# Patient Record
Sex: MALE | Race: WHITE | NOT HISPANIC OR LATINO | Employment: OTHER | URBAN - METROPOLITAN AREA
[De-identification: names, ages, dates, MRNs, and addresses within clinical notes are randomized per-mention and may not be internally consistent; named-entity substitution may affect disease eponyms.]

---

## 2018-04-09 ENCOUNTER — TRANSCRIBE ORDERS (OUTPATIENT)
Dept: ADMINISTRATIVE | Facility: HOSPITAL | Age: 63
End: 2018-04-09

## 2018-04-09 DIAGNOSIS — J01.00 SUBACUTE MAXILLARY SINUSITIS: Primary | ICD-10-CM

## 2018-06-05 ENCOUNTER — HOSPITAL ENCOUNTER (INPATIENT)
Facility: HOSPITAL | Age: 63
LOS: 11 days | Discharge: HOME | DRG: 486 | End: 2018-06-16
Attending: EMERGENCY MEDICINE | Admitting: FAMILY MEDICINE
Payer: COMMERCIAL

## 2018-06-05 DIAGNOSIS — M70.51 INFRAPATELLAR BURSITIS OF RIGHT KNEE: ICD-10-CM

## 2018-06-05 DIAGNOSIS — A41.9 SEPSIS (HCC): ICD-10-CM

## 2018-06-05 DIAGNOSIS — L30.9 DERMATITIS: ICD-10-CM

## 2018-06-05 DIAGNOSIS — L03.115 CELLULITIS OF RIGHT LOWER EXTREMITY: Primary | ICD-10-CM

## 2018-06-05 DIAGNOSIS — M00.061 STAPHYLOCOCCAL ARTHRITIS OF RIGHT KNEE (HCC): ICD-10-CM

## 2018-06-05 DIAGNOSIS — F41.8 DEPRESSION WITH ANXIETY: ICD-10-CM

## 2018-06-05 DIAGNOSIS — M25.461 EFFUSION OF RIGHT KNEE: ICD-10-CM

## 2018-06-05 LAB
ALBUMIN SERPL BCP-MCNC: 3.5 G/DL (ref 3.5–5)
ALP SERPL-CCNC: 103 U/L (ref 46–116)
ALT SERPL W P-5'-P-CCNC: 74 U/L (ref 12–78)
ANION GAP SERPL CALCULATED.3IONS-SCNC: 9 MMOL/L (ref 4–13)
APTT PPP: 29 SECONDS (ref 24–36)
AST SERPL W P-5'-P-CCNC: 35 U/L (ref 5–45)
BASOPHILS # BLD AUTO: 0.03 THOUSANDS/ΜL (ref 0–0.1)
BASOPHILS NFR BLD AUTO: 0 % (ref 0–1)
BILIRUB SERPL-MCNC: 0.3 MG/DL (ref 0.2–1)
BUN SERPL-MCNC: 19 MG/DL (ref 5–25)
CALCIUM SERPL-MCNC: 9.4 MG/DL (ref 8.3–10.1)
CHLORIDE SERPL-SCNC: 97 MMOL/L (ref 100–108)
CO2 SERPL-SCNC: 28 MMOL/L (ref 21–32)
CREAT SERPL-MCNC: 0.77 MG/DL (ref 0.6–1.3)
EOSINOPHIL # BLD AUTO: 0.03 THOUSAND/ΜL (ref 0–0.61)
EOSINOPHIL NFR BLD AUTO: 0 % (ref 0–6)
ERYTHROCYTE [DISTWIDTH] IN BLOOD BY AUTOMATED COUNT: 13.3 % (ref 11.6–15.1)
GFR SERPL CREATININE-BSD FRML MDRD: 98 ML/MIN/1.73SQ M
GLUCOSE SERPL-MCNC: 95 MG/DL (ref 65–140)
HCT VFR BLD AUTO: 42.2 % (ref 36.5–49.3)
HGB BLD-MCNC: 13.5 G/DL (ref 12–17)
IMM GRANULOCYTES # BLD AUTO: 0.07 THOUSAND/UL (ref 0–0.2)
IMM GRANULOCYTES NFR BLD AUTO: 1 % (ref 0–2)
INR PPP: 0.92 (ref 0.86–1.16)
LACTATE SERPL-SCNC: 1.3 MMOL/L (ref 0.5–2)
LYMPHOCYTES # BLD AUTO: 2.17 THOUSANDS/ΜL (ref 0.6–4.47)
LYMPHOCYTES NFR BLD AUTO: 15 % (ref 14–44)
MCH RBC QN AUTO: 30.9 PG (ref 26.8–34.3)
MCHC RBC AUTO-ENTMCNC: 32 G/DL (ref 31.4–37.4)
MCV RBC AUTO: 97 FL (ref 82–98)
MONOCYTES # BLD AUTO: 1.17 THOUSAND/ΜL (ref 0.17–1.22)
MONOCYTES NFR BLD AUTO: 8 % (ref 4–12)
NEUTROPHILS # BLD AUTO: 10.75 THOUSANDS/ΜL (ref 1.85–7.62)
NEUTS SEG NFR BLD AUTO: 76 % (ref 43–75)
NRBC BLD AUTO-RTO: 0 /100 WBCS
PLATELET # BLD AUTO: 393 THOUSANDS/UL (ref 149–390)
PMV BLD AUTO: 8.6 FL (ref 8.9–12.7)
POTASSIUM SERPL-SCNC: 3.8 MMOL/L (ref 3.5–5.3)
PROT SERPL-MCNC: 8.2 G/DL (ref 6.4–8.2)
PROTHROMBIN TIME: 9.7 SECONDS (ref 9.4–11.7)
RBC # BLD AUTO: 4.37 MILLION/UL (ref 3.88–5.62)
SODIUM SERPL-SCNC: 134 MMOL/L (ref 136–145)
URATE SERPL-MCNC: 5 MG/DL (ref 4.2–8)
WBC # BLD AUTO: 14.22 THOUSAND/UL (ref 4.31–10.16)

## 2018-06-05 PROCEDURE — 84550 ASSAY OF BLOOD/URIC ACID: CPT | Performed by: NURSE PRACTITIONER

## 2018-06-05 PROCEDURE — 36415 COLL VENOUS BLD VENIPUNCTURE: CPT | Performed by: EMERGENCY MEDICINE

## 2018-06-05 PROCEDURE — 85610 PROTHROMBIN TIME: CPT | Performed by: EMERGENCY MEDICINE

## 2018-06-05 PROCEDURE — 85652 RBC SED RATE AUTOMATED: CPT | Performed by: NURSE PRACTITIONER

## 2018-06-05 PROCEDURE — 85025 COMPLETE CBC W/AUTO DIFF WBC: CPT | Performed by: EMERGENCY MEDICINE

## 2018-06-05 PROCEDURE — 99284 EMERGENCY DEPT VISIT MOD MDM: CPT

## 2018-06-05 PROCEDURE — 85730 THROMBOPLASTIN TIME PARTIAL: CPT | Performed by: EMERGENCY MEDICINE

## 2018-06-05 PROCEDURE — 99222 1ST HOSP IP/OBS MODERATE 55: CPT | Performed by: NURSE PRACTITIONER

## 2018-06-05 PROCEDURE — 87040 BLOOD CULTURE FOR BACTERIA: CPT | Performed by: EMERGENCY MEDICINE

## 2018-06-05 PROCEDURE — 83605 ASSAY OF LACTIC ACID: CPT | Performed by: NURSE PRACTITIONER

## 2018-06-05 PROCEDURE — 80053 COMPREHEN METABOLIC PANEL: CPT | Performed by: EMERGENCY MEDICINE

## 2018-06-05 RX ORDER — ZOLPIDEM TARTRATE 5 MG/1
10 TABLET ORAL
Status: DISCONTINUED | OUTPATIENT
Start: 2018-06-05 | End: 2018-06-11

## 2018-06-05 RX ORDER — SACCHAROMYCES BOULARDII 250 MG
250 CAPSULE ORAL 2 TIMES DAILY
Status: DISCONTINUED | OUTPATIENT
Start: 2018-06-06 | End: 2018-06-16 | Stop reason: HOSPADM

## 2018-06-05 RX ORDER — ACETAMINOPHEN 325 MG/1
650 TABLET ORAL ONCE
Status: DISCONTINUED | OUTPATIENT
Start: 2018-06-05 | End: 2018-06-05

## 2018-06-05 RX ORDER — SODIUM CHLORIDE 9 MG/ML
50 INJECTION, SOLUTION INTRAVENOUS CONTINUOUS
Status: DISCONTINUED | OUTPATIENT
Start: 2018-06-06 | End: 2018-06-08

## 2018-06-05 RX ORDER — ALPRAZOLAM 0.5 MG/1
0.5 TABLET ORAL 2 TIMES DAILY PRN
Status: DISCONTINUED | OUTPATIENT
Start: 2018-06-05 | End: 2018-06-16 | Stop reason: HOSPADM

## 2018-06-05 RX ORDER — HEPARIN SODIUM 5000 [USP'U]/ML
5000 INJECTION, SOLUTION INTRAVENOUS; SUBCUTANEOUS EVERY 8 HOURS SCHEDULED
Status: DISCONTINUED | OUTPATIENT
Start: 2018-06-05 | End: 2018-06-06

## 2018-06-05 RX ORDER — ZOLPIDEM TARTRATE 10 MG/1
10 TABLET ORAL
COMMUNITY

## 2018-06-05 RX ORDER — ONDANSETRON 2 MG/ML
4 INJECTION INTRAMUSCULAR; INTRAVENOUS EVERY 6 HOURS PRN
Status: DISCONTINUED | OUTPATIENT
Start: 2018-06-05 | End: 2018-06-16 | Stop reason: HOSPADM

## 2018-06-05 RX ORDER — IBUPROFEN 600 MG/1
600 TABLET ORAL ONCE
Status: COMPLETED | OUTPATIENT
Start: 2018-06-05 | End: 2018-06-05

## 2018-06-05 RX ORDER — MAGNESIUM HYDROXIDE/ALUMINUM HYDROXICE/SIMETHICONE 120; 1200; 1200 MG/30ML; MG/30ML; MG/30ML
30 SUSPENSION ORAL EVERY 6 HOURS PRN
Status: DISCONTINUED | OUTPATIENT
Start: 2018-06-05 | End: 2018-06-16 | Stop reason: HOSPADM

## 2018-06-05 RX ORDER — VENLAFAXINE 75 MG/1
75 TABLET ORAL 2 TIMES DAILY
COMMUNITY

## 2018-06-05 RX ORDER — PRAVASTATIN SODIUM 80 MG/1
80 TABLET ORAL
Status: DISCONTINUED | OUTPATIENT
Start: 2018-06-05 | End: 2018-06-16 | Stop reason: HOSPADM

## 2018-06-05 RX ORDER — OXYCODONE HYDROCHLORIDE AND ACETAMINOPHEN 5; 325 MG/1; MG/1
1 TABLET ORAL EVERY 6 HOURS PRN
Status: DISCONTINUED | OUTPATIENT
Start: 2018-06-05 | End: 2018-06-14

## 2018-06-05 RX ORDER — KETOROLAC TROMETHAMINE 30 MG/ML
30 INJECTION, SOLUTION INTRAMUSCULAR; INTRAVENOUS ONCE
Status: COMPLETED | OUTPATIENT
Start: 2018-06-05 | End: 2018-06-05

## 2018-06-05 RX ADMIN — HEPARIN SODIUM 5000 UNITS: 5000 INJECTION, SOLUTION INTRAVENOUS; SUBCUTANEOUS at 23:17

## 2018-06-05 RX ADMIN — IBUPROFEN 600 MG: 600 TABLET ORAL at 21:09

## 2018-06-05 RX ADMIN — CEFTRIAXONE 1000 MG: 1 INJECTION, SOLUTION INTRAVENOUS at 21:28

## 2018-06-05 RX ADMIN — SODIUM CHLORIDE 75 ML/HR: 0.9 INJECTION, SOLUTION INTRAVENOUS at 22:42

## 2018-06-05 RX ADMIN — VANCOMYCIN HYDROCHLORIDE 1250 MG: 10 INJECTION, POWDER, LYOPHILIZED, FOR SOLUTION INTRAVENOUS at 23:17

## 2018-06-05 RX ADMIN — PRAVASTATIN SODIUM 80 MG: 80 TABLET ORAL at 23:17

## 2018-06-05 RX ADMIN — KETOROLAC TROMETHAMINE 30 MG: 30 INJECTION, SOLUTION INTRAMUSCULAR at 21:27

## 2018-06-06 ENCOUNTER — APPOINTMENT (INPATIENT)
Dept: RADIOLOGY | Facility: HOSPITAL | Age: 63
DRG: 486 | End: 2018-06-06
Payer: COMMERCIAL

## 2018-06-06 PROBLEM — E87.1 HYPONATREMIA: Status: ACTIVE | Noted: 2018-06-06

## 2018-06-06 PROBLEM — D75.839 THROMBOCYTOSIS: Status: ACTIVE | Noted: 2018-06-06

## 2018-06-06 LAB
ANION GAP SERPL CALCULATED.3IONS-SCNC: 7 MMOL/L (ref 4–13)
APPEARANCE FLD: CLEAR
BASOPHILS # BLD AUTO: 0.03 THOUSANDS/ΜL (ref 0–0.1)
BASOPHILS NFR BLD AUTO: 0 % (ref 0–1)
BUN SERPL-MCNC: 16 MG/DL (ref 5–25)
CALCIUM SERPL-MCNC: 8.7 MG/DL (ref 8.3–10.1)
CHLORIDE SERPL-SCNC: 101 MMOL/L (ref 100–108)
CO2 SERPL-SCNC: 26 MMOL/L (ref 21–32)
COLOR FLD: ABNORMAL
CREAT SERPL-MCNC: 0.67 MG/DL (ref 0.6–1.3)
CRP SERPL QL: >90 MG/L
CRYSTALS SNV QL MICRO: NORMAL
EOSINOPHIL # BLD AUTO: 0.05 THOUSAND/ΜL (ref 0–0.61)
EOSINOPHIL NFR BLD AUTO: 0 % (ref 0–6)
ERYTHROCYTE [DISTWIDTH] IN BLOOD BY AUTOMATED COUNT: 13.4 % (ref 11.6–15.1)
ERYTHROCYTE [SEDIMENTATION RATE] IN BLOOD: 51 MM/HOUR (ref 2–10)
EST. AVERAGE GLUCOSE BLD GHB EST-MCNC: 154 MG/DL
GFR SERPL CREATININE-BSD FRML MDRD: 104 ML/MIN/1.73SQ M
GLUCOSE SERPL-MCNC: 96 MG/DL (ref 65–140)
HBA1C MFR BLD: 7 % (ref 4.2–6.3)
HCT VFR BLD AUTO: 36.5 % (ref 36.5–49.3)
HGB BLD-MCNC: 11.7 G/DL (ref 12–17)
IMM GRANULOCYTES # BLD AUTO: 0.04 THOUSAND/UL (ref 0–0.2)
IMM GRANULOCYTES NFR BLD AUTO: 0 % (ref 0–2)
LYMPHOCYTES # BLD AUTO: 2.21 THOUSANDS/ΜL (ref 0.6–4.47)
LYMPHOCYTES # SNV MANUAL: 12 %
LYMPHOCYTES NFR BLD AUTO: 19 % (ref 14–44)
MAGNESIUM SERPL-MCNC: 1.9 MG/DL (ref 1.6–2.6)
MCH RBC QN AUTO: 31 PG (ref 26.8–34.3)
MCHC RBC AUTO-ENTMCNC: 32.1 G/DL (ref 31.4–37.4)
MCV RBC AUTO: 97 FL (ref 82–98)
MONOCYTES # BLD AUTO: 0.94 THOUSAND/ΜL (ref 0.17–1.22)
MONOCYTES NFR BLD AUTO: 8 % (ref 4–12)
MONOCYTES NFR SNV MANUAL: 5 %
NEUTROPHILS # BLD AUTO: 8.42 THOUSANDS/ΜL (ref 1.85–7.62)
NEUTROPHILS NFR SNV MANUAL: 83 %
NEUTS SEG NFR BLD AUTO: 73 % (ref 43–75)
NRBC BLD AUTO-RTO: 0 /100 WBCS
PLATELET # BLD AUTO: 339 THOUSANDS/UL (ref 149–390)
PMV BLD AUTO: 9 FL (ref 8.9–12.7)
POTASSIUM SERPL-SCNC: 4.2 MMOL/L (ref 3.5–5.3)
RBC # BLD AUTO: 3.77 MILLION/UL (ref 3.88–5.62)
SITE: ABNORMAL
SODIUM SERPL-SCNC: 134 MMOL/L (ref 136–145)
TOTAL CELLS COUNTED SPEC: 100
WBC # BLD AUTO: 11.69 THOUSAND/UL (ref 4.31–10.16)
WBC # FLD MANUAL: ABNORMAL /UL (ref 0–200)

## 2018-06-06 PROCEDURE — 87186 SC STD MICRODIL/AGAR DIL: CPT | Performed by: PHYSICIAN ASSISTANT

## 2018-06-06 PROCEDURE — 87070 CULTURE OTHR SPECIMN AEROBIC: CPT | Performed by: PHYSICIAN ASSISTANT

## 2018-06-06 PROCEDURE — 80048 BASIC METABOLIC PNL TOTAL CA: CPT | Performed by: NURSE PRACTITIONER

## 2018-06-06 PROCEDURE — 87205 SMEAR GRAM STAIN: CPT | Performed by: SPECIALIST

## 2018-06-06 PROCEDURE — 20610 DRAIN/INJ JOINT/BURSA W/O US: CPT | Performed by: ORTHOPAEDIC SURGERY

## 2018-06-06 PROCEDURE — 99232 SBSQ HOSP IP/OBS MODERATE 35: CPT | Performed by: INTERNAL MEDICINE

## 2018-06-06 PROCEDURE — 0S9C3ZZ DRAINAGE OF RIGHT KNEE JOINT, PERCUTANEOUS APPROACH: ICD-10-PCS | Performed by: ORTHOPAEDIC SURGERY

## 2018-06-06 PROCEDURE — 89051 BODY FLUID CELL COUNT: CPT | Performed by: PHYSICIAN ASSISTANT

## 2018-06-06 PROCEDURE — 83735 ASSAY OF MAGNESIUM: CPT | Performed by: NURSE PRACTITIONER

## 2018-06-06 PROCEDURE — 73560 X-RAY EXAM OF KNEE 1 OR 2: CPT

## 2018-06-06 PROCEDURE — 87102 FUNGUS ISOLATION CULTURE: CPT | Performed by: SPECIALIST

## 2018-06-06 PROCEDURE — 83036 HEMOGLOBIN GLYCOSYLATED A1C: CPT | Performed by: NURSE PRACTITIONER

## 2018-06-06 PROCEDURE — 86618 LYME DISEASE ANTIBODY: CPT | Performed by: SPECIALIST

## 2018-06-06 PROCEDURE — 87476 LYME DIS DNA AMP PROBE: CPT | Performed by: SPECIALIST

## 2018-06-06 PROCEDURE — 87147 CULTURE TYPE IMMUNOLOGIC: CPT | Performed by: PHYSICIAN ASSISTANT

## 2018-06-06 PROCEDURE — 99253 IP/OBS CNSLTJ NEW/EST LOW 45: CPT | Performed by: ORTHOPAEDIC SURGERY

## 2018-06-06 PROCEDURE — 0M9N3ZZ DRAINAGE OF RIGHT KNEE BURSA AND LIGAMENT, PERCUTANEOUS APPROACH: ICD-10-PCS | Performed by: ORTHOPAEDIC SURGERY

## 2018-06-06 PROCEDURE — 85025 COMPLETE CBC W/AUTO DIFF WBC: CPT | Performed by: NURSE PRACTITIONER

## 2018-06-06 PROCEDURE — 89060 EXAM SYNOVIAL FLUID CRYSTALS: CPT | Performed by: PHYSICIAN ASSISTANT

## 2018-06-06 PROCEDURE — 87186 SC STD MICRODIL/AGAR DIL: CPT | Performed by: SPECIALIST

## 2018-06-06 PROCEDURE — 87205 SMEAR GRAM STAIN: CPT | Performed by: PHYSICIAN ASSISTANT

## 2018-06-06 PROCEDURE — 86140 C-REACTIVE PROTEIN: CPT | Performed by: NURSE PRACTITIONER

## 2018-06-06 PROCEDURE — 87070 CULTURE OTHR SPECIMN AEROBIC: CPT | Performed by: SPECIALIST

## 2018-06-06 PROCEDURE — 87147 CULTURE TYPE IMMUNOLOGIC: CPT | Performed by: SPECIALIST

## 2018-06-06 RX ORDER — HEPARIN SODIUM 5000 [USP'U]/ML
5000 INJECTION, SOLUTION INTRAVENOUS; SUBCUTANEOUS EVERY 8 HOURS SCHEDULED
Status: DISCONTINUED | OUTPATIENT
Start: 2018-06-06 | End: 2018-06-06

## 2018-06-06 RX ORDER — IBUPROFEN 400 MG/1
400 TABLET ORAL EVERY 8 HOURS SCHEDULED
Status: DISCONTINUED | OUTPATIENT
Start: 2018-06-06 | End: 2018-06-10

## 2018-06-06 RX ORDER — ACETAMINOPHEN 325 MG/1
650 TABLET ORAL EVERY 6 HOURS PRN
Status: DISCONTINUED | OUTPATIENT
Start: 2018-06-06 | End: 2018-06-10

## 2018-06-06 RX ORDER — HEPARIN SODIUM 5000 [USP'U]/ML
5000 INJECTION, SOLUTION INTRAVENOUS; SUBCUTANEOUS EVERY 12 HOURS SCHEDULED
Status: DISCONTINUED | OUTPATIENT
Start: 2018-06-06 | End: 2018-06-14

## 2018-06-06 RX ADMIN — CEFTRIAXONE 2000 MG: 2 INJECTION, SOLUTION INTRAVENOUS at 18:29

## 2018-06-06 RX ADMIN — Medication 250 MG: at 18:30

## 2018-06-06 RX ADMIN — VANCOMYCIN HYDROCHLORIDE 1250 MG: 10 INJECTION, POWDER, LYOPHILIZED, FOR SOLUTION INTRAVENOUS at 10:43

## 2018-06-06 RX ADMIN — Medication 250 MG: at 09:19

## 2018-06-06 RX ADMIN — OXYCODONE HYDROCHLORIDE AND ACETAMINOPHEN 1 TABLET: 5; 325 TABLET ORAL at 04:06

## 2018-06-06 RX ADMIN — SODIUM CHLORIDE 50 ML/HR: 0.9 INJECTION, SOLUTION INTRAVENOUS at 18:38

## 2018-06-06 RX ADMIN — HEPARIN SODIUM 5000 UNITS: 5000 INJECTION, SOLUTION INTRAVENOUS; SUBCUTANEOUS at 21:37

## 2018-06-06 RX ADMIN — HEPARIN SODIUM 5000 UNITS: 5000 INJECTION, SOLUTION INTRAVENOUS; SUBCUTANEOUS at 12:36

## 2018-06-06 RX ADMIN — ACETAMINOPHEN 650 MG: 325 TABLET ORAL at 18:30

## 2018-06-06 RX ADMIN — PRAVASTATIN SODIUM 80 MG: 80 TABLET ORAL at 18:31

## 2018-06-06 RX ADMIN — OXYCODONE HYDROCHLORIDE AND ACETAMINOPHEN 1 TABLET: 5; 325 TABLET ORAL at 21:41

## 2018-06-06 RX ADMIN — IBUPROFEN 400 MG: 400 TABLET ORAL at 21:37

## 2018-06-06 RX ADMIN — IBUPROFEN 400 MG: 400 TABLET ORAL at 14:42

## 2018-06-06 RX ADMIN — ACETAMINOPHEN 650 MG: 325 TABLET ORAL at 10:43

## 2018-06-06 RX ADMIN — ZOLPIDEM TARTRATE 10 MG: 5 TABLET ORAL at 22:35

## 2018-06-06 RX ADMIN — ENALAPRIL MALEATE 15 MG: 10 TABLET ORAL at 09:10

## 2018-06-06 RX ADMIN — ALPRAZOLAM 0.5 MG: 0.5 TABLET ORAL at 18:38

## 2018-06-06 NOTE — ASSESSMENT & PLAN NOTE
As evidenced by WBC 14 22, pulse rate 103, with suspected source of infection right lower extremity cellulitis, possible bursitis vs septic joint right knee  · Patient received IV Rocephin in ED, presently on Vancomycin IV q 12hr   · Consulted ID and orthopedics   · Gentle IV fluids in view of sepsis  · Follow-up cultures

## 2018-06-06 NOTE — CASE MANAGEMENT
Initial Clinical Review    Admission: Date/Time/Statement: 6/5/18 @ 2124     Orders Placed This Encounter   Procedures    Inpatient Admission (expected length of stay for this patient is greater than two midnights)     Standing Status:   Standing     Number of Occurrences:   1     Order Specific Question:   Admitting Physician     Answer:   Dora Landaverde [[id number]]     Order Specific Question:   Level of Care     Answer:   Med Surg [16]     Order Specific Question:   Estimated length of stay     Answer:   More than 2 Midnights     Order Specific Question:   Certification     Answer:   I certify that inpatient services are medically necessary for this patient for a duration of greater than two midnights  See H&P and MD Progress Notes for additional information about the patient's course of treatment  ED: Date/Time/Mode of Arrival:   ED Arrival Information     Expected Arrival Acuity Means of Arrival Escorted By Service Admission Type    - 6/5/2018 18:43 Urgent [facility] Urgent    Arrival Complaint    SWELLING OF THE RIGHT KNEE           Chief Complaint:   Chief Complaint   Patient presents with    Leg Swelling     Pt reports right let swollen, red, warm to touch since 5/27  PCP treating for gout  History of Illness: Roge Smart is a 66 y o  male with PMH of borderline diabetes, hypertension, hyperlipidemia who presents with right leg infection  He states he was treated for gout in right knee with colchicine by his PCP on 5/25 for 10 days  He had right knee aspiration and cortisone injection to right knee by his PCP on 5/28 in his office  He states redness has spreaded to right lower leg with edema for past 4-5 days  Right knee is extremely painful, very tender,felt like pins and needles in right knee, and pain radiates up to right hip and right buttock  He was prescribed Ultracet by PCP with minimal relief    He reports chills and cold sweats for past 4-5 days, denies fever   He denies chest pains, headaches, dizziness, SOB, nauseous, vomiting, diarrhea constipation  He denies urinary symptoms, denies URI symptoms  He denies history of MRSA  In ED, patient received Rocephin 1 g, Motrin, Toradol IV  Musculoskeletal: He exhibits edema and tenderness  He exhibits no deformity  Right knee swollen, reddened, increased warmth, tender to touch  Edema, erythema extends to right lower leg,2+ edema in right leg  Limited ROM to right knee due to pain  ED Vital Signs:   ED Triage Vitals [06/05/18 1850]   Temperature Pulse Respirations Blood Pressure SpO2   97 8 °F (36 6 °C) 103 20 147/80 99 %      Temp Source Heart Rate Source Patient Position - Orthostatic VS BP Location FiO2 (%)   Tympanic Monitor Sitting Right arm --      Pain Score       5        Wt Readings from Last 1 Encounters:   06/05/18 86 2 kg (190 lb 0 2 oz)       Vital Signs (abnormal): Abnormal Labs/Diagnostic Test Results: SEDRATE 51  WBC Thousand/uL 14 22*     PLATELETS Thousands/uL 393*   NEUTROS PCT % 76*     SODIUM mmol/L 134*     CHLORIDE mmol/L 97*   XRAY KNEE RIGHT   1   Mild degenerative changes with small suprapatellar joint effusion  If there is clinical concern for septic arthritis, consider follow-up arthrocentesis  2   Diffuse soft tissue swelling anterior to the patella, most compatible with prepatellar bursitis    3   More focal soft tissue swelling overlying the anterior tibial tubercle, suspicious for superimposed infrapatellar bursitis        ED Treatment:   Medication Administration from 06/05/2018 1843 to 06/05/2018 2229       Date/Time Order Dose Route Action Action by Comments     06/05/2018 2109 ibuprofen (MOTRIN) tablet 600 mg 600 mg Oral Given Karina Bhatia RN      06/05/2018 2127 ketorolac (TORADOL) injection 30 mg 30 mg Intravenous Given Karina Bhatia RN      06/05/2018 2128 cefTRIAXone (ROCEPHIN) IVPB (premix) 1,000 mg 1,000 mg Intravenous New Bag Karina Bhatia RN           Past Medical/Surgical History: Active Ambulatory Problems     Diagnosis Date Noted    No Active Ambulatory Problems     Resolved Ambulatory Problems     Diagnosis Date Noted    No Resolved Ambulatory Problems     Past Medical History:   Diagnosis Date    Borderline diabetes     Depression     Hyperlipidemia     Hypertension        Admitting Diagnosis: Effusion of right knee [M25 461]  Cellulitis of right lower extremity [L03 115]  Sepsis ([facility] [address] ) [A41 9]  Pain and swelling of right knee [M25 561, M25 461]    Age/Sex: 66 y o  male    Assessment/Plan:   Sepsis ([facility] [address] )   Assessment & Plan     · As evidenced by WBC 14 22, pulse rate 103, with suspected source of infection right lower extremity cellulitis, possible septic joint right knee  · Patient received IV Rocephin in ED  Will escalate to IV Vanco   · Will consult Orthopedic and ID in a deepa [place]leson · Gentle IV fluids in view of sepsis  · Will follow cultures       Cellulitis of right lower extremity   Assessment & Plan     · Suspect septic joint in right knee with extending infection to right lower leg  · Marked edema, erythema, increased warmth, tenderness to right knee  · Patient was treated with colchicine on 5/25/18 for 10 days and had knee aspiration and cortisone injection in PCP's office on 5/28 per patient  · Will order x-ray right knee  Venous Doppler to rule out DVT  ·  Start patient on IV Vanco for possible septic joint  Pain control with Percocet p r n     Warm compress  Elevation  Neurovascular check  · Check ESR, CRP, procalcitonin  Check uric acid  · Consult Orthopedic and ID in a deepa [place] Keep patient NPO after midnight until seen by Orthopedic       Hyponatremia   Assessment & Plan     Mild  Sodium 134  Gentle IV hydration with normal saline  Repeat lab in a          Depression   Assessment & Plan     Continue Effexor       Thrombocytosis (HCC)   Assessment & Plan     Mild  Platelet 792  Likely reactive to sepsis    Heparin subcu for DVT prophylaxis  Monitor       Hypertension   Assessment & Plan     Continue enalapril  Monitor       Borderline diabetes   Assessment & Plan     Glucose 95  Check A1c       Hyperlipidemia   Assessment & Plan     Continue statin         VTE Prophylaxis: Heparin  / sequential compression device   Code Status: Full code  POLST: POLST form is not discussed and not completed at this time    Anticipated Length of Stay:  Patient will be admitted on an Inpatient basis with an anticipated length of stay of  > 2 midnights  Justification for Hospital Stay:  Sepsis, RLE cellulitis, possible septic joint      Admission Orders:  GMF  ELEVATE AFFECT LIMB  CONSULT ORTHOPEDIC SURGERY  CONSULT ID  VAS LOWER LIMB VENOUS DUPLEX  BMP CBC DIFF   PROCALCTONIN  NEUROVASCULAR CHECKS  Scheduled Meds:   Current Facility-Administered Medications:  acetaminophen 650 mg Oral Q6H PRN SANCHO Clemons    ALPRAZolam 0 5 mg Oral BID PRN SANCHO Melvin    aluminum-magnesium hydroxide-simethicone 30 mL Oral Q6H PRN SANCHO Melvin    enalapril 15 mg Oral Daily SANCHO Melvin    heparin (porcine) 5,000 Units Subcutaneous Q12H [address] SANCHO Clemons    ibuprofen 400 mg Oral [facility] SANCHO Clemons    ondansetron 4 mg Intravenous Q6H PRN SANCHO Melvin    oxyCODONE-acetaminophen 1 tablet Oral Q6H PRN SANCHO Melvin    pravastatin 80 mg Oral Daily With SANCHO Macias    saccharomyces boulardii 250 mg Oral BID SANCHO Melvin    sodium chloride 50 mL/hr Intravenous Continuous SANCHO Clemons Last Rate: 75 mL/hr (06/05/18 2242)   vancomycin 15 mg/kg Intravenous Q12H SANCHO Melvin Last Rate: 1,250 mg (06/06/18 1043)   zolpidem 10 mg Oral HS PRN SANCHO Melvin      Continuous Infusions:   sodium chloride 50 mL/hr Last Rate: 75 mL/hr (06/05/18 2242)     PRN Meds:   acetaminophen    ALPRAZolam    aluminum-magnesium hydroxide-simethicone    ondansetron   oxyCODONE-acetaminophen    zolpidem

## 2018-06-06 NOTE — PLAN OF CARE
DISCHARGE PLANNING     Discharge to home or other facility with appropriate resources Progressing        INFECTION - ADULT     Absence or prevention of progression during hospitalization Progressing     Absence of fever/infection during neutropenic period Progressing        Knowledge Deficit     Patient/family/caregiver demonstrates understanding of disease process, treatment plan, medications, and discharge instructions Progressing        METABOLIC, FLUID AND ELECTROLYTES - ADULT     Glucose maintained within target range Progressing        MUSCULOSKELETAL - ADULT     Maintain or return mobility to safest level of function Progressing     Maintain proper alignment of affected body part Progressing        PAIN - ADULT     Verbalizes/displays adequate comfort level or baseline comfort level Progressing        Potential for Falls     Patient will remain free of falls Progressing        Prexisting or High Potential for Compromised Skin Integrity     Skin integrity is maintained or improved Progressing        SKIN/TISSUE INTEGRITY - ADULT     Skin integrity remains intact Progressing     Incision(s), wounds(s) or drain site(s) healing without S/S of infection Progressing

## 2018-06-06 NOTE — SEPSIS NOTE
Sepsis Note   Jose L Lesser 66 y o  male MRN: [id number]  Unit/Bed#: ED 09 Encounter: [id number]            Initial Sepsis Screening     Row Name 06/05/18 2201                Is the patient's history suggestive of a new or worsening infection? (!)  Yes (Proceed)  -CY        Suspected source of infection soft tissue  -CY        Are two or more of the following signs & symptoms of infection both present and new to the patient? (!)  Yes (Proceed)  -CY        Indicate SIRS criteria Tachycardia > 90 bpm;Leukocytosis (WBC > 09079 IJL)  -CY        If the answer is yes to both questions, suspicion of sepsis is present          If severe sepsis is present AND tissue hypoperfusion perists in the hour after fluid resuscitation or lactate > 4, the patient meets criteria for SEPTIC SHOCK          Are any of the following organ dysfunction criteria present within 6 hours of suspected infection and SIRS criteria that are NOT considered to be chronic conditions? No  -CY        Organ dysfunction          Date of presentation of severe sepsis          Time of presentation of severe sepsis          Tissue hypoperfusion persists in the hour after crystalloid fluid administration, evidenced, by either:          Was hypotension present within one hour of the conclusion of crystalloid fluid administration?           Date of presentation of septic shock          Time of presentation of septic shock            User Key  (r) = Recorded By, (t) = Taken By, (c) = Cosigned By    Initials Name Provider Type    [address], [address] Nenita [place] Nurse Practitioner

## 2018-06-06 NOTE — CASE MANAGEMENT
Continued Stay Review    Date: 6/6/18    Vital Signs: /77 (BP Location: Right arm)   Pulse 83   Temp 98 1 °F (36 7 °C) (Oral)   Resp 18   Ht 5' 9" (1 753 m)   Wt 86 2 kg (190 lb 0 2 oz)   SpO2 98%   BMI 28 06 kg/m²       GMF  SEDRATE  C REACTIVE  NEUROVASULAR CHECK  XR KNEE 1 OR 2 VIEW  MOTRIN  BMP CBC DIFF   PROCALCITONIN  Medications:   Scheduled Meds:   Current Facility-Administered Medications:  acetaminophen 650 mg Oral Q6H PRN SANCHO Allen    ALPRAZolam 0 5 mg Oral BID PRN Papo Alberto, SANCHO    aluminum-magnesium hydroxide-simethicone 30 mL Oral Q6H PRN Papo Alberto, SANCHO    enalapril 15 mg Oral Daily Cuijosé Alberto, SANCHO    heparin (porcine) 5,000 Units Subcutaneous Q12H [address] SANCHO Allen    ibuprofen 400 mg Oral On license of [facility] SANCHO Allen    ondansetron 4 mg Intravenous Q6H PRN Papo Alberto, SANCHO    oxyCODONE-acetaminophen 1 tablet Oral Q6H PRN Ppao Alberto, SANCHO    pravastatin 80 mg Oral Daily With SANCHO Macias    saccharomyces boulardii 250 mg Oral BID Cuijosé Alberto, SANCHO    sodium chloride 50 mL/hr Intravenous Continuous SANCHO Allen Last Rate: 75 mL/hr (06/05/18 2242)   vancomycin 15 mg/kg Intravenous Q12H SANCHO Melvin Last Rate: 1,250 mg (06/06/18 1043)   zolpidem 10 mg Oral HS PRN Papo Alberto, SANCHO      Continuous Infusions:   sodium chloride 50 mL/hr Last Rate: 75 mL/hr (06/05/18 2242)     PRN Meds:   acetaminophen    ALPRAZolam    aluminum-magnesium hydroxide-simethicone    ondansetron    oxyCODONE-acetaminophen    zolpidem    Abnormal Labs/Diagnostic Results:  WBC 11 69 ,HGBA1C 7 0 C REACTIVE >90 0  XRAY KNEE1  Mild degenerative changes with small suprapatellar joint effusion  If there is clinical concern for septic arthritis, consider follow-up arthrocentesis  2   Diffuse soft tissue swelling anterior to the patella, most compatible with prepatellar bursitis    3   More focal soft tissue swelling overlying the anterior tibial tubercle, suspicious for superimposed infrapatellar bursitis  Age/Sex: 66 y o  male     GENERAL SURGERY  Assessment:  1) Right knee Suprapatellar, prepatellar, infrapatellar bursitis - erythema, elevated leukocytosis, no tenderness with micro or macromotion, good AROM And PROM, not suspicious for septic arthritis, possible concurrent cellulitis vs bursitis vs gout, no history of gout, history of aspiration, no history of lyme or gonorrhea/chlamydia  2) Right knee pain - erythema present, could be due to gout; however, currently appears based on clinical history to have possible infectious etiology with bursitis, no improvement with colchicine  Plan:  1-2)   - Continue IV antibiotics  - IV fluids per SLIM  - RICE: rest, compression, elevation (no ice at this current moment because if gout arthropathy is underlying issue will necessarily alleviate pain)  - will continue to follow into tomorrow  - no acute orthopedic intervention  - can start NSAID if not contraindicated   - called outpatient PCP but their office was closed and was unable to obtain record of aspiration  - no requirement for repeat aspiration at this current moment expected with potential risk receiving the joint  - routine neurovascular exam with vitals  - repeat am labs with CBC, BMP, Mag, Phos  - will attempt to reach PCP tomorrow in order to obtain record of aspiration  - can r/o lyme or STI related reactive arthritis per SLIM with VRDL, STI panel, and PCR assay for lyme, will default to ID recommendations     ID CONSULT  This is a moved middle-age man with diabetes mellitus type 2, hypertension, hyperlipidemia, depression, presented with painful swelling and redness of the right leg particularly the knee area, not responsive to steroid injection to the right knee, given 8 days ago  He probably has septic bursitis/cellulitis, cannot rule out deeper ie , joint infection    Given his work history, previous Lyme disease, this could also be due to Lyme arthritis  Other microbial etiologies to be considered besides the usual staphylococci and streptococci, related to the steroid injection are Gram-negative bacilli, fungi, etc   Recommend aspiration of the bursas and submit for microbial diagnosis including Lyme disease PCR   Change antimicrobial treatment to ceftriaxone pending culture results  Discussed with the orthopedic surgeon  ATTENDING  Assessment & Plan     As evidenced by WBC 14 22, pulse rate 103, with suspected source of infection right lower extremity cellulitis, possible bursitis vs septic joint right knee  · Patient received IV Rocephin in ED, presently on Vancomycin IV q 12hr   · Consulted ID and orthopedics   · Gentle IV fluids in view of sepsis  · Follow-up cultures          Cellulitis of right lower extremity   Assessment & Plan     Suspect right knee bursitis vs septic joint in right knee with extending infection to right lower leg vs gout due to marked edema, erythema, increased warmth, tenderness with ROM to right knee  Patient was treated with colchicine on 5/25/18 for 10 days and had knee aspiration and cortisone injection in PCP's office on 5/28 per patient  · CRP > 90, SED rate 51, Uric Acid 5 0  · Right knee x-ray, small suprapatellar joint effusion, prepatellar bursitis, and more focal soft tissue swelling overlying the anterior tibial tubercle, suspicious for superimposed infrapatellar bursitis   If there is clinical concern for septic arthritis, consider follow-up arthrocentesis  · Venous Doppler to rule out DVT, pending   · Check Procalcitonin   · Continue Vancomycin IV q 12hrs for possible septic joint  · Pain control with scheduled Motrin and Percocet PRN  · Rest, elevation  · Neurovascular checks  · Consulted ID and orthopedic, pending consultation      Assessment & Plan     Mild  Sodium 134  Gentle IV hydration with normal saline      Repeat BMP in am          Thrombocytosis ([address] )   Assessment & Plan   Mild  Platelet 512 -> 233  Likely reactive to sepsis  Heparin subcu for DVT prophylaxis    Monitor CBC in am

## 2018-06-06 NOTE — H&P
H&P- Krishna Srinivasan 1955, 66 y o  male MRN: [id number]    Unit/Bed#: [address] [place] [address] Encounter: [id number]    Primary Care Provider: Jamie Cao MD   Date and time admitted to hospital: 6/5/2018  7:38 PM        * Sepsis [facility])   Assessment & Plan    · As evidenced by WBC 14 22, pulse rate 103, with suspected source of infection right lower extremity cellulitis, possible septic joint right knee  · Patient received IV Rocephin in ED  Will escalate to IV Vanco   · Will consult Orthopedic and ID in a [facility] · Gentle IV fluids in view of sepsis  · Will follow cultures  Cellulitis of right lower extremity   Assessment & Plan    · Suspect septic joint in right knee with extending infection to right lower leg  · Marked edema, erythema, increased warmth, tenderness to right knee  · Patient was treated with colchicine on 5/25/18 for 10 days and had knee aspiration and cortisone injection in PCP's office on 5/28 per patient  · Will order x-ray right knee  Venous Doppler to rule out DVT  ·  Start patient on IV Vanco for possible septic joint  Pain control with Percocet p r n     Warm compress  Elevation  Neurovascular check  · Check ESR, CRP, procalcitonin  Check uric acid  · Consult Orthopedic and ID in a [facility] Keep patient NPO after midnight until seen by Orthopedic  Hyponatremia   Assessment & Plan    Mild  Sodium 134  Gentle IV hydration with normal saline  Repeat lab in [facility] Depression   Assessment & Plan    Continue Effexor  Thrombocytosis (HCC)   Assessment & Plan    Mild  Platelet 431  Likely reactive to sepsis  Heparin subcu for DVT prophylaxis  Monitor  Hypertension   Assessment & Plan    Continue enalapril  Monitor  Borderline diabetes   Assessment & Plan    Glucose 95  Check A1c  Hyperlipidemia   Assessment & Plan    Continue statin              VTE Prophylaxis: Heparin  / sequential compression device   Code Status: Full code  POLST: POLST form is not discussed and not completed at this time  Anticipated Length of Stay:  Patient will be admitted on an Inpatient basis with an anticipated length of stay of  > 2 midnights  Justification for Hospital Stay:  Sepsis, RLE cellulitis, possible septic joint  Total Time for Visit, including Counseling / Coordination of Care: 45 minutes  Greater than 50% of this total time spent on direct patient counseling and coordination of care  Chief Complaint:   Right leg infection  History of Present Illness:    Reg Montoya is a 66 y o  male with PMH of borderline diabetes, hypertension, hyperlipidemia who presents with right leg infection  He states he was treated for gout in right knee with colchicine by his PCP on 5/25 for 10 days  He had right knee aspiration and cortisone injection to right knee by his PCP on 5/28 in his office  He states redness has spreaded to right lower leg with edema for past 4-5 days  Right knee is extremely painful, very tender,felt like pins and needles in right knee, and pain radiates up to right hip and right buttock  He was prescribed Ultracet by PCP with minimal relief  He reports chills and cold sweats for past 4-5 days, denies fever  He denies chest pains, headaches, dizziness, SOB, nauseous, vomiting, diarrhea constipation  He denies urinary symptoms, denies URI symptoms  He denies history of MRSA  In ED, patient received Rocephin 1 g, Motrin, Toradol IV  Review of Systems:    Review of Systems   Constitutional: Positive for chills and diaphoresis  Musculoskeletal: Positive for arthralgias and joint swelling  Skin: Positive for color change  All other systems reviewed and are negative        Past Medical and Surgical History:     Past Medical History:   Diagnosis Date    Borderline diabetes     Depression     Hyperlipidemia     Hypertension        Past Surgical History:   Procedure Laterality Date    LUMBAR EPIDURAL INJECTION      SHOULDER SURGERY Left     SPINE SURGERY         Meds/Allergies:    Prior to Admission medications    Medication Sig Start Date End Date Taking? Authorizing Provider   ALPRAZolam Jimkarlie Aldana) 0 5 mg tablet Take 0 5 mg by mouth 2 (two) times a day as needed     Yes Historical Provider, MD   ENALAPRIL MALEATE PO Take 5 mg by mouth 3 (three) times a day     Yes Historical Provider, MD   metFORMIN (GLUCOPHAGE) 1000 MG tablet Take 1,000 mg by mouth daily with breakfast   Yes Historical Provider, MD   rosuvastatin (CRESTOR) 10 MG tablet Take 10 mg by mouth daily  Yes Historical Provider, MD   venlafaxine [facility]) 75 mg tablet Take 75 mg by mouth 2 (two) times a day   Yes Historical Provider, MD   zolpidem (AMBIEN) 10 mg tablet Take 10 mg by mouth daily at bedtime as needed for sleep   Yes Historical Provider, MD   traMADol-acetaminophen (ULTRACET) 37 5-325 mg per tablet Take 1 tablet by mouth every 6 (six) hours as needed for moderate pain  Historical Provider, MD     I have reviewed home medications with patient personally  Allergies: Allergies   Allergen Reactions    Iodine Anaphylaxis    Shellfish-Derived Products        Social History:     Marital Status: Single   Occupation:  Retired  Patient Pre-hospital Living Situation:  Lives with family  Patient Pre-hospital Level of Mobility:  Independent  Patient Pre-hospital Diet Restrictions:  None  Substance Use History:   History   Alcohol Use No     History   Smoking Status    Current Some Day Smoker    Types: Cigars   Smokeless Tobacco    Never Used     History   Drug Use No       Family History:    History reviewed  No pertinent family history      Physical Exam:     Vitals:   Blood Pressure: 141/79 (06/05/18 2230)  Pulse: 87 (06/05/18 2230)  Temperature: 97 7 °F (36 5 °C) (06/05/18 2230)  Temp Source: Oral (06/05/18 2230)  Respirations: 18 (06/05/18 2230)  Height: 5' 9" (175 3 cm) (06/05/18 2230)  Weight - Scale: 86 2 kg (190 lb 0 2 oz) (06/05/18 2230)  SpO2: 98 % (06/05/18 2230)    Physical Exam   Constitutional: He is oriented to person, place, and time  He appears well-developed and well-nourished  HENT:   Head: Normocephalic and atraumatic  Neck: Normal range of motion  Neck supple  Cardiovascular: Normal rate and regular rhythm  Exam reveals no gallop and no friction rub  No murmur heard  Pulmonary/Chest: Effort normal and breath sounds normal  No respiratory distress  He has no wheezes  He has no rales  Abdominal: Soft  Bowel sounds are normal  He exhibits no distension  There is no tenderness  There is no rebound  Musculoskeletal: He exhibits edema and tenderness  He exhibits no deformity  Right knee swollen, reddened, increased warmth, tender to touch  Edema, erythema extends to right lower leg,2+ edema in right leg  Limited ROM to right knee due to pain  Neurological: He is alert and oriented to person, place, and time  Skin: There is erythema  Psychiatric:   Patient appears anxious  Nursing note and vitals reviewed  Additional Data:     Lab Results: I have personally reviewed pertinent reports  Results from last 7 days  Lab Units 06/05/18 2128   WBC Thousand/uL 14 22*   HEMOGLOBIN g/dL 13 5   HEMATOCRIT % 42 2   PLATELETS Thousands/uL 393*   NEUTROS PCT % 76*   LYMPHS PCT % 15   MONOS PCT % 8   EOS PCT % 0       Results from last 7 days  Lab Units 06/05/18  2128   SODIUM mmol/L 134*   POTASSIUM mmol/L 3 8   CHLORIDE mmol/L 97*   CO2 mmol/L 28   BUN mg/dL 19   CREATININE mg/dL 0 77   CALCIUM mg/dL 9 4   TOTAL PROTEIN g/dL 8 2   BILIRUBIN TOTAL mg/dL 0 30   ALK PHOS U/L 103   ALT U/L 74   AST U/L 35   GLUCOSE RANDOM mg/dL 95       Results from last 7 days  Lab Units 06/05/18  2128   INR  0 92       Imaging: I have personally reviewed pertinent reports  X-ray right knee pending    EKG, Pathology, and Other Studies Reviewed on Admission:   · EKG: n/a      Allscripts Records Reviewed: No  n/a     ** Please Note: Dragon 360 Dictation voice to text software may have been used in the creation of this document   **

## 2018-06-06 NOTE — ASSESSMENT & PLAN NOTE
· Suspect septic joint in right knee with extending infection to right lower leg  · Marked edema, erythema, increased warmth, tenderness to right knee  · Patient was treated with colchicine on 5/25/18 for 10 days and had knee aspiration and cortisone injection in PCP's office on 5/28 per patient  · Will order x-ray right knee  Venous Doppler to rule out DVT  ·  Start patient on IV Vanco for possible septic joint  Pain control with Percocet p r n     Warm compress  Elevation  Neurovascular check  · Check ESR, CRP, procalcitonin  Check uric acid  · [facility] Keep patient NPO after midnight until seen by Orthopedic

## 2018-06-06 NOTE — PLAN OF CARE
Problem: INFECTION - ADULT  Goal: Absence of fever/infection during neutropenic period  INTERVENTIONS:  - Monitor WBC      Outcome: Adequate for Discharge

## 2018-06-06 NOTE — ED NOTES
RT leg noted to have edema to knee,below knee and above knee  RT knee with noted area of redness,warm to touch  Patient was told by primary MD to come have leg seen in ER       Bulmaro Valladares RN  06/05/18 2005

## 2018-06-06 NOTE — SEPSIS NOTE
Sepsis Note   Robbie Arevalo 66 y o  male MRN: [id number]  Unit/Bed#: ED 09 Encounter: [id number]            Initial Sepsis Screening     Row Name 06/05/18 2201                Is the patient's history suggestive of a new or worsening infection? (!)  Yes (Proceed)  -CY        Suspected source of infection soft tissue  -CY        Are two or more of the following signs & symptoms of infection both present and new to the patient? (!)  Yes (Proceed)  -CY        Indicate SIRS criteria Tachycardia > 90 bpm;Leukocytosis (WBC > 24970 IJL)  -CY        If the answer is yes to both questions, suspicion of sepsis is present          If severe sepsis is present AND tissue hypoperfusion perists in the hour after fluid resuscitation or lactate > 4, the patient meets criteria for SEPTIC SHOCK          Are any of the following organ dysfunction criteria present within 6 hours of suspected infection and SIRS criteria that are NOT considered to be chronic conditions? No  -CY        Organ dysfunction          Date of presentation of severe sepsis          Time of presentation of severe sepsis          Tissue hypoperfusion persists in the hour after crystalloid fluid administration, evidenced, by either:          Was hypotension present within one hour of the conclusion of crystalloid fluid administration?           Date of presentation of septic shock          Time of presentation of septic shock            User Key  (r) = Recorded By, (t) = Taken By, (c) = Cosigned By    Initials Name Provider Type    [address], [address] Nenita [address] Nurse Practitioner

## 2018-06-06 NOTE — ED PROVIDER NOTES
History  Chief Complaint   Patient presents with    Leg Swelling     Pt reports right let swollen, red, warm to touch since 5/27  PCP treating for gout  24-year-old white male borderline diabetic presents with a for evaluation of red swollen right lower extremity  Patient was being treated for gout for the last 10 days and the symptoms have gotten progressively worse  No fever, no known trauma, patient complains of pain, increased tenderness, increased swelling, increased redness  History provided by:  Patient  Knee Pain   Location:  Knee  Time since incident:  1 week  Injury: no    Knee location:  R knee  Pain details:     Quality:  Aching and throbbing    Radiates to:  Does not radiate    Severity:  Moderate    Onset quality:  Gradual    Duration:  1 week    Timing:  Constant    Progression:  Worsening  Chronicity:  Recurrent  Prior injury to area:  No  Relieved by:  Nothing  Worsened by:  Bearing weight, extension and flexion  Ineffective treatments:  NSAIDs  Associated symptoms: no fever        Prior to Admission Medications   Prescriptions Last Dose Informant Patient Reported? Taking? ALPRAZolam (XANAX) 0 5 mg tablet 6/4/2018 at Unknown time  Yes Yes   Sig: Take 0 5 mg by mouth 2 (two) times a day as needed     ENALAPRIL MALEATE PO 6/5/2018 at 0800  Yes Yes   Sig: Take 5 mg by mouth 3 (three) times a day     metFORMIN (GLUCOPHAGE) 1000 MG tablet 6/5/2018 at 0800  Yes Yes   Sig: Take 1,000 mg by mouth daily with breakfast   rosuvastatin (CRESTOR) 10 MG tablet 6/4/2018 at 2100  Yes Yes   Sig: Take 10 mg by mouth daily  traMADol-acetaminophen (ULTRACET) 37 5-325 mg per tablet   Yes No   Sig: Take 1 tablet by mouth every 6 (six) hours as needed for moderate pain     venlafaxine (EFFEXOR) 75 mg tablet 6/4/2018  Yes Yes   Sig: Take 75 mg by mouth 2 (two) times a day   zolpidem (AMBIEN) 10 mg tablet   Yes Yes   Sig: Take 10 mg by mouth daily at bedtime as needed for sleep Facility-Administered Medications: None       Past Medical History:   Diagnosis Date    Borderline diabetes     Depression     Hyperlipidemia     Hypertension        Past Surgical History:   Procedure Laterality Date    LUMBAR EPIDURAL INJECTION      SHOULDER SURGERY Left     SPINE SURGERY      WOUND DEBRIDEMENT Right 6/8/2018    Procedure: RIGHT KNEE ARTHROSCOPY, IRRIGATION AND DEBRIDEMENT; RIGHT KNEE IRRIGATION AND EXTENSIVE DEBRIDEMENT OF INFECTED BURSA; Surgeon: Loraine Palmer MD;  Location: [address] [place] [address];  Service: Orthopedics    WOUND DEBRIDEMENT Right 6/11/2018    Procedure: open DEBRIDEMENT patellar bursa, arthroscopic right knee joint irrigation and debridement;  Surgeon: Loraine Palmer MD;  Location: [address] [place] [address];  Service: Orthopedics       History reviewed  No pertinent family history  I have reviewed and agree with the history as documented  Social History   Substance Use Topics    Smoking status: Current Some Day Smoker     Types: Cigars    Smokeless tobacco: Never Used    Alcohol use No        Review of Systems   Constitutional: Negative  Negative for chills and fever  HENT: Negative  Respiratory: Negative  Cardiovascular: Negative  Gastrointestinal: Negative  Genitourinary: Negative  Musculoskeletal: Negative  Skin: Positive for color change and rash  Neurological: Negative  Hematological: Negative  Psychiatric/Behavioral: Negative  All other systems reviewed and are negative  Physical Exam  Physical Exam   Constitutional: He is oriented to person, place, and time  He appears well-developed and well-nourished  HENT:   Head: Normocephalic and atraumatic  Right Ear: External ear normal    Left Ear: External ear normal    Nose: Nose normal    Mouth/Throat: Oropharynx is clear and moist    Eyes: Conjunctivae and EOM are normal    Neck: Normal range of motion  Neck supple     Cardiovascular: Normal rate, regular rhythm, normal heart sounds and intact distal pulses  Pulmonary/Chest: Effort normal and breath sounds normal    Abdominal: Soft  Bowel sounds are normal    Musculoskeletal: He exhibits edema and tenderness  Right knee: He exhibits swelling, effusion and erythema  Right lower extremity cellulitis, with knee effusion and edema   Neurological: He is alert and oriented to person, place, and time  Skin: Skin is warm  Capillary refill takes less than 2 seconds  There is erythema  Psychiatric: He has a normal mood and affect  His behavior is normal  Judgment and thought content normal    Nursing note and vitals reviewed        Vital Signs  ED Triage Vitals [06/05/18 1850]   Temperature Pulse Respirations Blood Pressure SpO2   97 8 °F (36 6 °C) 103 20 147/80 99 %      Temp Source Heart Rate Source Patient Position - Orthostatic VS BP Location FiO2 (%)   Tympanic Monitor Sitting Right arm --      Pain Score       5           Vitals:    06/14/18 2045 06/14/18 2055 06/14/18 2116 06/14/18 2138   BP: 157/91 (!) 179/93 165/88 127/91   Pulse: 97 97 98 105   Patient Position - Orthostatic VS:    Lying       Visual Acuity      ED Medications  Medications   ALPRAZolam (XANAX) tablet 0 5 mg (0 5 mg Oral Given 6/14/18 2241)   enalapril (VASOTEC) tablet 15 mg ( Oral MAR Unhold 6/14/18 2145)   pravastatin (PRAVACHOL) tablet 80 mg ( Oral MAR Unhold 6/14/18 2145)   ondansetron (ZOFRAN) injection 4 mg ( Intravenous MAR Unhold 6/14/18 2145)   aluminum-magnesium hydroxide-simethicone (MYLANTA) 200-200-20 mg/5 mL oral suspension 30 mL ( Oral MAR Unhold 6/14/18 2145)   saccharomyces boulardii (FLORASTOR) capsule 250 mg ( Oral MAR Unhold 6/14/18 2145)   venlafaxine (EFFEXOR) tablet 75 mg ( Oral MAR Unhold 6/14/18 2145)   insulin lispro (HumaLOG) 100 units/mL subcutaneous injection 1-5 Units ( Subcutaneous MAR Unhold 6/14/18 2145)   insulin lispro (HumaLOG) 100 units/mL subcutaneous injection 1-5 Units (1 Units Subcutaneous Not Given 6/14/18 2231)   polyethylene glycol (MIRALAX) packet 17 g ( Oral MAR Unhold 6/14/18 2145)   morphine injection 2 mg (2 mg Intravenous Given 6/14/18 2225)   famotidine (PEPCID) tablet 20 mg ( Oral MAR Unhold 6/14/18 2145)   ibuprofen (MOTRIN) tablet 400 mg ( Oral MAR Unhold 6/14/18 2145)   acetaminophen (TYLENOL) tablet 650 mg (650 mg Oral Given 6/14/18 2225)   sodium chloride 0 9 % infusion ( Intravenous New Bag 6/14/18 1830)   zolpidem (AMBIEN) tablet 10 mg (10 mg Oral Given 6/14/18 2241)   ALPRAZolam (XANAX) tablet 0 5 mg ( Oral MAR Unhold 6/14/18 2145)   ceFAZolin (ANCEF) IVPB (premix) 2,000 mg (2,000 mg Intravenous New Bag 6/15/18 0225)   clobetasol (TEMOVATE) 0 05 % cream ( Topical MAR Unhold 6/14/18 2145)   diphenhydrAMINE (BENADRYL) tablet 25 mg (25 mg Oral Given 6/14/18 2241)   diphenhydrAMINE-zinc acetate (BENADRYL) 2-0 1 % cream ( Topical MAR Unhold 6/14/18 2145)   oxyCODONE-acetaminophen (PERCOCET) 5-325 mg per tablet 1 tablet (1 tablet Oral Not Given 6/15/18 0328)   calamine-zinc oxide lotion ( Topical MAR Unhold 6/14/18 2145)   nystatin (MYCOSTATIN) powder 1 application ( Topical MAR Unhold 6/14/18 2145)   heparin (porcine) subcutaneous injection 5,000 Units (not administered)   docusate sodium (COLACE) capsule 100 mg (100 mg Oral Not Given 6/14/18 2241)   ibuprofen (MOTRIN) tablet 600 mg (600 mg Oral Given 6/5/18 2109)   ketorolac (TORADOL) injection 30 mg (30 mg Intravenous Given 6/5/18 2127)   cefTRIAXone (ROCEPHIN) IVPB (premix) 1,000 mg (0 mg Intravenous Stopped 6/6/18 0912)   potassium chloride (K-DUR,KLOR-CON) CR tablet 40 mEq (40 mEq Oral Given 6/8/18 0946)   sodium chloride 0 9 % infusion (75 mL/hr Intravenous New Bag 6/8/18 1529)   ceFAZolin (ANCEF) IVPB (premix) 1,000 mg (0 mg Intravenous Stopped 6/9/18 2223)   morphine injection 2 mg (2 mg Intravenous Given 6/9/18 0408)   magnesium sulfate 2 g/50 mL IVPB (premix) 2 g (0 g Intravenous Stopped 6/9/18 2223)   LORazepam (ATIVAN) 2 mg/mL injection 1 mg (1 mg Intravenous Given 6/11/18 2134)   ALPRAZolam Jimmey Maze) tablet 0 5 mg (0 5 mg Oral Given 6/12/18 0113)   diphenhydrAMINE (BENADRYL) tablet 25 mg (25 mg Oral Given 6/14/18 0428)   HYDROmorphone (DILAUDID) injection 0 5 mg (0 5 mg Intravenous Given 6/15/18 0224)       Diagnostic Studies  Results Reviewed     Procedure Component Value Units Date/Time    Blood culture #2 [[id number]] Collected:  06/05/18 2128    Lab Status:  Final result Specimen:  Blood from Arm, Right Updated:  06/11/18 1101     Blood Culture No Growth After 5 Days  Blood culture #1 [[id number]] Collected:  06/05/18 2128    Lab Status:  Final result Specimen:  Blood from Arm, Left Updated:  06/11/18 1101     Blood Culture No Growth After 5 Days  Sedimentation rate, automated [[id number]]  (Abnormal) Collected:  06/05/18 2128    Lab Status:  Final result Specimen:  Blood from Arm, Left Updated:  06/06/18 0239     Sed Rate 51 (H) mm/hour     Lactic acid, plasma [[id number]]  (Normal) Collected:  06/05/18 2218    Lab Status:  Final result Specimen:  Blood from Arm, Right Updated:  06/05/18 2249     LACTIC ACID 1 3 mmol/L     Narrative:         Result may be elevated if tourniquet was used during collection      Uric acid [[id number]]  (Normal) Collected:  06/05/18 2219    Lab Status:  Final result Specimen:  Blood from Arm, Right Updated:  06/05/18 2243     Uric Acid 5 0 mg/dL     Comprehensive metabolic panel [[id number]]  (Abnormal) Collected:  06/05/18 2128    Lab Status:  Final result Specimen:  Blood from Arm, Left Updated:  06/05/18 2156     Sodium 134 (L) mmol/L      Potassium 3 8 mmol/L      Chloride 97 (L) mmol/L      CO2 28 mmol/L      Anion Gap 9 mmol/L      BUN 19 mg/dL      Creatinine 0 77 mg/dL      Glucose 95 mg/dL      Calcium 9 4 mg/dL      AST 35 U/L      ALT 74 U/L      Alkaline Phosphatase 103 U/L      Total Protein 8 2 g/dL      Albumin 3 5 g/dL      Total Bilirubin 0 30 mg/dL      eGFR 98 ml/min/1 73sq m     Narrative:         National Kidney Disease Education Program recommendations are as follows:  GFR calculation is accurate only with a steady state creatinine  Chronic Kidney disease less than 60 ml/min/1 73 sq  meters  Kidney failure less than 15 ml/min/1 73 sq  meters  Protime-INR [[id number]]  (Normal) Collected:  06/05/18 2128    Lab Status:  Final result Specimen:  Blood from Arm, Left Updated:  06/05/18 2154     Protime 9 7 seconds      INR 0 92    APTT [[id number]]  (Normal) Collected:  06/05/18 2128    Lab Status:  Final result Specimen:  Blood from Arm, Left Updated:  06/05/18 2154     PTT 29 seconds     CBC and differential [[id number]]  (Abnormal) Collected:  06/05/18 2128    Lab Status:  Final result Specimen:  Blood from Arm, Left Updated:  06/05/18 2138     WBC 14 22 (H) Thousand/uL      RBC 4 37 Million/uL      Hemoglobin 13 5 g/dL      Hematocrit 42 2 %      MCV 97 fL      MCH 30 9 pg      MCHC 32 0 g/dL      RDW 13 3 %      MPV 8 6 (L) fL      Platelets 116 (H) Thousands/uL      nRBC 0 /100 WBCs      Neutrophils Relative 76 (H) %      Immat GRANS % 1 %      Lymphocytes Relative 15 %      Monocytes Relative 8 %      Eosinophils Relative 0 %      Basophils Relative 0 %      Neutrophils Absolute 10 75 (H) Thousands/µL      Immature Grans Absolute 0 07 Thousand/uL      Lymphocytes Absolute 2 17 Thousands/µL      Monocytes Absolute 1 17 Thousand/µL      Eosinophils Absolute 0 03 Thousand/µL      Basophils Absolute 0 03 Thousands/µL                  XR spine lumbar 2 or 3 views injury   Final Result by Von Estevez DO (06/11 1114)   Degenerative and postoperative changes  Workstation performed: [id number]         VAS lower limb venous duplex study, unilateral/limited   Final Result by Cyndee Paige MD (06/07 3654)      XR knee 1 or 2 vw right   Final Result by Von Estevez DO (06/06 1015)   1  Mild degenerative changes with small suprapatellar joint effusion    If there is clinical concern for septic arthritis, consider follow-up arthrocentesis  2   Diffuse soft tissue swelling anterior to the patella, most compatible with prepatellar bursitis  3   More focal soft tissue swelling overlying the anterior tibial tubercle, suspicious for superimposed infrapatellar bursitis  The study was marked in [facility] for immediate notification  Workstation performed: [id number]                    Procedures  Procedures       Phone Contacts  ED Phone Contact    ED Course                         Initial Sepsis Screening     [address] Name 06/05/18 2201                Is the patient's history suggestive of a new or worsening infection? (!)  Yes (Proceed)  -CY        Suspected source of infection soft tissue  -CY        Are two or more of the following signs & symptoms of infection both present and new to the patient? (!)  Yes (Proceed)  -CY        Indicate SIRS criteria Tachycardia > 90 bpm;Leukocytosis (WBC > 08855 IJL)  -CY        If the answer is yes to both questions, suspicion of sepsis is present          If severe sepsis is present AND tissue hypoperfusion perists in the hour after fluid resuscitation or lactate > 4, the patient meets criteria for SEPTIC SHOCK          Are any of the following organ dysfunction criteria present within 6 hours of suspected infection and SIRS criteria that are NOT considered to be chronic conditions? No  -CY        Organ dysfunction          Date of presentation of severe sepsis          Time of presentation of severe sepsis          Tissue hypoperfusion persists in the hour after crystalloid fluid administration, evidenced, by either:          Was hypotension present within one hour of the conclusion of crystalloid fluid administration?           Date of presentation of septic shock          Time of presentation of septic shock            User Key  (r) = Recorded By, (t) = Taken By, (c) = Cosigned By    Initials Name Provider Type    Heladio Young, [facility] Nurse Practitioner                  [facility]  [address] Time    Disposition  Final diagnoses:   Cellulitis of right lower extremity   Effusion of right knee     Time reflects when diagnosis was documented in both MDM as applicable and the Disposition within this note     Time User Action Codes Description Comment    6/5/2018  9:19 PM Cyntha Campi Add [A90 976] Cellulitis of right lower extremity     6/5/2018  9:19 PM Cyntha Campi Add [M25 461] Effusion of right knee     6/5/2018 10:12 PM Alexandria Deep Modify [X87 174] Cellulitis of right lower extremity     6/5/2018 10:13 PM Geoffrey Deep Modify [L03 115] Cellulitis of right lower extremity     6/5/2018 10:13 PM Geoffrey Deep Add [A41 9] Sepsis ([address] )     6/5/2018 10:13 PM Geoffrey Deep Modify [H02 975] Cellulitis of right lower extremity     6/5/2018 10:13 PM Alexandria Deep Modify [A41 9] Sepsis ([address] )     6/5/2018 10:13 PM Alexandria Deep Modify [G10 967] Cellulitis of right lower extremity     6/5/2018 10:14 PM Alexandria Deep Modify [A91 705] Cellulitis of right lower extremity     6/8/2018  9:59 AM Fede Spikes Add [M70 51] Infrapatellar bursitis of right knee     6/8/2018  5:52 PM Cassia Henderson Modify [C97 187] Cellulitis of right lower extremity     6/8/2018  5:52 PM Cassia Henderson Modify [M70 51] Infrapatellar bursitis of right knee     6/9/2018  2:12 PM Arliss Sours Modify [J28 537] Cellulitis of right lower extremity     6/9/2018  2:12 PM Arliss Sours Modify [M70 51] Infrapatellar bursitis of right knee     6/11/2018  9:28 PM Alexandria Deep Add [F41 8] Depression with anxiety     6/13/2018  1:31 PM Vick Welch Add [M00 061] Staphylococcal arthritis of right knee ([address] )     6/13/2018  1:31 PM Vick Welch Modify [M00 061] Staphylococcal arthritis of right knee [facility])       ED Disposition     ED Disposition Condition Comment    Admit  Case was discussed with NP and the patient's admission status was agreed to be Admission Status: inpatient status to the service of Dr Mariaelena Guerrero   Follow-up Information    None         Current Discharge Medication List      CONTINUE these medications which have NOT CHANGED    Details   ALPRAZolam (XANAX) 0 5 mg tablet Take 0 5 mg by mouth 2 (two) times a day as needed        ENALAPRIL MALEATE PO Take 5 mg by mouth 3 (three) times a day        metFORMIN (GLUCOPHAGE) 1000 MG tablet Take 1,000 mg by mouth daily with breakfast      rosuvastatin (CRESTOR) 10 MG tablet Take 10 mg by mouth daily  venlafaxine (EFFEXOR) 75 mg tablet Take 75 mg by mouth 2 (two) times a day      zolpidem (AMBIEN) 10 mg tablet Take 10 mg by mouth daily at bedtime as needed for sleep      traMADol-acetaminophen (ULTRACET) 37 5-325 mg per tablet Take 1 tablet by mouth every 6 (six) hours as needed for moderate pain  No discharge procedures on file      ED Provider  Electronically Signed by           Adair Proctor MD  06/05/18 0877 MD Puma  06/15/18 2246

## 2018-06-06 NOTE — SOCIAL WORK
SW met with pt to assess needs and discuss plans  Discussed goals of making sure pt's needs are met upon discharge, pt's preferences are taken into account, pt understands her health condition, medications and symptoms to watch for after returning home and pt is aware of any follow up appointments recommended by hospital physician  Pt lives alone  Independent, driving  No need for DME or HHC in the past   Pt's PCP is Dr Carie Hu MD   Preferred pharmacy is [facility]  Per pt he has prescription coverage and no difficulty getting his medication as prescribed  Pt's plan is to return home when discharged  No discharge needs expressed or anticipated by pt at this time  Offered support in future if needed

## 2018-06-06 NOTE — ASSESSMENT & PLAN NOTE
Mild   Platelet 353 -> 913  Likely reactive to sepsis  Heparin subcu for DVT prophylaxis    Monitor CBC in am

## 2018-06-06 NOTE — CONSULTS
Consultation - Orthopedics   Brenda Srinivasan 66 y o  male MRN: [id number]  Unit/Bed#: 2 [place] [address] Encounter: [id number]      Assessment/Plan     Assessment:  1) Right knee Suprapatellar, prepatellar, infrapatellar bursitis - erythema, elevated leukocytosis, no tenderness with micro or macromotion, good AROM And PROM, not suspicious for septic arthritis, possible concurrent cellulitis vs bursitis vs gout, no history of gout, history of aspiration, no history of lyme or gonorrhea/chlamydia  2) Right knee pain - erythema present, could be due to gout; however, currently appears based on clinical history to have possible infectious etiology with bursitis, no improvement with colchicine    Plan:  1-2)   - Continue IV antibiotics  - IV fluids per SLIM  - RICE: rest, compression, elevation (no ice at this current moment because if gout arthropathy is underlying issue will necessarily alleviate pain)  - will continue to follow into tomorrow  - no acute orthopedic intervention  - can start NSAID if not contraindicated   - called outpatient PCP but their office was closed and was unable to obtain record of aspiration  - no requirement for repeat aspiration at this current moment expected with potential risk receiving the joint  - routine neurovascular exam with vitals  - repeat am labs with CBC, BMP, Mag, Phos  - will attempt to reach PCP tomorrow in order to obtain record of aspiration  - can r/o lyme or STI related reactive arthritis per SLIM with VRDL, STI panel, and PCR assay for lyme, will default to ID recommendations    History of Present Illness   Physician Requesting Consult: Abby Cote MD  Reason for Consult / Principal Problem: right knee pain  HPI: Leonidas Loyola is a 66y o  year old male with a past medical history pertinent for borderline diabetes, hypertension, hyperlipidemia presenting to the orthopedic surgery service with right knee pain   Patient reports that in the 3rd week of May he began to start to have right sided knee pain  Patient reports and initial onset of very sharp and very severe pain that was exacerbated with even just light touch and range of motion of the knee  Patient reports that it was erythematous on the top of the kneecap and was swollen just on top of the kneecap  Patient reports that on 5/28/18 he visited his family doctor at which point his doctor was suspicious of in gout arthropathy  Patient had aspiration and injection of steroid at his family doctor  Patient was then put on colchicine for 10 day course  Patient reports afterwards he had felt better initially but his pain still persisted  Patient reports that the pain, over the last week, has become more of a moderate to severe dull/aching around the knee circumferentially but primarily around the prepatellar comma suprapatellar, infrapatellar regions  Patient reports that his active and passive range of motion are limited better it improved compared to his initial visit with family doctor  Patient reports that his erythema is become more diffuse extending throughout the entire knee  Patient reports some chills but denies fevers  Patient denies any surgical history on his knee  Patient denies any sexually transmitted diseases such as chlamydia gonorrhea  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 and has had tick bites but never any sort of presentation of Lyme disease  Patient currently has the ability to flex and extend his knee from approximately 15-90 degrees without much tenderness  Patient since hospitalization and administration of antibiotics as reported that he has felt some improvement in his right knee  Patient denies any history of trauma but 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  Patient denies any history of prior exacerbations of gout      Inpatient consult to Orthopedic Surgery  Consult performed by: Bhavana Carrion T  Consult ordered by: Araseli Hathaway          Review of Systems   Constitutional: Positive for chills  Negative for fatigue and fever  Respiratory: Negative for chest tightness, shortness of breath and wheezing  Cardiovascular: Negative for chest pain and palpitations  Gastrointestinal: Negative for abdominal distention, abdominal pain, constipation, diarrhea and vomiting  Genitourinary: Negative for difficulty urinating, dysuria and flank pain  Musculoskeletal: Positive for arthralgias, gait problem, joint swelling and myalgias  Skin: Positive for color change  Neurological: Negative for weakness and numbness         Historical Information   Past Medical History:   Diagnosis Date    Borderline diabetes     Depression     Hyperlipidemia     Hypertension      Past Surgical History:   Procedure Laterality Date    LUMBAR EPIDURAL INJECTION      SHOULDER SURGERY Left     SPINE SURGERY       Social History   History   Alcohol Use No     History   Drug Use No     History   Smoking Status    Current Some Day Smoker    Types: Cigars   Smokeless Tobacco    Never Used     Family History: non-contributory    Meds/Allergies   all current active meds have been reviewed and current meds:   Current Facility-Administered Medications   Medication Dose Route Frequency    acetaminophen (TYLENOL) tablet 650 mg  650 mg Oral Q6H PRN    ALPRAZolam (XANAX) tablet 0 5 mg  0 5 mg Oral BID PRN    aluminum-magnesium hydroxide-simethicone (MYLANTA) 200-200-20 mg/5 mL oral suspension 30 mL  30 mL Oral Q6H PRN    enalapril (VASOTEC) tablet 15 mg  15 mg Oral Daily    heparin (porcine) subcutaneous injection 5,000 Units  5,000 Units Subcutaneous Q12H KERLINE    ibuprofen (MOTRIN) tablet 400 mg  400 mg Oral Q8H [address]    ondansetron (ZOFRAN) injection 4 mg  4 mg Intravenous Q6H PRN    oxyCODONE-acetaminophen (PERCOCET) 5-325 mg per tablet 1 tablet  1 tablet Oral Q6H PRN    pravastatin (PRAVACHOL) tablet 80 mg  80 mg Oral Daily With Dinner    saccharomyces boulardii (FLORASTOR) capsule 250 mg  250 mg Oral BID    sodium chloride 0 9 % infusion  50 mL/hr Intravenous Continuous    vancomycin (VANCOCIN) 1,250 mg in sodium chloride 0 9 % 250 mL IVPB  15 mg/kg Intravenous Q12H    zolpidem (AMBIEN) tablet 10 mg  10 mg Oral HS PRN     Allergies   Allergen Reactions    Iodine Anaphylaxis    Shellfish-Derived Products        Objective   Vitals: Blood pressure 144/77, pulse 83, temperature 98 1 °F (36 7 °C), temperature source Oral, resp  rate 18, height 5' 9" (1 753 m), weight 86 2 kg (190 lb 0 2 oz), SpO2 98 %  ,Body mass index is 28 06 kg/m²  No intake or output data in the 24 hours ending 06/06/18 1255  No intake/output data recorded  Invasive Devices     Peripheral Intravenous Line            Peripheral IV 06/05/18 Left Antecubital less than 1 day                Physical Exam   Constitutional: He is oriented to person, place, and time  Vital signs are normal  He appears well-developed and well-nourished  He is cooperative  He does not have a sickly appearance  He does not appear ill  No distress  HENT:   Head: Normocephalic and atraumatic  Right Ear: External ear normal  Decreased hearing is noted  Left Ear: External ear normal  Decreased hearing is noted  Nose: Nose normal    Cardiovascular: Normal rate  Pulses:       Radial pulses are 2+ on the right side, and 2+ on the left side  Dorsalis pedis pulses are 2+ on the right side, and 2+ on the left side  Pulmonary/Chest: Breath sounds normal  No accessory muscle usage  No respiratory distress  He has no decreased breath sounds  Musculoskeletal:        Right knee: He exhibits effusion  Left knee: He exhibits no effusion  Neurological: He is alert and oriented to person, place, and time  He has normal strength  No sensory deficit  GCS eye subscore is 4  GCS verbal subscore is 5  GCS motor subscore is 6     Skin:          Right Ankle Exam   Right ankle exam is normal   Swelling: none    Tenderness   The patient is experiencing no tenderness  Range of Motion   The patient has normal right ankle ROM  Muscle Strength   The patient has normal right ankle strength  Other   Erythema: absent  Sensation: normal  Pulse: present     Comments:  Significant 3+ edema up the calf and over the tibial area      Left Ankle Exam   Left ankle exam is normal   Swelling: none    Tenderness   The patient is experiencing no tenderness  Range of Motion   The patient has normal left ankle ROM  Muscle Strength   The patient has normal left ankle strength  Other   Erythema: absent  Sensation: normal  Pulse: present      Right Knee Exam     Tenderness   The patient is experiencing tenderness in the lateral joint line, medial joint line, patella and patellar tendon  Range of Motion   Extension:  15 abnormal   Flexion:  90 abnormal     Tests   Lachman:  Anterior - negative    Posterior - negative  Varus: negative  Valgus: negative    Other   Erythema: present  Sensation: normal  Swelling: moderate  Other tests: effusion present    Comments:  Prepatellar, suprapatellar, and infrapatellar bursitis      Left Knee Exam     Tenderness   The patient is experiencing no tenderness  Range of Motion   The patient has normal left knee ROM  Other   Erythema: absent  Sensation: normal  Swelling: none  Effusion: no effusion present      Right Hip Exam   Right hip exam is normal      Tenderness   The patient is experiencing no tenderness  Range of Motion   The patient has normal right hip ROM  Muscle Strength   The patient has normal right hip strength  Left Hip Exam   Left hip exam is normal     Tenderness   The patient is experiencing no tenderness  Range of Motion   The patient has normal left hip ROM  Muscle Strength   The patient has normal left hip strength  Lab Results:   I have personally reviewed pertinent lab results    CBC: Lab Results   Component Value Date    WBC 11 69 (H) 06/06/2018    HGB 11 7 (L) 06/06/2018    HCT 36 5 06/06/2018    MCV 97 06/06/2018     06/06/2018    MCH 31 0 06/06/2018    MCHC 32 1 06/06/2018    RDW 13 4 06/06/2018    MPV 9 0 06/06/2018    NRBC 0 06/06/2018     CMP:   Lab Results   Component Value Date     (L) 06/06/2018     06/06/2018    CO2 26 06/06/2018    ANIONGAP 7 06/06/2018    BUN 16 06/06/2018    CREATININE 0 67 06/06/2018    GLUCOSE 96 06/06/2018    CALCIUM 8 7 06/06/2018    AST 35 06/05/2018    ALT 74 06/05/2018    ALKPHOS 103 06/05/2018    PROT 8 2 06/05/2018    BILITOT 0 30 06/05/2018    EGFR 104 06/06/2018     Imaging Studies: I have personally reviewed pertinent reports  EKG, Pathology, and Other Studies: I have personally reviewed pertinent reports  VTE Prophylaxis: Heparin    Code Status: Level 1 - Full Code    Counseling / Coordination of Care  Total floor / unit time spent today 45 minutes  Greater than 50% of total time was spent with the patient and / or family counseling and / or coordination of care  A description of the counseling / coordination of care:  Developing plan, reviewing with attending, reviewing imaging, reviewing labs, physical exam, patient history, patient education, coordinating with im

## 2018-06-06 NOTE — PROGRESS NOTES
Progress Note - Kwaku Enter 1955, 66 y o  male MRN: [id number]    Unit/Bed#: 2 [place] [address] Encounter: [id number]    Primary Care Provider: Sunshine Parry MD   Date and time admitted to hospital: 6/5/2018  7:38 PM        * Sepsis [facility])   Assessment & Plan    As evidenced by WBC 14 22, pulse rate 103, with suspected source of infection right lower extremity cellulitis, possible bursitis vs septic joint right knee  · Patient received IV Rocephin in ED, presently on Vancomycin IV q 12hr   · Consulted ID and orthopedics   · Gentle IV fluids in view of sepsis  · Follow-up cultures        Cellulitis of right lower extremity   Assessment & Plan    Suspect right knee bursitis vs septic joint in right knee with extending infection to right lower leg vs gout due to marked edema, erythema, increased warmth, tenderness with ROM to right knee  Patient was treated with colchicine on 5/25/18 for 10 days and had knee aspiration and cortisone injection in PCP's office on 5/28 per patient  · CRP > 90, SED rate 51, Uric Acid 5 0  · Right knee x-ray, small suprapatellar joint effusion, prepatellar bursitis, and more focal soft tissue swelling overlying the anterior tibial tubercle, suspicious for superimposed infrapatellar bursitis   If there is clinical concern for septic arthritis, consider follow-up arthrocentesis  · Venous Doppler to rule out DVT, pending   · Check Procalcitonin   · Continue Vancomycin IV q 12hrs for possible septic joint  · Pain control with scheduled Motrin and Percocet PRN  · Rest, elevation  · Neurovascular checks  · Consulted ID and orthopedic, pending consultation         Depression   Assessment & Plan    Continue Effexor  Hyponatremia   Assessment & Plan    Mild  Sodium 134  Gentle IV hydration with normal saline  Repeat BMP in am        Thrombocytosis (HCC)   Assessment & Plan    Mild  Platelet 559 -> 764  Likely reactive to sepsis  Heparin subcu for DVT prophylaxis    Monitor CBC in am         Hypertension   Assessment & Plan    Continue Enalapril  Monitor  Borderline diabetes   Assessment & Plan    Check A1c 7 0  Enforce TLCs  Outpatient F/U        Hyperlipidemia   Assessment & Plan    Continue statin  VTE Pharmacologic Prophylaxis:   Pharmacologic: Heparin  Mechanical VTE Prophylaxis in Place: Yes    Patient Centered Rounds: I have performed bedside rounds with nursing staff today  Discussions with Specialists or Other Care Team Provider: Nursing, CM, orthopedics     Education and Discussions with Family / Patient: I have answered all questions to the best of my ability  Family at bedside  Time Spent for Care: 20 minutes  More than 50% of total time spent on counseling and coordination of care as described above  Current Length of Stay: 1 day(s)    Current Patient Status: Inpatient   Certification Statement: The patient will continue to require additional inpatient hospital stay due to sepsis due to cellulitis, r/o septic arthritis     Discharge Plan: Patient is not medically stable for discharge, likely in 48-72 hours pending progress  Code Status: Level 1 - Full Code      Subjective:   Patient resting in bed  Reports right knee erythema, edema, pain with ROM  Pain control with oral analgesics  No other complaints, denies HA, lightheadedness, dizziness, chest pain, SOB, abdominal pain, N/V/D  Objective:     Vitals:   Temp (24hrs), Av 9 °F (36 6 °C), Min:97 6 °F (36 4 °C), Max:98 1 °F (36 7 °C)    HR:  [] 97  Resp:  [18-20] 18  BP: (129-154)/(61-85) 129/61  SpO2:  [97 %-99 %] 97 %  Body mass index is 28 06 kg/m²  Input and Output Summary (last 24 hours):     No intake or output data in the 24 hours ending 18 5624    Physical Exam:     Physical Exam   Constitutional: He is oriented to person, place, and time  He appears well-developed  No distress  HENT:   Head: Normocephalic  Neck: Normal range of motion     Cardiovascular: Normal rate and regular rhythm  Pulmonary/Chest: Effort normal and breath sounds normal  No respiratory distress  He has no wheezes  He has no rhonchi  He has no rales  Abdominal: Soft  Bowel sounds are normal  He exhibits no distension  There is no tenderness  Musculoskeletal: He exhibits edema (+3 RLE with significant swelling to inferior and superior aspect of right knee ) and tenderness (right knee with ROM)  Neurological: He is alert and oriented to person, place, and time  Skin: Skin is warm and dry  He is not diaphoretic  There is erythema  Psychiatric: He has a normal mood and affect  His behavior is normal    Nursing note and vitals reviewed  Additional Data:     Labs:      Results from last 7 days  Lab Units 06/06/18  0711   WBC Thousand/uL 11 69*   HEMOGLOBIN g/dL 11 7*   HEMATOCRIT % 36 5   PLATELETS Thousands/uL 339   NEUTROS PCT % 73   LYMPHS PCT % 19   MONOS PCT % 8   EOS PCT % 0       Results from last 7 days  Lab Units 06/06/18  0711 06/05/18  2128   SODIUM mmol/L 134* 134*   POTASSIUM mmol/L 4 2 3 8   CHLORIDE mmol/L 101 97*   CO2 mmol/L 26 28   BUN mg/dL 16 19   CREATININE mg/dL 0 67 0 77   CALCIUM mg/dL 8 7 9 4   TOTAL PROTEIN g/dL  --  8 2   BILIRUBIN TOTAL mg/dL  --  0 30   ALK PHOS U/L  --  103   ALT U/L  --  74   AST U/L  --  35   GLUCOSE RANDOM mg/dL 96 95       Results from last 7 days  Lab Units 06/05/18  2128   INR  0 92       * I Have Reviewed All Lab Data Listed Above  * Additional Pertinent Lab Tests Reviewed:  All Labs Within Last 24 Hours Reviewed    Imaging:    Imaging Reports Reviewed Today Include: Right Knee Xray  Imaging Personally Reviewed by Myself Includes:  None     Recent Cultures (last 7 days):           Last 24 Hours Medication List:     Current Facility-Administered Medications:  acetaminophen 650 mg Oral Q6H PRN SANCHO Hilario    ALPRAZolam 0 5 mg Oral BID PRN SANCHO Melvin    aluminum-magnesium hydroxide-simethicone 30 mL Oral Q6H PRN SANCHO Melvin    enalapril 15 mg Oral Daily SANCHO Melvin    heparin (porcine) 5,000 Units Subcutaneous Q12H [address] SANCHO Root    ibuprofen 400 mg Oral [facility] SANCHO Root    ondansetron 4 mg Intravenous Q6H PRN SANCHO Helton    oxyCODONE-acetaminophen 1 tablet Oral Q6H PRN SANCHO Melvin    pravastatin 80 mg Oral Daily With SANCHO Macias    saccharomyces boulardii 250 mg Oral BID SANCHO Melvin    sodium chloride 50 mL/hr Intravenous Continuous SANCHO Root Last Rate: 50 mL/hr (06/06/18 1240)   vancomycin 15 mg/kg Intravenous Q12H SANCHO Melvin Last Rate: 1,250 mg (06/06/18 1043)   zolpidem 10 mg Oral HS PRN SANCHO Helton         Today, Patient Was Seen By: SANCHO Root    ** Please Note: Dictation voice to text software may have been used in the creation of this document   **

## 2018-06-06 NOTE — PLAN OF CARE
Problem: INFECTION - ADULT  Goal: Absence of fever/infection during neutropenic period  INTERVENTIONS:  - Monitor WBC      Outcome: Completed Date Met: 06/06/18

## 2018-06-06 NOTE — ASSESSMENT & PLAN NOTE
Suspect right knee bursitis vs septic joint in right knee with extending infection to right lower leg vs gout due to marked edema, erythema, increased warmth, tenderness with ROM to right knee  Patient was treated with colchicine on 5/25/18 for 10 days and had knee aspiration and cortisone injection in PCP's office on 5/28 per patient  · CRP > 90, SED rate 51, Uric Acid 5 0  · Right knee x-ray, small suprapatellar joint effusion, prepatellar bursitis, and more focal soft tissue swelling overlying the anterior tibial tubercle, suspicious for superimposed infrapatellar bursitis   If there is clinical concern for septic arthritis, consider follow-up arthrocentesis    · Venous Doppler to rule out DVT, pending   · Check Procalcitonin   · Continue Vancomycin IV q 12hrs for possible septic joint  · Pain control with scheduled Motrin and Percocet PRN  · Rest, elevation  · Neurovascular checks  · Consulted ID and orthopedic, pending consultation

## 2018-06-06 NOTE — ASSESSMENT & PLAN NOTE
· As evidenced by WBC 14 22, pulse rate 103, with suspected source of infection right lower extremity cellulitis, possible septic joint right knee  · Patient received IV Rocephin in ED  Will escalate to IV Vanco   · Will consult Orthopedic and ID in a deepa Ocampo · Gentle IV fluids in view of sepsis  · Will follow cultures

## 2018-06-06 NOTE — CONSULTS
Consultation - Infectious Disease   Galen Srinivasan 66 y o  male MRN: [id number]  Unit/Bed#: [address] [place] [address] Encounter: [id number]        History of Present Illness   Physician Requesting Consult: Lashanda Diane MD  Reason for Consult / Principal Problem:  Sepsis/cellulitis of lower extremities    HPI: Jeaneth Plunkett is a 66y o  year old male who has diabetes mellitus type 2, hypertension, hyperlipidemia, depression, was in usual state of his health till about 8 or 10 days ago when he had an abrupt onset of right knee swelling and pain  He sought medical attention with his primary physician, Isma Martinez, on May 29, 2018  The Dr  suspected acute gout, and according to the patient, he gave him an injection of steroid in the right knee  Over the course of the week, his pain and swelling did not improve, in fact the redness and swelling worsened, spread to involve the leg  His primary physician then advised him to seek medical attention at the hospital   He was hospitalized yesterday  In the ER he was given a dose of ceftriaxone 1 g and since then he has been on vancomycin  The Infectious Diseases consult is requested for further intervention  Patient does not recall having any fever or chills  He had some malaise related to the pain  He has not had any sore throat, chest pain, cough or phlegm  He has not had any abdominal pain, nausea, vomiting, diarrhea or dysuria  He has not had any recent travel, any sick contacts  No recent antimicrobial use  He is retired , works outdoors still  His works  requires excavation, working in [employer]  He has had tick bites  About 2 or 3 years ago he was diagnosed to have Lyme disease  He denies any substance abuse, and injection drug use or cigarette smoking or alcohol abuse  Allergies to iodine and shellfish noted        ,Inpatient consult to Infectious Diseases  Consult performed by: Darlene Avila ordered by: Jennifer Rios of [employer]   Constitutional: Positive for activity change, appetite change and fatigue  HENT: Positive for hearing loss  Eyes: Negative  Respiratory: Negative  Cardiovascular: Negative  Gastrointestinal: Negative  Endocrine: Negative  Genitourinary: Negative  Musculoskeletal: Positive for joint swelling  Skin: Positive for color change  Allergic/Immunologic: Positive for food allergies  Neurological: Negative  Hematological: Negative  Psychiatric/Behavioral: Negative  Historical Information   Past Medical History:   Diagnosis Date    Borderline diabetes     Depression     Hyperlipidemia     Hypertension      Past Surgical History:   Procedure Laterality Date    LUMBAR EPIDURAL INJECTION      SHOULDER SURGERY Left     SPINE SURGERY       Social History   History   Alcohol Use No     History   Drug Use No     History   Smoking Status    Current Some Day Smoker    Types: Cigars   Smokeless Tobacco    Never Used     Family History: History reviewed  No pertinent family history      Meds/Allergies     Current Facility-Administered Medications:     acetaminophen (TYLENOL) tablet 650 mg, 650 mg, Oral, Q6H PRN, SANCHO Chau, 650 mg at 06/06/18 1043    ALPRAZolam (XANAX) tablet 0 5 mg, 0 5 mg, Oral, BID PRN, SANCHO Melvin    aluminum-magnesium hydroxide-simethicone (MYLANTA) 200-200-20 mg/5 mL oral suspension 30 mL, 30 mL, Oral, Q6H PRN, SANCHO Melvin    enalapril (VASOTEC) tablet 15 mg, 15 mg, Oral, Daily, SANCHO Melvin, 15 mg at 06/06/18 0910    heparin (porcine) subcutaneous injection 5,000 Units, 5,000 Units, Subcutaneous, Q12H [facility], 5,000 Units at 06/06/18 1236 **AND** [CANCELED] Platelet count, , , Once, SANCHO Melvin    ibuprofen (MOTRIN) tablet 400 mg, 400 mg, Oral, Q8H [facility], SANCHO Chau, 400 mg at 06/06/18 1442    ondansetron (ZOFRAN) injection 4 mg, 4 mg, Intravenous, Q6H PRN, SANCHO Mcclure   oxyCODONE-acetaminophen (PERCOCET) 5-325 mg per tablet 1 tablet, 1 tablet, Oral, Q6H PRN, SANCHO Melvin, 1 tablet at 06/06/18 0406    pravastatin (PRAVACHOL) tablet 80 mg, 80 mg, Oral, Daily With Dinner, Papo Alberto, SANCHO, 80 mg at 06/05/18 2317    saccharomyces boulardii (FLORASTOR) capsule 250 mg, 250 mg, Oral, BID, KORIN MelvinNP, 250 mg at 06/06/18 0919    sodium chloride 0 9 % infusion, 50 mL/hr, Intravenous, Continuous, KORIN UpNP, Last Rate: 50 mL/hr at 06/06/18 1240, 50 mL/hr at 06/06/18 1240    vancomycin (VANCOCIN) 1,250 mg in sodium chloride 0 9 % 250 mL IVPB, 15 mg/kg, Intravenous, Q12H, SANCHO Melvin, Last Rate: 166 7 mL/hr at 06/06/18 1043, 1,250 mg at 06/06/18 1043    zolpidem (AMBIEN) tablet 10 mg, 10 mg, Oral, HS PRN, Papo Alberto, KORINNP  Allergies   Allergen Reactions    Iodine Anaphylaxis    Shellfish-Derived Products      all current active meds have been reviewed    PTA meds:    Prescriptions Prior to Admission   Medication Sig Dispense Refill Last Dose    ALPRAZolam (XANAX) 0 5 mg tablet Take 0 5 mg by mouth 2 (two) times a day as needed     6/4/2018 at Unknown time    ENALAPRIL MALEATE PO Take 5 mg by mouth 3 (three) times a day     6/5/2018 at 0800    metFORMIN (GLUCOPHAGE) 1000 MG tablet Take 1,000 mg by mouth daily with breakfast   6/5/2018 at 0800    rosuvastatin (CRESTOR) 10 MG tablet Take 10 mg by mouth daily  6/4/2018 at 2100    [facility]) 75 mg tablet Take 75 mg by mouth 2 (two) times a day   6/4/2018    zolpidem (AMBIEN) 10 mg tablet Take 10 mg by mouth daily at bedtime as needed for sleep       traMADol-acetaminophen (ULTRACET) 37 5-325 mg per tablet Take 1 tablet by mouth every 6 (six) hours as needed for moderate pain         and discontinued meds:   Medications Discontinued During This Encounter   Medication Reason    acetaminophen (TYLENOL) tablet 650 mg     heparin (porcine) subcutaneous injection 5,000 Units     heparin (porcine) subcutaneous injection 5,000 Units        PHYSICAL EXAM:  Vitals:    06/05/18 2230 06/06/18 0322 06/06/18 0701 06/06/18 1420   BP: 141/79 154/85 144/77 129/61   BP Location: Right arm Right arm Right arm Right arm   Pulse: 87 96 83 97   Resp: 18 18 18 18   Temp: 97 7 °F (36 5 °C) 97 6 °F (36 4 °C) 98 1 °F (36 7 °C) 98 1 °F (36 7 °C)   TempSrc: Oral Tympanic Oral Oral   SpO2: 98% 98% 98% 97%   Weight: 86 2 kg (190 lb 0 2 oz)      Height: 5' 9" (1 753 m)        Body mass index is 28 06 kg/m²  Weight (last 2 days)     Date/Time   Weight    06/05/18 2230  86 2 (190 01)    06/05/18 1850  86 2 (190)            Physical Exam  This is a middle-age man who is alert and comfortable  He has been afebrile  Vital signs are stable  HEENT exam:  Normocephalic head, no scleral icterus or conjunctivitis  No nasal or oropharyngeal mucosal lesions  Neck:  Is supple with no jugular venous distention, lymphadenopathy, thyromegaly or focal tenderness  Chest:  Heart sounds are regular with no murmurs  The lungs are clear to auscultation  Abdomen:  Is soft, nontender without any organomegaly  Bowel sounds are active  Lower extremities:  Right leg: There is a puncture sierra on the knee; along with intense erythema, swelling, involving the knee, and distally the leg below knee  There is a fluctuant swelling of the prepatellar bursa, along with tenderness as well as over the anterior tibial tubercle   No evidence of regional lymphadenitis or lymphangitis  The calf is supple  The upper extremities reveal no acute arthritis of phlebitis  No intake or output data in the 24 hours ending 06/06/18 1648    Invasive Devices:   Peripheral IV 06/05/18 Left Antecubital (Active)   Site Assessment Clean;Dry; Intact 6/5/2018 10:45 PM   Dressing Type Transparent 6/5/2018 10:45 PM   Line Status Blood return noted; Flushed; Infusing 6/5/2018 10:45 PM   Dressing Status Clean;Dry; Intact 6/5/2018 10:45 PM   Dressing Change Due 06/09/18 6/5/2018 10:45 PM   Reason Not Rotated Not due 6/5/2018 10:45 PM         Lab Results:   Recent Results (from the past 96 hour(s))   CBC and differential    Collection Time: 06/05/18  9:28 PM   Result Value Ref Range    WBC 14 22 (H) 4 31 - 10 16 Thousand/uL    RBC 4 37 3 88 - 5 62 Million/uL    Hemoglobin 13 5 12 0 - 17 0 g/dL    Hematocrit 42 2 36 5 - 49 3 %    MCV 97 82 - 98 fL    MCH 30 9 26 8 - 34 3 pg    MCHC 32 0 31 4 - 37 4 g/dL    RDW 13 3 11 6 - 15 1 %    MPV 8 6 (L) 8 9 - 12 7 fL    Platelets 968 (H) 231 - 390 Thousands/uL    nRBC 0 /100 WBCs    Neutrophils Relative 76 (H) 43 - 75 %    Immat GRANS % 1 0 - 2 %    Lymphocytes Relative 15 14 - 44 %    Monocytes Relative 8 4 - 12 %    Eosinophils Relative 0 0 - 6 %    Basophils Relative 0 0 - 1 %    Neutrophils Absolute 10 75 (H) 1 85 - 7 62 Thousands/µL    Immature Grans Absolute 0 07 0 00 - 0 20 Thousand/uL    Lymphocytes Absolute 2 17 0 60 - 4 47 Thousands/µL    Monocytes Absolute 1 17 0 17 - 1 22 Thousand/µL    Eosinophils Absolute 0 03 0 00 - 0 61 Thousand/µL    Basophils Absolute 0 03 0 00 - 0 10 Thousands/µL   Protime-INR    Collection Time: 06/05/18  9:28 PM   Result Value Ref Range    Protime 9 7 9 4 - 11 7 seconds    INR 0 92 0 86 - 1 16   APTT    Collection Time: 06/05/18  9:28 PM   Result Value Ref Range    PTT 29 24 - 36 seconds   Comprehensive metabolic panel    Collection Time: 06/05/18  9:28 PM   Result Value Ref Range    Sodium 134 (L) 136 - 145 mmol/L    Potassium 3 8 3 5 - 5 3 mmol/L    Chloride 97 (L) 100 - 108 mmol/L    CO2 28 21 - 32 mmol/L    Anion Gap 9 4 - 13 mmol/L    BUN 19 5 - 25 mg/dL    Creatinine 0 77 0 60 - 1 30 mg/dL    Glucose 95 65 - 140 mg/dL    Calcium 9 4 8 3 - 10 1 mg/dL    AST 35 5 - 45 U/L    ALT 74 12 - 78 U/L    Alkaline Phosphatase 103 46 - 116 U/L    Total Protein 8 2 6 4 - 8 2 g/dL    Albumin 3 5 3 5 - 5 0 g/dL    Total Bilirubin 0 30 0 20 - 1 00 mg/dL    eGFR 98 ml/min/1 73sq m   Sedimentation rate, automated    Collection Time: 06/05/18  9:28 PM   Result Value Ref Range    Sed Rate 51 (H) 2 - 10 mm/hour   Lactic acid, plasma    Collection Time: 06/05/18 10:18 PM   Result Value Ref Range    LACTIC ACID 1 3 0 5 - 2 0 mmol/L   Uric acid    Collection Time: 06/05/18 10:19 PM   Result Value Ref Range    Uric Acid 5 0 4 2 - 8 0 mg/dL   C-reactive protein    Collection Time: 06/06/18  7:11 AM   Result Value Ref Range    CRP >90 0 (H) <3 0 mg/L   Basic metabolic panel    Collection Time: 06/06/18  7:11 AM   Result Value Ref Range    Sodium 134 (L) 136 - 145 mmol/L    Potassium 4 2 3 5 - 5 3 mmol/L    Chloride 101 100 - 108 mmol/L    CO2 26 21 - 32 mmol/L    Anion Gap 7 4 - 13 mmol/L    BUN 16 5 - 25 mg/dL    Creatinine 0 67 0 60 - 1 30 mg/dL    Glucose 96 65 - 140 mg/dL    Calcium 8 7 8 3 - 10 1 mg/dL    eGFR 104 ml/min/1 73sq m   Magnesium    Collection Time: 06/06/18  7:11 AM   Result Value Ref Range    Magnesium 1 9 1 6 - 2 6 mg/dL   Hemoglobin A1C    Collection Time: 06/06/18  7:11 AM   Result Value Ref Range    Hemoglobin A1C 7 0 (H) 4 2 - 6 3 %     mg/dl   CBC and differential    Collection Time: 06/06/18  7:11 AM   Result Value Ref Range    WBC 11 69 (H) 4 31 - 10 16 Thousand/uL    RBC 3 77 (L) 3 88 - 5 62 Million/uL    Hemoglobin 11 7 (L) 12 0 - 17 0 g/dL    Hematocrit 36 5 36 5 - 49 3 %    MCV 97 82 - 98 fL    MCH 31 0 26 8 - 34 3 pg    MCHC 32 1 31 4 - 37 4 g/dL    RDW 13 4 11 6 - 15 1 %    MPV 9 0 8 9 - 12 7 fL    Platelets 442 062 - 364 Thousands/uL    nRBC 0 /100 WBCs    Neutrophils Relative 73 43 - 75 %    Immat GRANS % 0 0 - 2 %    Lymphocytes Relative 19 14 - 44 %    Monocytes Relative 8 4 - 12 %    Eosinophils Relative 0 0 - 6 %    Basophils Relative 0 0 - 1 %    Neutrophils Absolute 8 42 (H) 1 85 - 7 62 Thousands/µL    Immature Grans Absolute 0 04 0 00 - 0 20 Thousand/uL    Lymphocytes Absolute 2 21 0 60 - 4 47 Thousands/µL    Monocytes Absolute 0 94 0 17 - 1 22 Thousand/µL    Eosinophils Absolute 0 05 0 00 - 0 61 Thousand/µL    Basophils Absolute 0 03 0 00 - 0 10 Thousands/µL     Cultures            HEPATITIS: No results found for: HAV, HEPAIGM, HEPBIGM, HEPBCAB, HBEAG, HEPCAB    PPD: No results found for: PPD    Urine Tox Screen: No results found for: PCP, THC, TCA    I have personally reviewed pertinent labs  Imaging Studies:  Xr Knee 1 Or 2 Vw Right    Result Date: 6/6/2018  Narrative: RIGHT KNEE INDICATION:   infection,suspect spetic joint     Redness and swelling  COMPARISON:  Plain radiographs May 15, 2016 VIEWS:  XR KNEE 1 OR 2 VW RIGHT Images: 2 FINDINGS: There is no acute fracture or dislocation  Small suprapatellar joint effusion  Mild medial joint space narrowing  Mild narrowing of the patellofemoral space  Mild sharpening of the tibial spines  No lytic or blastic lesions are seen  Diffuse soft tissue swelling, extending from above the patella, inferiorly to the anterior tibial tubercle  More focal soft tissue swelling overlying the anterior tibial tubercle  No radiopaque foreign bodies are seen  Impression: 1  Mild degenerative changes with small suprapatellar joint effusion  If there is clinical concern for septic arthritis, consider follow-up arthrocentesis  2   Diffuse soft tissue swelling anterior to the patella, most compatible with prepatellar bursitis  3   More focal soft tissue swelling overlying the anterior tibial tubercle, suspicious for superimposed infrapatellar bursitis  The study was marked in [facility] for immediate notification  Workstation performed: [id number]       EKG, Pathology, and Other Studies: I have personally reviewed pertinent reports          Principal Problem:    Sepsis ([address] )  Active Problems:    Hyperlipidemia    Borderline diabetes    Hypertension    Cellulitis of right lower extremity    Thrombocytosis (HCC)    Hyponatremia    Depression      Assessment/Plan   This is a moved middle-age man with diabetes mellitus type 2, hypertension, hyperlipidemia, depression, presented with painful swelling and redness of the right leg particularly the knee area, not responsive to steroid injection to the right knee, given 8 days ago  He probably has septic bursitis/cellulitis, cannot rule out deeper ie , joint infection  Given his work history, previous Lyme disease, this could also be due to Lyme arthritis  Other microbial etiologies to be considered besides the usual staphylococci and streptococci, related to the steroid injection are Gram-negative bacilli, fungi, etc   Recommend aspiration of the bursas and submit for microbial diagnosis including Lyme disease PCR   Change antimicrobial treatment to ceftriaxone pending culture results  Discussed with the orthopedic surgeon  Discussed with the hospitalist   Thank you for the consultation  I will follow up

## 2018-06-07 ENCOUNTER — APPOINTMENT (INPATIENT)
Dept: RADIOLOGY | Facility: HOSPITAL | Age: 63
DRG: 486 | End: 2018-06-07
Payer: COMMERCIAL

## 2018-06-07 LAB
ANION GAP SERPL CALCULATED.3IONS-SCNC: 11 MMOL/L (ref 4–13)
BASOPHILS # BLD AUTO: 0.05 THOUSANDS/ΜL (ref 0–0.1)
BASOPHILS NFR BLD AUTO: 0 % (ref 0–1)
BUN SERPL-MCNC: 10 MG/DL (ref 5–25)
CALCIUM SERPL-MCNC: 9.4 MG/DL (ref 8.3–10.1)
CHLORIDE SERPL-SCNC: 100 MMOL/L (ref 100–108)
CO2 SERPL-SCNC: 21 MMOL/L (ref 21–32)
CREAT SERPL-MCNC: 0.7 MG/DL (ref 0.6–1.3)
EOSINOPHIL # BLD AUTO: 0.06 THOUSAND/ΜL (ref 0–0.61)
EOSINOPHIL NFR BLD AUTO: 1 % (ref 0–6)
ERYTHROCYTE [DISTWIDTH] IN BLOOD BY AUTOMATED COUNT: 13.3 % (ref 11.6–15.1)
GFR SERPL CREATININE-BSD FRML MDRD: 102 ML/MIN/1.73SQ M
GLUCOSE SERPL-MCNC: 105 MG/DL (ref 65–140)
GLUCOSE SERPL-MCNC: 107 MG/DL (ref 65–140)
GLUCOSE SERPL-MCNC: 137 MG/DL (ref 65–140)
GLUCOSE SERPL-MCNC: 82 MG/DL (ref 65–140)
GLUCOSE SERPL-MCNC: 89 MG/DL (ref 65–140)
HCT VFR BLD AUTO: 44.6 % (ref 36.5–49.3)
HGB BLD-MCNC: 13.6 G/DL (ref 12–17)
IMM GRANULOCYTES # BLD AUTO: 0.08 THOUSAND/UL (ref 0–0.2)
IMM GRANULOCYTES NFR BLD AUTO: 1 % (ref 0–2)
LYMPHOCYTES # BLD AUTO: 2.3 THOUSANDS/ΜL (ref 0.6–4.47)
LYMPHOCYTES NFR BLD AUTO: 18 % (ref 14–44)
MCH RBC QN AUTO: 31.3 PG (ref 26.8–34.3)
MCHC RBC AUTO-ENTMCNC: 30.5 G/DL (ref 31.4–37.4)
MCV RBC AUTO: 103 FL (ref 82–98)
MONOCYTES # BLD AUTO: 1.26 THOUSAND/ΜL (ref 0.17–1.22)
MONOCYTES NFR BLD AUTO: 10 % (ref 4–12)
NEUTROPHILS # BLD AUTO: 9.42 THOUSANDS/ΜL (ref 1.85–7.62)
NEUTS SEG NFR BLD AUTO: 70 % (ref 43–75)
NRBC BLD AUTO-RTO: 0 /100 WBCS
PLATELET # BLD AUTO: 339 THOUSANDS/UL (ref 149–390)
PMV BLD AUTO: 8.7 FL (ref 8.9–12.7)
POTASSIUM SERPL-SCNC: 4.7 MMOL/L (ref 3.5–5.3)
PROCALCITONIN SERPL-MCNC: 0.06 NG/ML
RBC # BLD AUTO: 4.34 MILLION/UL (ref 3.88–5.62)
SODIUM SERPL-SCNC: 132 MMOL/L (ref 136–145)
WBC # BLD AUTO: 13.17 THOUSAND/UL (ref 4.31–10.16)

## 2018-06-07 PROCEDURE — 82948 REAGENT STRIP/BLOOD GLUCOSE: CPT

## 2018-06-07 PROCEDURE — 85025 COMPLETE CBC W/AUTO DIFF WBC: CPT | Performed by: NURSE PRACTITIONER

## 2018-06-07 PROCEDURE — 80048 BASIC METABOLIC PNL TOTAL CA: CPT | Performed by: NURSE PRACTITIONER

## 2018-06-07 PROCEDURE — 84145 PROCALCITONIN (PCT): CPT | Performed by: NURSE PRACTITIONER

## 2018-06-07 PROCEDURE — 99232 SBSQ HOSP IP/OBS MODERATE 35: CPT | Performed by: INTERNAL MEDICINE

## 2018-06-07 PROCEDURE — 93971 EXTREMITY STUDY: CPT

## 2018-06-07 PROCEDURE — 93970 EXTREMITY STUDY: CPT | Performed by: SURGERY

## 2018-06-07 PROCEDURE — 99232 SBSQ HOSP IP/OBS MODERATE 35: CPT | Performed by: ORTHOPAEDIC SURGERY

## 2018-06-07 RX ORDER — DOCUSATE SODIUM 100 MG/1
100 CAPSULE, LIQUID FILLED ORAL 2 TIMES DAILY
Status: DISCONTINUED | OUTPATIENT
Start: 2018-06-07 | End: 2018-06-13

## 2018-06-07 RX ORDER — VENLAFAXINE 37.5 MG/1
75 TABLET ORAL 2 TIMES DAILY WITH MEALS
Status: DISCONTINUED | OUTPATIENT
Start: 2018-06-07 | End: 2018-06-16 | Stop reason: HOSPADM

## 2018-06-07 RX ADMIN — ALPRAZOLAM 0.5 MG: 0.5 TABLET ORAL at 21:27

## 2018-06-07 RX ADMIN — Medication 250 MG: at 17:19

## 2018-06-07 RX ADMIN — ZOLPIDEM TARTRATE 10 MG: 5 TABLET ORAL at 23:10

## 2018-06-07 RX ADMIN — ENALAPRIL MALEATE 15 MG: 10 TABLET ORAL at 09:21

## 2018-06-07 RX ADMIN — IBUPROFEN 400 MG: 400 TABLET ORAL at 21:26

## 2018-06-07 RX ADMIN — CEFTRIAXONE 2000 MG: 2 INJECTION, SOLUTION INTRAVENOUS at 17:19

## 2018-06-07 RX ADMIN — IBUPROFEN 400 MG: 400 TABLET ORAL at 06:19

## 2018-06-07 RX ADMIN — OXYCODONE HYDROCHLORIDE AND ACETAMINOPHEN 1 TABLET: 5; 325 TABLET ORAL at 06:34

## 2018-06-07 RX ADMIN — HEPARIN SODIUM 5000 UNITS: 5000 INJECTION, SOLUTION INTRAVENOUS; SUBCUTANEOUS at 21:26

## 2018-06-07 RX ADMIN — PRAVASTATIN SODIUM 80 MG: 80 TABLET ORAL at 17:19

## 2018-06-07 RX ADMIN — OXYCODONE HYDROCHLORIDE AND ACETAMINOPHEN 1 TABLET: 5; 325 TABLET ORAL at 14:09

## 2018-06-07 RX ADMIN — ACETAMINOPHEN 650 MG: 325 TABLET ORAL at 00:41

## 2018-06-07 RX ADMIN — DOCUSATE SODIUM 100 MG: 100 CAPSULE, LIQUID FILLED ORAL at 21:26

## 2018-06-07 RX ADMIN — VENLAFAXINE 75 MG: 37.5 TABLET ORAL at 17:20

## 2018-06-07 RX ADMIN — Medication 250 MG: at 09:22

## 2018-06-07 RX ADMIN — VENLAFAXINE 75 MG: 37.5 TABLET ORAL at 09:23

## 2018-06-07 RX ADMIN — HEPARIN SODIUM 5000 UNITS: 5000 INJECTION, SOLUTION INTRAVENOUS; SUBCUTANEOUS at 09:21

## 2018-06-07 RX ADMIN — IBUPROFEN 400 MG: 400 TABLET ORAL at 14:07

## 2018-06-07 RX ADMIN — SODIUM CHLORIDE 50 ML/HR: 0.9 INJECTION, SOLUTION INTRAVENOUS at 17:20

## 2018-06-07 NOTE — PROGRESS NOTES
Progress Note - Orthopedics   Marita Boxer Woronowicz 66 y o  male MRN: [id number]  Unit/Bed#: [address] [place] [address] Encounter: [id number]    Assessment:  1) Right knee Suprapatellar, prepatellar, infrapatellar bursitis - erythema reduced, swelling reduced, marked improvement, leukocytosis increased secondary to aspiration, minimal tenderness with micro or macromotion, good AROM And PROM, not suspicious for septic arthritis, possible concurrent cellulitis vs bursitis, not as likely to be gout being that there were no crystals seen, history of aspiration, no history of lyme or gonorrhea/chlamydia, pertinent history of being in the woods/forest a lot  2) Right knee pain - erythema present, could be due to gout; however, currently appears based on clinical history to have possible infectious etiology with bursitis, no improvement with colchicine    Plan:  1-2)   - Continue IV antibiotics per SLIM and ID  - IV fluids per SLIM  - RICE: rest, ice, compression, elevation   - will continue to follow as fluid cultures pending  - no acute orthopedic intervention  - can start NSAID if not contraindicated   - no need to reach out PCP at this point   - routine neurovascular exam with vitals   - repeat am labs with CBC, BMP, Mag, Phos   - Dr Anatoliy Linda performed aspiration of bursa and synovial fluid, will continue to review for cultures and white count   - WBAT  - no acute surgical intervention  - regular diet is fine        Weight bearing: WBAT    VTE Pharmacologic Prophylaxis: Heparin  VTE Mechanical Prophylaxis: sequential compression device    Subjective:  Patient was seen examined at bedside  Patient denies any acute events overnight  Patient reports that his pain is much improved  Patient denies any sort of increase in swelling or increase in erythema  Patient reports that his knee is much better  Patient denies any changes in his neurovascular status    Patient denies any numbness, tingling, lack of motor movement, lack of sensation in his distal lower right extremity  Vitals: Blood pressure 138/76, pulse 90, temperature 99 6 °F (37 6 °C), temperature source Oral, resp  rate 18, height 5' 9" (1 753 m), weight 86 2 kg (190 lb 0 2 oz), SpO2 96 %  ,Body mass index is 28 06 kg/m²  Intake/Output Summary (Last 24 hours) at 06/07/18 1206  Last data filed at 06/07/18 0150   Gross per 24 hour   Intake             1500 ml   Output             1450 ml   Net               50 ml       Invasive Devices     Peripheral Intravenous Line            Peripheral IV 06/05/18 Left Antecubital 1 day    Long-Dwell Peripheral IV (Midline) 98/85/22 Left Basilic less than 1 day                Physical Exam: /76 (BP Location: Right arm)   Pulse 90   Temp 99 6 °F (37 6 °C) (Oral)   Resp 18   Ht 5' 9" (1 753 m)   Wt 86 2 kg (190 lb 0 2 oz)   SpO2 96%   BMI 28 06 kg/m²   General appearance: alert and oriented, in no acute distress  Head: Normocephalic, without obvious abnormality, atraumatic  Skin: erythema and swelling present on right knee  Ortho Exam:   right Lower Extremity: Thigh and calf compartments are soft and nontender to palpation  + L3-S1 SILT  2+ DF/PF  Right knee is tender to palpation minimally around prepatellar , infrapatellar, suprapatellar bursa as well as medial/lateral joint line  Right lower extremity edema has significantly reduced to +1  Right knee is warm and erythematous but erythema has reduced compared to yesterday  Active and passive range of motion are much improved  Flexion of knee is up to 105° as extension is to about 10°  Right ankle and foot have full and complete active and passive range of motion with dorsiflexion, plantar flexion, eversion, inversion, EHL  Patient has moderate joint effusion with positive ballottement  capillary refill less than 2 seconds  Sensation and strength are intact    Lab, Imaging and other studies:   I have personally reviewed pertinent lab results    CBC:   Lab Results   Component Value Date    WBC 13 17 (H) 06/07/2018    HGB 13 6 06/07/2018    HCT 44 6 06/07/2018     (H) 06/07/2018     06/07/2018    MCH 31 3 06/07/2018    MCHC 30 5 (L) 06/07/2018    RDW 13 3 06/07/2018    MPV 8 7 (L) 06/07/2018    NRBC 0 06/07/2018     CMP:   Lab Results   Component Value Date     (L) 06/07/2018     06/07/2018    CO2 21 06/07/2018    ANIONGAP 11 06/07/2018    BUN 10 06/07/2018    CREATININE 0 70 06/07/2018    GLUCOSE 105 06/07/2018    CALCIUM 9 4 06/07/2018    EGFR 102 06/07/2018

## 2018-06-07 NOTE — PROGRESS NOTES
Progress Note - Infectious Disease   Keven Srinivasan 66 y o  male MRN: [id number]  Unit/Bed#: [address] [place] [address] Encounter: [id number]      Subjective/Objective   Subjective:   Events since last seen were noted  Discussed with the orthopedic surgeon  Patient feels better, reports that he can bend his right knee more easily without pain  He denies any chills        Meds/Allergies     Current Facility-Administered Medications:     acetaminophen (TYLENOL) tablet 650 mg, 650 mg, Oral, Q6H PRN, MichaelSANCHO Myrick, 650 mg at 06/07/18 0041    ALPRAZolam [place] [facility]) tablet 0 5 mg, 0 5 mg, Oral, BID PRN, SANCHO Melvin, 0 5 mg at 06/06/18 1838    aluminum-magnesium hydroxide-simethicone (MYLANTA) 200-200-20 mg/5 mL oral suspension 30 mL, 30 mL, Oral, Q6H PRN, SANCHO Melvin    cefTRIAXone (ROCEPHIN) IVPB (premix) 2,000 mg, 2,000 mg, Intravenous, Q24H, Yoanna Brar MD, Last Rate: 100 mL/hr at 06/06/18 1829, 2,000 mg at 06/06/18 1829    docusate sodium (COLACE) capsule 100 mg, 100 mg, Oral, BID, SANCHO Gamez    enalapril (VASOTEC) tablet 15 mg, 15 mg, Oral, Daily, SANCHO Melvin, 15 mg at 06/07/18 7805    heparin (porcine) subcutaneous injection 5,000 Units, 5,000 Units, Subcutaneous, Q12H [facility], 5,000 Units at 06/07/18 0921 **AND** [CANCELED] Platelet count, , , Once, SANCHO Melvin    ibuprofen (MOTRIN) tablet 400 mg, 400 mg, Oral, Q8H [facility], Michael Rob, CRNP, 400 mg at 06/07/18 2530    insulin lispro (HumaLOG) 100 units/mL subcutaneous injection 1-5 Units, 1-5 Units, Subcutaneous, TID AC **AND** Fingerstick Glucose (POCT), , , TID AC, SANCHO Melvin    insulin lispro (HumaLOG) 100 units/mL subcutaneous injection 1-5 Units, 1-5 Units, Subcutaneous, HS, SANCHO Melvin    ondansetron (ZOFRAN) injection 4 mg, 4 mg, Intravenous, Q6H PRN, SANCHO Melvin    oxyCODONE-acetaminophen (PERCOCET) 5-325 mg per tablet 1 tablet, 1 tablet, Oral, Q6H PRN, SANCHO Melvin, 1 tablet at 18 0634    pravastatin (PRAVACHOL) tablet 80 mg, 80 mg, Oral, Daily With Dinner, Papo Tobinrik, CRNP, 80 mg at 18 1831    saccharomyces boulardii (FLORASTOR) capsule 250 mg, 250 mg, Oral, BID, Cuiyin Yurik, CRNP, 250 mg at 18 3526    sodium chloride 0 9 % infusion, 50 mL/hr, Intravenous, Continuous, SANCHO Knowles, Last Rate: 50 mL/hr at 18 183, 50 mL/hr at 18 183    venlafaxine [facility]) tablet 75 mg, 75 mg, Oral, BID With Meals, Cuiyin Yurik, CRNP, 75 mg at 18 6581    zolpidem (AMBIEN) tablet 10 mg, 10 mg, Oral, HS PRN, Cuiyin Yurik, CRNP, 10 mg at 18 2235  Allergies   Allergen Reactions    Iodine Anaphylaxis    Shellfish-Derived Products      all current active meds have been reviewed    discontinued meds:   Medications Discontinued During This Encounter   Medication Reason    acetaminophen (TYLENOL) tablet 650 mg     heparin (porcine) subcutaneous injection 5,000 Units     heparin (porcine) subcutaneous injection 5,000 Units     vancomycin (VANCOCIN) 1,250 mg in sodium chloride 0 9 % 250 mL IVPB        Physical Exam: Physical Exam    HR:  [85-97] 90  Resp:  [18] 18  BP: (121-149)/(61-76) 138/76  SpO2:  [96 %-99 %] 96 %  Body mass index is 28 06 kg/m²  Weight (last 2 days)     Date/Time   Weight    18  86 2 (190 01)    18 1850  86 2 (190)            Temp (24hrs), Av 7 °F (37 1 °C), Min:98 1 °F (36 7 °C), Max:99 6 °F (37 6 °C)  Current: Temperature: 99 6 °F (37 6 °C)AGE@    Intake/Output Summary (Last 24 hours) at 18 1403  Last data filed at 18 0150   Gross per 24 hour   Intake             1260 ml   Output             1100 ml   Net              160 ml     His maximum temperature 99 6 degree F he still has tachycardia, other vital signs are stable  The lungs are clear and heart sounds revealed no murmurs  Abdomen is soft and nontender    The right lower extremity:  There is persistent erythema, fluctuance swelling, over the right knee, and also there is erythema and swelling of part of the leg below knee  The calf is supple  There is no lymphangitis or regional lymphadenitis  No other joints show acute inflammation        Invasive Devices     Peripheral Intravenous Line            Long-Dwell Peripheral IV (Midline) 20/50/23 Left Basilic less than 1 day                            Lab, Imaging and other studies:    Recent Results (from the past 96 hour(s))   CBC and differential    Collection Time: 06/05/18  9:28 PM   Result Value Ref Range    WBC 14 22 (H) 4 31 - 10 16 Thousand/uL    RBC 4 37 3 88 - 5 62 Million/uL    Hemoglobin 13 5 12 0 - 17 0 g/dL    Hematocrit 42 2 36 5 - 49 3 %    MCV 97 82 - 98 fL    MCH 30 9 26 8 - 34 3 pg    MCHC 32 0 31 4 - 37 4 g/dL    RDW 13 3 11 6 - 15 1 %    MPV 8 6 (L) 8 9 - 12 7 fL    Platelets 518 (H) 169 - 390 Thousands/uL    nRBC 0 /100 WBCs    Neutrophils Relative 76 (H) 43 - 75 %    Immat GRANS % 1 0 - 2 %    Lymphocytes Relative 15 14 - 44 %    Monocytes Relative 8 4 - 12 %    Eosinophils Relative 0 0 - 6 %    Basophils Relative 0 0 - 1 %    Neutrophils Absolute 10 75 (H) 1 85 - 7 62 Thousands/µL    Immature Grans Absolute 0 07 0 00 - 0 20 Thousand/uL    Lymphocytes Absolute 2 17 0 60 - 4 47 Thousands/µL    Monocytes Absolute 1 17 0 17 - 1 22 Thousand/µL    Eosinophils Absolute 0 03 0 00 - 0 61 Thousand/µL    Basophils Absolute 0 03 0 00 - 0 10 Thousands/µL   Protime-INR    Collection Time: 06/05/18  9:28 PM   Result Value Ref Range    Protime 9 7 9 4 - 11 7 seconds    INR 0 92 0 86 - 1 16   APTT    Collection Time: 06/05/18  9:28 PM   Result Value Ref Range    PTT 29 24 - 36 seconds   Comprehensive metabolic panel    Collection Time: 06/05/18  9:28 PM   Result Value Ref Range    Sodium 134 (L) 136 - 145 mmol/L    Potassium 3 8 3 5 - 5 3 mmol/L    Chloride 97 (L) 100 - 108 mmol/L    CO2 28 21 - 32 mmol/L    Anion Gap 9 4 - 13 mmol/L    BUN 19 5 - 25 mg/dL    Creatinine 0 77 0 60 - 1 30 mg/dL    Glucose 95 65 - 140 mg/dL    Calcium 9 4 8 3 - 10 1 mg/dL    AST 35 5 - 45 U/L    ALT 74 12 - 78 U/L    Alkaline Phosphatase 103 46 - 116 U/L    Total Protein 8 2 6 4 - 8 2 g/dL    Albumin 3 5 3 5 - 5 0 g/dL    Total Bilirubin 0 30 0 20 - 1 00 mg/dL    eGFR 98 ml/min/1 73sq m   Blood culture #1    Collection Time: 06/05/18  9:28 PM   Result Value Ref Range    Blood Culture No Growth at 24 hrs  Blood culture #2    Collection Time: 06/05/18  9:28 PM   Result Value Ref Range    Blood Culture No Growth at 24 hrs      Sedimentation rate, automated    Collection Time: 06/05/18  9:28 PM   Result Value Ref Range    Sed Rate 51 (H) 2 - 10 mm/hour   Lactic acid, plasma    Collection Time: 06/05/18 10:18 PM   Result Value Ref Range    LACTIC ACID 1 3 0 5 - 2 0 mmol/L   Uric acid    Collection Time: 06/05/18 10:19 PM   Result Value Ref Range    Uric Acid 5 0 4 2 - 8 0 mg/dL   C-reactive protein    Collection Time: 06/06/18  7:11 AM   Result Value Ref Range    CRP >90 0 (H) <3 0 mg/L   Basic metabolic panel    Collection Time: 06/06/18  7:11 AM   Result Value Ref Range    Sodium 134 (L) 136 - 145 mmol/L    Potassium 4 2 3 5 - 5 3 mmol/L    Chloride 101 100 - 108 mmol/L    CO2 26 21 - 32 mmol/L    Anion Gap 7 4 - 13 mmol/L    BUN 16 5 - 25 mg/dL    Creatinine 0 67 0 60 - 1 30 mg/dL    Glucose 96 65 - 140 mg/dL    Calcium 8 7 8 3 - 10 1 mg/dL    eGFR 104 ml/min/1 73sq m   Magnesium    Collection Time: 06/06/18  7:11 AM   Result Value Ref Range    Magnesium 1 9 1 6 - 2 6 mg/dL   Hemoglobin A1C    Collection Time: 06/06/18  7:11 AM   Result Value Ref Range    Hemoglobin A1C 7 0 (H) 4 2 - 6 3 %     mg/dl   CBC and differential    Collection Time: 06/06/18  7:11 AM   Result Value Ref Range    WBC 11 69 (H) 4 31 - 10 16 Thousand/uL    RBC 3 77 (L) 3 88 - 5 62 Million/uL    Hemoglobin 11 7 (L) 12 0 - 17 0 g/dL    Hematocrit 36 5 36 5 - 49 3 %    MCV 97 82 - 98 fL    MCH 31 0 26 8 - 34 3 pg    MCHC 32 1 31 4 - 37 4 g/dL    RDW 13 4 11 6 - 15 1 %    MPV 9 0 8 9 - 12 7 fL    Platelets 999 994 - 784 Thousands/uL    nRBC 0 /100 WBCs    Neutrophils Relative 73 43 - 75 %    Immat GRANS % 0 0 - 2 %    Lymphocytes Relative 19 14 - 44 %    Monocytes Relative 8 4 - 12 %    Eosinophils Relative 0 0 - 6 %    Basophils Relative 0 0 - 1 %    Neutrophils Absolute 8 42 (H) 1 85 - 7 62 Thousands/µL    Immature Grans Absolute 0 04 0 00 - 0 20 Thousand/uL    Lymphocytes Absolute 2 21 0 60 - 4 47 Thousands/µL    Monocytes Absolute 0 94 0 17 - 1 22 Thousand/µL    Eosinophils Absolute 0 05 0 00 - 0 61 Thousand/µL    Basophils Absolute 0 03 0 00 - 0 10 Thousands/µL   Synovial fluid white cell count w/ diff    Collection Time: 06/06/18  7:23 PM   Result Value Ref Range    Site synovial fluid right knee     Color, Fluid Light Yellow Clear, Colorless,Yellow    Clarity, Fluid Clear Clear    WBC, Fluid 17,450 (H) 0 - 200 /ul   Synovial fluid, crystal    Collection Time: 06/06/18  7:23 PM   Result Value Ref Range    Crystals, Synovial Fluid No Crystals Seen No Crystals Seen   Body fluid culture and Gram stain    Collection Time: 06/06/18  7:23 PM   Result Value Ref Range    Body Fluid Culture, Sterile Culture results to follow       Gram Stain Result 2+ Polys     Gram Stain Result No bacteria seen    Synovial Fluid Diff    Collection Time: 06/06/18  7:23 PM   Result Value Ref Range    Total Counted 100     Neutrophil % Synovial 83 %    Lymph % Synovial 12 %    Monocyte % Synovial 5 %   Body fluid culture and Gram stain    Collection Time: 06/06/18  7:24 PM   Result Value Ref Range    Body Fluid Culture, Sterile 4+ Growth of Staphylococcus aureus (A)     Gram Stain Result 4+ Polys     Gram Stain Result       4+ Gram positive cocci in pairs, chains and clusters   Basic metabolic panel    Collection Time: 06/07/18  6:30 AM   Result Value Ref Range    Sodium 132 (L) 136 - 145 mmol/L    Potassium 4 7 3 5 - 5 3 mmol/L    Chloride 100 100 - 108 mmol/L    CO2 21 21 - 32 mmol/L    Anion Gap 11 4 - 13 mmol/L    BUN 10 5 - 25 mg/dL    Creatinine 0 70 0 60 - 1 30 mg/dL    Glucose 105 65 - 140 mg/dL    Calcium 9 4 8 3 - 10 1 mg/dL    eGFR 102 ml/min/1 73sq m   CBC and differential    Collection Time: 06/07/18  6:30 AM   Result Value Ref Range    WBC 13 17 (H) 4 31 - 10 16 Thousand/uL    RBC 4 34 3 88 - 5 62 Million/uL    Hemoglobin 13 6 12 0 - 17 0 g/dL    Hematocrit 44 6 36 5 - 49 3 %     (H) 82 - 98 fL    MCH 31 3 26 8 - 34 3 pg    MCHC 30 5 (L) 31 4 - 37 4 g/dL    RDW 13 3 11 6 - 15 1 %    MPV 8 7 (L) 8 9 - 12 7 fL    Platelets 443 678 - 241 Thousands/uL    nRBC 0 /100 WBCs    Neutrophils Relative 70 43 - 75 %    Immat GRANS % 1 0 - 2 %    Lymphocytes Relative 18 14 - 44 %    Monocytes Relative 10 4 - 12 %    Eosinophils Relative 1 0 - 6 %    Basophils Relative 0 0 - 1 %    Neutrophils Absolute 9 42 (H) 1 85 - 7 62 Thousands/µL    Immature Grans Absolute 0 08 0 00 - 0 20 Thousand/uL    Lymphocytes Absolute 2 30 0 60 - 4 47 Thousands/µL    Monocytes Absolute 1 26 (H) 0 17 - 1 22 Thousand/µL    Eosinophils Absolute 0 06 0 00 - 0 61 Thousand/µL    Basophils Absolute 0 05 0 00 - 0 10 Thousands/µL   Procalcitonin    Collection Time: 06/07/18  6:30 AM   Result Value Ref Range    Procalcitonin 0 06 <=0 25 ng/ml   Fingerstick Glucose (POCT)    Collection Time: 06/07/18  7:25 AM   Result Value Ref Range    POC Glucose 107 65 - 140 mg/dl   Fingerstick Glucose (POCT)    Collection Time: 06/07/18 11:35 AM   Result Value Ref Range    POC Glucose 89 65 - 140 mg/dl       Results from last 7 days  Lab Units 06/06/18  0711   MAGNESIUM mg/dL 1 9           Results from last 7 days  Lab Units 06/05/18  2128   INR  0 92   PTT seconds 29     No results found for: TROPONINT  ABG:No results found for: PHART, QXD1QMT, PO2ART, YZH3XKO, G8TKMJPB, BEART, SOURCE        Cultures    Results from last 7 days  Lab Units 06/06/18 1924 06/06/18 1923 06/05/18 2128   BLOOD CULTURE   --   --  No Growth at 24 hrs  No Growth at 24 hrs  GRAM STAIN RESULT  4+ Polys  4+ Gram positive cocci in pairs, chains and clusters 2+ Polys  No bacteria seen  --    BODY FLUID CULTURE, STERILE  4+ Growth of Staphylococcus aureus* Culture results to follow  --       Xr Knee 1 Or 2 Vw Right    Result Date: 6/6/2018  Narrative: RIGHT KNEE INDICATION:   infection,suspect spetic joint     Redness and swelling  COMPARISON:  Plain radiographs May 15, 2016 VIEWS:  XR KNEE 1 OR 2 VW RIGHT Images: 2 FINDINGS: There is no acute fracture or dislocation  Small suprapatellar joint effusion  Mild medial joint space narrowing  Mild narrowing of the patellofemoral space  Mild sharpening of the tibial spines  No lytic or blastic lesions are seen  Diffuse soft tissue swelling, extending from above the patella, inferiorly to the anterior tibial tubercle  More focal soft tissue swelling overlying the anterior tibial tubercle  No radiopaque foreign bodies are seen  Impression: 1  Mild degenerative changes with small suprapatellar joint effusion  If there is clinical concern for septic arthritis, consider follow-up arthrocentesis  2   Diffuse soft tissue swelling anterior to the patella, most compatible with prepatellar bursitis  3   More focal soft tissue swelling overlying the anterior tibial tubercle, suspicious for superimposed infrapatellar bursitis  The study was marked in [facility] for immediate notification  Workstation performed: [id number]     Vas Lower Limb Venous Duplex Study, Unilateral/limited    Result Date: 6/7/2018  Narrative:  [facility] REPORT CLINICAL: Indications: Patient presents with right lower extremity edema  Risk Factors: The patient has no history of DVT  The patient current BMI is 28 06, Weight is 190 lb and Height is 69 in  CONCLUSION: RIGHT LOWER LIMB No evidence of acute or chronic deep vein thrombosis   No evidence of superficial thrombophlebitis noted   Doppler evaluation shows a normal response to augmentation maneuvers  Popliteal, posterior tibial and anterior tibial arterial Doppler waveforms are triphasic  LEFT LOWER LIMB LIMITED Evaluation shows no evidence of thrombus in the common femoral vein  Doppler evaluation shows a normal response to augmentation maneuvers  I have personally reviewed pertinent reports  Principal Problem:    Sepsis ([address] )  Active Problems:    Hyperlipidemia    Borderline diabetes    Hypertension    Cellulitis of right lower extremity    Thrombocytosis (HCC)    Hyponatremia    Depression      Assessment/Plan: This is a middle-age man with acute right prepatellar bursitis and arthritis, suspected septic bursitis, microbial etiology  Staphylococcus aureus  He does not have the usual risk for methicillin-resistant Staphylococcus aureus  It is unclear whether he has septic arthritis of the knee with the same organism  He has had a lot of outdoor exposure to ticks, in the differential, Lyme disease was also considered  No crystals were seen and other smears, Lyme disease PCR and cultures are pending  Continue current treatment with ceftriaxone pending sensitivity results and results of the cultures done on the synovial fluid  He may need surgical incision and drainage of the bursa and washout of the knee if both areas are infected with Staphylococcus aureus  Patient was counseled

## 2018-06-07 NOTE — CASE MANAGEMENT
Continued Stay Review    Date: 6/7/18    Vital Signs: /76 (BP Location: Right arm)   Pulse 90   Temp 99 6 °F (37 6 °C) (Oral)   Resp 18   Ht 5' 9" (1 753 m)   Wt 86 2 kg (190 lb 0 2 oz)   SpO2 96%   BMI 28 06 kg/m²       GMF  BMP MG CBC DIFF  Medications:   Scheduled Meds:   Current Facility-Administered Medications:  acetaminophen 650 mg Oral Q6H PRN Kathe SANCHO Burrell    ALPRAZolam 0 5 mg Oral BID PRN SANCHO Melvin    aluminum-magnesium hydroxide-simethicone 30 mL Oral Q6H PRN SANCHO Melvin    cefTRIAXone 2,000 mg Intravenous Q24H Yoanna Brar MD Last Rate: 2,000 mg (06/06/18 1829)   enalapril 15 mg Oral Daily SANCHO Melvin    heparin (porcine) 5,000 Units Subcutaneous Q12H [facility] Kathe First, SANCHO    ibuprofen 400 mg Oral [facility] Kathe First, CRREJI    insulin lispro 1-5 Units Subcutaneous TID AC Papo Alberto, SANCHO    insulin lispro 1-5 Units Subcutaneous HS SANCHO Melvin    ondansetron 4 mg Intravenous Q6H PRN SANCHO Melvin    oxyCODONE-acetaminophen 1 tablet Oral Q6H PRN SANCHO Melvin    pravastatin 80 mg Oral Daily With Burris & SANCHO Perez    saccharomyces boulardii 250 mg Oral BID SANCHO Melvin    sodium chloride 50 mL/hr Intravenous Continuous Kathe First, CRNP Last Rate: 50 mL/hr (06/06/18 1838)   venlafaxine 75 mg Oral BID With Meals SANCHO Melvin    zolpidem 10 mg Oral HS PRN SANCHO Melvin      Continuous Infusions:   sodium chloride 50 mL/hr Last Rate: 50 mL/hr (06/06/18 1838)     PRN Meds:   acetaminophen    ALPRAZolam    aluminum-magnesium hydroxide-simethicone    ondansetron    oxyCODONE-acetaminophen    zolpidem    Abnormal Labs/Diagnostic Results:  WBC 13 17   SYNOVIAL FLUID WBC 17,450    Age/Sex: 66 y o  male     ATTENDING  * Sepsis ([facility] [address] )  CRP > 90, SED rate 51, Uric Acid 5 0, Procalcitonin 0 06 (checked after initiation of antimicrobial therapy)   · ID and orthopedics following, appreciate recommendations  · S/p aspiration x2 of right knee joint on 6/6/18   · Per ID, OK to discontinue IV Vancomycin   · Started Rocephin 2gm IVPB daily, day #2   · Discontinue IVF   · Follow-up blood cultures, aspiration fluid for gram stain and culture and Lyme, and Lyme PCR   · Monitor for worsening signs of infection  · Repeat CBC in am   · Probiotic while on ABX  Cellulitis of right lower extremity  · Venous Doppler to rule out DVT, pending   · Discontinued IV Vancomycin and started Rocephin 2gm IVPB daily per ID recs  · Pain control with scheduled Motrin and Percocet PRN  · Rest, elevation  · Neurovascular checks  · Per ortho, right knee aspirated in 2 areas  F/U cultures   May need surgical irrigation debridement if no significant improvement with ABX    SURGEON  Assessment:  1) Right knee Suprapatellar, prepatellar, infrapatellar bursitis - erythema reduced, swelling reduced, marked improvement, leukocytosis increased secondary to aspiration, minimal tenderness with micro or macromotion, good AROM And PROM, not suspicious for septic arthritis, possible concurrent cellulitis vs bursitis, not as likely to be gout being that there were no crystals seen, history of aspiration, no history of lyme or gonorrhea/chlamydia, pertinent history of being in the woods/forest a lot  2) Right knee pain - erythema present, could be due to gout; however, currently appears based on clinical history to have possible infectious etiology with bursitis, no improvement with colchicine  Plan:  1-2)   - Continue IV antibiotics per SLIM and ID  - IV fluids per SLIM  - RICE: rest, ice, compression, elevation   - will continue to follow as fluid cultures pending  - no acute orthopedic intervention  - can start NSAID if not contraindicated   - no need to reach out PCP at this point   - routine neurovascular exam with vitals   - repeat am labs with CBC, BMP, Mag, Phos   - Dr Shamika Smith performed aspiration of bursa and synovial fluid, will continue to review for cultures and white count   - WBAT  - no acute surgical intervention  - regular diet is fine      ID  Assessment/Plan: This is a middle-age man with acute right prepatellar bursitis and arthritis, suspected septic bursitis, microbial etiology  Staphylococcus aureus  He does not have the usual risk for methicillin-resistant Staphylococcus aureus  It is unclear whether he has septic arthritis of the knee with the same organism  He has had a lot of outdoor exposure to ticks, in the differential, Lyme disease was also considered  No crystals were seen and other smears, Lyme disease PCR and cultures are pending  Continue current treatment with ceftriaxone pending sensitivity results and results of the cultures done on the synovial fluid  He may need surgical incision and drainage of the bursa and washout of the knee if both areas are infected with Staphylococcus aureus  Patient was counseled

## 2018-06-07 NOTE — PROGRESS NOTES
Progress Note - Yasmine Cunningham 1955, 66 y o  male MRN: [id number]    Unit/Bed#: 2 [place] [address] Encounter: [id number]    Primary Care Provider: Augustin Calderon MD   Date and time admitted to hospital: 6/5/2018  7:38 PM        * Sepsis [facility])   Assessment & Plan    As evidenced by WBC 14 22, pulse rate 103, with suspected source of infection right lower extremity cellulitis with a possible prepatellar bursitis with significant effusion vs septic arthritis vs gout vs Lyme  Presently, afebrile however white count slightly increased from 11 69 ->13 1   CRP > 90, SED rate 51, Uric Acid 5 0, Procalcitonin 0 06 (checked after initiation of antimicrobial therapy)   · ID and orthopedics following, appreciate recommendations  · S/p aspiration x2 of right knee joint on 6/6/18   · Per ID, OK to discontinue IV Vancomycin   · Started Rocephin 2gm IVPB daily, day #2   · Discontinue IVF   · Follow-up blood cultures, aspiration fluid for gram stain and culture and Lyme, and Lyme PCR   · Monitor for worsening signs of infection  · Repeat CBC in am   · Probiotic while on ABX        Cellulitis of right lower extremity   Assessment & Plan    With a possible prepatellar bursitis with significant effusion vs septic arthritis vs gout vs Lyme  Patient presenting with marked edema, erythema, increased warmth, tenderness with ROM to right knee  Patient was treated with colchicine on 5/25/18 for 10 days and had knee aspiration and cortisone injection in PCP's office on 5/28 per patient  · Right knee x-ray, small suprapatellar joint effusion, prepatellar bursitis, and more focal soft tissue swelling overlying the anterior tibial tubercle, suspicious for superimposed infrapatellar bursitis   If there is clinical concern for septic arthritis, consider follow-up arthrocentesis    · Venous Doppler to rule out DVT, pending   · Discontinued IV Vancomycin and started Rocephin 2gm IVPB daily per ID recs  · Pain control with scheduled Motrin and Percocet PRN  · Rest, elevation  · Neurovascular checks  · Per ortho, right knee aspirated in 2 areas  F/U cultures  May need surgical irrigation debridement if no significant improvement with ABX        Depression   Assessment & Plan    Continue Effexor        Hyponatremia   Assessment & Plan    Mild  Sodium 134 -> 132  · OK to discontinue IV hydration   · Repeat BMP in am        Thrombocytosis (HCC)   Assessment & Plan    Mild  Platelet 597 -> 781 -> 339  Likely reactive to sepsis  · Continue Heparin subcu for DVT prophylaxis  · Monitor CBC in am         Hypertension   Assessment & Plan    Continue Enalapril  Pain control  Monitor  Borderline diabetes   Assessment & Plan    Check A1c, 7 0  Morning glucose, 105  · Enforce TLCs  · Outpatient F/U        Hyperlipidemia   Assessment & Plan    Continue statin            VTE Pharmacologic Prophylaxis:   Pharmacologic: Heparin  Mechanical VTE Prophylaxis in Place: Yes    Patient Centered Rounds: I have performed bedside rounds with nursing staff today  Discussions with Specialists or Other Care Team Provider: Nursing, CM, orthopedic and ID notes appreciated     Education and Discussions with Family / Patient: I have answered all questions to the best of my ability  Time Spent for Care: 20 minutes  More than 50% of total time spent on counseling and coordination of care as described above  Current Length of Stay: 2 day(s)    Current Patient Status: Inpatient   Certification Statement: The patient will continue to require additional inpatient hospital stay due to sepsis 2/2 right knee cellulitis with pending workup and possible need for washout debridement in OR     Discharge Plan: Patient is not medically stable for discharge today, likely in 48-72 hours pending progress  Code Status: Level 1 - Full Code      Subjective:   Patient observed resting in bed, appears to be in good spirits  States less pain and swelling in his right knee   States improvement with ROM after orthopedic team aspirated his knee  States he still has significant swelling and pain just below his knee and feels like an aspiration to this area would relieve his symptoms  Denies HA, dizziness, lightheadedness, chest pain, SOB, abd pain, N/V/D  Had a BM this morning  Appetite fair  Requesting a stool softener  Objective:     Vitals:   Temp (24hrs), Av 7 °F (37 1 °C), Min:98 1 °F (36 7 °C), Max:99 6 °F (37 6 °C)    HR:  [85-97] 90  Resp:  [18] 18  BP: (121-149)/(61-76) 138/76  SpO2:  [96 %-99 %] 96 %  Body mass index is 28 06 kg/m²  Input and Output Summary (last 24 hours): Intake/Output Summary (Last 24 hours) at 18 1056  Last data filed at 18 0150   Gross per 24 hour   Intake             1500 ml   Output             1450 ml   Net               50 ml       Physical Exam:     Physical Exam   Constitutional: He is oriented to person, place, and time  He appears well-developed and well-nourished  No distress  HENT:   Head: Normocephalic  Neck: Normal range of motion  Cardiovascular: Normal rate, regular rhythm and intact distal pulses  Pulmonary/Chest: Effort normal and breath sounds normal  No respiratory distress  He has no wheezes  He has no rhonchi  He has no rales  Abdominal: Soft  Bowel sounds are normal  He exhibits no distension  There is no tenderness  Musculoskeletal: He exhibits edema (surrounding right knee and extending to right ankle, +2 ) and tenderness (right knee)  Neurological: He is alert and oriented to person, place, and time  Skin: Skin is warm and dry  He is not diaphoretic  There is erythema (right knee and right lower extremity )  Psychiatric: He has a normal mood and affect  His behavior is normal  Judgment and thought content normal    Nursing note and vitals reviewed        Additional Data:     Labs:      Results from last 7 days  Lab Units 18  0630   WBC Thousand/uL 13 17*   HEMOGLOBIN g/dL 13 6 HEMATOCRIT % 44 6   PLATELETS Thousands/uL 339   NEUTROS PCT % 70   LYMPHS PCT % 18   MONOS PCT % 10   EOS PCT % 1       Results from last 7 days  Lab Units 06/07/18  0630  06/05/18 2128   SODIUM mmol/L 132*  < > 134*   POTASSIUM mmol/L 4 7  < > 3 8   CHLORIDE mmol/L 100  < > 97*   CO2 mmol/L 21  < > 28   BUN mg/dL 10  < > 19   CREATININE mg/dL 0 70  < > 0 77   CALCIUM mg/dL 9 4  < > 9 4   TOTAL PROTEIN g/dL  --   --  8 2   BILIRUBIN TOTAL mg/dL  --   --  0 30   ALK PHOS U/L  --   --  103   ALT U/L  --   --  74   AST U/L  --   --  35   GLUCOSE RANDOM mg/dL 105  < > 95   < > = values in this interval not displayed  Results from last 7 days  Lab Units 06/05/18 2128   INR  0 92       * I Have Reviewed All Lab Data Listed Above  * Additional Pertinent Lab Tests Reviewed:  All Labs Within Last 24 Hours Reviewed    Imaging:    Imaging Reports Reviewed Today Include: Xray   Imaging Personally Reviewed by Myself Includes: None    Recent Cultures (last 7 days):           Last 24 Hours Medication List:     Current Facility-Administered Medications:  acetaminophen 650 mg Oral Q6H PRN Uche Purple, KORINNP    ALPRAZolam 0 5 mg Oral BID PRN SANCHO Mevlin    aluminum-magnesium hydroxide-simethicone 30 mL Oral Q6H PRN SANCHO Melvin    cefTRIAXone 2,000 mg Intravenous Q24H Yoanna Brar MD Last Rate: 2,000 mg (06/06/18 1829)   enalapril 15 mg Oral Daily SANCHO Melvin    heparin (porcine) 5,000 Units Subcutaneous Q12H [address] Uche Purple, SANCHO    ibuprofen 400 mg Oral Q8H [address] Uche Purple, CRNP    insulin lispro 1-5 Units Subcutaneous TID AC SANCHO Melvin    insulin lispro 1-5 Units Subcutaneous HS SANCHO Melvin    ondansetron 4 mg Intravenous Q6H PRN SANCHO Melvin    oxyCODONE-acetaminophen 1 tablet Oral Q6H PRN SANCHO Melvin    pravastatin 80 mg Oral Daily With SANCHO Macias    saccharomyces boulardii 250 mg Oral BID SANCHO Melvin    sodium chloride 50 mL/hr Intravenous Continuous SANCHO Clemons Last Rate: 50 mL/hr (06/06/18 1838)   venlafaxine 75 mg Oral BID With Meals SANCHO Melvin    zolpidem 10 mg Oral HS PRN SANCHO Avila         Today, Patient Was Seen By: SANCHO Clemons    ** Please Note: Dictation voice to text software may have been used in the creation of this document   **

## 2018-06-07 NOTE — ASSESSMENT & PLAN NOTE
With a possible prepatellar bursitis with significant effusion vs septic arthritis vs gout vs Lyme  Patient presenting with marked edema, erythema, increased warmth, tenderness with ROM to right knee  Patient was treated with colchicine on 5/25/18 for 10 days and had knee aspiration and cortisone injection in PCP's office on 5/28 per patient  · Right knee x-ray, small suprapatellar joint effusion, prepatellar bursitis, and more focal soft tissue swelling overlying the anterior tibial tubercle, suspicious for superimposed infrapatellar bursitis   If there is clinical concern for septic arthritis, consider follow-up arthrocentesis  · Venous Doppler to rule out DVT, pending   · Discontinued IV Vancomycin and started Rocephin 2gm IVPB daily per ID recs  · Pain control with scheduled Motrin and Percocet PRN  · Rest, elevation  · Neurovascular checks  · Per ortho, right knee aspirated in 2 areas  F/U cultures   May need surgical irrigation debridement if no significant improvement with ABX

## 2018-06-07 NOTE — ASSESSMENT & PLAN NOTE
Mild   Platelet 247 -> 834 -> 339  Likely reactive to sepsis  · Continue Heparin subcu for DVT prophylaxis    · Monitor CBC in am

## 2018-06-07 NOTE — PLAN OF CARE
DISCHARGE PLANNING     Discharge to home or other facility with appropriate resources Progressing        DISCHARGE PLANNING - CARE MANAGEMENT     Discharge to post-acute care or home with appropriate resources Progressing        INFECTION - ADULT     Absence or prevention of progression during hospitalization Progressing        Knowledge Deficit     Patient/family/caregiver demonstrates understanding of disease process, treatment plan, medications, and discharge instructions Progressing        METABOLIC, FLUID AND ELECTROLYTES - ADULT     Glucose maintained within target range Progressing        MUSCULOSKELETAL - ADULT     Maintain or return mobility to safest level of function Progressing     Maintain proper alignment of affected body part Progressing        PAIN - ADULT     Verbalizes/displays adequate comfort level or baseline comfort level Progressing        Potential for Falls     Patient will remain free of falls Progressing        Prexisting or High Potential for Compromised Skin Integrity     Skin integrity is maintained or improved Progressing        SKIN/TISSUE INTEGRITY - ADULT     Skin integrity remains intact Progressing     Incision(s), wounds(s) or drain site(s) healing without S/S of infection Progressing

## 2018-06-07 NOTE — ASSESSMENT & PLAN NOTE
As evidenced by WBC 14 22, pulse rate 103, with suspected source of infection right lower extremity cellulitis with a possible prepatellar bursitis with significant effusion vs septic arthritis vs gout vs Lyme  Presently, afebrile however white count slightly increased from 11 69 ->13 1   CRP > 90, SED rate 51, Uric Acid 5 0, Procalcitonin 0 06 (checked after initiation of antimicrobial therapy)   · ID and orthopedics following, appreciate recommendations  · S/p aspiration x2 of right knee joint on 6/6/18   · Per ID, OK to discontinue IV Vancomycin   · Started Rocephin 2gm IVPB daily, day #2   · Discontinue IVF   · Follow-up blood cultures, aspiration fluid for gram stain and culture and Lyme, and Lyme PCR   · Monitor for worsening signs of infection  · Repeat CBC in am   · Probiotic while on ABX

## 2018-06-08 ENCOUNTER — ANESTHESIA EVENT (INPATIENT)
Dept: PERIOP | Facility: HOSPITAL | Age: 63
DRG: 486 | End: 2018-06-08
Payer: COMMERCIAL

## 2018-06-08 ENCOUNTER — ANESTHESIA (INPATIENT)
Dept: PERIOP | Facility: HOSPITAL | Age: 63
DRG: 486 | End: 2018-06-08
Payer: COMMERCIAL

## 2018-06-08 PROBLEM — M70.51 INFRAPATELLAR BURSITIS OF RIGHT KNEE: Status: ACTIVE | Noted: 2018-06-05

## 2018-06-08 LAB
ANION GAP SERPL CALCULATED.3IONS-SCNC: 9 MMOL/L (ref 4–13)
B BURGDOR IGG SER IA-ACNC: 0.67
B BURGDOR IGM SER IA-ACNC: 0.28
BACTERIA SPEC BFLD CULT: ABNORMAL
BASOPHILS # BLD AUTO: 0.03 THOUSANDS/ΜL (ref 0–0.1)
BASOPHILS NFR BLD AUTO: 0 % (ref 0–1)
BUN SERPL-MCNC: 9 MG/DL (ref 5–25)
CALCIUM SERPL-MCNC: 8.6 MG/DL (ref 8.3–10.1)
CHLORIDE SERPL-SCNC: 99 MMOL/L (ref 100–108)
CO2 SERPL-SCNC: 25 MMOL/L (ref 21–32)
CREAT SERPL-MCNC: 0.66 MG/DL (ref 0.6–1.3)
EOSINOPHIL # BLD AUTO: 0.06 THOUSAND/ΜL (ref 0–0.61)
EOSINOPHIL NFR BLD AUTO: 1 % (ref 0–6)
ERYTHROCYTE [DISTWIDTH] IN BLOOD BY AUTOMATED COUNT: 13 % (ref 11.6–15.1)
GFR SERPL CREATININE-BSD FRML MDRD: 104 ML/MIN/1.73SQ M
GLUCOSE SERPL-MCNC: 101 MG/DL (ref 65–140)
GLUCOSE SERPL-MCNC: 143 MG/DL (ref 65–140)
GLUCOSE SERPL-MCNC: 72 MG/DL (ref 65–140)
GLUCOSE SERPL-MCNC: 92 MG/DL (ref 65–140)
GLUCOSE SERPL-MCNC: 99 MG/DL (ref 65–140)
GRAM STN SPEC: ABNORMAL
GRAM STN SPEC: ABNORMAL
HCT VFR BLD AUTO: 35 % (ref 36.5–49.3)
HGB BLD-MCNC: 11.2 G/DL (ref 12–17)
IMM GRANULOCYTES # BLD AUTO: 0.05 THOUSAND/UL (ref 0–0.2)
IMM GRANULOCYTES NFR BLD AUTO: 1 % (ref 0–2)
LYMPHOCYTES # BLD AUTO: 2.02 THOUSANDS/ΜL (ref 0.6–4.47)
LYMPHOCYTES NFR BLD AUTO: 20 % (ref 14–44)
MAGNESIUM SERPL-MCNC: 1.7 MG/DL (ref 1.6–2.6)
MCH RBC QN AUTO: 30.6 PG (ref 26.8–34.3)
MCHC RBC AUTO-ENTMCNC: 32 G/DL (ref 31.4–37.4)
MCV RBC AUTO: 96 FL (ref 82–98)
MONOCYTES # BLD AUTO: 1.06 THOUSAND/ΜL (ref 0.17–1.22)
MONOCYTES NFR BLD AUTO: 10 % (ref 4–12)
NEUTROPHILS # BLD AUTO: 6.97 THOUSANDS/ΜL (ref 1.85–7.62)
NEUTS SEG NFR BLD AUTO: 68 % (ref 43–75)
NRBC BLD AUTO-RTO: 0 /100 WBCS
PLATELET # BLD AUTO: 307 THOUSANDS/UL (ref 149–390)
PMV BLD AUTO: 8.8 FL (ref 8.9–12.7)
POTASSIUM SERPL-SCNC: 3.6 MMOL/L (ref 3.5–5.3)
RBC # BLD AUTO: 3.66 MILLION/UL (ref 3.88–5.62)
SODIUM SERPL-SCNC: 133 MMOL/L (ref 136–145)
WBC # BLD AUTO: 10.19 THOUSAND/UL (ref 4.31–10.16)

## 2018-06-08 PROCEDURE — 87147 CULTURE TYPE IMMUNOLOGIC: CPT | Performed by: ORTHOPAEDIC SURGERY

## 2018-06-08 PROCEDURE — 87070 CULTURE OTHR SPECIMN AEROBIC: CPT | Performed by: ORTHOPAEDIC SURGERY

## 2018-06-08 PROCEDURE — 87205 SMEAR GRAM STAIN: CPT | Performed by: ORTHOPAEDIC SURGERY

## 2018-06-08 PROCEDURE — 99232 SBSQ HOSP IP/OBS MODERATE 35: CPT | Performed by: NURSE PRACTITIONER

## 2018-06-08 PROCEDURE — 0MDN0ZZ EXTRACTION OF RIGHT KNEE BURSA AND LIGAMENT, OPEN APPROACH: ICD-10-PCS | Performed by: ORTHOPAEDIC SURGERY

## 2018-06-08 PROCEDURE — 99232 SBSQ HOSP IP/OBS MODERATE 35: CPT | Performed by: ORTHOPAEDIC SURGERY

## 2018-06-08 PROCEDURE — 29871 ARTHRS KNEE SURG FOR INFCTJ: CPT | Performed by: ORTHOPAEDIC SURGERY

## 2018-06-08 PROCEDURE — 88305 TISSUE EXAM BY PATHOLOGIST: CPT | Performed by: PATHOLOGY

## 2018-06-08 PROCEDURE — 83735 ASSAY OF MAGNESIUM: CPT | Performed by: NURSE PRACTITIONER

## 2018-06-08 PROCEDURE — 85025 COMPLETE CBC W/AUTO DIFF WBC: CPT | Performed by: NURSE PRACTITIONER

## 2018-06-08 PROCEDURE — 0S9C4ZZ DRAINAGE OF RIGHT KNEE JOINT, PERCUTANEOUS ENDOSCOPIC APPROACH: ICD-10-PCS | Performed by: ORTHOPAEDIC SURGERY

## 2018-06-08 PROCEDURE — 80048 BASIC METABOLIC PNL TOTAL CA: CPT | Performed by: NURSE PRACTITIONER

## 2018-06-08 PROCEDURE — 88311 DECALCIFY TISSUE: CPT | Performed by: PATHOLOGY

## 2018-06-08 PROCEDURE — 87075 CULTR BACTERIA EXCEPT BLOOD: CPT | Performed by: ORTHOPAEDIC SURGERY

## 2018-06-08 PROCEDURE — 87186 SC STD MICRODIL/AGAR DIL: CPT | Performed by: ORTHOPAEDIC SURGERY

## 2018-06-08 PROCEDURE — 82948 REAGENT STRIP/BLOOD GLUCOSE: CPT

## 2018-06-08 PROCEDURE — 27301 DRAIN THIGH/KNEE LESION: CPT | Performed by: ORTHOPAEDIC SURGERY

## 2018-06-08 PROCEDURE — 87176 TISSUE HOMOGENIZATION CULTR: CPT | Performed by: ORTHOPAEDIC SURGERY

## 2018-06-08 RX ORDER — POLYETHYLENE GLYCOL 3350 17 G/17G
17 POWDER, FOR SOLUTION ORAL DAILY PRN
Status: DISCONTINUED | OUTPATIENT
Start: 2018-06-08 | End: 2018-06-15

## 2018-06-08 RX ORDER — CEFAZOLIN SODIUM 1 G/3ML
INJECTION, POWDER, FOR SOLUTION INTRAMUSCULAR; INTRAVENOUS AS NEEDED
Status: DISCONTINUED | OUTPATIENT
Start: 2018-06-08 | End: 2018-06-08 | Stop reason: SURG

## 2018-06-08 RX ORDER — POTASSIUM CHLORIDE 20 MEQ/1
40 TABLET, EXTENDED RELEASE ORAL ONCE
Status: COMPLETED | OUTPATIENT
Start: 2018-06-08 | End: 2018-06-08

## 2018-06-08 RX ORDER — FENTANYL CITRATE/PF 50 MCG/ML
50 SYRINGE (ML) INJECTION
Status: DISCONTINUED | OUTPATIENT
Start: 2018-06-08 | End: 2018-06-08 | Stop reason: HOSPADM

## 2018-06-08 RX ORDER — FENTANYL CITRATE 50 UG/ML
INJECTION, SOLUTION INTRAMUSCULAR; INTRAVENOUS AS NEEDED
Status: DISCONTINUED | OUTPATIENT
Start: 2018-06-08 | End: 2018-06-08 | Stop reason: SURG

## 2018-06-08 RX ORDER — ONDANSETRON 2 MG/ML
INJECTION INTRAMUSCULAR; INTRAVENOUS AS NEEDED
Status: DISCONTINUED | OUTPATIENT
Start: 2018-06-08 | End: 2018-06-08 | Stop reason: SURG

## 2018-06-08 RX ORDER — OXYCODONE HYDROCHLORIDE AND ACETAMINOPHEN 5; 325 MG/1; MG/1
1 TABLET ORAL EVERY 4 HOURS PRN
Status: DISCONTINUED | OUTPATIENT
Start: 2018-06-08 | End: 2018-06-08 | Stop reason: HOSPADM

## 2018-06-08 RX ORDER — SODIUM CHLORIDE 9 MG/ML
75 INJECTION, SOLUTION INTRAVENOUS CONTINUOUS
Status: CANCELLED | OUTPATIENT
Start: 2018-06-08

## 2018-06-08 RX ORDER — PROMETHAZINE HYDROCHLORIDE 25 MG/ML
12.5 INJECTION, SOLUTION INTRAMUSCULAR; INTRAVENOUS ONCE AS NEEDED
Status: DISCONTINUED | OUTPATIENT
Start: 2018-06-08 | End: 2018-06-08 | Stop reason: HOSPADM

## 2018-06-08 RX ORDER — MAGNESIUM HYDROXIDE 1200 MG/15ML
LIQUID ORAL AS NEEDED
Status: DISCONTINUED | OUTPATIENT
Start: 2018-06-08 | End: 2018-06-08 | Stop reason: HOSPADM

## 2018-06-08 RX ORDER — LIDOCAINE HYDROCHLORIDE 10 MG/ML
INJECTION, SOLUTION INFILTRATION; PERINEURAL AS NEEDED
Status: DISCONTINUED | OUTPATIENT
Start: 2018-06-08 | End: 2018-06-08 | Stop reason: SURG

## 2018-06-08 RX ORDER — PROPOFOL 10 MG/ML
INJECTION, EMULSION INTRAVENOUS AS NEEDED
Status: DISCONTINUED | OUTPATIENT
Start: 2018-06-08 | End: 2018-06-08 | Stop reason: SURG

## 2018-06-08 RX ORDER — SODIUM CHLORIDE 9 MG/ML
75 INJECTION, SOLUTION INTRAVENOUS ONCE
Status: COMPLETED | OUTPATIENT
Start: 2018-06-08 | End: 2018-06-08

## 2018-06-08 RX ORDER — MEPERIDINE HYDROCHLORIDE 25 MG/ML
12.5 INJECTION INTRAMUSCULAR; INTRAVENOUS; SUBCUTANEOUS
Status: DISCONTINUED | OUTPATIENT
Start: 2018-06-08 | End: 2018-06-08 | Stop reason: HOSPADM

## 2018-06-08 RX ORDER — HYDROMORPHONE HCL 110MG/55ML
0.5 PATIENT CONTROLLED ANALGESIA SYRINGE INTRAVENOUS
Status: DISCONTINUED | OUTPATIENT
Start: 2018-06-08 | End: 2018-06-08 | Stop reason: HOSPADM

## 2018-06-08 RX ORDER — ONDANSETRON 2 MG/ML
4 INJECTION INTRAMUSCULAR; INTRAVENOUS ONCE AS NEEDED
Status: DISCONTINUED | OUTPATIENT
Start: 2018-06-08 | End: 2018-06-08 | Stop reason: HOSPADM

## 2018-06-08 RX ORDER — MIDAZOLAM HYDROCHLORIDE 1 MG/ML
INJECTION INTRAMUSCULAR; INTRAVENOUS AS NEEDED
Status: DISCONTINUED | OUTPATIENT
Start: 2018-06-08 | End: 2018-06-08 | Stop reason: SURG

## 2018-06-08 RX ADMIN — OXYCODONE HYDROCHLORIDE AND ACETAMINOPHEN 1 TABLET: 5; 325 TABLET ORAL at 19:32

## 2018-06-08 RX ADMIN — VENLAFAXINE 75 MG: 37.5 TABLET ORAL at 09:47

## 2018-06-08 RX ADMIN — FENTANYL CITRATE 50 MCG: 50 INJECTION, SOLUTION INTRAMUSCULAR; INTRAVENOUS at 17:49

## 2018-06-08 RX ADMIN — FENTANYL CITRATE 50 MCG: 50 INJECTION, SOLUTION INTRAMUSCULAR; INTRAVENOUS at 18:02

## 2018-06-08 RX ADMIN — IBUPROFEN 400 MG: 400 TABLET ORAL at 06:18

## 2018-06-08 RX ADMIN — DOCUSATE SODIUM 100 MG: 100 CAPSULE, LIQUID FILLED ORAL at 09:43

## 2018-06-08 RX ADMIN — ENALAPRIL MALEATE 15 MG: 10 TABLET ORAL at 09:43

## 2018-06-08 RX ADMIN — FENTANYL CITRATE 50 MCG: 50 INJECTION, SOLUTION INTRAMUSCULAR; INTRAVENOUS at 19:08

## 2018-06-08 RX ADMIN — DOCUSATE SODIUM 100 MG: 100 CAPSULE, LIQUID FILLED ORAL at 21:30

## 2018-06-08 RX ADMIN — LIDOCAINE HYDROCHLORIDE 50 MG: 10 INJECTION, SOLUTION INFILTRATION; PERINEURAL at 17:26

## 2018-06-08 RX ADMIN — FENTANYL CITRATE 50 MCG: 50 INJECTION, SOLUTION INTRAMUSCULAR; INTRAVENOUS at 19:24

## 2018-06-08 RX ADMIN — SODIUM CHLORIDE 75 ML/HR: 0.9 INJECTION, SOLUTION INTRAVENOUS at 15:29

## 2018-06-08 RX ADMIN — OXYCODONE HYDROCHLORIDE AND ACETAMINOPHEN 1 TABLET: 5; 325 TABLET ORAL at 04:49

## 2018-06-08 RX ADMIN — FENTANYL CITRATE 50 MCG: 50 INJECTION, SOLUTION INTRAMUSCULAR; INTRAVENOUS at 17:26

## 2018-06-08 RX ADMIN — VENLAFAXINE 75 MG: 37.5 TABLET ORAL at 21:30

## 2018-06-08 RX ADMIN — PROPOFOL 200 MG: 10 INJECTION, EMULSION INTRAVENOUS at 17:26

## 2018-06-08 RX ADMIN — ONDANSETRON 4 MG: 2 INJECTION INTRAMUSCULAR; INTRAVENOUS at 17:37

## 2018-06-08 RX ADMIN — IBUPROFEN 400 MG: 400 TABLET ORAL at 14:29

## 2018-06-08 RX ADMIN — FENTANYL CITRATE 50 MCG: 50 INJECTION, SOLUTION INTRAMUSCULAR; INTRAVENOUS at 18:18

## 2018-06-08 RX ADMIN — HYDROMORPHONE HYDROCHLORIDE 0.5 MG: 2 INJECTION, SOLUTION INTRAMUSCULAR; INTRAVENOUS; SUBCUTANEOUS at 19:43

## 2018-06-08 RX ADMIN — MIDAZOLAM HYDROCHLORIDE 2 MG: 1 INJECTION, SOLUTION INTRAMUSCULAR; INTRAVENOUS at 17:21

## 2018-06-08 RX ADMIN — CEFAZOLIN 2000 MG: 1 INJECTION, POWDER, FOR SOLUTION INTRAVENOUS at 17:31

## 2018-06-08 RX ADMIN — HEPARIN SODIUM 5000 UNITS: 5000 INJECTION, SOLUTION INTRAVENOUS; SUBCUTANEOUS at 21:31

## 2018-06-08 RX ADMIN — Medication 250 MG: at 09:47

## 2018-06-08 RX ADMIN — POTASSIUM CHLORIDE 40 MEQ: 1500 TABLET, EXTENDED RELEASE ORAL at 09:46

## 2018-06-08 RX ADMIN — ALPRAZOLAM 0.5 MG: 0.5 TABLET ORAL at 14:47

## 2018-06-08 RX ADMIN — FENTANYL CITRATE 50 MCG: 50 INJECTION, SOLUTION INTRAMUSCULAR; INTRAVENOUS at 18:06

## 2018-06-08 RX ADMIN — Medication 400 MG: at 09:47

## 2018-06-08 RX ADMIN — PRAVASTATIN SODIUM 80 MG: 80 TABLET ORAL at 21:31

## 2018-06-08 NOTE — PROGRESS NOTES
Dr Katharine Desouza office # [phone] Fax # [phone]    Release of information signed form faxed to Dr Georges Palacios office on 6/7718  I followed up with a phone call at 2pm  said she was waiting for Dr Katharine Desouza he was in with a patient  As of 6/8/18 we still did not receive office notes or lab results from office visit on 5/28/18  I called office again today  told me the office notes were misplaced and as soon as they find them they will fax them to us

## 2018-06-08 NOTE — ASSESSMENT & PLAN NOTE
With prepatellar bursitis and septic arthritis   Right knee x-ray, small suprapatellar joint effusion, prepatellar bursitis, and more focal soft tissue swelling overlying the anterior tibial tubercle, suspicious for superimposed infrapatellar bursitis   If there is clinical concern for septic arthritis, consider follow-up arthrocentesis    Venous Doppler to rule out DVT, negative

## 2018-06-08 NOTE — H&P (VIEW-ONLY)
Consultation - Orthopedics   Nena Srinivasan 66 y o  male MRN: [id number]  Unit/Bed#: 2 [place] [address] Encounter: [id number]      Assessment/Plan     Assessment:  1) Right knee Suprapatellar, prepatellar, infrapatellar bursitis - erythema, elevated leukocytosis, no tenderness with micro or macromotion, good AROM And PROM, not suspicious for septic arthritis, possible concurrent cellulitis vs bursitis vs gout, no history of gout, history of aspiration, no history of lyme or gonorrhea/chlamydia  2) Right knee pain - erythema present, could be due to gout; however, currently appears based on clinical history to have possible infectious etiology with bursitis, no improvement with colchicine    Plan:  1-2)   - Continue IV antibiotics  - IV fluids per SLIM  - RICE: rest, compression, elevation (no ice at this current moment because if gout arthropathy is underlying issue will necessarily alleviate pain)  - will continue to follow into tomorrow  - no acute orthopedic intervention  - can start NSAID if not contraindicated   - called outpatient PCP but their office was closed and was unable to obtain record of aspiration  - no requirement for repeat aspiration at this current moment expected with potential risk receiving the joint  - routine neurovascular exam with vitals  - repeat am labs with CBC, BMP, Mag, Phos  - will attempt to reach PCP tomorrow in order to obtain record of aspiration  - can r/o lyme or STI related reactive arthritis per SLIM with VRDL, STI panel, and PCR assay for lyme, will default to ID recommendations    History of Present Illness   Physician Requesting Consult: John Beck MD  Reason for Consult / Principal Problem: right knee pain  HPI: Cyndee Copeland is a 66y o  year old male with a past medical history pertinent for borderline diabetes, hypertension, hyperlipidemia presenting to the orthopedic surgery service with right knee pain   Patient reports that in the 3rd week of May he began to start to have right sided knee pain  Patient reports and initial onset of very sharp and very severe pain that was exacerbated with even just light touch and range of motion of the knee  Patient reports that it was erythematous on the top of the kneecap and was swollen just on top of the kneecap  Patient reports that on 5/28/18 he visited his family doctor at which point his doctor was suspicious of in gout arthropathy  Patient had aspiration and injection of steroid at his family doctor  Patient was then put on colchicine for 10 day course  Patient reports afterwards he had felt better initially but his pain still persisted  Patient reports that the pain, over the last week, has become more of a moderate to severe dull/aching around the knee circumferentially but primarily around the prepatellar comma suprapatellar, infrapatellar regions  Patient reports that his active and passive range of motion are limited better it improved compared to his initial visit with family doctor  Patient reports that his erythema is become more diffuse extending throughout the entire knee  Patient reports some chills but denies fevers  Patient denies any surgical history on his knee  Patient denies any sexually transmitted diseases such as chlamydia gonorrhea  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 and has had tick bites but never any sort of presentation of Lyme disease  Patient currently has the ability to flex and extend his knee from approximately 15-90 degrees without much tenderness  Patient since hospitalization and administration of antibiotics as reported that he has felt some improvement in his right knee  Patient denies any history of trauma but 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  Patient denies any history of prior exacerbations of gout      Inpatient consult to Orthopedic Surgery  Consult performed by: Veronica Prather T  Consult ordered by: Inna Covarrubias          Review of Systems   Constitutional: Positive for chills  Negative for fatigue and fever  Respiratory: Negative for chest tightness, shortness of breath and wheezing  Cardiovascular: Negative for chest pain and palpitations  Gastrointestinal: Negative for abdominal distention, abdominal pain, constipation, diarrhea and vomiting  Genitourinary: Negative for difficulty urinating, dysuria and flank pain  Musculoskeletal: Positive for arthralgias, gait problem, joint swelling and myalgias  Skin: Positive for color change  Neurological: Negative for weakness and numbness         Historical Information   Past Medical History:   Diagnosis Date    Borderline diabetes     Depression     Hyperlipidemia     Hypertension      Past Surgical History:   Procedure Laterality Date    LUMBAR EPIDURAL INJECTION      SHOULDER SURGERY Left     SPINE SURGERY       Social History   History   Alcohol Use No     History   Drug Use No     History   Smoking Status    Current Some Day Smoker    Types: Cigars   Smokeless Tobacco    Never Used     Family History: non-contributory    Meds/Allergies   all current active meds have been reviewed and current meds:   Current Facility-Administered Medications   Medication Dose Route Frequency    acetaminophen (TYLENOL) tablet 650 mg  650 mg Oral Q6H PRN    ALPRAZolam (XANAX) tablet 0 5 mg  0 5 mg Oral BID PRN    aluminum-magnesium hydroxide-simethicone (MYLANTA) 200-200-20 mg/5 mL oral suspension 30 mL  30 mL Oral Q6H PRN    enalapril (VASOTEC) tablet 15 mg  15 mg Oral Daily    heparin (porcine) subcutaneous injection 5,000 Units  5,000 Units Subcutaneous Q12H [facility]    ibuprofen (MOTRIN) tablet 400 mg  400 mg Oral Q8H [facility]    ondansetron (ZOFRAN) injection 4 mg  4 mg Intravenous Q6H PRN    oxyCODONE-acetaminophen (PERCOCET) 5-325 mg per tablet 1 tablet  1 tablet Oral Q6H PRN    pravastatin (PRAVACHOL) tablet 80 mg  80 mg Oral Daily With Dinner    saccharomyces boulardii (FLORASTOR) capsule 250 mg  250 mg Oral BID    sodium chloride 0 9 % infusion  50 mL/hr Intravenous Continuous    vancomycin (VANCOCIN) 1,250 mg in sodium chloride 0 9 % 250 mL IVPB  15 mg/kg Intravenous Q12H    zolpidem (AMBIEN) tablet 10 mg  10 mg Oral HS PRN     Allergies   Allergen Reactions    Iodine Anaphylaxis    Shellfish-Derived Products        Objective   Vitals: Blood pressure 144/77, pulse 83, temperature 98 1 °F (36 7 °C), temperature source Oral, resp  rate 18, height 5' 9" (1 753 m), weight 86 2 kg (190 lb 0 2 oz), SpO2 98 %  ,Body mass index is 28 06 kg/m²  No intake or output data in the 24 hours ending 06/06/18 1255  No intake/output data recorded  Invasive Devices     Peripheral Intravenous Line            Peripheral IV 06/05/18 Left Antecubital less than 1 day                Physical Exam   Constitutional: He is oriented to person, place, and time  Vital signs are normal  He appears well-developed and well-nourished  He is cooperative  He does not have a sickly appearance  He does not appear ill  No distress  HENT:   Head: Normocephalic and atraumatic  Right Ear: External ear normal  Decreased hearing is noted  Left Ear: External ear normal  Decreased hearing is noted  Nose: Nose normal    Cardiovascular: Normal rate  Pulses:       Radial pulses are 2+ on the right side, and 2+ on the left side  Dorsalis pedis pulses are 2+ on the right side, and 2+ on the left side  Pulmonary/Chest: Breath sounds normal  No accessory muscle usage  No respiratory distress  He has no decreased breath sounds  Musculoskeletal:        Right knee: He exhibits effusion  Left knee: He exhibits no effusion  Neurological: He is alert and oriented to person, place, and time  He has normal strength  No sensory deficit  GCS eye subscore is 4  GCS verbal subscore is 5  GCS motor subscore is 6     Skin:          Right Ankle Exam   Right ankle exam is normal   Swelling: none    Tenderness   The patient is experiencing no tenderness  Range of Motion   The patient has normal right ankle ROM  Muscle Strength   The patient has normal right ankle strength  Other   Erythema: absent  Sensation: normal  Pulse: present     Comments:  Significant 3+ edema up the calf and over the tibial area      Left Ankle Exam   Left ankle exam is normal   Swelling: none    Tenderness   The patient is experiencing no tenderness  Range of Motion   The patient has normal left ankle ROM  Muscle Strength   The patient has normal left ankle strength  Other   Erythema: absent  Sensation: normal  Pulse: present      Right Knee Exam     Tenderness   The patient is experiencing tenderness in the lateral joint line, medial joint line, patella and patellar tendon  Range of Motion   Extension:  15 abnormal   Flexion:  90 abnormal     Tests   Lachman:  Anterior - negative    Posterior - negative  Varus: negative  Valgus: negative    Other   Erythema: present  Sensation: normal  Swelling: moderate  Other tests: effusion present    Comments:  Prepatellar, suprapatellar, and infrapatellar bursitis      Left Knee Exam     Tenderness   The patient is experiencing no tenderness  Range of Motion   The patient has normal left knee ROM  Other   Erythema: absent  Sensation: normal  Swelling: none  Effusion: no effusion present      Right Hip Exam   Right hip exam is normal      Tenderness   The patient is experiencing no tenderness  Range of Motion   The patient has normal right hip ROM  Muscle Strength   The patient has normal right hip strength  Left Hip Exam   Left hip exam is normal     Tenderness   The patient is experiencing no tenderness  Range of Motion   The patient has normal left hip ROM  Muscle Strength   The patient has normal left hip strength  Lab Results:   I have personally reviewed pertinent lab results    CBC: Lab Results   Component Value Date    WBC 11 69 (H) 06/06/2018    HGB 11 7 (L) 06/06/2018    HCT 36 5 06/06/2018    MCV 97 06/06/2018     06/06/2018    MCH 31 0 06/06/2018    MCHC 32 1 06/06/2018    RDW 13 4 06/06/2018    MPV 9 0 06/06/2018    NRBC 0 06/06/2018     CMP:   Lab Results   Component Value Date     (L) 06/06/2018     06/06/2018    CO2 26 06/06/2018    ANIONGAP 7 06/06/2018    BUN 16 06/06/2018    CREATININE 0 67 06/06/2018    GLUCOSE 96 06/06/2018    CALCIUM 8 7 06/06/2018    AST 35 06/05/2018    ALT 74 06/05/2018    ALKPHOS 103 06/05/2018    PROT 8 2 06/05/2018    BILITOT 0 30 06/05/2018    EGFR 104 06/06/2018     Imaging Studies: I have personally reviewed pertinent reports  EKG, Pathology, and Other Studies: I have personally reviewed pertinent reports  VTE Prophylaxis: Heparin    Code Status: Level 1 - Full Code    Counseling / Coordination of Care  Total floor / unit time spent today 45 minutes  Greater than 50% of total time was spent with the patient and / or family counseling and / or coordination of care  A description of the counseling / coordination of care:  Developing plan, reviewing with attending, reviewing imaging, reviewing labs, physical exam, patient history, patient education, coordinating with im

## 2018-06-08 NOTE — PROGRESS NOTES
Progress Note - Emmanuel Guerin 1955, 66 y o  male MRN: [id number]    Unit/Bed#: [address] [address] Encounter: [id number]    Primary Care Provider: Noemi Guerra MD   Date and time admitted to hospital: 6/5/2018  7:38 PM        * Sepsis [facility])   Assessment & Plan    As evidenced by WBC 14 22, , with right lower extremity cellulitis with prepatellar bursitis with possible septic bursitis vs Lyme   Presently, afebrile  WBC improved today, 11 69 ->13 1 -> 11 2  CRP > 90, SED rate 51, Uric Acid 5 0, Procalcitonin 0 06 (checked after initiation of antimicrobial therapy)   · ID and orthopedics following, appreciate recommendations  · S/p aspiration x2 of right knee joint on 6/6/18   · Per ID, on Rocephin 2gm IVPB daily, day #3  · Preliminary right knee aspirate culture growing Staph Aureus   · Follow-up final blood cultures, aspiration fluid for gram stain and culture and Lyme, and Lyme PCR   · Monitor for worsening signs of infection  · Repeat CBC in am   · Probiotic while on ABX        Infrapatellar bursitis of right knee   Assessment & Plan    With prepatellar bursitis with possible septic bursitis vs Lyme   Patient was treated with colchicine on 5/25/18 for 10 days and had knee aspiration and cortisone injection in PCP's office on 5/28 per patient  · Right knee x-ray, small suprapatellar joint effusion, prepatellar bursitis, and more focal soft tissue swelling overlying the anterior tibial tubercle, suspicious for superimposed infrapatellar bursitis   If there is clinical concern for septic arthritis, consider follow-up arthrocentesis  · Venous Doppler to rule out DVT, negative   · On Rocephin 2gm IVPB daily per ID recs, day #3  · Preliminary right knee aspirate growing Staph Aureus   · Pain control with scheduled Motrin and Percocet PRN  · Rest, elevation  · Neurovascular checks  · Per ortho, right knee aspirated in 2 areas  F/U cultures   May need surgical irrigation debridement if no significant improvement with ABX        Cellulitis of right lower extremity   Assessment & Plan    With prepatellar bursitis with possible septic bursitis vs Lyme   · Right knee x-ray, small suprapatellar joint effusion, prepatellar bursitis, and more focal soft tissue swelling overlying the anterior tibial tubercle, suspicious for superimposed infrapatellar bursitis   If there is clinical concern for septic arthritis, consider follow-up arthrocentesis  · Venous Doppler to rule out DVT, negative   · On Rocephin 2gm IVPB daily per ID recs, day #3  · Preliminary right knee aspirate growing Staph Aureus   · Pain control with scheduled Motrin and Percocet PRN  · Rest, elevation  · Neurovascular checks        Hyponatremia   Assessment & Plan    Mild  Sodium 134 -> 132 -> 133  · Repeat BMP in am        Thrombocytosis (HCC)   Assessment & Plan    Mild  Platelet 518 -> 361  Likely reactive to sepsis  · Continue Heparin subcu for DVT prophylaxis  Borderline diabetes   Assessment & Plan    Check A1c, 7 0  Morning glucose, 105 -> 99  · Enforce TLCs  · Outpatient F/U        Depression   Assessment & Plan    Continue Effexor        Hypertension   Assessment & Plan    Continue Enalapril  Pain control  Monitor  Hyperlipidemia   Assessment & Plan    Continue statin            VTE Pharmacologic Prophylaxis:   Pharmacologic: Heparin  Mechanical VTE Prophylaxis in Place: Yes    Patient Centered Rounds: I have performed bedside rounds with nursing staff today  Discussions with Specialists or Other Care Team Provider: Nursing, CM, orthopedic and ID notes appreciated     Education and Discussions with Family / Patient: I have answered all questions to the best of my ability  Time Spent for Care: 20 minutes  More than 50% of total time spent on counseling and coordination of care as described above      Current Length of Stay: 3 day(s)    Current Patient Status: Inpatient   Certification Statement: The patient will continue to require additional inpatient hospital stay due to sepsis 2/2 right knee cellulitis with pending workup and possible need for washout debridement in OR     Discharge Plan: Patient is not medically stable for discharge today, likely in 48 hours pending progress  Code Status: Level 1 - Full Code      Subjective:   Observed patient resting OOB in chair with feet elevated  Overall, patient continues to have improvement in right knee swelling, redness, pain, and ROM  Reports a large, fluid-filled blister at the base of his knee  He would like this aspirated  He had a normal BM today  No other complaints  Objective:     Vitals:   Temp (24hrs), Av 3 °F (36 8 °C), Min:97 7 °F (36 5 °C), Max:98 9 °F (37 2 °C)    HR:  [] 88  Resp:  [18] 18  BP: (121-155)/(70-82) 134/82  SpO2:  [95 %-100 %] 95 %  Body mass index is 28 06 kg/m²  Input and Output Summary (last 24 hours): Intake/Output Summary (Last 24 hours) at 18 1133  Last data filed at 18 0120   Gross per 24 hour   Intake                0 ml   Output             1150 ml   Net            -1150 ml       Physical Exam:     Physical Exam   Constitutional: He is oriented to person, place, and time  He appears well-developed  No distress  HENT:   Head: Normocephalic  Neck: Normal range of motion  Cardiovascular: Normal rate, regular rhythm and intact distal pulses  Pulmonary/Chest: Effort normal and breath sounds normal  No respiratory distress  He has no wheezes  He has no rhonchi  He has no rales  Abdominal: Soft  Bowel sounds are normal  He exhibits no distension  There is no tenderness  Musculoskeletal: He exhibits edema (+1 RLE with increased edema around right knee now with a bullae at the prepatellar location ) and tenderness (right knee)  Right knee: He exhibits decreased range of motion, swelling, effusion and erythema  Tenderness found  Neurological: He is alert and oriented to person, place, and time  Skin: Skin is warm and dry  He is not diaphoretic  There is erythema (Right leg, > around R knee)  Psychiatric: He has a normal mood and affect  His behavior is normal  Judgment and thought content normal    Nursing note and vitals reviewed  Additional Data:     Labs:      Results from last 7 days  Lab Units 06/08/18 0623   WBC Thousand/uL 10 19*   HEMOGLOBIN g/dL 11 2*   HEMATOCRIT % 35 0*   PLATELETS Thousands/uL 307   NEUTROS PCT % 68   LYMPHS PCT % 20   MONOS PCT % 10   EOS PCT % 1       Results from last 7 days  Lab Units 06/08/18 0623 06/05/18 2128   SODIUM mmol/L 133*  < > 134*   POTASSIUM mmol/L 3 6  < > 3 8   CHLORIDE mmol/L 99*  < > 97*   CO2 mmol/L 25  < > 28   BUN mg/dL 9  < > 19   CREATININE mg/dL 0 66  < > 0 77   CALCIUM mg/dL 8 6  < > 9 4   TOTAL PROTEIN g/dL  --   --  8 2   BILIRUBIN TOTAL mg/dL  --   --  0 30   ALK PHOS U/L  --   --  103   ALT U/L  --   --  74   AST U/L  --   --  35   GLUCOSE RANDOM mg/dL 99  < > 95   < > = values in this interval not displayed  Results from last 7 days  Lab Units 06/05/18 2128   INR  0 92       * I Have Reviewed All Lab Data Listed Above  * Additional Pertinent Lab Tests Reviewed: All Labs Within Last 24 Hours Reviewed    Imaging:    Imaging Reports Reviewed Today Include: Xray   Imaging Personally Reviewed by Myself Includes: None    Recent Cultures (last 7 days):       Results from last 7 days  Lab Units 06/06/18 1924 06/06/18 1923 06/05/18 2128   BLOOD CULTURE   --   --  No Growth at 48 hrs  No Growth at 48 hrs     GRAM STAIN RESULT  4+ Polys  4+ Gram positive cocci in pairs, chains and clusters 2+ Polys  No bacteria seen  --    BODY FLUID CULTURE, STERILE  4+ Growth of Staphylococcus aureus* Few Colonies of Staphylococcus aureus*  --        Last 24 Hours Medication List:     Current Facility-Administered Medications:  acetaminophen 650 mg Oral Q6H PRN SANCHO Mitcehll    ALPRAZolam 0 5 mg Oral BID PRN SANCHO Chavez aluminum-magnesium hydroxide-simethicone 30 mL Oral Q6H PRN SANCHO Melvin    cefTRIAXone 2,000 mg Intravenous Q24H Yoanna Brar MD Last Rate: Stopped (06/07/18 1834)   docusate sodium 100 mg Oral BID SANCHO Garcia    enalapril 15 mg Oral Daily SANCHO Melvin    heparin (porcine) 5,000 Units Subcutaneous Q12H [address] SANCHO Garcia    ibuprofen 400 mg Oral [facility] SANCHO Garcia    insulin lispro 1-5 Units Subcutaneous TID AC SANCHO Melvin    insulin lispro 1-5 Units Subcutaneous HS SANCHO Melvin    magnesium oxide 400 mg Oral BID SANCHO Garcia    ondansetron 4 mg Intravenous Q6H PRN SANCHO Melvin    oxyCODONE-acetaminophen 1 tablet Oral Q6H PRN SANCHO Melvin    polyethylene glycol 17 g Oral Daily PRN SANCHO Garcia    pravastatin 80 mg Oral Daily With Dinner SANCHO Melvin    saccharomyces boulardii 250 mg Oral BID SANCHO Melvin    venlafaxine 75 mg Oral BID With Meals SANCHO Melvin    zolpidem 10 mg Oral HS PRN SANCHO Eisenberg         Today, Patient Was Seen By: SANCHO Garcia    ** Please Note: Dictation voice to text software may have been used in the creation of this document   **

## 2018-06-08 NOTE — CASE MANAGEMENT
Continued Stay Review    Date: 6/8/18    Vital Signs: /82 (BP Location: Right arm)   Pulse 88   Temp 97 7 °F (36 5 °C) (Oral)   Resp 18   Ht 5' 9" (1 753 m)   Wt 86 2 kg (190 lb 0 2 oz)   SpO2 95%   BMI 28 06 kg/m²       GMF  BMP MG CBC   Medications:   Scheduled Meds:   Current Facility-Administered Medications:  acetaminophen 650 mg Oral Q6H PRN SANCHO Gaspar    ALPRAZolam 0 5 mg Oral BID PRN SANCHO Melvin    aluminum-magnesium hydroxide-simethicone 30 mL Oral Q6H PRN SANCHO Melvin    cefTRIAXone 2,000 mg Intravenous Q24H Yoanna Brar MD Last Rate: Stopped (06/07/18 1834)   docusate sodium 100 mg Oral BID SANCHO Gaspar    enalapril 15 mg Oral Daily SANCHO Melvin    heparin (porcine) 5,000 Units Subcutaneous Q12H [facility] SANCHO Gaspar    ibuprofen 400 mg Oral [facility] SANCHO Gaspar    insulin lispro 1-5 Units Subcutaneous TID AC SANCHO Melvin    insulin lispro 1-5 Units Subcutaneous HS SANCHO Melvin    magnesium oxide 400 mg Oral BID Betzaida Zavaleta, SANCHO    ondansetron 4 mg Intravenous Q6H PRN SANCHO Melvin    oxyCODONE-acetaminophen 1 tablet Oral Q6H PRN SANCHO Melvin    polyethylene glycol 17 g Oral Daily PRN SANCHO Gaspar    potassium chloride 40 mEq Oral Once SANCHO Gaspar    pravastatin 80 mg Oral Daily With [place] & [place], SANCHO    saccharomyces boulardii 250 mg Oral BID SANCHO Melvin    sodium chloride 50 mL/hr Intravenous Continuous SANCHO Gaspar Last Rate: 50 mL/hr (06/07/18 1720)   venlafaxine 75 mg Oral BID With Meals SANCHO Melvin    zolpidem 10 mg Oral HS PRN SANCHO Melvin      Continuous Infusions:   sodium chloride 50 mL/hr Last Rate: 50 mL/hr (06/07/18 1720)     PRN Meds:   acetaminophen    ALPRAZolam    aluminum-magnesium hydroxide-simethicone    ondansetron    oxyCODONE-acetaminophen    polyethylene glycol    zolpidem    Abnormal Labs/Diagnostic Results:  CL 99 WBC 10 19 H/H 11 2/35    Age/Sex: 66 y o  male     ATTENDING  Sepsis ([facility] [address] )  WBC improved today, 11 69 ->13 1 -> 11 2  CRP > 90, SED rate 51, Uric Acid 5 0, Procalcitonin 0 06 (checked after initiation of antimicrobial therapy)   · ID and orthopedics following, appreciate recommendations  · S/p aspiration x2 of right knee joint on 6/6/18   · Per ID, on Rocephin 2gm IVPB daily, day #3  · Preliminary right knee aspirate culture growing Staph Aureus   · Follow-up final blood cultures, aspiration fluid for gram stain and culture and Lyme, and Lyme PCR   · Monitor for worsening signs of infection  · Repeat CBC in am   · Probiotic while on ABX  Infrapatellar bursitis of right knee  · Per ortho, right knee aspirated in 2 areas  F/U cultures  May need surgical irrigation debridement if no significant improvement with ABX  Certification Statement: The patient will continue to require additional inpatient hospital stay due to sepsis 2/2 right knee cellulitis with pending workup and possible need for washout debridement in OR      PER ID  Assessment/Plan: This is a middle-age man with above list of comorbidities who presented with signs of sepsis and is noted to have septic arthritis of the right knee along with prepatellar/infrapatellar bursitis  The organism most likely is methicillin sensitive Staphylococcus aureus  In addition he has had multiple tick bites and outdoor exposure  Underlying Lyme disease is also suspected  The concur with plan for surgical debridement/arthroscopic washout    Continue ceftriaxone pending Lyme disease serologically test   Discussed with the orthopedic surgeon as well as the hospitalist     SURGICAL NOTE     Assessment:  1) Right knee Suprapatellar, prepatellar, infrapatellar bursitis - erythema similar compared to yesterday, swelling reduced, marked improvement with AROM and PROM, leukocytosis reducing, minimal tenderness with micro or macromotion, good AROM And PROM, not suspicious for septic arthritis, likely just infectious bursitis, infrapatellar has developed to having a hematoma/blister like presentation with some purulence underneath the epidermis  2) Right knee pain - likely just secondary to bursitis     Plan:  1-2)   - continue IV antibiotics per slim and Infectious Disease  - IV fluids per slim  - continue RICE  - reviewed cultures  - will plan for incision and drainage as well as washout of infrapatellar and prepatellar bursas  - continue p r n  analgesic medications per slim  - consent to be obtained by Dr Lema Dad  - case request placed  - NPO  - repeat labs with a m  lab work  - weight-bearing as tolerated

## 2018-06-08 NOTE — PROGRESS NOTES
Progress Note - Orthopedics   Shari Soulier Woronowicz 66 y o  male MRN: [id number]  Unit/Bed#: [address] [place] [address] Encounter: [id number]    Assessment:  1) Right knee Suprapatellar, prepatellar, infrapatellar bursitis - erythema similar compared to yesterday, swelling reduced, marked improvement with AROM and PROM, leukocytosis reducing, minimal tenderness with micro or macromotion, good AROM And PROM, not suspicious for septic arthritis, likely just infectious bursitis, infrapatellar has developed to having a hematoma/blister like presentation with some purulence underneath the epidermis  2) Right knee pain - likely just secondary to bursitis    Plan:  1-2)   - continue IV antibiotics per slim and Infectious Disease  - IV fluids per slim  - continue RICE  - reviewed cultures  - will plan for incision and drainage as well as washout of infrapatellar and prepatellar bursas  - continue p r n  analgesic medications per slim  - consent to be obtained by Dr Wilian Luis  - case request placed  - NPO  - repeat labs with a m  lab work  - weight-bearing as tolerated    Weight bearing: WBAT    VTE Pharmacologic Prophylaxis: Heparin  VTE Mechanical Prophylaxis: sequential compression device    Subjective:  Patient was seen examined at bedside  Patient denies any acute events overnight  Patient reports that his knee feels much better than he feels like his swelling and his redness is minimally improved  Patient reports that his pain is significantly improved  Patient reports that he still neurovascularly intact  Patient denies any numbness, tingling, lack of motor movement, lack of sensation in his distal lower right extremity  Patient reports that he has better active and passive range of motion  Patient does report that beneath his knee cap he has noticed the development of this red blister like thing underneath the skin  Patient was wondering if it would help with another aspiration    Patient was educated on the fact that this could be the development of an abscess/purulence within the bursa will not necessarily heal without incision and drainage  Patient was educated that Dr Cortney Cheek will be able to evaluate and determine whether not he will do an incision and drainage this afternoon  Patient was educated and aspirations not the appropriate management of this particular disease process from a therapeutic standpoint  Vitals: Blood pressure 142/75, pulse 90, temperature 97 9 °F (36 6 °C), temperature source Oral, resp  rate 18, height 5' 9" (1 753 m), weight 86 2 kg (190 lb 0 2 oz), SpO2 97 %  ,Body mass index is 28 06 kg/m²  Intake/Output Summary (Last 24 hours) at 06/08/18 1435  Last data filed at 06/08/18 0120   Gross per 24 hour   Intake                0 ml   Output             1150 ml   Net            -1150 ml       Invasive Devices     Peripheral Intravenous Line            Long-Dwell Peripheral IV (Midline) 40/82/91 Left Basilic 1 day                Physical Exam: /75 (BP Location: Right arm)   Pulse 90   Temp 97 9 °F (36 6 °C) (Oral)   Resp 18   Ht 5' 9" (1 753 m)   Wt 86 2 kg (190 lb 0 2 oz)   SpO2 97%   BMI 28 06 kg/m²   General appearance: alert and oriented, in no acute distress  Head: Normocephalic, without obvious abnormality, atraumatic  Skin: erythema minimally improved, swelling minimally improved, swelling/blood/purulence developement over infrapatellar bursa, Swelling minimally improved,  Ortho Exam:  Thigh and calf compartments are soft and nontender to palpation  + L3-S1 SILT  2+ DF/PF  Right knee is tender to palpation minimally around prepatellar , infrapatellar, suprapatellar bursa as well as medial/lateral joint line  Right lower extremity edema has significantly reduced 0  Right knee is warm and erythematous but erythema has reduced compared to yesterday  Active and passive range of motion are much improved  Flexion of knee is up to 105° as extension is to about 5°    Right ankle and foot have full and complete active and passive range of motion with dorsiflexion, plantar flexion, eversion, inversion, EHL  Patient has moderate joint effusion  capillary refill less than 2 seconds  Sensation and strength are intact  development of blood and purulence over the infrapatellar bursa consistent with possible consolidation of infection in infrapatellar bursa  Lab, Imaging and other studies:   I have personally reviewed pertinent lab results    CBC:   Lab Results   Component Value Date    WBC 10 19 (H) 06/08/2018    HGB 11 2 (L) 06/08/2018    HCT 35 0 (L) 06/08/2018    MCV 96 06/08/2018     06/08/2018    MCH 30 6 06/08/2018    MCHC 32 0 06/08/2018    RDW 13 0 06/08/2018    MPV 8 8 (L) 06/08/2018    NRBC 0 06/08/2018     CMP:   Lab Results   Component Value Date     (L) 06/08/2018    CL 99 (L) 06/08/2018    CO2 25 06/08/2018    ANIONGAP 9 06/08/2018    BUN 9 06/08/2018    CREATININE 0 66 06/08/2018    GLUCOSE 99 06/08/2018    CALCIUM 8 6 06/08/2018    EGFR 104 06/08/2018

## 2018-06-08 NOTE — ASSESSMENT & PLAN NOTE
Possible SIADH from uncontrolled pain  Less likely due to Effexor as patient's Na has worsened  Serum osmo low, however, urine osmo wnl  No improvement on IVF  TSH and urine acid wnl    With fluid restriction, Na normalized  Continue fluid restriction, 1500 mL/day

## 2018-06-08 NOTE — OP NOTE
OPERATIVE REPORT  PATIENT NAME: Ifeoma Miller    :  1955  MRN: [id number]  Pt Location: [facility] OR ROOM 02    SURGERY DATE: 2018    Surgeon(s) and Role:     * Alton Renee MD - Primary    Preop Diagnosis:  Cellulitis of right lower extremity [L03 115]  Infrapatellar bursitis of right knee [M70 51] and septic arthritis right knee    Post-Op Diagnosis Codes:     * Cellulitis of right lower extremity [I86 852]     * Infrapatellar bursitis of right knee [M70 51] and septic arthritis right knee and infected prepatellar bursitis right knee    Procedure:  Right knee arthroscopy with irrigation and debridement of the joint and right knee irrigation and extensive debridement of the prepatellar bursa with placement of VAC sponge    Specimen(s):  Multiple specimens taken with 1 from the joint itself for culture as well as 1 from the suprapatellar bursa and 1 from the infrapatellar bursa as well as tissue culture and tissue pathology of the bursa itself    Estimated Blood Loss:   50 cc  Drains:  Negative Pressure Wound Therapy (V A C ) Knee Right (Active)   Number of days: 0       Anesthesia Type:   General    Operative Indications:  Cellulitis of right lower extremity [C08 592]  Infrapatellar bursitis of right knee [M70 51]  Ed is a 74-year-old male who has been suffering with right knee pain  He was found to have what appears to be a prepatellar bursitis that was infected and I indeed did aspirate purulence off of that prepatellar bursa 2 days ago  I also aspirated fluid from the right knee 2 days ago  The right knee had 17,000 white cells and the culture did not come back positive until today  This was staph aureus with just a few colonies  The prepatellar bursa however was significantly positive for Staph aureus with many colonies being 4+  He did not respond to IV antibiotics over the course of the past 2 days to my liking and today in fact was having worsening of his symptoms   Thus, we thought it appropriate to bring him to the operating room for formal irrigation debridement of both the prepatellar bursa and the knee  He understood the risks and benefits of that procedure wished to go ahead  The risks are inclusive of but not limited to persistent infection, stiffness, failure of the operation, blood clots, medical problems perioperatively, and need for further surgery  Operative Findings:  Right knee with what appeared to be infected joint fluid that was irrigated thoroughly utilizing the arthroscope with 3 L of irrigation  It was however not nearly as purulent with infection as the prepatellar bursa which was infected in the infrapatellar region as well as prepatellar and suprapatellar region  We did start with multiple smaller incisions about the right knee in the areas of fluctuance however we did make a large anterior incision as we demonstrated copious amounts of purulence and what appeared to be necrotic bursa tissue  This required extensive debridement  We did get an excellent visualization of this with excellent debridement sharply with scissors  We placed a VAC sponge and we do plan to come back on Monday for further irrigation and debridement  Complications:   None    Procedure and Technique:  Ed was taken to the operating room and placed supine on the OR table  He was given preoperative IV antibiotics  The cultures had demonstrated methicillin sensitive Staph aureus that was sensitive to Ancef and thus Ancef was given preoperatively  He was due for his Rocephin anyway  We did prep and drape the right lower extremity in usual sterile fashion after general anesthesia was induced  We took a surgical time-out  We began with the arthroscopic portion of the procedure  We placed an arthroscopic portal superolaterally away from any areas of erythema  This was with an 11 blade  We began diagnostic arthroscopy and demonstrated a cloudy type of fluid from the knee and did culture that    We then evacuated the fluid in the knee and began our placement of arthroscopic fluid and did suction that out as well  We did irrigate with more than 3 L  we did go through skin along the superomedial aspect of the knee however as we went through skin with our 11 blade we immediately came through with a copious amount of purulent and thus did not into the joint from there  It was clear that this was an area of bursal infection  We did open that slightly further with a skin blade and did demonstrate quite a bit of purulence  We then made a small incision over the prepatellar bursa in the infrapatellar aspect where it had been leaking pus  This did express some significant pus as well  We did also make a small incision along the lateral aspect as we were attempting to express all purulence  It became evident that we needed a large anterior incision as I did visualize necrotic bursal tissue and extensive purulence  Thus, we did make an anterior incision with a 15 blade that encompassed the infrapatellar incision  We did extensive debridement sharply with scissors the necrotic bursa  We expressed likely well over 500 cc of purulence  We then irrigated with the pulse lavage quite thoroughly  We felt that we had a very good and clean and dry field at the end of the procedure  We then took a medium-sized VAC sponge and cut that down the size  We did place is in standard fashion after we had closed the arthroscopic portal as well as the 2 other bursal incisions with 4-0 nylon suture  We had good suction  The knee was obviously significantly decompressed as we had alleviated a very large amount of purulence  We placed him into a knee immobilizer after the dressings had been applied  He woke from anesthesia without difficulty and transferred to recovery room stable condition   He will continue to be on IV antibiotics and we will plan is taken back to the operating room on Monday for additional irrigation debridement and likely VAC sponge change  This does appear to be a significant infection that requires close monitoring and aggressive treatment     I was present for the entire procedure    Patient Disposition:  PACU     SIGNATURE: Luis Haines MD  DATE: June 8, 2018  TIME: 7:03 PM

## 2018-06-08 NOTE — ASSESSMENT & PLAN NOTE
Sepsis as evidenced by leukocytosis, tachycardia, resolved  Right knee aspirate and tissue cultures growing MSSA  Orthopedics and ID consult following  S/p right knee surgical irrigation debridement of bursa and infected joint on 6/8/18  S/p repeat right I&D of infrapatellar bursa and irrigation/washout of right knee on 6/11/18  S/p right knee arthrotomy, I&D, irrigation debridement with complex wound closure on 6/14/18  Patient declined STR placement  He insists to go home  Continue on IV abx, switch back to ceftriaxone 2g iv daily to facilitate outpatient therapy in the infusion center  Plan PICC at [place] [id number] on 6/15/18  CM/SW notified  Anticipate d/c home w/ home service tomorrow

## 2018-06-08 NOTE — ANESTHESIA PREPROCEDURE EVALUATION
Review of Systems/Medical History  Patient summary reviewed  Chart reviewed  No history of anesthetic complications     Cardiovascular  Hyperlipidemia, Hypertension ,    Pulmonary  Smoker cigar smoker  ,        GI/Hepatic            Endo/Other  Diabetes Diet controlled,      GYN       Hematology   Musculoskeletal  Back pain (s/p LESI) , lumbar pain,   Comment: Bursitis right knee Arthritis     Neurology   Psychology   Depression ,              Physical Exam    Airway    Mallampati score: II  TM Distance: >3 FB  Neck ROM: full     Dental   lower dentures,     Cardiovascular  Rhythm: regular, Rate: normal,     Pulmonary  Breath sounds clear to auscultation,     Other Findings  Partial lower denture      Anesthesia Plan  ASA Score- 2     Anesthesia Type- general with ASA Monitors  Additional Monitors:   Airway Plan: LMA  Plan Factors-    Induction- intravenous  Postoperative Plan- Plan for postoperative opioid use  Planned trial extubation    Informed Consent- Anesthetic plan and risks discussed with patient

## 2018-06-08 NOTE — PROGRESS NOTES
Progress Note - Infectious Disease   Radha Srinivasan 66 y o  male MRN: [id number]  Unit/Bed#: 2 [place] [address] Encounter: [id number]      Subjective/Objective   Subjective: The event since last seen were noted   Patient continues to have discomfort in his right knee, but no chills or high fever  No gastrointestinal or urinary tract symptoms        Meds/Allergies     Current Facility-Administered Medications:     acetaminophen (TYLENOL) tablet 650 mg, 650 mg, Oral, Q6H PRN, SANCHO Gaspar, 650 mg at 06/07/18 0041    ALPRAZolam Buddie Vannesa) tablet 0 5 mg, 0 5 mg, Oral, BID PRN, SANCHO Melvin, 0 5 mg at 06/07/18 2127    aluminum-magnesium hydroxide-simethicone (MYLANTA) 200-200-20 mg/5 mL oral suspension 30 mL, 30 mL, Oral, Q6H PRN, SANCHO Melvin    cefTRIAXone (ROCEPHIN) IVPB (premix) 2,000 mg, 2,000 mg, Intravenous, Q24H, Yoanna Brar MD, Stopped at 06/07/18 1834    docusate sodium (COLACE) capsule 100 mg, 100 mg, Oral, BID, SANCHO Gamez, 100 mg at 06/08/18 0943    enalapril (VASOTEC) tablet 15 mg, 15 mg, Oral, Daily, SANCHO Melvin, 15 mg at 06/08/18 0943    heparin (porcine) subcutaneous injection 5,000 Units, 5,000 Units, Subcutaneous, Q12H [facility], 5,000 Units at 06/07/18 2126 **AND** [CANCELED] Platelet count, , , Once, SANCHO Melvin    ibuprofen (MOTRIN) tablet 400 mg, 400 mg, Oral, Q8H [facility], SANCHO Gaspar, 400 mg at 06/08/18 0618    insulin lispro (HumaLOG) 100 units/mL subcutaneous injection 1-5 Units, 1-5 Units, Subcutaneous, TID AC **AND** Fingerstick Glucose (POCT), , , TID AC, SANCHO Melvin    insulin lispro (HumaLOG) 100 units/mL subcutaneous injection 1-5 Units, 1-5 Units, Subcutaneous, HS, SANCHO Melvin    magnesium oxide (MAG-OX) tablet 400 mg, 400 mg, Oral, BID, SANCHO Gamez, 400 mg at 06/08/18 0947    ondansetron (ZOFRAN) injection 4 mg, 4 mg, Intravenous, Q6H PRN, SANCHO Kumar    oxyCODONE-acetaminophen (PERCOCET) 5-325 mg per tablet 1 tablet, 1 tablet, Oral, Q6H PRN, Cuiyin Yurik, CRNP, 1 tablet at 18 0449    polyethylene glycol (MIRALAX) packet 17 g, 17 g, Oral, Daily PRN, Vista Lemme, CRNP    pravastatin (PRAVACHOL) tablet 80 mg, 80 mg, Oral, Daily With Dinner, Cuiyin Yurik, CRNP, 80 mg at 18 1719    saccharomyces boulardii (FLORASTOR) capsule 250 mg, 250 mg, Oral, BID, Cuiyin Yurik, CRNP, 250 mg at 18 0947    venlafaxine (EFFEXOR) tablet 75 mg, 75 mg, Oral, BID With Meals, Cuiyin Yurik, CRNP, 75 mg at 18 0947    zolpidem (AMBIEN) tablet 10 mg, 10 mg, Oral, HS PRN, Cuiyin Yurik, CRNP, 10 mg at 18 2310  Allergies   Allergen Reactions    Iodine Anaphylaxis    Shellfish-Derived Products      all current active meds have been reviewed    discontinued meds:   Medications Discontinued During This Encounter   Medication Reason    acetaminophen (TYLENOL) tablet 650 mg     heparin (porcine) subcutaneous injection 5,000 Units     heparin (porcine) subcutaneous injection 5,000 Units     vancomycin (VANCOCIN) 1,250 mg in sodium chloride 0 9 % 250 mL IVPB     sodium chloride 0 9 % infusion        Physical Exam: Physical Exam    HR:  [] 88  Resp:  [18] 18  BP: (121-155)/(70-82) 134/82  SpO2:  [95 %-100 %] 95 %  Body mass index is 28 06 kg/m²  Weight (last 2 days)     None        Temp (24hrs), Av 3 °F (36 8 °C), Min:97 7 °F (36 5 °C), Max:98 9 °F (37 2 °C)  Current: Temperature: 97 7 °F (36 5 °C)AGE@    Intake/Output Summary (Last 24 hours) at 18 1218  Last data filed at 18 0120   Gross per 24 hour   Intake                0 ml   Output             1150 ml   Net            -1150 ml    He has been afebrile and vital signs have stabilized  He is alert and not uncomfortable  The lungs are clear  Heart sounds are regular without murmur  Abdomen is obese and soft and nontender    The right leg, the knee and adjacent proximal and distal leg revealed persistent erythema, moderate swelling and today presence of a large bulla on the prepatellar area  This is draining sanguinous fluid  The calf is supple  There is no lymphangitis or regional lymphadenitis  The midline catheter exit site is clean          Invasive Devices     Peripheral Intravenous Line            Long-Dwell Peripheral IV (Midline) 05/27/75 Left Basilic 1 day                            Lab, Imaging and other studies:    Recent Results (from the past 96 hour(s))   CBC and differential    Collection Time: 06/05/18  9:28 PM   Result Value Ref Range    WBC 14 22 (H) 4 31 - 10 16 Thousand/uL    RBC 4 37 3 88 - 5 62 Million/uL    Hemoglobin 13 5 12 0 - 17 0 g/dL    Hematocrit 42 2 36 5 - 49 3 %    MCV 97 82 - 98 fL    MCH 30 9 26 8 - 34 3 pg    MCHC 32 0 31 4 - 37 4 g/dL    RDW 13 3 11 6 - 15 1 %    MPV 8 6 (L) 8 9 - 12 7 fL    Platelets 070 (H) 708 - 390 Thousands/uL    nRBC 0 /100 WBCs    Neutrophils Relative 76 (H) 43 - 75 %    Immat GRANS % 1 0 - 2 %    Lymphocytes Relative 15 14 - 44 %    Monocytes Relative 8 4 - 12 %    Eosinophils Relative 0 0 - 6 %    Basophils Relative 0 0 - 1 %    Neutrophils Absolute 10 75 (H) 1 85 - 7 62 Thousands/µL    Immature Grans Absolute 0 07 0 00 - 0 20 Thousand/uL    Lymphocytes Absolute 2 17 0 60 - 4 47 Thousands/µL    Monocytes Absolute 1 17 0 17 - 1 22 Thousand/µL    Eosinophils Absolute 0 03 0 00 - 0 61 Thousand/µL    Basophils Absolute 0 03 0 00 - 0 10 Thousands/µL   Protime-INR    Collection Time: 06/05/18  9:28 PM   Result Value Ref Range    Protime 9 7 9 4 - 11 7 seconds    INR 0 92 0 86 - 1 16   APTT    Collection Time: 06/05/18  9:28 PM   Result Value Ref Range    PTT 29 24 - 36 seconds   Comprehensive metabolic panel    Collection Time: 06/05/18  9:28 PM   Result Value Ref Range    Sodium 134 (L) 136 - 145 mmol/L    Potassium 3 8 3 5 - 5 3 mmol/L    Chloride 97 (L) 100 - 108 mmol/L    CO2 28 21 - 32 mmol/L    Anion Gap 9 4 - 13 mmol/L    BUN 19 5 - 25 mg/dL    Creatinine 0 77 0 60 - 1 30 mg/dL    Glucose 95 65 - 140 mg/dL    Calcium 9 4 8 3 - 10 1 mg/dL    AST 35 5 - 45 U/L    ALT 74 12 - 78 U/L    Alkaline Phosphatase 103 46 - 116 U/L    Total Protein 8 2 6 4 - 8 2 g/dL    Albumin 3 5 3 5 - 5 0 g/dL    Total Bilirubin 0 30 0 20 - 1 00 mg/dL    eGFR 98 ml/min/1 73sq m   Blood culture #1    Collection Time: 06/05/18  9:28 PM   Result Value Ref Range    Blood Culture No Growth at 48 hrs  Blood culture #2    Collection Time: 06/05/18  9:28 PM   Result Value Ref Range    Blood Culture No Growth at 48 hrs      Sedimentation rate, automated    Collection Time: 06/05/18  9:28 PM   Result Value Ref Range    Sed Rate 51 (H) 2 - 10 mm/hour   Lactic acid, plasma    Collection Time: 06/05/18 10:18 PM   Result Value Ref Range    LACTIC ACID 1 3 0 5 - 2 0 mmol/L   Uric acid    Collection Time: 06/05/18 10:19 PM   Result Value Ref Range    Uric Acid 5 0 4 2 - 8 0 mg/dL   C-reactive protein    Collection Time: 06/06/18  7:11 AM   Result Value Ref Range    CRP >90 0 (H) <3 0 mg/L   Basic metabolic panel    Collection Time: 06/06/18  7:11 AM   Result Value Ref Range    Sodium 134 (L) 136 - 145 mmol/L    Potassium 4 2 3 5 - 5 3 mmol/L    Chloride 101 100 - 108 mmol/L    CO2 26 21 - 32 mmol/L    Anion Gap 7 4 - 13 mmol/L    BUN 16 5 - 25 mg/dL    Creatinine 0 67 0 60 - 1 30 mg/dL    Glucose 96 65 - 140 mg/dL    Calcium 8 7 8 3 - 10 1 mg/dL    eGFR 104 ml/min/1 73sq m   Magnesium    Collection Time: 06/06/18  7:11 AM   Result Value Ref Range    Magnesium 1 9 1 6 - 2 6 mg/dL   Hemoglobin A1C    Collection Time: 06/06/18  7:11 AM   Result Value Ref Range    Hemoglobin A1C 7 0 (H) 4 2 - 6 3 %     mg/dl   CBC and differential    Collection Time: 06/06/18  7:11 AM   Result Value Ref Range    WBC 11 69 (H) 4 31 - 10 16 Thousand/uL    RBC 3 77 (L) 3 88 - 5 62 Million/uL    Hemoglobin 11 7 (L) 12 0 - 17 0 g/dL    Hematocrit 36 5 36 5 - 49 3 %    MCV 97 82 - 98 fL    MCH 31 0 26 8 - 34 3 pg    MCHC 32 1 31 4 - 37 4 g/dL    RDW 13 4 11 6 - 15 1 %    MPV 9 0 8 9 - 12 7 fL    Platelets 287 988 - 078 Thousands/uL    nRBC 0 /100 WBCs    Neutrophils Relative 73 43 - 75 %    Immat GRANS % 0 0 - 2 %    Lymphocytes Relative 19 14 - 44 %    Monocytes Relative 8 4 - 12 %    Eosinophils Relative 0 0 - 6 %    Basophils Relative 0 0 - 1 %    Neutrophils Absolute 8 42 (H) 1 85 - 7 62 Thousands/µL    Immature Grans Absolute 0 04 0 00 - 0 20 Thousand/uL    Lymphocytes Absolute 2 21 0 60 - 4 47 Thousands/µL    Monocytes Absolute 0 94 0 17 - 1 22 Thousand/µL    Eosinophils Absolute 0 05 0 00 - 0 61 Thousand/µL    Basophils Absolute 0 03 0 00 - 0 10 Thousands/µL   Synovial fluid white cell count w/ diff    Collection Time: 06/06/18  7:23 PM   Result Value Ref Range    Site synovial fluid right knee     Color, Fluid Light Yellow Clear, Colorless,Yellow    Clarity, Fluid Clear Clear    WBC, Fluid 17,450 (H) 0 - 200 /ul   Synovial fluid, crystal    Collection Time: 06/06/18  7:23 PM   Result Value Ref Range    Crystals, Synovial Fluid No Crystals Seen No Crystals Seen   Body fluid culture and Gram stain    Collection Time: 06/06/18  7:23 PM   Result Value Ref Range    Body Fluid Culture, Sterile Few Colonies of Staphylococcus aureus (A)     Gram Stain Result 2+ Polys     Gram Stain Result No bacteria seen    Synovial Fluid Diff    Collection Time: 06/06/18  7:23 PM   Result Value Ref Range    Total Counted 100     Neutrophil % Synovial 83 %    Lymph % Synovial 12 %    Monocyte % Synovial 5 %   Body fluid culture and Gram stain    Collection Time: 06/06/18  7:24 PM   Result Value Ref Range    Body Fluid Culture, Sterile 4+ Growth of Staphylococcus aureus (A)     Gram Stain Result 4+ Polys     Gram Stain Result       4+ Gram positive cocci in pairs, chains and clusters       Susceptibility    Staphylococcus aureus - TRIPP     Ampicillin ($$) >8 00 Resistant ug/ml     Cefazolin ($) <=4 00 Susceptible ug/ml     Clindamycin ($) <=0 25 Susceptible ug/ml     Erythromycin ($$$$) <=0 25 Susceptible ug/ml     Gentamicin ($$) <=1 Susceptible ug/ml     Oxacillin <=0 25 Susceptible ug/ml     Tetracycline <=2 Susceptible ug/ml     Trimethoprim + Sulfamethoxazole ($$$) <=0 5/9 5 Susceptible ug/ml     Vancomycin ($) 1 00 Susceptible ug/ml   Basic metabolic panel    Collection Time: 06/07/18  6:30 AM   Result Value Ref Range    Sodium 132 (L) 136 - 145 mmol/L    Potassium 4 7 3 5 - 5 3 mmol/L    Chloride 100 100 - 108 mmol/L    CO2 21 21 - 32 mmol/L    Anion Gap 11 4 - 13 mmol/L    BUN 10 5 - 25 mg/dL    Creatinine 0 70 0 60 - 1 30 mg/dL    Glucose 105 65 - 140 mg/dL    Calcium 9 4 8 3 - 10 1 mg/dL    eGFR 102 ml/min/1 73sq m   CBC and differential    Collection Time: 06/07/18  6:30 AM   Result Value Ref Range    WBC 13 17 (H) 4 31 - 10 16 Thousand/uL    RBC 4 34 3 88 - 5 62 Million/uL    Hemoglobin 13 6 12 0 - 17 0 g/dL    Hematocrit 44 6 36 5 - 49 3 %     (H) 82 - 98 fL    MCH 31 3 26 8 - 34 3 pg    MCHC 30 5 (L) 31 4 - 37 4 g/dL    RDW 13 3 11 6 - 15 1 %    MPV 8 7 (L) 8 9 - 12 7 fL    Platelets 046 798 - 512 Thousands/uL    nRBC 0 /100 WBCs    Neutrophils Relative 70 43 - 75 %    Immat GRANS % 1 0 - 2 %    Lymphocytes Relative 18 14 - 44 %    Monocytes Relative 10 4 - 12 %    Eosinophils Relative 1 0 - 6 %    Basophils Relative 0 0 - 1 %    Neutrophils Absolute 9 42 (H) 1 85 - 7 62 Thousands/µL    Immature Grans Absolute 0 08 0 00 - 0 20 Thousand/uL    Lymphocytes Absolute 2 30 0 60 - 4 47 Thousands/µL    Monocytes Absolute 1 26 (H) 0 17 - 1 22 Thousand/µL    Eosinophils Absolute 0 06 0 00 - 0 61 Thousand/µL    Basophils Absolute 0 05 0 00 - 0 10 Thousands/µL   Procalcitonin    Collection Time: 06/07/18  6:30 AM   Result Value Ref Range    Procalcitonin 0 06 <=0 25 ng/ml   Fingerstick Glucose (POCT)    Collection Time: 06/07/18  7:25 AM   Result Value Ref Range    POC Glucose 107 65 - 140 mg/dl   Fingerstick Glucose (POCT)    Collection Time: 06/07/18 11:35 AM   Result Value Ref Range    POC Glucose 89 65 - 140 mg/dl   Fingerstick Glucose (POCT)    Collection Time: 06/07/18  4:35 PM   Result Value Ref Range    POC Glucose 137 65 - 140 mg/dl   Fingerstick Glucose (POCT)    Collection Time: 06/07/18  9:31 PM   Result Value Ref Range    POC Glucose 82 65 - 140 mg/dl   Basic metabolic panel    Collection Time: 06/08/18  6:23 AM   Result Value Ref Range    Sodium 133 (L) 136 - 145 mmol/L    Potassium 3 6 3 5 - 5 3 mmol/L    Chloride 99 (L) 100 - 108 mmol/L    CO2 25 21 - 32 mmol/L    Anion Gap 9 4 - 13 mmol/L    BUN 9 5 - 25 mg/dL    Creatinine 0 66 0 60 - 1 30 mg/dL    Glucose 99 65 - 140 mg/dL    Calcium 8 6 8 3 - 10 1 mg/dL    eGFR 104 ml/min/1 73sq m   Magnesium    Collection Time: 06/08/18  6:23 AM   Result Value Ref Range    Magnesium 1 7 1 6 - 2 6 mg/dL   CBC and differential    Collection Time: 06/08/18  6:23 AM   Result Value Ref Range    WBC 10 19 (H) 4 31 - 10 16 Thousand/uL    RBC 3 66 (L) 3 88 - 5 62 Million/uL    Hemoglobin 11 2 (L) 12 0 - 17 0 g/dL    Hematocrit 35 0 (L) 36 5 - 49 3 %    MCV 96 82 - 98 fL    MCH 30 6 26 8 - 34 3 pg    MCHC 32 0 31 4 - 37 4 g/dL    RDW 13 0 11 6 - 15 1 %    MPV 8 8 (L) 8 9 - 12 7 fL    Platelets 332 868 - 498 Thousands/uL    nRBC 0 /100 WBCs    Neutrophils Relative 68 43 - 75 %    Immat GRANS % 1 0 - 2 %    Lymphocytes Relative 20 14 - 44 %    Monocytes Relative 10 4 - 12 %    Eosinophils Relative 1 0 - 6 %    Basophils Relative 0 0 - 1 %    Neutrophils Absolute 6 97 1 85 - 7 62 Thousands/µL    Immature Grans Absolute 0 05 0 00 - 0 20 Thousand/uL    Lymphocytes Absolute 2 02 0 60 - 4 47 Thousands/µL    Monocytes Absolute 1 06 0 17 - 1 22 Thousand/µL    Eosinophils Absolute 0 06 0 00 - 0 61 Thousand/µL    Basophils Absolute 0 03 0 00 - 0 10 Thousands/µL   Fingerstick Glucose (POCT)    Collection Time: 06/08/18  7:12 AM   Result Value Ref Range    POC Glucose 101 65 - 140 mg/dl   Fingerstick Glucose (POCT) Collection Time: 06/08/18 11:32 AM   Result Value Ref Range    POC Glucose 92 65 - 140 mg/dl       Results from last 7 days  Lab Units 06/08/18  0623 06/06/18  0711   MAGNESIUM mg/dL 1 7 1 9           Results from last 7 days  Lab Units 06/05/18 2128   INR  0 92   PTT seconds 29     No results found for: TROPONINT  ABG:No results found for: PHART, CDY6CZE, PO2ART, HVG5AQS, X7EXLILQ, BEART, SOURCE        Cultures    Results from last 7 days  Lab Units 06/06/18  1924 06/06/18  1923 06/05/18  2128   BLOOD CULTURE   --   --  No Growth at 48 hrs  No Growth at 48 hrs  GRAM STAIN RESULT  4+ Polys  4+ Gram positive cocci in pairs, chains and clusters 2+ Polys  No bacteria seen  --    BODY FLUID CULTURE, STERILE  4+ Growth of Staphylococcus aureus* Few Colonies of Staphylococcus aureus*  --       Xr Knee 1 Or 2 Vw Right    Result Date: 6/6/2018  Narrative: RIGHT KNEE INDICATION:   infection,suspect spetic joint     Redness and swelling  COMPARISON:  Plain radiographs May 15, 2016 VIEWS:  XR KNEE 1 OR 2 VW RIGHT Images: 2 FINDINGS: There is no acute fracture or dislocation  Small suprapatellar joint effusion  Mild medial joint space narrowing  Mild narrowing of the patellofemoral space  Mild sharpening of the tibial spines  No lytic or blastic lesions are seen  Diffuse soft tissue swelling, extending from above the patella, inferiorly to the anterior tibial tubercle  More focal soft tissue swelling overlying the anterior tibial tubercle  No radiopaque foreign bodies are seen  Impression: 1  Mild degenerative changes with small suprapatellar joint effusion  If there is clinical concern for septic arthritis, consider follow-up arthrocentesis  2   Diffuse soft tissue swelling anterior to the patella, most compatible with prepatellar bursitis  3   More focal soft tissue swelling overlying the anterior tibial tubercle, suspicious for superimposed infrapatellar bursitis   The study was marked in [facility] for immediate notification  Workstation performed: [id number]     Vas Lower Limb Venous Duplex Study, Unilateral/limited    Result Date: 6/7/2018  Narrative:  [facility] REPORT CLINICAL: Indications: Patient presents with right lower extremity edema  Risk Factors: The patient has no history of DVT  The patient current BMI is 28 06, Weight is 190 lb and Height is 69 in  CONCLUSION: Impression: RIGHT LOWER LIMB No evidence of acute or chronic deep vein thrombosis   No evidence of superficial thrombophlebitis noted  Doppler evaluation shows a normal response to augmentation maneuvers  Popliteal, posterior tibial and anterior tibial arterial Doppler waveforms are triphasic  LEFT LOWER LIMB LIMITED Evaluation shows no evidence of thrombus in the common femoral vein  Doppler evaluation shows a normal response to augmentation maneuvers  SIGNATURE: Electronically Signed by: Rocael Graham MD, RPVI on 2018-06-07 03:44:21 PM    I have personally reviewed pertinent reports  Principal Problem:    Sepsis ([address] )  Active Problems:    Hyperlipidemia    Borderline diabetes    Hypertension    Cellulitis of right lower extremity    Thrombocytosis (HCC)    Hyponatremia    Depression    Infrapatellar bursitis of right knee      Assessment/Plan: This is a middle-age man with above list of comorbidities who presented with signs of sepsis and is noted to have septic arthritis of the right knee along with prepatellar/infrapatellar bursitis  The organism most likely is methicillin sensitive Staphylococcus aureus  In addition he has had multiple tick bites and outdoor exposure  Underlying Lyme disease is also suspected  The concur with plan for surgical debridement/arthroscopic washout    Continue ceftriaxone pending Lyme disease serologically test   Discussed with the orthopedic surgeon as well as the hospitalist

## 2018-06-09 PROBLEM — M25.461 EFFUSION OF RIGHT KNEE: Status: ACTIVE | Noted: 2018-06-05

## 2018-06-09 LAB
ANION GAP SERPL CALCULATED.3IONS-SCNC: 8 MMOL/L (ref 4–13)
BACTERIA SPEC BFLD CULT: ABNORMAL
BUN SERPL-MCNC: 8 MG/DL (ref 5–25)
CALCIUM SERPL-MCNC: 8.4 MG/DL (ref 8.3–10.1)
CHLORIDE SERPL-SCNC: 95 MMOL/L (ref 100–108)
CO2 SERPL-SCNC: 27 MMOL/L (ref 21–32)
CREAT SERPL-MCNC: 0.71 MG/DL (ref 0.6–1.3)
ERYTHROCYTE [DISTWIDTH] IN BLOOD BY AUTOMATED COUNT: 13 % (ref 11.6–15.1)
GFR SERPL CREATININE-BSD FRML MDRD: 101 ML/MIN/1.73SQ M
GLUCOSE SERPL-MCNC: 100 MG/DL (ref 65–140)
GLUCOSE SERPL-MCNC: 110 MG/DL (ref 65–140)
GLUCOSE SERPL-MCNC: 130 MG/DL (ref 65–140)
GLUCOSE SERPL-MCNC: 144 MG/DL (ref 65–140)
GLUCOSE SERPL-MCNC: 155 MG/DL (ref 65–140)
GRAM STN SPEC: ABNORMAL
GRAM STN SPEC: ABNORMAL
HCT VFR BLD AUTO: 33.5 % (ref 36.5–49.3)
HGB BLD-MCNC: 10.9 G/DL (ref 12–17)
MAGNESIUM SERPL-MCNC: 1.7 MG/DL (ref 1.6–2.6)
MCH RBC QN AUTO: 31.1 PG (ref 26.8–34.3)
MCHC RBC AUTO-ENTMCNC: 32.5 G/DL (ref 31.4–37.4)
MCV RBC AUTO: 95 FL (ref 82–98)
OSMOLALITY UR/SERPL-RTO: 274 MMOL/KG (ref 282–298)
OSMOLALITY UR: 465 MMOL/KG
PLATELET # BLD AUTO: 332 THOUSANDS/UL (ref 149–390)
PMV BLD AUTO: 8.8 FL (ref 8.9–12.7)
POTASSIUM SERPL-SCNC: 4 MMOL/L (ref 3.5–5.3)
RBC # BLD AUTO: 3.51 MILLION/UL (ref 3.88–5.62)
SODIUM 24H UR-SCNC: 29 MOL/L
SODIUM SERPL-SCNC: 130 MMOL/L (ref 136–145)
WBC # BLD AUTO: 9.53 THOUSAND/UL (ref 4.31–10.16)

## 2018-06-09 PROCEDURE — 80048 BASIC METABOLIC PNL TOTAL CA: CPT | Performed by: NURSE PRACTITIONER

## 2018-06-09 PROCEDURE — 99232 SBSQ HOSP IP/OBS MODERATE 35: CPT | Performed by: NURSE PRACTITIONER

## 2018-06-09 PROCEDURE — 83735 ASSAY OF MAGNESIUM: CPT | Performed by: NURSE PRACTITIONER

## 2018-06-09 PROCEDURE — 83930 ASSAY OF BLOOD OSMOLALITY: CPT | Performed by: NURSE PRACTITIONER

## 2018-06-09 PROCEDURE — 83935 ASSAY OF URINE OSMOLALITY: CPT | Performed by: NURSE PRACTITIONER

## 2018-06-09 PROCEDURE — 84300 ASSAY OF URINE SODIUM: CPT | Performed by: NURSE PRACTITIONER

## 2018-06-09 PROCEDURE — 99024 POSTOP FOLLOW-UP VISIT: CPT | Performed by: ORTHOPAEDIC SURGERY

## 2018-06-09 PROCEDURE — 85027 COMPLETE CBC AUTOMATED: CPT | Performed by: NURSE PRACTITIONER

## 2018-06-09 PROCEDURE — 82948 REAGENT STRIP/BLOOD GLUCOSE: CPT

## 2018-06-09 RX ORDER — MORPHINE SULFATE 2 MG/ML
2 INJECTION, SOLUTION INTRAMUSCULAR; INTRAVENOUS ONCE AS NEEDED
Status: COMPLETED | OUTPATIENT
Start: 2018-06-09 | End: 2018-06-09

## 2018-06-09 RX ORDER — MAGNESIUM SULFATE HEPTAHYDRATE 40 MG/ML
2 INJECTION, SOLUTION INTRAVENOUS ONCE
Status: COMPLETED | OUTPATIENT
Start: 2018-06-09 | End: 2018-06-09

## 2018-06-09 RX ORDER — MORPHINE SULFATE 2 MG/ML
2 INJECTION, SOLUTION INTRAMUSCULAR; INTRAVENOUS EVERY 4 HOURS PRN
Status: DISCONTINUED | OUTPATIENT
Start: 2018-06-09 | End: 2018-06-15

## 2018-06-09 RX ORDER — SODIUM CHLORIDE 9 MG/ML
50 INJECTION, SOLUTION INTRAVENOUS CONTINUOUS
Status: DISCONTINUED | OUTPATIENT
Start: 2018-06-09 | End: 2018-06-09

## 2018-06-09 RX ADMIN — VENLAFAXINE 75 MG: 37.5 TABLET ORAL at 17:36

## 2018-06-09 RX ADMIN — CEFAZOLIN SODIUM 1000 MG: 1 SOLUTION INTRAVENOUS at 09:37

## 2018-06-09 RX ADMIN — IBUPROFEN 400 MG: 400 TABLET ORAL at 22:15

## 2018-06-09 RX ADMIN — Medication 250 MG: at 09:40

## 2018-06-09 RX ADMIN — ZOLPIDEM TARTRATE 10 MG: 5 TABLET ORAL at 22:22

## 2018-06-09 RX ADMIN — MORPHINE SULFATE 2 MG: 2 INJECTION, SOLUTION INTRAMUSCULAR; INTRAVENOUS at 04:08

## 2018-06-09 RX ADMIN — HEPARIN SODIUM 5000 UNITS: 5000 INJECTION, SOLUTION INTRAVENOUS; SUBCUTANEOUS at 22:15

## 2018-06-09 RX ADMIN — HEPARIN SODIUM 5000 UNITS: 5000 INJECTION, SOLUTION INTRAVENOUS; SUBCUTANEOUS at 09:40

## 2018-06-09 RX ADMIN — OXYCODONE HYDROCHLORIDE AND ACETAMINOPHEN 1 TABLET: 5; 325 TABLET ORAL at 18:16

## 2018-06-09 RX ADMIN — Medication 250 MG: at 17:36

## 2018-06-09 RX ADMIN — PRAVASTATIN SODIUM 80 MG: 80 TABLET ORAL at 17:36

## 2018-06-09 RX ADMIN — CEFAZOLIN SODIUM 1000 MG: 1 SOLUTION INTRAVENOUS at 00:24

## 2018-06-09 RX ADMIN — OXYCODONE HYDROCHLORIDE AND ACETAMINOPHEN 1 TABLET: 5; 325 TABLET ORAL at 10:28

## 2018-06-09 RX ADMIN — MAGNESIUM SULFATE HEPTAHYDRATE 2 G: 40 INJECTION, SOLUTION INTRAVENOUS at 10:28

## 2018-06-09 RX ADMIN — SODIUM CHLORIDE 50 ML/HR: 0.9 INJECTION, SOLUTION INTRAVENOUS at 15:15

## 2018-06-09 RX ADMIN — DOCUSATE SODIUM 100 MG: 100 CAPSULE, LIQUID FILLED ORAL at 09:41

## 2018-06-09 RX ADMIN — OXYCODONE HYDROCHLORIDE AND ACETAMINOPHEN 1 TABLET: 5; 325 TABLET ORAL at 00:24

## 2018-06-09 RX ADMIN — ZOLPIDEM TARTRATE 10 MG: 5 TABLET ORAL at 00:19

## 2018-06-09 RX ADMIN — INSULIN LISPRO 1 UNITS: 100 INJECTION, SOLUTION INTRAVENOUS; SUBCUTANEOUS at 09:38

## 2018-06-09 RX ADMIN — CEFTRIAXONE 2000 MG: 2 INJECTION, SOLUTION INTRAVENOUS at 16:22

## 2018-06-09 RX ADMIN — IBUPROFEN 400 MG: 400 TABLET ORAL at 06:15

## 2018-06-09 RX ADMIN — IBUPROFEN 400 MG: 400 TABLET ORAL at 13:46

## 2018-06-09 RX ADMIN — VENLAFAXINE 75 MG: 37.5 TABLET ORAL at 09:40

## 2018-06-09 NOTE — PROGRESS NOTES
Progress Note - Infectious Disease   Dion Srinivasan 66 y o  male MRN: [id number]  Unit/Bed#: [address] [place] [address] Encounter: [id number]      Subjective/Objective   Subjective: The events since last seen were noted  The operative note seen  Right knee arthroscopy with irrigation debridement of the joint, extensive debridement of prepatellar bursa and placement of wound VAC  Today, feels a lot better  When queried about the extent of his knee pain he reported that today his knee pain is " 2-1/2    No other symptoms    He is enjoying the attention at the hospital     Meds/Allergies     Current Facility-Administered Medications:     acetaminophen (TYLENOL) tablet 650 mg, 650 mg, Oral, Q6H PRN, SANCHO Guerrero, 650 mg at 06/07/18 0041    ALPRAZolam Noris Jethro) tablet 0 5 mg, 0 5 mg, Oral, BID PRN, SANCHO Melvin, 0 5 mg at 06/08/18 1447    aluminum-magnesium hydroxide-simethicone (MYLANTA) 200-200-20 mg/5 mL oral suspension 30 mL, 30 mL, Oral, Q6H PRN, SANCHO Melvin    cefTRIAXone (ROCEPHIN) IVPB (premix) 2,000 mg, 2,000 mg, Intravenous, Q24H, SANCHO Gamez, Last Rate: 100 mL/hr at 06/09/18 1622, 2,000 mg at 06/09/18 1622    docusate sodium (COLACE) capsule 100 mg, 100 mg, Oral, BID, SANCHO Gamez, 100 mg at 06/09/18 0941    enalapril (VASOTEC) tablet 15 mg, 15 mg, Oral, Daily, SANCHO Melvin, 15 mg at 06/08/18 0943    heparin (porcine) subcutaneous injection 5,000 Units, 5,000 Units, Subcutaneous, Q12H [address], 5,000 Units at 06/09/18 0940 **AND** [CANCELED] Platelet count, , , Once, SANCHO Melvin    ibuprofen (MOTRIN) tablet 400 mg, 400 mg, Oral, Q8H [address], SANCHO Guerrero, 400 mg at 06/09/18 1346    insulin lispro (HumaLOG) 100 units/mL subcutaneous injection 1-5 Units, 1-5 Units, Subcutaneous, TID AC, 1 Units at 06/09/18 0938 **AND** Fingerstick Glucose (POCT), , , TID AC, SANCHO Melvin    insulin lispro (HumaLOG) 100 units/mL subcutaneous injection 1-5 Units, 1-5 Units, Subcutaneous, HS, Papo Alberto, CRNP    morphine injection 2 mg, 2 mg, Intravenous, Q4H PRN, SANCHO Ott    ondansetron [facility]) injection 4 mg, 4 mg, Intravenous, Q6H PRN, Papo Tobinrik, CRNP    oxyCODONE-acetaminophen (PERCOCET) 5-325 mg per tablet 1 tablet, 1 tablet, Oral, Q6H PRN, Meliijosé Tobinrik, CRNP, 1 tablet at 06/09/18 1028    polyethylene glycol (MIRALAX) packet 17 g, 17 g, Oral, Daily PRN, Edd Aaron, KORINNP    pravastatin (PRAVACHOL) tablet 80 mg, 80 mg, Oral, Daily With Dinner, Papo Alberto, CRNP, 80 mg at 06/08/18 2131    saccharomyces boulardii (FLORASTOR) capsule 250 mg, 250 mg, Oral, BID, Cuijosé Tobinrik, CRNP, 250 mg at 06/09/18 0940    sodium chloride 0 9 % infusion, 50 mL/hr, Intravenous, Continuous, SANCHO Gamez, Last Rate: 50 mL/hr at 06/09/18 1515, 50 mL/hr at 06/09/18 1515    venlafaxine (EFFEXOR) tablet 75 mg, 75 mg, Oral, BID With Meals, Meliijosé Tobinrik, CRNP, 75 mg at 06/09/18 0940    zolpidem (AMBIEN) tablet 10 mg, 10 mg, Oral, HS PRN, Meliijosé Tobinrik, CRNP, 10 mg at 06/09/18 0019  Allergies   Allergen Reactions    Iodine Anaphylaxis    Shellfish-Derived Products      all current active meds have been reviewed    discontinued meds:   Medications Discontinued During This Encounter   Medication Reason    acetaminophen (TYLENOL) tablet 650 mg     heparin (porcine) subcutaneous injection 5,000 Units     heparin (porcine) subcutaneous injection 5,000 Units     vancomycin (VANCOCIN) 1,250 mg in sodium chloride 0 9 % 250 mL IVPB     sodium chloride 0 9 % infusion     sodium chloride 0 9 % irrigation solution Patient Discharge    fentaNYL (SUBLIMAZE) injection 50 mcg Patient Transfer    HYDROmorphone (DILAUDID) injection 0 5 mg Patient Transfer    ondansetron (ZOFRAN) injection 4 mg Patient Transfer    promethazine (PHENERGAN) injection 12 5 mg Patient Transfer    meperidine (DEMEROL) injection 12 5 mg Patient Transfer    oxyCODONE-acetaminophen (PERCOCET) 5-325 mg per tablet 1 tablet Patient Transfer    cefTRIAXone (ROCEPHIN) IVPB (premix) 2,000 mg     magnesium oxide (MAG-OX) tablet 400 mg        Physical Exam: Physical Exam    HR:  [] 102  Resp:  [16-20] 20  BP: (109-188)/() 124/67  SpO2:  [95 %-97 %] 97 %  Body mass index is 28 06 kg/m²  Weight (last 2 days)     None        Temp (24hrs), Av 9 °F (36 6 °C), Min:97 °F (36 1 °C), Max:98 5 °F (36 9 °C)  Current: Temperature: 98 5 °F (36 9 °C)AGE@    Intake/Output Summary (Last 24 hours) at 18 1724  Last data filed at 18 1604   Gross per 24 hour   Intake              200 ml   Output              680 ml   Net             -480 ml     He is alert and comfortable, cheerful  Vital signs are stable except for tachycardia  The lungs are clear  Heart sounds are regular  Abdomen is soft and nontender  Right lower extremity is held in a brace  The wound VAC drainage is sanguinous  The foot is warm and pink  The left arm IV site is clean        Invasive Devices     Peripheral Intravenous Line            Long-Dwell Peripheral IV (Midline)  Left Basilic 2 days          Drain            Negative Pressure Wound Therapy (V A C ) Knee Right less than 1 day                            Lab, Imaging and other studies:    Recent Results (from the past 96 hour(s))   CBC and differential    Collection Time: 18  9:28 PM   Result Value Ref Range    WBC 14 22 (H) 4 31 - 10 16 Thousand/uL    RBC 4 37 3 88 - 5 62 Million/uL    Hemoglobin 13 5 12 0 - 17 0 g/dL    Hematocrit 42 2 36 5 - 49 3 %    MCV 97 82 - 98 fL    MCH 30 9 26 8 - 34 3 pg    MCHC 32 0 31 4 - 37 4 g/dL    RDW 13 3 11 6 - 15 1 %    MPV 8 6 (L) 8 9 - 12 7 fL    Platelets 197 (H) 395 - 390 Thousands/uL    nRBC 0 /100 WBCs    Neutrophils Relative 76 (H) 43 - 75 %    Immat GRANS % 1 0 - 2 %    Lymphocytes Relative 15 14 - 44 %    Monocytes Relative 8 4 - 12 %    Eosinophils Relative 0 0 - 6 % Basophils Relative 0 0 - 1 %    Neutrophils Absolute 10 75 (H) 1 85 - 7 62 Thousands/µL    Immature Grans Absolute 0 07 0 00 - 0 20 Thousand/uL    Lymphocytes Absolute 2 17 0 60 - 4 47 Thousands/µL    Monocytes Absolute 1 17 0 17 - 1 22 Thousand/µL    Eosinophils Absolute 0 03 0 00 - 0 61 Thousand/µL    Basophils Absolute 0 03 0 00 - 0 10 Thousands/µL   Protime-INR    Collection Time: 06/05/18  9:28 PM   Result Value Ref Range    Protime 9 7 9 4 - 11 7 seconds    INR 0 92 0 86 - 1 16   APTT    Collection Time: 06/05/18  9:28 PM   Result Value Ref Range    PTT 29 24 - 36 seconds   Comprehensive metabolic panel    Collection Time: 06/05/18  9:28 PM   Result Value Ref Range    Sodium 134 (L) 136 - 145 mmol/L    Potassium 3 8 3 5 - 5 3 mmol/L    Chloride 97 (L) 100 - 108 mmol/L    CO2 28 21 - 32 mmol/L    Anion Gap 9 4 - 13 mmol/L    BUN 19 5 - 25 mg/dL    Creatinine 0 77 0 60 - 1 30 mg/dL    Glucose 95 65 - 140 mg/dL    Calcium 9 4 8 3 - 10 1 mg/dL    AST 35 5 - 45 U/L    ALT 74 12 - 78 U/L    Alkaline Phosphatase 103 46 - 116 U/L    Total Protein 8 2 6 4 - 8 2 g/dL    Albumin 3 5 3 5 - 5 0 g/dL    Total Bilirubin 0 30 0 20 - 1 00 mg/dL    eGFR 98 ml/min/1 73sq m   Blood culture #1    Collection Time: 06/05/18  9:28 PM   Result Value Ref Range    Blood Culture No Growth at 72 hrs  Blood culture #2    Collection Time: 06/05/18  9:28 PM   Result Value Ref Range    Blood Culture No Growth at 72 hrs      Sedimentation rate, automated    Collection Time: 06/05/18  9:28 PM   Result Value Ref Range    Sed Rate 51 (H) 2 - 10 mm/hour   Lactic acid, plasma    Collection Time: 06/05/18 10:18 PM   Result Value Ref Range    LACTIC ACID 1 3 0 5 - 2 0 mmol/L   Uric acid    Collection Time: 06/05/18 10:19 PM   Result Value Ref Range    Uric Acid 5 0 4 2 - 8 0 mg/dL   C-reactive protein    Collection Time: 06/06/18  7:11 AM   Result Value Ref Range    CRP >90 0 (H) <3 0 mg/L   Basic metabolic panel    Collection Time: 06/06/18  7:11 AM   Result Value Ref Range    Sodium 134 (L) 136 - 145 mmol/L    Potassium 4 2 3 5 - 5 3 mmol/L    Chloride 101 100 - 108 mmol/L    CO2 26 21 - 32 mmol/L    Anion Gap 7 4 - 13 mmol/L    BUN 16 5 - 25 mg/dL    Creatinine 0 67 0 60 - 1 30 mg/dL    Glucose 96 65 - 140 mg/dL    Calcium 8 7 8 3 - 10 1 mg/dL    eGFR 104 ml/min/1 73sq m   Magnesium    Collection Time: 06/06/18  7:11 AM   Result Value Ref Range    Magnesium 1 9 1 6 - 2 6 mg/dL   Hemoglobin A1C    Collection Time: 06/06/18  7:11 AM   Result Value Ref Range    Hemoglobin A1C 7 0 (H) 4 2 - 6 3 %     mg/dl   CBC and differential    Collection Time: 06/06/18  7:11 AM   Result Value Ref Range    WBC 11 69 (H) 4 31 - 10 16 Thousand/uL    RBC 3 77 (L) 3 88 - 5 62 Million/uL    Hemoglobin 11 7 (L) 12 0 - 17 0 g/dL    Hematocrit 36 5 36 5 - 49 3 %    MCV 97 82 - 98 fL    MCH 31 0 26 8 - 34 3 pg    MCHC 32 1 31 4 - 37 4 g/dL    RDW 13 4 11 6 - 15 1 %    MPV 9 0 8 9 - 12 7 fL    Platelets 837 457 - 436 Thousands/uL    nRBC 0 /100 WBCs    Neutrophils Relative 73 43 - 75 %    Immat GRANS % 0 0 - 2 %    Lymphocytes Relative 19 14 - 44 %    Monocytes Relative 8 4 - 12 %    Eosinophils Relative 0 0 - 6 %    Basophils Relative 0 0 - 1 %    Neutrophils Absolute 8 42 (H) 1 85 - 7 62 Thousands/µL    Immature Grans Absolute 0 04 0 00 - 0 20 Thousand/uL    Lymphocytes Absolute 2 21 0 60 - 4 47 Thousands/µL    Monocytes Absolute 0 94 0 17 - 1 22 Thousand/µL    Eosinophils Absolute 0 05 0 00 - 0 61 Thousand/µL    Basophils Absolute 0 03 0 00 - 0 10 Thousands/µL   Synovial fluid white cell count w/ diff    Collection Time: 06/06/18  7:23 PM   Result Value Ref Range    Site synovial fluid right knee     Color, Fluid Light Yellow Clear, Colorless,Yellow    Clarity, Fluid Clear Clear    WBC, Fluid 17,450 (H) 0 - 200 /ul   Synovial fluid, crystal    Collection Time: 06/06/18  7:23 PM   Result Value Ref Range    Crystals, Synovial Fluid No Crystals Seen No Crystals Seen   Body fluid culture and Gram stain    Collection Time: 06/06/18  7:23 PM   Result Value Ref Range    Body Fluid Culture, Sterile Few Colonies of Staphylococcus aureus (A)     Gram Stain Result 2+ Polys     Gram Stain Result No bacteria seen        Susceptibility    Staphylococcus aureus - TRIPP     Ampicillin ($$) >8 00 Resistant ug/ml     Cefazolin ($) <=4 00 Susceptible ug/ml     Clindamycin ($) <=0 25 Susceptible ug/ml     Erythromycin ($$$$) <=0 25 Susceptible ug/ml     Gentamicin ($$) <=1 Susceptible ug/ml     Oxacillin <=0 25 Susceptible ug/ml     Tetracycline <=2 Susceptible ug/ml     Trimethoprim + Sulfamethoxazole ($$$) <=0 5/9 5 Susceptible ug/ml     Vancomycin ($) 1 00 Susceptible ug/ml   Synovial Fluid Diff    Collection Time: 06/06/18  7:23 PM   Result Value Ref Range    Total Counted 100     Neutrophil % Synovial 83 %    Lymph % Synovial 12 %    Monocyte % Synovial 5 %   Body fluid culture and Gram stain    Collection Time: 06/06/18  7:24 PM   Result Value Ref Range    Body Fluid Culture, Sterile 4+ Growth of Staphylococcus aureus (A)     Gram Stain Result 4+ Polys     Gram Stain Result       4+ Gram positive cocci in pairs, chains and clusters       Susceptibility    Staphylococcus aureus - TRIPP     Ampicillin ($$) >8 00 Resistant ug/ml     Cefazolin ($) <=4 00 Susceptible ug/ml     Clindamycin ($) <=0 25 Susceptible ug/ml     Erythromycin ($$$$) <=0 25 Susceptible ug/ml     Gentamicin ($$) <=1 Susceptible ug/ml     Oxacillin <=0 25 Susceptible ug/ml     Tetracycline <=2 Susceptible ug/ml     Trimethoprim + Sulfamethoxazole ($$$) <=0 5/9 5 Susceptible ug/ml     Vancomycin ($) 1 00 Susceptible ug/ml   Lyme Antibody Profile with reflex to WB    Collection Time: 06/06/18  8:25 PM   Result Value Ref Range    LYME AB IGG 0 67 0 00 - 0 79    LYME AB IGM 0 28 0 00 - 3 01   Basic metabolic panel    Collection Time: 06/07/18  6:30 AM   Result Value Ref Range    Sodium 132 (L) 136 - 145 mmol/L    Potassium 4 7 3 5 - 5 3 mmol/L    Chloride 100 100 - 108 mmol/L    CO2 21 21 - 32 mmol/L    Anion Gap 11 4 - 13 mmol/L    BUN 10 5 - 25 mg/dL    Creatinine 0 70 0 60 - 1 30 mg/dL    Glucose 105 65 - 140 mg/dL    Calcium 9 4 8 3 - 10 1 mg/dL    eGFR 102 ml/min/1 73sq m   CBC and differential    Collection Time: 06/07/18  6:30 AM   Result Value Ref Range    WBC 13 17 (H) 4 31 - 10 16 Thousand/uL    RBC 4 34 3 88 - 5 62 Million/uL    Hemoglobin 13 6 12 0 - 17 0 g/dL    Hematocrit 44 6 36 5 - 49 3 %     (H) 82 - 98 fL    MCH 31 3 26 8 - 34 3 pg    MCHC 30 5 (L) 31 4 - 37 4 g/dL    RDW 13 3 11 6 - 15 1 %    MPV 8 7 (L) 8 9 - 12 7 fL    Platelets 669 085 - 980 Thousands/uL    nRBC 0 /100 WBCs    Neutrophils Relative 70 43 - 75 %    Immat GRANS % 1 0 - 2 %    Lymphocytes Relative 18 14 - 44 %    Monocytes Relative 10 4 - 12 %    Eosinophils Relative 1 0 - 6 %    Basophils Relative 0 0 - 1 %    Neutrophils Absolute 9 42 (H) 1 85 - 7 62 Thousands/µL    Immature Grans Absolute 0 08 0 00 - 0 20 Thousand/uL    Lymphocytes Absolute 2 30 0 60 - 4 47 Thousands/µL    Monocytes Absolute 1 26 (H) 0 17 - 1 22 Thousand/µL    Eosinophils Absolute 0 06 0 00 - 0 61 Thousand/µL    Basophils Absolute 0 05 0 00 - 0 10 Thousands/µL   Procalcitonin    Collection Time: 06/07/18  6:30 AM   Result Value Ref Range    Procalcitonin 0 06 <=0 25 ng/ml   Fingerstick Glucose (POCT)    Collection Time: 06/07/18  7:25 AM   Result Value Ref Range    POC Glucose 107 65 - 140 mg/dl   Fingerstick Glucose (POCT)    Collection Time: 06/07/18 11:35 AM   Result Value Ref Range    POC Glucose 89 65 - 140 mg/dl   Fingerstick Glucose (POCT)    Collection Time: 06/07/18  4:35 PM   Result Value Ref Range    POC Glucose 137 65 - 140 mg/dl   Fingerstick Glucose (POCT)    Collection Time: 06/07/18  9:31 PM   Result Value Ref Range    POC Glucose 82 65 - 140 mg/dl   Basic metabolic panel    Collection Time: 06/08/18  6:23 AM   Result Value Ref Range    Sodium 133 (L) 136 - 145 mmol/L Potassium 3 6 3 5 - 5 3 mmol/L    Chloride 99 (L) 100 - 108 mmol/L    CO2 25 21 - 32 mmol/L    Anion Gap 9 4 - 13 mmol/L    BUN 9 5 - 25 mg/dL    Creatinine 0 66 0 60 - 1 30 mg/dL    Glucose 99 65 - 140 mg/dL    Calcium 8 6 8 3 - 10 1 mg/dL    eGFR 104 ml/min/1 73sq m   Magnesium    Collection Time: 06/08/18  6:23 AM   Result Value Ref Range    Magnesium 1 7 1 6 - 2 6 mg/dL   CBC and differential    Collection Time: 06/08/18  6:23 AM   Result Value Ref Range    WBC 10 19 (H) 4 31 - 10 16 Thousand/uL    RBC 3 66 (L) 3 88 - 5 62 Million/uL    Hemoglobin 11 2 (L) 12 0 - 17 0 g/dL    Hematocrit 35 0 (L) 36 5 - 49 3 %    MCV 96 82 - 98 fL    MCH 30 6 26 8 - 34 3 pg    MCHC 32 0 31 4 - 37 4 g/dL    RDW 13 0 11 6 - 15 1 %    MPV 8 8 (L) 8 9 - 12 7 fL    Platelets 363 378 - 844 Thousands/uL    nRBC 0 /100 WBCs    Neutrophils Relative 68 43 - 75 %    Immat GRANS % 1 0 - 2 %    Lymphocytes Relative 20 14 - 44 %    Monocytes Relative 10 4 - 12 %    Eosinophils Relative 1 0 - 6 %    Basophils Relative 0 0 - 1 %    Neutrophils Absolute 6 97 1 85 - 7 62 Thousands/µL    Immature Grans Absolute 0 05 0 00 - 0 20 Thousand/uL    Lymphocytes Absolute 2 02 0 60 - 4 47 Thousands/µL    Monocytes Absolute 1 06 0 17 - 1 22 Thousand/µL    Eosinophils Absolute 0 06 0 00 - 0 61 Thousand/µL    Basophils Absolute 0 03 0 00 - 0 10 Thousands/µL   Fingerstick Glucose (POCT)    Collection Time: 06/08/18  7:12 AM   Result Value Ref Range    POC Glucose 101 65 - 140 mg/dl   Fingerstick Glucose (POCT)    Collection Time: 06/08/18 11:32 AM   Result Value Ref Range    POC Glucose 92 65 - 140 mg/dl   Fingerstick Glucose (POCT)    Collection Time: 06/08/18  4:26 PM   Result Value Ref Range    POC Glucose 72 65 - 140 mg/dl   Fingerstick Glucose (POCT)    Collection Time: 06/08/18  8:39 PM   Result Value Ref Range    POC Glucose 143 (H) 65 - 140 mg/dl   Basic metabolic panel    Collection Time: 06/09/18  6:17 AM   Result Value Ref Range    Sodium 130 (L) 136 - 145 mmol/L    Potassium 4 0 3 5 - 5 3 mmol/L    Chloride 95 (L) 100 - 108 mmol/L    CO2 27 21 - 32 mmol/L    Anion Gap 8 4 - 13 mmol/L    BUN 8 5 - 25 mg/dL    Creatinine 0 71 0 60 - 1 30 mg/dL    Glucose 144 (H) 65 - 140 mg/dL    Calcium 8 4 8 3 - 10 1 mg/dL    eGFR 101 ml/min/1 73sq m   Magnesium    Collection Time: 06/09/18  6:17 AM   Result Value Ref Range    Magnesium 1 7 1 6 - 2 6 mg/dL   CBC    Collection Time: 06/09/18  6:17 AM   Result Value Ref Range    WBC 9 53 4 31 - 10 16 Thousand/uL    RBC 3 51 (L) 3 88 - 5 62 Million/uL    Hemoglobin 10 9 (L) 12 0 - 17 0 g/dL    Hematocrit 33 5 (L) 36 5 - 49 3 %    MCV 95 82 - 98 fL    MCH 31 1 26 8 - 34 3 pg    MCHC 32 5 31 4 - 37 4 g/dL    RDW 13 0 11 6 - 15 1 %    Platelets 835 651 - 223 Thousands/uL    MPV 8 8 (L) 8 9 - 12 7 fL   Fingerstick Glucose (POCT)    Collection Time: 06/09/18  8:10 AM   Result Value Ref Range    POC Glucose 155 (H) 65 - 140 mg/dl   Fingerstick Glucose (POCT)    Collection Time: 06/09/18 11:06 AM   Result Value Ref Range    POC Glucose 110 65 - 140 mg/dl   Osmolality    Collection Time: 06/09/18 11:57 AM   Result Value Ref Range    Osmolality Serum 274 (L) 282 - 298 mmol/KG   Fingerstick Glucose (POCT)    Collection Time: 06/09/18  4:22 PM   Result Value Ref Range    POC Glucose 130 65 - 140 mg/dl       Results from last 7 days  Lab Units 06/09/18  0617 06/08/18  0623 06/06/18  0711   MAGNESIUM mg/dL 1 7 1 7 1 9           Results from last 7 days  Lab Units 06/05/18 2128   INR  0 92   PTT seconds 29     No results found for: TROPONINT  ABG:No results found for: PHART, DYX6VME, PO2ART, ZQX2PIX, H0PWONOD, BEART, SOURCE        Cultures    Results from last 7 days  Lab Units 06/06/18 1924 06/06/18 1923 06/05/18 2128   BLOOD CULTURE   --   --  No Growth at 72 hrs  No Growth at 72 hrs     GRAM STAIN RESULT  4+ Polys  4+ Gram positive cocci in pairs, chains and clusters 2+ Polys  No bacteria seen  --    BODY FLUID CULTURE, STERILE  4+ Growth of Staphylococcus aureus* Few Colonies of Staphylococcus aureus*  --       Xr Knee 1 Or 2 Vw Right    Result Date: 6/6/2018  Narrative: RIGHT KNEE INDICATION:   infection,suspect spetic joint     Redness and swelling  COMPARISON:  Plain radiographs May 15, 2016 VIEWS:  XR KNEE 1 OR 2 VW RIGHT Images: 2 FINDINGS: There is no acute fracture or dislocation  Small suprapatellar joint effusion  Mild medial joint space narrowing  Mild narrowing of the patellofemoral space  Mild sharpening of the tibial spines  No lytic or blastic lesions are seen  Diffuse soft tissue swelling, extending from above the patella, inferiorly to the anterior tibial tubercle  More focal soft tissue swelling overlying the anterior tibial tubercle  No radiopaque foreign bodies are seen  Impression: 1  Mild degenerative changes with small suprapatellar joint effusion  If there is clinical concern for septic arthritis, consider follow-up arthrocentesis  2   Diffuse soft tissue swelling anterior to the patella, most compatible with prepatellar bursitis  3   More focal soft tissue swelling overlying the anterior tibial tubercle, suspicious for superimposed infrapatellar bursitis  The study was marked in [facility] for immediate notification  Workstation performed: [id number]     Vas Lower Limb Venous Duplex Study, Unilateral/limited    Result Date: 6/7/2018  Narrative:  [facility] REPORT CLINICAL: Indications: Patient presents with right lower extremity edema  Risk Factors: The patient has no history of DVT  The patient current BMI is 28 06, Weight is 190 lb and Height is 69 in  CONCLUSION: Impression: RIGHT LOWER LIMB No evidence of acute or chronic deep vein thrombosis   No evidence of superficial thrombophlebitis noted  Doppler evaluation shows a normal response to augmentation maneuvers  Popliteal, posterior tibial and anterior tibial arterial Doppler waveforms are triphasic    LEFT LOWER LIMB LIMITED Evaluation shows no evidence of thrombus in the common femoral vein  Doppler evaluation shows a normal response to augmentation maneuvers  SIGNATURE: Electronically Signed by: Arnol Gonzalez MD, RPVI on 2018-06-07 03:44:21 PM    I have personally reviewed pertinent reports  Principal Problem:    Sepsis ([address] )  Active Problems:    Hyperlipidemia    Borderline diabetes    Hypertension    Cellulitis of right lower extremity    Thrombocytosis (HCC)    Hyponatremia    Depression    Infrapatellar bursitis of right knee    Effusion of right knee      Assessment/Plan: This is a middle-age man with above list of comorbidities who had presented with signs of sepsis, found to have prepatellar/infrapatellar bursitis and septic arthritis of the right knee along with cellulitis of the right leg  Methicillin sensitive Staphylococcus aureus is the microbial etiology  He has had arthroscopic surgery for debridement and irrigation  He is postoperative day 1  His courses satisfactory  Continue current treatment  Ceftriaxone treatment would be conducive to daily intravenous infusion in the [facility] infusion room  The Lyme disease serology occult tests are reported negative

## 2018-06-09 NOTE — PROGRESS NOTES
Progress Note - Reg Montoya 1955, 66 y o  male MRN: [id number]    Unit/Bed#: [address] [address] Encounter: [id number]    Primary Care Provider: Millie Knight MD   Date and time admitted to hospital: 6/5/2018  7:38 PM        * Sepsis [facility])   Assessment & Plan    As evidenced by WBC 14 22, , due to right knee bursitis and septic arthritis with right lower extremity cellulitis   Rule out Lyme disease  Presently, afebrile  WBC improved today, 11 69 ->13 1 -> 10 19 -> 9 53  CRP > 90, SED rate 51, Uric Acid 5 0, Procalcitonin 0 06 (checked after initiation of antimicrobial therapy)   · ID and orthopedics following, appreciate recommendations  · S/p aspiration x2 of right knee joint on 6/6/18   · POD #1 S/p right knee irrigation debridement of infected bursa and irrigation and debridement of infected joint on 6/8/18  · Per ID, continue on Rocephin 2gm IVPB daily until line has been ruled out, day #4  · Right knee aspirate culture growing Staph Aureus   · Follow-up final blood cultures, aspiration fluid for gram stain and culture and Lyme, and Lyme PCR   · Monitor for worsening signs of infection  · Repeat CBC in am   · Probiotic while on ABX        Infrapatellar bursitis of right knee   Assessment & Plan    With prepatellar bursitis and septic arthritis   · Right knee x-ray, small suprapatellar joint effusion, prepatellar bursitis, and more focal soft tissue swelling overlying the anterior tibial tubercle, suspicious for superimposed infrapatellar bursitis   If there is clinical concern for septic arthritis, consider follow-up arthrocentesis    · Venous Doppler to rule out DVT, negative   · POD day #1 S/p right knee irrigation debridement of infected bursa as well as irrigation and debridement of infected joint  · Wound VAC intact and functioning well  · Plan for repeat irrigation and debridement of the right knee on Monday with VAC change  · Consider performing closure on Monday if wound looks good per Ortho  · Continue Rocephin 2gm IVPB daily per ID recs, day #4  · Preliminary right knee aspirate growing Staph Aureus   · Pain control   · Rest, elevation  · Neurovascular checks  · PT in light of right knee wound VAC and leg brace  · Consider need for short-term rehab on discharge        Cellulitis of right lower extremity   Assessment & Plan    With prepatellar bursitis and septic arthritis   · Right knee x-ray, small suprapatellar joint effusion, prepatellar bursitis, and more focal soft tissue swelling overlying the anterior tibial tubercle, suspicious for superimposed infrapatellar bursitis   If there is clinical concern for septic arthritis, consider follow-up arthrocentesis  · Venous Doppler to rule out DVT, negative   · On Rocephin 2gm IVPB daily per ID recs, day #4  · Right knee aspirate growing Staph Aureus   · Pain control with scheduled Motrin, Percocet and Morphine PRN  · Rest, elevation  · Neurovascular checks        Hyponatremia   Assessment & Plan    Mild  Sodium 134 -> 132 -> 133 -> 130, possible medication side effect from Effexor  · Check urine sodium and osmolality as well as serum osmolality  · Continue IV fluids, normal saline solution   · Repeat BMP in am        Thrombocytosis (HCC)   Assessment & Plan    Mild  Resolved  Likely reactive to sepsis  · Continue Heparin subcu for DVT prophylaxis  Depression   Assessment & Plan    Continue Effexor        Hypertension   Assessment & Plan    Continue Enalapril  Pain control  Monitor  Borderline diabetes   Assessment & Plan    Check A1c, 7 0  · Enforce TLCs  · Outpatient F/U        Hyperlipidemia   Assessment & Plan    Continue statin            VTE Pharmacologic Prophylaxis:   Pharmacologic: Heparin  Mechanical VTE Prophylaxis in Place: Yes    Patient Centered Rounds: I have performed bedside rounds with nursing staff today      Discussions with Specialists or Other Care Team Provider: Nursing, CM, orthopedic and ID notes appreciated     Education and Discussions with Family / Patient: I have answered all questions to the best of my ability  Time Spent for Care: 20 minutes  More than 50% of total time spent on counseling and coordination of care as described above  Current Length of Stay: 4 day(s)    Current Patient Status: Inpatient   Certification Statement: The patient will continue to require additional inpatient hospital stay due to sepsis 2/2 right knee cellulitis with bursitis and septic arthritis requiring OR debridement and washout with another washout scheduled for Monday     Discharge Plan: Patient is not medically stable for discharge today, likely in 72 hours pending progress  Code Status: Level 1 - Full Code      Subjective:   Observed patient resting in bed  Right lower extremity leg brace intact  Right knee wound VAC intact and functioning well  Patient is tolerating wound VAC and leg brace without pain or discomfort  He has sensation in his feet  He would like to stand and pivot on his left leg in order to get out of bed  He is looking forward to starting physical therapy  His pain is currently controlled  His appetite is improving  He is without other complaints  Objective:     Vitals:   Temp (24hrs), Av 9 °F (36 6 °C), Min:97 °F (36 1 °C), Max:98 5 °F (36 9 °C)    HR:  [] 102  Resp:  [16-20] 20  BP: (109-188)/() 124/67  SpO2:  [95 %-97 %] 97 %  Body mass index is 28 06 kg/m²  Input and Output Summary (last 24 hours): Intake/Output Summary (Last 24 hours) at 18 1459  Last data filed at 18 1100   Gross per 24 hour   Intake              200 ml   Output              480 ml   Net             -280 ml       Physical Exam:     Physical Exam   Constitutional: He is oriented to person, place, and time  He appears well-developed  No distress  HENT:   Head: Normocephalic  Neck: Normal range of motion     Cardiovascular: Normal rate, regular rhythm and intact distal pulses  Pulmonary/Chest: Effort normal and breath sounds normal  No respiratory distress  He has no wheezes  He has no rhonchi  He has no rales  Abdominal: Soft  Bowel sounds are normal  He exhibits no distension  There is no tenderness  Musculoskeletal: Normal range of motion  He exhibits edema (+1 RLE) and tenderness (Right knee)  Right knee with improvement in erythema and swelling  Wound VAC intact and functioning well at 125 mmHg continuous suction with no seal leak  Low to moderate amount of bloody drainage in wound VAC chamber  RLE leg brace in place  Neurological: He is alert and oriented to person, place, and time  Skin: Skin is warm and dry  He is not diaphoretic  There is erythema (RLE)  Improvement in RLE erythema      Psychiatric: He has a normal mood and affect  His behavior is normal  Thought content normal    Nursing note and vitals reviewed  Additional Data:     Labs:      Results from last 7 days  Lab Units 06/09/18 0617 06/08/18  0623   WBC Thousand/uL 9 53 10 19*   HEMOGLOBIN g/dL 10 9* 11 2*   HEMATOCRIT % 33 5* 35 0*   PLATELETS Thousands/uL 332 307   NEUTROS PCT %  --  68   LYMPHS PCT %  --  20   MONOS PCT %  --  10   EOS PCT %  --  1       Results from last 7 days  Lab Units 06/09/18 0617 06/05/18 2128   SODIUM mmol/L 130*  < > 134*   POTASSIUM mmol/L 4 0  < > 3 8   CHLORIDE mmol/L 95*  < > 97*   CO2 mmol/L 27  < > 28   BUN mg/dL 8  < > 19   CREATININE mg/dL 0 71  < > 0 77   CALCIUM mg/dL 8 4  < > 9 4   TOTAL PROTEIN g/dL  --   --  8 2   BILIRUBIN TOTAL mg/dL  --   --  0 30   ALK PHOS U/L  --   --  103   ALT U/L  --   --  74   AST U/L  --   --  35   GLUCOSE RANDOM mg/dL 144*  < > 95   < > = values in this interval not displayed  Results from last 7 days  Lab Units 06/05/18 2128   INR  0 92       * I Have Reviewed All Lab Data Listed Above  * Additional Pertinent Lab Tests Reviewed:  All Labs Within Last 24 Hours Reviewed    Imaging:    Imaging Reports Reviewed Today Include: Right knee xray  Imaging Personally Reviewed by Myself Includes: None    Recent Cultures (last 7 days):       Results from last 7 days  Lab Units 06/06/18 1924 06/06/18 1923 06/05/18 2128   BLOOD CULTURE   --   --  No Growth at 72 hrs  No Growth at 72 hrs  GRAM STAIN RESULT  4+ Polys  4+ Gram positive cocci in pairs, chains and clusters 2+ Polys  No bacteria seen  --    BODY FLUID CULTURE, STERILE  4+ Growth of Staphylococcus aureus* Few Colonies of Staphylococcus aureus*  --        Last 24 Hours Medication List:     Current Facility-Administered Medications:  acetaminophen 650 mg Oral Q6H PRN SANCHO Dudley   ALPRAZolam 0 5 mg Oral BID PRN SANCHO Melvin   aluminum-magnesium hydroxide-simethicone 30 mL Oral Q6H PRN Meliijosé Alberto, SANCHO   cefTRIAXone 2,000 mg Intravenous Q24H Cher Costello, SANCHO   docusate sodium 100 mg Oral BID Cher Costello, SANCHO   enalapril 15 mg Oral Daily CuiSANCHO Hickman   heparin (porcine) 5,000 Units Subcutaneous Q12H [address] SANCHO Dudley   ibuprofen 400 mg Oral [facility] SANCHO Dudley   insulin lispro 1-5 Units Subcutaneous TID AC Papo Alberto, SANCHO   insulin lispro 1-5 Units Subcutaneous HS Cuijosé Alberto, SANCHO   morphine injection 2 mg Intravenous Q4H PRN SANCHO Dudley   ondansetron 4 mg Intravenous Q6H PRN Papo Alberto, SANCHO   oxyCODONE-acetaminophen 1 tablet Oral Q6H PRN Cuijosé Tobinrijossie, SANCHO   polyethylene glycol 17 g Oral Daily PRN SANCHO Dudley   pravastatin 80 mg Oral Daily With Dinner Cuijosé Alberto, SANCHO   saccharomyces boulardii 250 mg Oral BID Cuiyimyke Tobinrijossie, SANCHO   venlafaxine 75 mg Oral BID With Meals Cuiyimyke Yurijossie, SANCHO   zolpidem 10 mg Oral HS PRN SANCHO Hong        Today, Patient Was Seen By: SANCHO Dudley    ** Please Note: Dictation voice to text software may have been used in the creation of this document   **

## 2018-06-10 PROBLEM — M00.9 PYOGENIC ARTHRITIS OF RIGHT KNEE JOINT (HCC): Status: ACTIVE | Noted: 2018-06-10

## 2018-06-10 PROBLEM — D75.839 THROMBOCYTOSIS: Status: RESOLVED | Noted: 2018-06-06 | Resolved: 2018-06-10

## 2018-06-10 PROBLEM — M00.061 STAPHYLOCOCCAL ARTHRITIS OF RIGHT KNEE (HCC): Status: ACTIVE | Noted: 2018-06-10

## 2018-06-10 LAB
ANION GAP SERPL CALCULATED.3IONS-SCNC: 8 MMOL/L (ref 4–13)
B BURGDOR DNA SPEC QL NAA+PROBE: NEGATIVE
BUN SERPL-MCNC: 10 MG/DL (ref 5–25)
CALCIUM SERPL-MCNC: 8.9 MG/DL (ref 8.3–10.1)
CHLORIDE SERPL-SCNC: 97 MMOL/L (ref 100–108)
CO2 SERPL-SCNC: 27 MMOL/L (ref 21–32)
CREAT SERPL-MCNC: 0.85 MG/DL (ref 0.6–1.3)
ERYTHROCYTE [DISTWIDTH] IN BLOOD BY AUTOMATED COUNT: 13 % (ref 11.6–15.1)
GFR SERPL CREATININE-BSD FRML MDRD: 94 ML/MIN/1.73SQ M
GLUCOSE SERPL-MCNC: 107 MG/DL (ref 65–140)
GLUCOSE SERPL-MCNC: 109 MG/DL (ref 65–140)
GLUCOSE SERPL-MCNC: 119 MG/DL (ref 65–140)
GLUCOSE SERPL-MCNC: 138 MG/DL (ref 65–140)
GLUCOSE SERPL-MCNC: 198 MG/DL (ref 65–140)
HCT VFR BLD AUTO: 34.5 % (ref 36.5–49.3)
HGB BLD-MCNC: 11.3 G/DL (ref 12–17)
MCH RBC QN AUTO: 31.1 PG (ref 26.8–34.3)
MCHC RBC AUTO-ENTMCNC: 32.8 G/DL (ref 31.4–37.4)
MCV RBC AUTO: 95 FL (ref 82–98)
PLATELET # BLD AUTO: 344 THOUSANDS/UL (ref 149–390)
PMV BLD AUTO: 8.8 FL (ref 8.9–12.7)
POTASSIUM SERPL-SCNC: 3.7 MMOL/L (ref 3.5–5.3)
RBC # BLD AUTO: 3.63 MILLION/UL (ref 3.88–5.62)
SODIUM SERPL-SCNC: 132 MMOL/L (ref 136–145)
TSH SERPL DL<=0.05 MIU/L-ACNC: 1.08 UIU/ML (ref 0.36–3.74)
URATE SERPL-MCNC: 4.2 MG/DL (ref 4.2–8)
WBC # BLD AUTO: 12.44 THOUSAND/UL (ref 4.31–10.16)

## 2018-06-10 PROCEDURE — 82948 REAGENT STRIP/BLOOD GLUCOSE: CPT

## 2018-06-10 PROCEDURE — 84550 ASSAY OF BLOOD/URIC ACID: CPT | Performed by: NURSE PRACTITIONER

## 2018-06-10 PROCEDURE — 97116 GAIT TRAINING THERAPY: CPT

## 2018-06-10 PROCEDURE — G8979 MOBILITY GOAL STATUS: HCPCS

## 2018-06-10 PROCEDURE — 85027 COMPLETE CBC AUTOMATED: CPT | Performed by: NURSE PRACTITIONER

## 2018-06-10 PROCEDURE — G8978 MOBILITY CURRENT STATUS: HCPCS

## 2018-06-10 PROCEDURE — 97162 PT EVAL MOD COMPLEX 30 MIN: CPT

## 2018-06-10 PROCEDURE — 99232 SBSQ HOSP IP/OBS MODERATE 35: CPT | Performed by: NURSE PRACTITIONER

## 2018-06-10 PROCEDURE — 80048 BASIC METABOLIC PNL TOTAL CA: CPT | Performed by: NURSE PRACTITIONER

## 2018-06-10 PROCEDURE — 99024 POSTOP FOLLOW-UP VISIT: CPT | Performed by: PHYSICIAN ASSISTANT

## 2018-06-10 PROCEDURE — 84443 ASSAY THYROID STIM HORMONE: CPT | Performed by: NURSE PRACTITIONER

## 2018-06-10 RX ORDER — ACETAMINOPHEN 325 MG/1
650 TABLET ORAL EVERY 8 HOURS SCHEDULED
Status: DISCONTINUED | OUTPATIENT
Start: 2018-06-10 | End: 2018-06-16 | Stop reason: HOSPADM

## 2018-06-10 RX ORDER — FAMOTIDINE 20 MG/1
20 TABLET, FILM COATED ORAL DAILY
Status: DISCONTINUED | OUTPATIENT
Start: 2018-06-10 | End: 2018-06-16 | Stop reason: HOSPADM

## 2018-06-10 RX ORDER — IBUPROFEN 400 MG/1
400 TABLET ORAL EVERY 12 HOURS
Status: DISCONTINUED | OUTPATIENT
Start: 2018-06-10 | End: 2018-06-16 | Stop reason: HOSPADM

## 2018-06-10 RX ADMIN — PRAVASTATIN SODIUM 80 MG: 80 TABLET ORAL at 17:56

## 2018-06-10 RX ADMIN — DOCUSATE SODIUM 100 MG: 100 CAPSULE, LIQUID FILLED ORAL at 21:41

## 2018-06-10 RX ADMIN — IBUPROFEN 400 MG: 400 TABLET ORAL at 21:41

## 2018-06-10 RX ADMIN — OXYCODONE HYDROCHLORIDE AND ACETAMINOPHEN 1 TABLET: 5; 325 TABLET ORAL at 17:56

## 2018-06-10 RX ADMIN — HEPARIN SODIUM 5000 UNITS: 5000 INJECTION, SOLUTION INTRAVENOUS; SUBCUTANEOUS at 21:41

## 2018-06-10 RX ADMIN — FAMOTIDINE 20 MG: 20 TABLET ORAL at 11:11

## 2018-06-10 RX ADMIN — Medication 250 MG: at 08:36

## 2018-06-10 RX ADMIN — HEPARIN SODIUM 5000 UNITS: 5000 INJECTION, SOLUTION INTRAVENOUS; SUBCUTANEOUS at 08:36

## 2018-06-10 RX ADMIN — VENLAFAXINE 75 MG: 37.5 TABLET ORAL at 17:57

## 2018-06-10 RX ADMIN — IBUPROFEN 400 MG: 400 TABLET ORAL at 06:20

## 2018-06-10 RX ADMIN — OXYCODONE HYDROCHLORIDE AND ACETAMINOPHEN 1 TABLET: 5; 325 TABLET ORAL at 00:16

## 2018-06-10 RX ADMIN — Medication 250 MG: at 17:57

## 2018-06-10 RX ADMIN — VENLAFAXINE 75 MG: 37.5 TABLET ORAL at 08:36

## 2018-06-10 RX ADMIN — ACETAMINOPHEN 650 MG: 325 TABLET ORAL at 21:41

## 2018-06-10 RX ADMIN — OXYCODONE HYDROCHLORIDE AND ACETAMINOPHEN 1 TABLET: 5; 325 TABLET ORAL at 08:37

## 2018-06-10 RX ADMIN — ACETAMINOPHEN 650 MG: 325 TABLET ORAL at 14:33

## 2018-06-10 RX ADMIN — CEFTRIAXONE 2000 MG: 2 INJECTION, SOLUTION INTRAVENOUS at 14:33

## 2018-06-10 RX ADMIN — MORPHINE SULFATE 2 MG: 2 INJECTION, SOLUTION INTRAMUSCULAR; INTRAVENOUS at 13:05

## 2018-06-10 NOTE — PROGRESS NOTES
Progress Note - Orthopedics   Yara Srinivasan 66 y o  male MRN: [id number]  Unit/Bed#: [address] [place] [address] Encounter: [id number]        Subjective: Status post right knee I&D of infected bursa and joint 2 days ago  Patient feels well and notes minimal pain about the knee  He notes some itching about the lateral knee  He has been in knee immobilizer with vac sponge in place  He notes good sensation of the leg and silvestre any calf pain/cramping  Patient has been afebrile and has WBC have been normal     Vitals: Blood pressure 125/75, pulse 105, temperature 98 2 °F (36 8 °C), temperature source Oral, resp  rate 18, height 5' 9" (1 753 m), weight 86 2 kg (190 lb 0 2 oz), SpO2 98 %  ,Body mass index is 28 06 kg/m²  Intake/Output Summary (Last 24 hours) at 06/10/18 0850  Last data filed at 06/09/18 1604   Gross per 24 hour   Intake                0 ml   Output              650 ml   Net             -650 ml       Invasive Devices     Peripheral Intravenous Line            Long-Dwell Peripheral IV (Midline) 36/90/24 Left Basilic 2 days          Drain            Negative Pressure Wound Therapy (V A C ) Knee Right 1 day                  Ortho Exam: Right knee swelling and erythema but improved  The VAC sponge is working nicely and is intact  Compartments soft  No calf tenderness  Moves toes/ankle freely  2+ DP pulse  Sensation intact lateral femoral cutaneous, saphenous, sural, deep/superficial peroneal nerve distributions  Lab, Imaging and other studies: I have personally reviewed pertinent lab results    CBC:   Lab Results   Component Value Date    WBC 9 53 06/09/2018    HGB 10 9 (L) 06/09/2018    HCT 33 5 (L) 06/09/2018    MCV 95 06/09/2018     06/09/2018    MCH 31 1 06/09/2018    MCHC 32 5 06/09/2018    RDW 13 0 06/09/2018    MPV 8 8 (L) 06/09/2018    NRBC 0 06/08/2018     CMP: Lab Results   Component Value Date     (L) 06/09/2018    CL 95 (L) 06/09/2018    CO2 27 06/09/2018    ANIONGAP 8 06/09/2018    BUN 8 06/09/2018    CREATININE 0 71 06/09/2018    GLUCOSE 144 (H) 06/09/2018    CALCIUM 8 4 06/09/2018    AST 35 06/05/2018    ALT 74 06/05/2018    ALKPHOS 103 06/05/2018    PROT 8 2 06/05/2018    BILITOT 0 30 06/05/2018    EGFR 101 06/09/2018     PT/INR:   Lab Results   Component Value Date    INR 0 92 06/05/2018           Plan:  Plan to do repeat I&D tomorrow with possible VAC change versus wound closure  NPO after midnight

## 2018-06-10 NOTE — PHYSICAL THERAPY NOTE
PT EVALUATION       06/10/18 0941   Note Type   Note type Eval/Treat   Pain Assessment   Pain Assessment 0-10   Pain Score 3   Pain Type Surgical pain   Pain Location Knee   Pain Orientation Right   Home Living   Type of Home House   Home Layout One level  (1 BROOKE)   Home Equipment (none)   Prior Function   Level of [place] Independent with ADLs and functional mobility   Lives With Alone   ADL Assistance Independent   IADLs Independent   Restrictions/Precautions   Other Precautions Hard of hearing  (wound vac, knee immobilizer)   General   Additional Pertinent History Pt admitted with effusion of R knee now s/p R knee arthroscopy with debridement of infected bursa now with immobilizer and wound vac   Cognition   Overall Cognitive Status WFL   Arousal/Participation Cooperative   Orientation Level Oriented X4   RLE Assessment   RLE Assessment (hip/ankle ROM WNL, MMT hip 3-/5, ankle 5/5)   LLE Assessment   LLE Assessment WNL   Bed Mobility   Supine to Sit 5  Supervision   Transfers   Sit to Stand 5  Supervision   Stand to Sit 5  Supervision   Stand pivot 5  Supervision   Ambulation/Elevation   Gait pattern (antalgic R)   Gait Assistance 5  Supervision   Assistive Device Rolling walker   Distance 50 feet   Balance   Static Sitting Good   Dynamic Sitting Good   Static Standing Good   Dynamic Standing Fair   Assessment   Prognosis Good   Problem List Decreased strength;Decreased range of motion;Decreased mobility;Pain;Orthopedic restrictions   Assessment Patient seen for Physical Therapy evaluation  Patient admitted with Sepsis (HCC)/R knee infection s/p washout/debridement now with wound vac  Comorbidities affecting patient's physical performance include: htn  Personal factors affecting patient at time of initial evaluation include: stairs to enter home, depression and inability to perform ADLS   Prior to admission, patient was independent with functional mobility without assistive device and independent with ADLS   Please find objective findings from Physical Therapy assessment regarding body systems outlined above with impairments and limitations including decreased ROM, gait deviations, pain, decreased activity tolerance, decreased functional mobility tolerance, fall risk and orthopedic restrictions  The Barthel Index was used as a functional outcome tool presenting with a score of 70 today indicating moderate limitations of functional mobility and ADLS  Patient's clinical presentation is currently evolving as seen in patient's presentation of changing level of pain, increased fall risk, new onset of impairment of functional mobility and new onset of weakness  Pt would benefit from continued Physical Therapy treatment to address deficits as defined above and maximize level of functional mobility  As demonstrated by objective findings, the assigned level of complexity for this evaluation is moderate  Goals   Patient Goals "be independent"   STG Expiration Date 06/17/18   Short Term Goal #1 independent transfers bed to chair, independent ambulation with least restictive device 200 feet, independent up and down 1 step so pt can enter his home   LTG Expiration Date 06/24/18   Long Term Goal #1 independent ambulation outdoor surfaces with/without device 500 feet   Treatment Day 1   Plan   Treatment/Interventions LE strengthening/ROM; Elevations; Therapeutic exercise;Patient/family training;Equipment eval/education;Gait training;Bed mobility   PT Frequency Once a day   Recommendation   Recommendation (home)   Equipment Recommended (walker vs crutches vs cane)   Barthel Index   Feeding 10   Bathing 0   Grooming Score 5   Dressing Score 5   Bladder Score 10   Bowels Score 10   Toilet Use Score 5   Transfers (Bed/Chair) Score 10   Mobility (Level Surface) Score 10   Stairs Score 5   Barthel Index Score 79   Licensure   [place] License Number  Peggi Rolling PT [id number]       Time DT:3003  Time WSV:[id number]  Total Time: 11      S:  "I just want to know how this happened" "the back of my knee is so itchy"   O:  Gait training with rolling walker with cues for consistent silvia and equal step length  Pt ambulated 100 feet with walker with supervision  [place] Raisin left in room for pt to use with staff  Pt educated about his choice of assistive device including cane/1-2 crutches/walker as no weight bearing restrictions noted  Pt educated that he may need to carry the wound vac if still implanted upon DC  Pt c/o knee itchiness so 2 abd pads placed behind knee between his skin and the immobilizer  RN aware  A:  Pt tolerated initial activity well  Anticipate pt will be able to go home alone upon DC   P:  Continue PT, evaluate assistive device need      Sai Cali, PT [id number]

## 2018-06-10 NOTE — PROGRESS NOTES
Progress Note - Robbie Blade 1955, 66 y o  male MRN: [id number]    Unit/Bed#: [address] [place] [address] Encounter: [id number]    Primary Care Provider: Rona Mera MD   Date and time admitted to hospital: 6/5/2018  7:38 PM        * Sepsis [facility])   Assessment & Plan    As evidenced by WBC 14 22, , due to right knee bursitis and septic arthritis with right lower extremity cellulitis   Afebrile, no leukocytosis today  Lyme negative  Right knee aspirate growing MSSA  · ID and orthopedics following, appreciate recommendations  · S/p aspiration x2 of right knee joint on 6/6/18   · POD #2 S/p right knee surgical irrigation debridement of bursa and infected joint on 6/8/18  · Plan for repeat surgical irrigation and debridement on 6/11/18   · Per ID, continue on Rocephin 2gm IVPB daily, day #5  · Monitor for worsening signs of infection  · Repeat CBC in am   · Probiotic while on ABX  · Will need long term ABX for septic arthritis, Rocephin daily will be conducive to infusions         Staphylococcal arthritis of right knee [facility])   Assessment & Plan    See please plan above  Wound VAC therapy to right knee  Scheduled for repeat surgical irrigation tomorrow  Continue Rocephin 2gm IV daily, day #5    Will need outpatient Rocephin infusions in [facility] on discharge   PT recommending home on discharge         Infrapatellar bursitis of right knee   Assessment & Plan    See please plan above  Wound VAC therapy to right knee     Scheduled for repeat surgical irrigation tomorrow  PT recommending home on discharge         Cellulitis of right lower extremity   Assessment & Plan    With prepatellar bursitis and septic arthritis   · Right knee x-ray, small suprapatellar joint effusion, prepatellar bursitis, and more focal soft tissue swelling overlying the anterior tibial tubercle, suspicious for superimposed infrapatellar bursitis   If there is clinical concern for septic arthritis, consider follow-up arthrocentesis  · Venous Doppler to rule out DVT, negative   · Wound culture, MSSA  · On Rocephin 2gm IVPB daily per ID recs, day #5  · Pain control with scheduled Motrin, Percocet and Morphine PRN  · Rest, elevation  · Neurovascular checks        Hyponatremia   Assessment & Plan    Worsening during admission  Sodium 134 -> 132 -> 133 -> 130, possible SIADH from uncontrolled pain  Less likely due to Effexor as patient's Na has worsened  Serum osmo low, however, urine osmo wnl  No improvement on IVF  · Stop IVF  · Initiate fluid restriction, 1200 mL/day   · Check TSH, uric acid   · Pain control    · Repeat BMP in am         Thrombocytosis (HCC)resolved as of 6/10/2018   Assessment & Plan    Mild  Resolved  Likely reactive to sepsis  · Continue Heparin subcu for DVT prophylaxis  Depression   Assessment & Plan    Continue Effexor        Hypertension   Assessment & Plan    Continue Enalapril  Pain control  Monitor  Borderline diabetes   Assessment & Plan    Check A1c, 7 0  · Enforce TLCs  · Outpatient F/U        Hyperlipidemia   Assessment & Plan    Continue statin            VTE Pharmacologic Prophylaxis:   Pharmacologic: Heparin  Mechanical VTE Prophylaxis in Place: Yes    Patient Centered Rounds: I have performed bedside rounds with nursing staff today  Discussions with Specialists or Other Care Team Provider: Nursing, CM, my attending, ID notes appreciated     Education and Discussions with Family / Patient: I have answered all questions to the best of my ability  Time Spent for Care: 20 minutes  More than 50% of total time spent on counseling and coordination of care as described above      Current Length of Stay: 5 day(s)    Current Patient Status: Inpatient   Certification Statement: The patient will continue to require additional inpatient hospital stay due to sepsis 2/2 right knee cellulitis with bursitis and septic arthritis requiring OR debridement and washout with another washout scheduled for Monday     Discharge Plan: Patient is not medically stable for discharge today, likely in 48 hours pending progress  Code Status: Level 1 - Full Code      Subjective:   Observed patient ambulating hallways with PT  Now OOB in chair  States he feels great being able to get out of bed  Ambulated with and without use of walker  Feels well enough to go home with the wound VAC vs STR  Pain well controlled on current regimen  Denies diarrhea, HA, chest pain, or SOB  Patient educated on the need for a fluid restriction  Objective:     Vitals:   Temp (24hrs), Av 5 °F (36 9 °C), Min:98 2 °F (36 8 °C), Max:98 7 °F (37 1 °C)    HR:  [100-105] 105  Resp:  [18-20] 18  BP: (124-127)/(67-75) 125/75  SpO2:  [95 %-98 %] 98 %  Body mass index is 28 06 kg/m²  Input and Output Summary (last 24 hours): Intake/Output Summary (Last 24 hours) at 06/10/18 1052  Last data filed at 18 1604   Gross per 24 hour   Intake                0 ml   Output              650 ml   Net             -650 ml       Physical Exam:     Physical Exam   Constitutional: He is oriented to person, place, and time  He appears well-developed  No distress  HENT:   Head: Normocephalic  Neck: Normal range of motion  Cardiovascular: Normal rate, regular rhythm and intact distal pulses  Pulmonary/Chest: Effort normal and breath sounds normal  No respiratory distress  He has no wheezes  He has no rhonchi  He has no rales  Abdominal: Soft  Bowel sounds are normal  He exhibits no distension  There is no tenderness  Musculoskeletal: He exhibits edema (trace RLE) and tenderness (Right knee)  Neurological: He is alert and oriented to person, place, and time  Skin: Skin is warm and dry  He is not diaphoretic  There is erythema (right knee/ RLE, improving from previous days)  Right knee with wound VAC therapy, black foam, functioning well at 125 mmHg continuous suction without seal leak   Moderate amount of sanguineous drainage in VAC chamber  Psychiatric: He has a normal mood and affect  Judgment normal    Nursing note and vitals reviewed  Additional Data:     Labs:      Results from last 7 days  Lab Units 06/10/18  1025  06/08/18  0623   WBC Thousand/uL 12 44*  < > 10 19*   HEMOGLOBIN g/dL 11 3*  < > 11 2*   HEMATOCRIT % 34 5*  < > 35 0*   PLATELETS Thousands/uL 344  < > 307   NEUTROS PCT %  --   --  68   LYMPHS PCT %  --   --  20   MONOS PCT %  --   --  10   EOS PCT %  --   --  1   < > = values in this interval not displayed  Results from last 7 days  Lab Units 06/09/18  0617  06/05/18 2128   SODIUM mmol/L 130*  < > 134*   POTASSIUM mmol/L 4 0  < > 3 8   CHLORIDE mmol/L 95*  < > 97*   CO2 mmol/L 27  < > 28   BUN mg/dL 8  < > 19   CREATININE mg/dL 0 71  < > 0 77   CALCIUM mg/dL 8 4  < > 9 4   TOTAL PROTEIN g/dL  --   --  8 2   BILIRUBIN TOTAL mg/dL  --   --  0 30   ALK PHOS U/L  --   --  103   ALT U/L  --   --  74   AST U/L  --   --  35   GLUCOSE RANDOM mg/dL 144*  < > 95   < > = values in this interval not displayed  Results from last 7 days  Lab Units 06/05/18 2128   INR  0 92       * I Have Reviewed All Lab Data Listed Above  * Additional Pertinent Lab Tests Reviewed: All Labs Within Last 24 Hours Reviewed    Imaging:    Imaging Reports Reviewed Today Include: Right knee xray  Imaging Personally Reviewed by Myself Includes: None    Recent Cultures (last 7 days):       Results from last 7 days  Lab Units 06/08/18 2050 06/08/18 2045 06/08/18 2040 06/08/18 2028 06/06/18 1924 06/06/18 1923 06/05/18 2128   BLOOD CULTURE   --   --   --   --   --   --  No Growth at 72 hrs  No Growth at 72 hrs     GRAM STAIN RESULT  1+ Polys  3+ Gram positive cocci in pairs Rare Polys  1+ Gram positive cocci in pairs 1+ Polys  1+ Gram positive cocci in pairs 1+ Polys  No bacteria seen 4+ Polys  4+ Gram positive cocci in pairs, chains and clusters 2+ Polys  No bacteria seen  --    BODY FLUID CULTURE, STERILE   --   -- --   --  4+ Growth of Staphylococcus aureus* Few Colonies of Staphylococcus aureus*  --        Last 24 Hours Medication List:     Current Facility-Administered Medications:  acetaminophen 650 mg Oral Q6H PRN SANCHO Plunkett    ALPRAZolam 0 5 mg Oral BID PRN SANCHO Melvin    aluminum-magnesium hydroxide-simethicone 30 mL Oral Q6H PRN SANCHO Melvin    cefTRIAXone 2,000 mg Intravenous Q24H SANCHO Plunkett Last Rate: 2,000 mg (06/09/18 1622)   docusate sodium 100 mg Oral BID SANCHO Plunkett    enalapril 15 mg Oral Daily SANCHO Melvin    famotidine 20 mg Oral Daily SANCHO Plunkett    heparin (porcine) 5,000 Units Subcutaneous Q12H [facility] SANCHO Plunkett    ibuprofen 400 mg Oral [facility] SANCHO Plunkett    insulin lispro 1-5 Units Subcutaneous TID AC SANCHO Melvin    insulin lispro 1-5 Units Subcutaneous HS SANCHO Melvin    morphine injection 2 mg Intravenous Q4H PRN SANCHO Plunkett    ondansetron 4 mg Intravenous Q6H PRN SANCHO Melvin    oxyCODONE-acetaminophen 1 tablet Oral Q6H PRN SANCHO Melvin    polyethylene glycol 17 g Oral Daily PRN SANCHO Plunkett    pravastatin 80 mg Oral Daily With Dinner SANCHO Melvin    saccharomyces boulardii 250 mg Oral BID Meliijosé Alberto, SANCHO    venlafaxine 75 mg Oral BID With Meals SANCHO Melvin    zolpidem 10 mg Oral HS PRN SANCHO Gutierrez         Today, Patient Was Seen By: SANCHO Plunkett    ** Please Note: Dictation voice to text software may have been used in the creation of this document   **

## 2018-06-10 NOTE — PROGRESS NOTES
Progress Note - Infectious Disease   Janett Srinivasan 66 y o  male MRN: [id number]  Unit/Bed#: [address] [place] [address] Encounter: [id number]      Subjective/Objective   Subjective:   Complains of back pain as he has been bedridden  The pain in his right leg continues  Denies any headache, chest pain, shortness of breath        Meds/Allergies     Current Facility-Administered Medications:     acetaminophen (TYLENOL) tablet 650 mg, 650 mg, Oral, Q6H PRN, [place] SANCHO Son, 650 mg at 06/07/18 0041    ALPRAZolam Kandee Sharma) tablet 0 5 mg, 0 5 mg, Oral, BID PRN, SANCHO Melvin, 0 5 mg at 06/08/18 1447    aluminum-magnesium hydroxide-simethicone (MYLANTA) 200-200-20 mg/5 mL oral suspension 30 mL, 30 mL, Oral, Q6H PRN, SANCHO Melvin    cefTRIAXone (ROCEPHIN) IVPB (premix) 2,000 mg, 2,000 mg, Intravenous, Q24H, SANCHO Gamez, Last Rate: 100 mL/hr at 06/09/18 1622, 2,000 mg at 06/09/18 1622    docusate sodium (COLACE) capsule 100 mg, 100 mg, Oral, BID, SANCHO Gamez, 100 mg at 06/09/18 0941    enalapril (VASOTEC) tablet 15 mg, 15 mg, Oral, Daily, SANCHO Melvin, 15 mg at 06/08/18 0943    famotidine (PEPCID) tablet 20 mg, 20 mg, Oral, Daily, SANCHO Gamez, 20 mg at 06/10/18 1111    heparin (porcine) subcutaneous injection 5,000 Units, 5,000 Units, Subcutaneous, Q12H [facility], 5,000 Units at 06/10/18 0836 **AND** [CANCELED] Platelet count, , , Once, SANCHO Melvin    ibuprofen (MOTRIN) tablet 400 mg, 400 mg, Oral, Q12H, ASNCHO Gamez    insulin lispro (HumaLOG) 100 units/mL subcutaneous injection 1-5 Units, 1-5 Units, Subcutaneous, TID AC, 1 Units at 06/09/18 0938 **AND** Fingerstick Glucose (POCT), , , TID AC, SANCHO Melvin    insulin lispro (HumaLOG) 100 units/mL subcutaneous injection 1-5 Units, 1-5 Units, Subcutaneous, HS, SANCHO Melvin    morphine injection 2 mg, 2 mg, Intravenous, Q4H PRN, [place] Sox, SANCHO, 2 mg at 06/10/18 9129   ondansetron (ZOFRAN) injection 4 mg, 4 mg, Intravenous, Q6H PRN, Cuiyin Yurik, CRNP    oxyCODONE-acetaminophen (PERCOCET) 5-325 mg per tablet 1 tablet, 1 tablet, Oral, Q6H PRN, Cuiyin Yurik, CRNP, 1 tablet at 06/10/18 0837    polyethylene glycol (MIRALAX) packet 17 g, 17 g, Oral, Daily PRN, Michael Sit, CRNP    pravastatin (PRAVACHOL) tablet 80 mg, 80 mg, Oral, Daily With Dinner, Cuiyin Yurik, CRNP, 80 mg at 06/09/18 1736    saccharomyces boulardii (FLORASTOR) capsule 250 mg, 250 mg, Oral, BID, Cuiyin Yurik, CRNP, 250 mg at 06/10/18 0836    venlafaxine (EFFEXOR) tablet 75 mg, 75 mg, Oral, BID With Meals, Cuicalen Cristarik, CRNP, 75 mg at 06/10/18 0836    zolpidem (AMBIEN) tablet 10 mg, 10 mg, Oral, HS PRN, Cuiyin Yurik, CRNP, 10 mg at 06/09/18 2222  Allergies   Allergen Reactions    Iodine Anaphylaxis    Shellfish-Derived Products      all current active meds have been reviewed    discontinued meds:   Medications Discontinued During This Encounter   Medication Reason    acetaminophen (TYLENOL) tablet 650 mg     heparin (porcine) subcutaneous injection 5,000 Units     heparin (porcine) subcutaneous injection 5,000 Units     vancomycin (VANCOCIN) 1,250 mg in sodium chloride 0 9 % 250 mL IVPB     sodium chloride 0 9 % infusion     sodium chloride 0 9 % irrigation solution Patient Discharge    fentaNYL (SUBLIMAZE) injection 50 mcg Patient Transfer    HYDROmorphone (DILAUDID) injection 0 5 mg Patient Transfer    ondansetron (ZOFRAN) injection 4 mg Patient Transfer    promethazine (PHENERGAN) injection 12 5 mg Patient Transfer    meperidine (DEMEROL) injection 12 5 mg Patient Transfer    oxyCODONE-acetaminophen (PERCOCET) 5-325 mg per tablet 1 tablet Patient Transfer    cefTRIAXone (ROCEPHIN) IVPB (premix) 2,000 mg     magnesium oxide (MAG-OX) tablet 400 mg     sodium chloride 0 9 % infusion     ibuprofen (MOTRIN) tablet 400 mg        Physical Exam: Physical Exam    HR:  [100-105] 105  Resp: [18-20] 18  BP: (124-127)/(67-75) 125/75  SpO2:  [95 %-98 %] 98 %  Body mass index is 28 06 kg/m²  Weight (last 2 days)     None        Temp (24hrs), Av 5 °F (36 9 °C), Min:98 2 °F (36 8 °C), Max:98 7 °F (37 1 °C)  Current: Temperature: 98 2 °F (36 8 °C)AGE@    Intake/Output Summary (Last 24 hours) at 06/10/18 1335  Last data filed at 18 1604   Gross per 24 hour   Intake                0 ml   Output              200 ml   Net             -200 ml    He is alert and talkative  He has been afebrile but tachycardia persists  The lungs are clear  Heart sounds are regular and no murmur was appreciated  Abdomen is soft and nontender  The right leg is held in a brace, wound VAC has drained moderate amount of bloody fluid  There is moderate tenderness over the knee  The foot is warm            Invasive Devices     Peripheral Intravenous Line            Long-Dwell Peripheral IV (Midline) // Left Basilic 3 days          Drain            Negative Pressure Wound Therapy (V A C ) Knee Right 1 day                            Lab, Imaging and other studies:    Recent Results (from the past 96 hour(s))   Lyme disease, PCR    Collection Time: 18  7:21 PM   Result Value Ref Range    Lyme Disease(B burgdorferi)PCR Negative Negative   Synovial fluid white cell count w/ diff    Collection Time: 18  7:23 PM   Result Value Ref Range    Site synovial fluid right knee     Color, Fluid Light Yellow Clear, Colorless,Yellow    Clarity, Fluid Clear Clear    WBC, Fluid 17,450 (H) 0 - 200 /ul   Synovial fluid, crystal    Collection Time: 18  7:23 PM   Result Value Ref Range    Crystals, Synovial Fluid No Crystals Seen No Crystals Seen   Body fluid culture and Gram stain    Collection Time: 18  7:23 PM   Result Value Ref Range    Body Fluid Culture, Sterile Few Colonies of Staphylococcus aureus (A)     Gram Stain Result 2+ Polys     Gram Stain Result No bacteria seen        Susceptibility Staphylococcus aureus - TRIPP     Ampicillin ($$) >8 00 Resistant ug/ml     Cefazolin ($) <=4 00 Susceptible ug/ml     Clindamycin ($) <=0 25 Susceptible ug/ml     Erythromycin ($$$$) <=0 25 Susceptible ug/ml     Gentamicin ($$) <=1 Susceptible ug/ml     Oxacillin <=0 25 Susceptible ug/ml     Tetracycline <=2 Susceptible ug/ml     Trimethoprim + Sulfamethoxazole ($$$) <=0 5/9 5 Susceptible ug/ml     Vancomycin ($) 1 00 Susceptible ug/ml   Synovial Fluid Diff    Collection Time: 06/06/18  7:23 PM   Result Value Ref Range    Total Counted 100     Neutrophil % Synovial 83 %    Lymph % Synovial 12 %    Monocyte % Synovial 5 %   Body fluid culture and Gram stain    Collection Time: 06/06/18  7:24 PM   Result Value Ref Range    Body Fluid Culture, Sterile 4+ Growth of Staphylococcus aureus (A)     Gram Stain Result 4+ Polys     Gram Stain Result       4+ Gram positive cocci in pairs, chains and clusters       Susceptibility    Staphylococcus aureus - TRIPP     Ampicillin ($$) >8 00 Resistant ug/ml     Cefazolin ($) <=4 00 Susceptible ug/ml     Clindamycin ($) <=0 25 Susceptible ug/ml     Erythromycin ($$$$) <=0 25 Susceptible ug/ml     Gentamicin ($$) <=1 Susceptible ug/ml     Oxacillin <=0 25 Susceptible ug/ml     Tetracycline <=2 Susceptible ug/ml     Trimethoprim + Sulfamethoxazole ($$$) <=0 5/9 5 Susceptible ug/ml     Vancomycin ($) 1 00 Susceptible ug/ml   Lyme Antibody Profile with reflex to WB    Collection Time: 06/06/18  8:25 PM   Result Value Ref Range    LYME AB IGG 0 67 0 00 - 0 79    LYME AB IGM 0 28 0 00 - 8 62   Basic metabolic panel    Collection Time: 06/07/18  6:30 AM   Result Value Ref Range    Sodium 132 (L) 136 - 145 mmol/L    Potassium 4 7 3 5 - 5 3 mmol/L    Chloride 100 100 - 108 mmol/L    CO2 21 21 - 32 mmol/L    Anion Gap 11 4 - 13 mmol/L    BUN 10 5 - 25 mg/dL    Creatinine 0 70 0 60 - 1 30 mg/dL    Glucose 105 65 - 140 mg/dL    Calcium 9 4 8 3 - 10 1 mg/dL    eGFR 102 ml/min/1 73sq m   CBC and differential    Collection Time: 06/07/18  6:30 AM   Result Value Ref Range    WBC 13 17 (H) 4 31 - 10 16 Thousand/uL    RBC 4 34 3 88 - 5 62 Million/uL    Hemoglobin 13 6 12 0 - 17 0 g/dL    Hematocrit 44 6 36 5 - 49 3 %     (H) 82 - 98 fL    MCH 31 3 26 8 - 34 3 pg    MCHC 30 5 (L) 31 4 - 37 4 g/dL    RDW 13 3 11 6 - 15 1 %    MPV 8 7 (L) 8 9 - 12 7 fL    Platelets 969 479 - 179 Thousands/uL    nRBC 0 /100 WBCs    Neutrophils Relative 70 43 - 75 %    Immat GRANS % 1 0 - 2 %    Lymphocytes Relative 18 14 - 44 %    Monocytes Relative 10 4 - 12 %    Eosinophils Relative 1 0 - 6 %    Basophils Relative 0 0 - 1 %    Neutrophils Absolute 9 42 (H) 1 85 - 7 62 Thousands/µL    Immature Grans Absolute 0 08 0 00 - 0 20 Thousand/uL    Lymphocytes Absolute 2 30 0 60 - 4 47 Thousands/µL    Monocytes Absolute 1 26 (H) 0 17 - 1 22 Thousand/µL    Eosinophils Absolute 0 06 0 00 - 0 61 Thousand/µL    Basophils Absolute 0 05 0 00 - 0 10 Thousands/µL   Procalcitonin    Collection Time: 06/07/18  6:30 AM   Result Value Ref Range    Procalcitonin 0 06 <=0 25 ng/ml   Fingerstick Glucose (POCT)    Collection Time: 06/07/18  7:25 AM   Result Value Ref Range    POC Glucose 107 65 - 140 mg/dl   Fingerstick Glucose (POCT)    Collection Time: 06/07/18 11:35 AM   Result Value Ref Range    POC Glucose 89 65 - 140 mg/dl   Fingerstick Glucose (POCT)    Collection Time: 06/07/18  4:35 PM   Result Value Ref Range    POC Glucose 137 65 - 140 mg/dl   Fingerstick Glucose (POCT)    Collection Time: 06/07/18  9:31 PM   Result Value Ref Range    POC Glucose 82 65 - 140 mg/dl   Basic metabolic panel    Collection Time: 06/08/18  6:23 AM   Result Value Ref Range    Sodium 133 (L) 136 - 145 mmol/L    Potassium 3 6 3 5 - 5 3 mmol/L    Chloride 99 (L) 100 - 108 mmol/L    CO2 25 21 - 32 mmol/L    Anion Gap 9 4 - 13 mmol/L    BUN 9 5 - 25 mg/dL    Creatinine 0 66 0 60 - 1 30 mg/dL    Glucose 99 65 - 140 mg/dL    Calcium 8 6 8 3 - 10 1 mg/dL    eGFR 104 ml/min/1 73sq m   Magnesium    Collection Time: 06/08/18  6:23 AM   Result Value Ref Range    Magnesium 1 7 1 6 - 2 6 mg/dL   CBC and differential    Collection Time: 06/08/18  6:23 AM   Result Value Ref Range    WBC 10 19 (H) 4 31 - 10 16 Thousand/uL    RBC 3 66 (L) 3 88 - 5 62 Million/uL    Hemoglobin 11 2 (L) 12 0 - 17 0 g/dL    Hematocrit 35 0 (L) 36 5 - 49 3 %    MCV 96 82 - 98 fL    MCH 30 6 26 8 - 34 3 pg    MCHC 32 0 31 4 - 37 4 g/dL    RDW 13 0 11 6 - 15 1 %    MPV 8 8 (L) 8 9 - 12 7 fL    Platelets 526 987 - 742 Thousands/uL    nRBC 0 /100 WBCs    Neutrophils Relative 68 43 - 75 %    Immat GRANS % 1 0 - 2 %    Lymphocytes Relative 20 14 - 44 %    Monocytes Relative 10 4 - 12 %    Eosinophils Relative 1 0 - 6 %    Basophils Relative 0 0 - 1 %    Neutrophils Absolute 6 97 1 85 - 7 62 Thousands/µL    Immature Grans Absolute 0 05 0 00 - 0 20 Thousand/uL    Lymphocytes Absolute 2 02 0 60 - 4 47 Thousands/µL    Monocytes Absolute 1 06 0 17 - 1 22 Thousand/µL    Eosinophils Absolute 0 06 0 00 - 0 61 Thousand/µL    Basophils Absolute 0 03 0 00 - 0 10 Thousands/µL   Fingerstick Glucose (POCT)    Collection Time: 06/08/18  7:12 AM   Result Value Ref Range    POC Glucose 101 65 - 140 mg/dl   Fingerstick Glucose (POCT)    Collection Time: 06/08/18 11:32 AM   Result Value Ref Range    POC Glucose 92 65 - 140 mg/dl   Fingerstick Glucose (POCT)    Collection Time: 06/08/18  4:26 PM   Result Value Ref Range    POC Glucose 72 65 - 140 mg/dl   Anaerobic culture and Gram stain    Collection Time: 06/08/18  8:28 PM   Result Value Ref Range    Anaerobic Culture Culture results to follow      Body fluid culture and Gram stain    Collection Time: 06/08/18  8:28 PM   Result Value Ref Range    Body Fluid Culture, Sterile 1+ Growth of Staphylococcus aureus (A)     Gram Stain Result 1+ Polys     Gram Stain Result No bacteria seen    Fingerstick Glucose (POCT)    Collection Time: 06/08/18  8:39 PM   Result Value Ref Range    POC Glucose 143 (H) 65 - 140 mg/dl   Anaerobic culture and Gram stain    Collection Time: 06/08/18  8:40 PM   Result Value Ref Range    Anaerobic Culture Culture results to follow  Anaerobic culture and Gram stain    Collection Time: 06/08/18  8:40 PM   Result Value Ref Range    Anaerobic Culture Culture results to follow      Body fluid culture and Gram stain    Collection Time: 06/08/18  8:40 PM   Result Value Ref Range    Body Fluid Culture, Sterile 3+ Growth of Staphylococcus aureus (A)     Gram Stain Result 1+ Polys     Gram Stain Result 1+ Gram positive cocci in pairs    Body fluid culture and Gram stain    Collection Time: 06/08/18  8:45 PM   Result Value Ref Range    Body Fluid Culture, Sterile 3+ Growth of Staphylococcus aureus (A)     Gram Stain Result Rare Polys     Gram Stain Result 1+ Gram positive cocci in pairs    Culture, tissue and Gram stain    Collection Time: 06/08/18  8:50 PM   Result Value Ref Range    Tissue Culture 4+ Growth of Staphylococcus aureus (A)     Gram Stain Result 1+ Polys     Gram Stain Result 3+ Gram positive cocci in pairs    Basic metabolic panel    Collection Time: 06/09/18  6:17 AM   Result Value Ref Range    Sodium 130 (L) 136 - 145 mmol/L    Potassium 4 0 3 5 - 5 3 mmol/L    Chloride 95 (L) 100 - 108 mmol/L    CO2 27 21 - 32 mmol/L    Anion Gap 8 4 - 13 mmol/L    BUN 8 5 - 25 mg/dL    Creatinine 0 71 0 60 - 1 30 mg/dL    Glucose 144 (H) 65 - 140 mg/dL    Calcium 8 4 8 3 - 10 1 mg/dL    eGFR 101 ml/min/1 73sq m   Magnesium    Collection Time: 06/09/18  6:17 AM   Result Value Ref Range    Magnesium 1 7 1 6 - 2 6 mg/dL   CBC    Collection Time: 06/09/18  6:17 AM   Result Value Ref Range    WBC 9 53 4 31 - 10 16 Thousand/uL    RBC 3 51 (L) 3 88 - 5 62 Million/uL    Hemoglobin 10 9 (L) 12 0 - 17 0 g/dL    Hematocrit 33 5 (L) 36 5 - 49 3 %    MCV 95 82 - 98 fL    MCH 31 1 26 8 - 34 3 pg    MCHC 32 5 31 4 - 37 4 g/dL    RDW 13 0 11 6 - 15 1 %    Platelets 591 899 - 753 Thousands/uL MPV 8 8 (L) 8 9 - 12 7 fL   Fingerstick Glucose (POCT)    Collection Time: 06/09/18  8:10 AM   Result Value Ref Range    POC Glucose 155 (H) 65 - 140 mg/dl   Fingerstick Glucose (POCT)    Collection Time: 06/09/18 11:06 AM   Result Value Ref Range    POC Glucose 110 65 - 140 mg/dl   Osmolality    Collection Time: 06/09/18 11:57 AM   Result Value Ref Range    Osmolality Serum 274 (L) 282 - 298 mmol/KG   Fingerstick Glucose (POCT)    Collection Time: 06/09/18  4:22 PM   Result Value Ref Range    POC Glucose 130 65 - 140 mg/dl   Osmolality, urine    Collection Time: 06/09/18  7:08 PM   Result Value Ref Range    Osmolality, Ur 465 250 - 900 mmol/KG   Sodium, urine, random    Collection Time: 06/09/18  7:08 PM   Result Value Ref Range    Sodium, Ur 29    Fingerstick Glucose (POCT)    Collection Time: 06/09/18 10:21 PM   Result Value Ref Range    POC Glucose 100 65 - 140 mg/dl   Fingerstick Glucose (POCT)    Collection Time: 06/10/18  7:38 AM   Result Value Ref Range    POC Glucose 119 65 - 140 mg/dl   Basic metabolic panel    Collection Time: 06/10/18 10:25 AM   Result Value Ref Range    Sodium 132 (L) 136 - 145 mmol/L    Potassium 3 7 3 5 - 5 3 mmol/L    Chloride 97 (L) 100 - 108 mmol/L    CO2 27 21 - 32 mmol/L    Anion Gap 8 4 - 13 mmol/L    BUN 10 5 - 25 mg/dL    Creatinine 0 85 0 60 - 1 30 mg/dL    Glucose 198 (H) 65 - 140 mg/dL    Calcium 8 9 8 3 - 10 1 mg/dL    eGFR 94 ml/min/1 73sq m   CBC    Collection Time: 06/10/18 10:25 AM   Result Value Ref Range    WBC 12 44 (H) 4 31 - 10 16 Thousand/uL    RBC 3 63 (L) 3 88 - 5 62 Million/uL    Hemoglobin 11 3 (L) 12 0 - 17 0 g/dL    Hematocrit 34 5 (L) 36 5 - 49 3 %    MCV 95 82 - 98 fL    MCH 31 1 26 8 - 34 3 pg    MCHC 32 8 31 4 - 37 4 g/dL    RDW 13 0 11 6 - 15 1 %    Platelets 905 985 - 915 Thousands/uL    MPV 8 8 (L) 8 9 - 12 7 fL   TSH, 3rd generation    Collection Time: 06/10/18 10:25 AM   Result Value Ref Range    TSH 3RD GENERATON 1 082 0 358 - 3 740 uIU/mL Uric acid    Collection Time: 06/10/18 10:25 AM   Result Value Ref Range    Uric Acid 4 2 4 2 - 8 0 mg/dL   Fingerstick Glucose (POCT)    Collection Time: 06/10/18 11:20 AM   Result Value Ref Range    POC Glucose 138 65 - 140 mg/dl       Results from last 7 days  Lab Units 06/09/18  0617 06/08/18  0623 06/06/18  0711   MAGNESIUM mg/dL 1 7 1 7 1 9           Results from last 7 days  Lab Units 06/05/18 2128   INR  0 92   PTT seconds 29     No results found for: TROPONINT  ABG:No results found for: PHART, OJY4NYU, PO2ART, CCF4KJA, G3QSWBTL, BEART, SOURCE        Cultures    Results from last 7 days  Lab Units 06/08/18 2050 06/08/18 2045 06/08/18 2040 06/08/18 2028 06/06/18  1924 06/06/18  1923 06/05/18  2128   BLOOD CULTURE   --   --   --   --   --   --  No Growth After 4 Days  No Growth After 4 Days  GRAM STAIN RESULT  1+ Polys  3+ Gram positive cocci in pairs Rare Polys  1+ Gram positive cocci in pairs 1+ Polys  1+ Gram positive cocci in pairs 1+ Polys  No bacteria seen 4+ Polys  4+ Gram positive cocci in pairs, chains and clusters 2+ Polys  No bacteria seen  --    BODY FLUID CULTURE, STERILE   --  3+ Growth of Staphylococcus aureus* 3+ Growth of Staphylococcus aureus* 1+ Growth of Staphylococcus aureus* 4+ Growth of Staphylococcus aureus* Few Colonies of Staphylococcus aureus*  --       Xr Knee 1 Or 2 Vw Right    Result Date: 6/6/2018  Narrative: RIGHT KNEE INDICATION:   infection,suspect spetic joint     Redness and swelling  COMPARISON:  Plain radiographs May 15, 2016 VIEWS:  XR KNEE 1 OR 2 VW RIGHT Images: 2 FINDINGS: There is no acute fracture or dislocation  Small suprapatellar joint effusion  Mild medial joint space narrowing  Mild narrowing of the patellofemoral space  Mild sharpening of the tibial spines  No lytic or blastic lesions are seen  Diffuse soft tissue swelling, extending from above the patella, inferiorly to the anterior tibial tubercle    More focal soft tissue swelling overlying the anterior tibial tubercle  No radiopaque foreign bodies are seen  Impression: 1  Mild degenerative changes with small suprapatellar joint effusion  If there is clinical concern for septic arthritis, consider follow-up arthrocentesis  2   Diffuse soft tissue swelling anterior to the patella, most compatible with prepatellar bursitis  3   More focal soft tissue swelling overlying the anterior tibial tubercle, suspicious for superimposed infrapatellar bursitis  The study was marked in [facility] for immediate notification  Workstation performed: [id number]     Vas Lower Limb Venous Duplex Study, Unilateral/limited    Result Date: 6/7/2018  Narrative:  [facility] REPORT CLINICAL: Indications: Patient presents with right lower extremity edema  Risk Factors: The patient has no history of DVT  The patient current BMI is 28 06, Weight is 190 lb and Height is 69 in  CONCLUSION: Impression: RIGHT LOWER LIMB No evidence of acute or chronic deep vein thrombosis   No evidence of superficial thrombophlebitis noted  Doppler evaluation shows a normal response to augmentation maneuvers  Popliteal, posterior tibial and anterior tibial arterial Doppler waveforms are triphasic  LEFT LOWER LIMB LIMITED Evaluation shows no evidence of thrombus in the common femoral vein  Doppler evaluation shows a normal response to augmentation maneuvers  SIGNATURE: Electronically Signed by: Mary Kate Stanton MD, RPVI on 2018-06-07 03:44:21 PM    I have personally reviewed pertinent reports  Principal Problem:    Sepsis ([address] )  Active Problems:    Hyperlipidemia    Borderline diabetes    Hypertension    Cellulitis of right lower extremity    Hyponatremia    Depression    Infrapatellar bursitis of right knee    Effusion of right knee    Staphylococcal arthritis of right knee (HCC)      Assessment/Plan:   This is a middle-age man with diabetes mellitus type 2, hyperlipidemia, hypertension, depression, who had the presented with signs of sepsis, and noted to have acute septic arthritis of the right knee along with prepatellar and infrapatellar bursitis  Methicillin sensitive Staphylococcus aureus infection  Persistent tachycardia, potent pathogen, rule out metastatic infection such as that causing bacterial endocarditis  Recommend echocardiographic evaluation  Continue current antimicrobial treatment with ceftriaxone, as once daily dose would be feasible for administration in the infusion room  Discussed with the hospitalist   Patient was counseled

## 2018-06-11 ENCOUNTER — ANESTHESIA EVENT (INPATIENT)
Dept: PERIOP | Facility: HOSPITAL | Age: 63
DRG: 486 | End: 2018-06-11
Payer: COMMERCIAL

## 2018-06-11 ENCOUNTER — APPOINTMENT (INPATIENT)
Dept: NON INVASIVE DIAGNOSTICS | Facility: HOSPITAL | Age: 63
DRG: 486 | End: 2018-06-11
Payer: COMMERCIAL

## 2018-06-11 ENCOUNTER — ANESTHESIA (INPATIENT)
Dept: PERIOP | Facility: HOSPITAL | Age: 63
DRG: 486 | End: 2018-06-11
Payer: COMMERCIAL

## 2018-06-11 ENCOUNTER — APPOINTMENT (INPATIENT)
Dept: RADIOLOGY | Facility: HOSPITAL | Age: 63
DRG: 486 | End: 2018-06-11
Payer: COMMERCIAL

## 2018-06-11 LAB
ANION GAP SERPL CALCULATED.3IONS-SCNC: 8 MMOL/L (ref 4–13)
BACTERIA BLD CULT: NORMAL
BACTERIA BLD CULT: NORMAL
BACTERIA SPEC ANAEROBE CULT: NORMAL
BACTERIA SPEC ANAEROBE CULT: NORMAL
BACTERIA SPEC BFLD CULT: ABNORMAL
BACTERIA TISS AEROBE CULT: ABNORMAL
BUN SERPL-MCNC: 11 MG/DL (ref 5–25)
CALCIUM SERPL-MCNC: 8.9 MG/DL (ref 8.3–10.1)
CHLORIDE SERPL-SCNC: 98 MMOL/L (ref 100–108)
CO2 SERPL-SCNC: 28 MMOL/L (ref 21–32)
CREAT SERPL-MCNC: 0.77 MG/DL (ref 0.6–1.3)
ERYTHROCYTE [DISTWIDTH] IN BLOOD BY AUTOMATED COUNT: 13 % (ref 11.6–15.1)
GFR SERPL CREATININE-BSD FRML MDRD: 98 ML/MIN/1.73SQ M
GLUCOSE SERPL-MCNC: 106 MG/DL (ref 65–140)
GLUCOSE SERPL-MCNC: 116 MG/DL (ref 65–140)
GLUCOSE SERPL-MCNC: 121 MG/DL (ref 65–140)
GLUCOSE SERPL-MCNC: 131 MG/DL (ref 65–140)
GRAM STN SPEC: ABNORMAL
HCT VFR BLD AUTO: 34.7 % (ref 36.5–49.3)
HGB BLD-MCNC: 11.3 G/DL (ref 12–17)
MCH RBC QN AUTO: 31 PG (ref 26.8–34.3)
MCHC RBC AUTO-ENTMCNC: 32.6 G/DL (ref 31.4–37.4)
MCV RBC AUTO: 95 FL (ref 82–98)
PLATELET # BLD AUTO: 372 THOUSANDS/UL (ref 149–390)
PMV BLD AUTO: 8.9 FL (ref 8.9–12.7)
POTASSIUM SERPL-SCNC: 4.1 MMOL/L (ref 3.5–5.3)
RBC # BLD AUTO: 3.64 MILLION/UL (ref 3.88–5.62)
SODIUM SERPL-SCNC: 134 MMOL/L (ref 136–145)
WBC # BLD AUTO: 13.77 THOUSAND/UL (ref 4.31–10.16)

## 2018-06-11 PROCEDURE — 99232 SBSQ HOSP IP/OBS MODERATE 35: CPT | Performed by: NURSE PRACTITIONER

## 2018-06-11 PROCEDURE — 0MDN0ZZ EXTRACTION OF RIGHT KNEE BURSA AND LIGAMENT, OPEN APPROACH: ICD-10-PCS | Performed by: ORTHOPAEDIC SURGERY

## 2018-06-11 PROCEDURE — 72100 X-RAY EXAM L-S SPINE 2/3 VWS: CPT

## 2018-06-11 PROCEDURE — 80048 BASIC METABOLIC PNL TOTAL CA: CPT | Performed by: NURSE PRACTITIONER

## 2018-06-11 PROCEDURE — 29871 ARTHRS KNEE SURG FOR INFCTJ: CPT | Performed by: ORTHOPAEDIC SURGERY

## 2018-06-11 PROCEDURE — 85027 COMPLETE CBC AUTOMATED: CPT | Performed by: NURSE PRACTITIONER

## 2018-06-11 PROCEDURE — 82948 REAGENT STRIP/BLOOD GLUCOSE: CPT

## 2018-06-11 PROCEDURE — 83735 ASSAY OF MAGNESIUM: CPT | Performed by: NURSE PRACTITIONER

## 2018-06-11 PROCEDURE — 27301 DRAIN THIGH/KNEE LESION: CPT | Performed by: ORTHOPAEDIC SURGERY

## 2018-06-11 PROCEDURE — 93306 TTE W/DOPPLER COMPLETE: CPT

## 2018-06-11 PROCEDURE — 0SBC4ZZ EXCISION OF RIGHT KNEE JOINT, PERCUTANEOUS ENDOSCOPIC APPROACH: ICD-10-PCS | Performed by: ORTHOPAEDIC SURGERY

## 2018-06-11 RX ORDER — SODIUM CHLORIDE 9 MG/ML
INJECTION, SOLUTION INTRAVENOUS AS NEEDED
Status: DISCONTINUED | OUTPATIENT
Start: 2018-06-11 | End: 2018-06-11 | Stop reason: HOSPADM

## 2018-06-11 RX ORDER — ONDANSETRON 2 MG/ML
INJECTION INTRAMUSCULAR; INTRAVENOUS AS NEEDED
Status: DISCONTINUED | OUTPATIENT
Start: 2018-06-11 | End: 2018-06-11 | Stop reason: SURG

## 2018-06-11 RX ORDER — HYDROMORPHONE HCL 110MG/55ML
0.5 PATIENT CONTROLLED ANALGESIA SYRINGE INTRAVENOUS
Status: DISCONTINUED | OUTPATIENT
Start: 2018-06-11 | End: 2018-06-11 | Stop reason: HOSPADM

## 2018-06-11 RX ORDER — LIDOCAINE HYDROCHLORIDE 10 MG/ML
INJECTION, SOLUTION INFILTRATION; PERINEURAL AS NEEDED
Status: DISCONTINUED | OUTPATIENT
Start: 2018-06-11 | End: 2018-06-11 | Stop reason: SURG

## 2018-06-11 RX ORDER — ONDANSETRON 2 MG/ML
4 INJECTION INTRAMUSCULAR; INTRAVENOUS ONCE AS NEEDED
Status: DISCONTINUED | OUTPATIENT
Start: 2018-06-11 | End: 2018-06-11 | Stop reason: HOSPADM

## 2018-06-11 RX ORDER — DIPHENHYDRAMINE HYDROCHLORIDE 50 MG/ML
12.5 INJECTION INTRAMUSCULAR; INTRAVENOUS ONCE AS NEEDED
Status: DISCONTINUED | OUTPATIENT
Start: 2018-06-11 | End: 2018-06-11 | Stop reason: HOSPADM

## 2018-06-11 RX ORDER — LORAZEPAM 2 MG/ML
1 INJECTION INTRAMUSCULAR ONCE
Status: COMPLETED | OUTPATIENT
Start: 2018-06-11 | End: 2018-06-11

## 2018-06-11 RX ORDER — MEPERIDINE HYDROCHLORIDE 25 MG/ML
12.5 INJECTION INTRAMUSCULAR; INTRAVENOUS; SUBCUTANEOUS
Status: DISCONTINUED | OUTPATIENT
Start: 2018-06-11 | End: 2018-06-11 | Stop reason: HOSPADM

## 2018-06-11 RX ORDER — SODIUM CHLORIDE 9 MG/ML
75 INJECTION, SOLUTION INTRAVENOUS CONTINUOUS
Status: DISCONTINUED | OUTPATIENT
Start: 2018-06-11 | End: 2018-06-16 | Stop reason: HOSPADM

## 2018-06-11 RX ORDER — FENTANYL CITRATE 50 UG/ML
INJECTION, SOLUTION INTRAMUSCULAR; INTRAVENOUS AS NEEDED
Status: DISCONTINUED | OUTPATIENT
Start: 2018-06-11 | End: 2018-06-11 | Stop reason: SURG

## 2018-06-11 RX ORDER — ZOLPIDEM TARTRATE 5 MG/1
10 TABLET ORAL
Status: DISCONTINUED | OUTPATIENT
Start: 2018-06-11 | End: 2018-06-16 | Stop reason: HOSPADM

## 2018-06-11 RX ORDER — FENTANYL CITRATE/PF 50 MCG/ML
50 SYRINGE (ML) INJECTION
Status: DISCONTINUED | OUTPATIENT
Start: 2018-06-11 | End: 2018-06-11 | Stop reason: HOSPADM

## 2018-06-11 RX ORDER — ZOLPIDEM TARTRATE 5 MG/1
5 TABLET ORAL
Status: DISCONTINUED | OUTPATIENT
Start: 2018-06-11 | End: 2018-06-11

## 2018-06-11 RX ORDER — PROPOFOL 10 MG/ML
INJECTION, EMULSION INTRAVENOUS AS NEEDED
Status: DISCONTINUED | OUTPATIENT
Start: 2018-06-11 | End: 2018-06-11 | Stop reason: SURG

## 2018-06-11 RX ORDER — PROMETHAZINE HYDROCHLORIDE 25 MG/ML
12.5 INJECTION, SOLUTION INTRAMUSCULAR; INTRAVENOUS ONCE AS NEEDED
Status: DISCONTINUED | OUTPATIENT
Start: 2018-06-11 | End: 2018-06-11 | Stop reason: HOSPADM

## 2018-06-11 RX ORDER — FENTANYL CITRATE/PF 50 MCG/ML
25 SYRINGE (ML) INJECTION
Status: DISCONTINUED | OUTPATIENT
Start: 2018-06-11 | End: 2018-06-11 | Stop reason: HOSPADM

## 2018-06-11 RX ADMIN — DOCUSATE SODIUM 100 MG: 100 CAPSULE, LIQUID FILLED ORAL at 21:34

## 2018-06-11 RX ADMIN — HEPARIN SODIUM 5000 UNITS: 5000 INJECTION, SOLUTION INTRAVENOUS; SUBCUTANEOUS at 21:34

## 2018-06-11 RX ADMIN — OXYCODONE HYDROCHLORIDE AND ACETAMINOPHEN 1 TABLET: 5; 325 TABLET ORAL at 00:10

## 2018-06-11 RX ADMIN — LORAZEPAM 1 MG: 2 INJECTION INTRAMUSCULAR; INTRAVENOUS at 21:34

## 2018-06-11 RX ADMIN — PROPOFOL 100 MG: 10 INJECTION, EMULSION INTRAVENOUS at 16:43

## 2018-06-11 RX ADMIN — FENTANYL CITRATE 50 MCG: 50 INJECTION, SOLUTION INTRAMUSCULAR; INTRAVENOUS at 16:47

## 2018-06-11 RX ADMIN — SODIUM CHLORIDE 75 ML/HR: 0.9 INJECTION, SOLUTION INTRAVENOUS at 20:27

## 2018-06-11 RX ADMIN — ACETAMINOPHEN 650 MG: 325 TABLET ORAL at 05:51

## 2018-06-11 RX ADMIN — ZOLPIDEM TARTRATE 10 MG: 5 TABLET ORAL at 00:10

## 2018-06-11 RX ADMIN — CEFTRIAXONE 2000 MG: 2 INJECTION, SOLUTION INTRAVENOUS at 14:41

## 2018-06-11 RX ADMIN — ALPRAZOLAM 0.5 MG: 0.5 TABLET ORAL at 19:27

## 2018-06-11 RX ADMIN — FENTANYL CITRATE 50 MCG: 50 INJECTION, SOLUTION INTRAMUSCULAR; INTRAVENOUS at 16:23

## 2018-06-11 RX ADMIN — IBUPROFEN 400 MG: 400 TABLET ORAL at 12:28

## 2018-06-11 RX ADMIN — ALPRAZOLAM 0.5 MG: 0.5 TABLET ORAL at 03:20

## 2018-06-11 RX ADMIN — IBUPROFEN 400 MG: 400 TABLET ORAL at 21:34

## 2018-06-11 RX ADMIN — FENTANYL CITRATE 50 MCG: 50 INJECTION, SOLUTION INTRAMUSCULAR; INTRAVENOUS at 17:11

## 2018-06-11 RX ADMIN — FENTANYL CITRATE 50 MCG: 50 INJECTION, SOLUTION INTRAMUSCULAR; INTRAVENOUS at 17:08

## 2018-06-11 RX ADMIN — PROPOFOL 200 MG: 10 INJECTION, EMULSION INTRAVENOUS at 16:24

## 2018-06-11 RX ADMIN — OXYCODONE HYDROCHLORIDE AND ACETAMINOPHEN 1 TABLET: 5; 325 TABLET ORAL at 21:34

## 2018-06-11 RX ADMIN — ZOLPIDEM TARTRATE 10 MG: 5 TABLET ORAL at 21:34

## 2018-06-11 RX ADMIN — FENTANYL CITRATE 50 MCG: 50 INJECTION, SOLUTION INTRAMUSCULAR; INTRAVENOUS at 16:39

## 2018-06-11 RX ADMIN — ONDANSETRON 4 MG: 2 INJECTION INTRAMUSCULAR; INTRAVENOUS at 16:38

## 2018-06-11 RX ADMIN — FENTANYL CITRATE 50 MCG: 50 INJECTION, SOLUTION INTRAMUSCULAR; INTRAVENOUS at 16:59

## 2018-06-11 RX ADMIN — PROPOFOL 100 MG: 10 INJECTION, EMULSION INTRAVENOUS at 16:39

## 2018-06-11 RX ADMIN — ENALAPRIL MALEATE 15 MG: 10 TABLET ORAL at 12:28

## 2018-06-11 RX ADMIN — ACETAMINOPHEN 650 MG: 325 TABLET ORAL at 21:34

## 2018-06-11 RX ADMIN — LIDOCAINE HYDROCHLORIDE 50 MG: 10 INJECTION, SOLUTION INFILTRATION; PERINEURAL at 16:24

## 2018-06-11 RX ADMIN — ACETAMINOPHEN 650 MG: 325 TABLET ORAL at 13:40

## 2018-06-11 NOTE — CASE MANAGEMENT
Continued Stay Review    Date: 6/11/18    Vital Signs: /76 (BP Location: Right arm)   Pulse 98   Temp 98 7 °F (37 1 °C) (Oral)   Resp 18   Ht 5' 9" (1 753 m)   Wt 86 2 kg (190 lb 0 2 oz)   SpO2 95%   BMI 28 06 kg/m²     GSF   CMP CBC DIFF MG  NPO  XRAY SPINE LUMBAR 2 TO 3 VIEWS  OR  POD 0  MONITOR DRAINAGE  DRAIN CARE  IV ATIVAN  CONSULT PSYCHIATRY  Medications:   Scheduled Meds:   Current Facility-Administered Medications:  acetaminophen 650 mg Oral Q8H [address] [facility], CRNP    ALPRAZolam 0 5 mg Oral BID PRN Papo Alberto, CRNP    aluminum-magnesium hydroxide-simethicone 30 mL Oral Q6H PRN Papo Alberto, KORINNP    cefTRIAXone 2,000 mg Intravenous Q24H Logan Harada, KORINNP Last Rate: 2,000 mg (06/10/18 1433)   docusate sodium 100 mg Oral BID Logan Harada, KORINNP    enalapril 15 mg Oral Daily Papo Alberto, CRNP    famotidine 20 mg Oral Daily [facility], SANCHO    heparin (porcine) 5,000 Units Subcutaneous Q12H [address] [facility], KORINNP    ibuprofen 400 mg Oral Q12H [facility], KORINNP    insulin lispro 1-5 Units Subcutaneous TID AC Papo Alberto, CRNP    insulin lispro 1-5 Units Subcutaneous HS Papo Alberto, CRNP    morphine injection 2 mg Intravenous Q4H PRN Logan Harada, KORINNP    ondansetron 4 mg Intravenous Q6H PRN Papo Alberto, CRNP    oxyCODONE-acetaminophen 1 tablet Oral Q6H PRN Papo Alberto, CRNP    polyethylene glycol 17 g Oral Daily PRN Logan Harada, CRNP    pravastatin 80 mg Oral Daily With Dinner Papo Alberto, CRNP    saccharomyces boulardii 250 mg Oral BID Cuijosé Alberto, CRNP    venlafaxine 75 mg Oral BID With Meals Papo Alberto, CRNP    zolpidem 5 mg Oral HS PRN Logan Harada, CRNP      Continuous Infusions:    PRN Meds: ALPRAZolam    aluminum-magnesium hydroxide-simethicone    morphine injection    ondansetron    oxyCODONE-acetaminophen    polyethylene glycol    zolpidem    Abnormal Labs/Diagnostic Results:    WBC 13 77 (H) 06/11/2018   HGB 11 3 (L) 06/11/2018     HCT 34 7 (L) 06/11/2018      (L) 06/11/2018      CL 98 (L) 06/11/2018   XRAY SPINE LUMBAR Degenerative and postoperative changes       Age/Sex: 66 y o  male     ATTENDING  * Sepsis ([address] )  · POD #3 S/p right knee surgical irrigation debridement of bursa and infected joint on 6/8/18  · Plan for repeat surgical irrigation and debridement today, 6/11/18   · Per ID, continue on Rocephin 2gm IVPB daily, day #6  ? Will need outpatient ABX with daily Rocephin in the infusion center for septic arthritis   · Check echocardiogram today for possible endocarditis considering persistent tachycardia   · Monitor for worsening signs of infection  · Repeat CBC in am   · Probiotic while on ABX  Staphylococcal arthritis of right knee (HCC)  ID and orthopedics following, appreciate recommendations  POD #3 S/p right knee surgical irrigation debridement of bursa and infected joint on 6/8/18  Wound VAC therapy to right knee   Right knee aspirate and tissue cultures growing MSSA  · Plan for repeat surgical irrigation and debridement today, 6/11/18   · Per ID, continue on Rocephin 2gm IVPB daily, day #6  ?  Will need outpatient ABX with daily Rocephin in the infusion center for septic arthritis   Hyponatremia  With fluid restriction, Na imrpved from 130 -> 132 -> 134   · Continue fluid restriction, 1200 mL/day   · Adequate pain control    · Repeat BMP in am     SURGICAL   Assessment:  POD #3 s/p right incision and drainage of infrapatellar bursa and irrigation/washout of right knee- patient reports pain and swelling have improved, wound VAC in place, in knee immobilizer, pain appears well controlled, neurovascularly intact, leukocytosis is trending up, appears to be consistent with septic arthritis and infrapatellar bursitis, AVSS  Plan:  POD #3 s/p right incision and drainage of infrapatellar bursa and irrigation/washout of right knee-   - Antibiotics:  Continue antibiotics per primary care team and Infectious Disease, coverage for staff appears to be appropriate, staff appears to be susceptible to multiple agents per cultures  - Anticoagulation:  Hold DVT prophylaxis for procedure today, SCDs, can reinstitute into tomorrow  - Activity:  Weightbearing as tolerated, PT/OT, to continue knee immobilizer  - AM lab draw:  Repeat a m  labs with CBC, BMP, Mag, phos  - Analgesia: Continue p r n  medication  - Dressing:  Continue wound VAC in place, seal is good currently, continue knee immobilizer  - Disposition:  In light of the leukocytosis trending upwards, we will continue with doing a repeat irrigation of the joint and bursa as well as any necessary debridements from a surgical standpoint this will be necessary in order to obtain better source control     OR  Procedure:Right knee open irrigation debridement of prepatellar bursa and right knee arthroscopic irrigation and debridement of the joint with partial closure prepatellar bursal wound and placement of VAC sponge  Operative Findings:  Right knee prepatellar bursa did look significantly improved since I operate on him on Friday  In fact, there was no sign of purulence or any sign of necrotic tissue  However, the joint did have once again the look of persistent infection with joint fluid that was still cloudy  We did irrigate this out with 6 L and also irrigated the prepatellar bursa with 3 L of fluid  We did leave a Hemovac in the joint to remove persistent drainage and a VAC sponge in the prepatellar bursa  Thank you,  [address] [place] [address] in the [facility] for 2017  Network Utilization Review Department  Phone: [phone]; Fax [phone]  ATTENTION: The Network Utilization Review Department is now centralized for our 7 Facilities  Make a note that we have a new phone and fax numbers for our Department   Please call with any questions or concerns to [phone] and carefully follow the prompts so that you are directed to the right person  All voicemails are confidential  Fax any determinations, approvals, denials, and requests for initial or continue stay review clinical to [phone]   Due to HIGH CALL volume, it would be easier if you could please send faxed requests to expedite your requests and in part, help us provide discharge notifications faster

## 2018-06-11 NOTE — ANESTHESIA POSTPROCEDURE EVALUATION
Post-Op Assessment Note      CV Status:  Stable    Mental Status:  Awake    Hydration Status:  Stable    PONV Controlled:  None    Airway Patency:  Patent    Post Op Vitals Reviewed: Yes          Staff: Anesthesiologist           BP      Temp      Pulse    Resp      SpO2

## 2018-06-11 NOTE — CASE MANAGEMENT
Continued Stay Review    Date: 6/10/18    Vital Signs: /76 (BP Location: Right arm)   Pulse 98   Temp 98 7 °F (37 1 °C) (Oral)   Resp 18   Ht 5' 9" (1 753 m)   Wt 86 2 kg (190 lb 0 2 oz)   SpO2 95%   BMI 28 06 kg/m²       CBC BMP  Medications:   Scheduled Meds:   Current Facility-Administered Medications:  acetaminophen 650 mg Oral Q8H [address] Michael Sit, CRNP    ALPRAZolam 0 5 mg Oral BID PRN Cuiyin Cristarik, CRNP    aluminum-magnesium hydroxide-simethicone 30 mL Oral Q6H PRN Cuicalen Cristarik, CRNP    cefTRIAXone 2,000 mg Intravenous Q24H Michael Sit, CRNP Last Rate: 2,000 mg (06/10/18 1433)   docusate sodium 100 mg Oral BID Michael Sit, CRNP    enalapril 15 mg Oral Daily Cuijosé Patelk, CRNP    famotidine 20 mg Oral Daily Michael Sit, CRNP    heparin (porcine) 5,000 Units Subcutaneous Q12H [address] Michael Sit, CRNP    ibuprofen 400 mg Oral Q12H Michael Sit, CRNP    insulin lispro 1-5 Units Subcutaneous TID AC Cugaudencio Patelk, CRNP    insulin lispro 1-5 Units Subcutaneous HS Papo Alberto, CRNP    morphine injection 2 mg Intravenous Q4H PRN Michael Sit, CRNP    ondansetron 4 mg Intravenous Q6H PRN Papo Alberto, CRNP    oxyCODONE-acetaminophen 1 tablet Oral Q6H PRN Meliijosé Alberto, CRNP    polyethylene glycol 17 g Oral Daily PRN Michael Sit, CRNP    pravastatin 80 mg Oral Daily With Dinner Papo Alberto, CRNP    saccharomyces boulardii 250 mg Oral BID Meliijosé Alberto, CRNP    venlafaxine 75 mg Oral BID With Meals Papo Alberto, CRNP    zolpidem 5 mg Oral HS PRN Michael Sit, CRNP      Continuous Infusions:    PRN Meds: ALPRAZolam    aluminum-magnesium hydroxide-simethicone    morphine injection    ondansetron    oxyCODONE-acetaminophen    polyethylene glycol    zolpidem    Abnormal Labs/Diagnostic Results:  CL 97 WBC 12 44     Age/Sex: 66 y o  male     SURGEON  Status post right knee I&D of infected bursa and joint 2 days ago  He has been in knee immobilizer with vac sponge in place  Plan:  Plan to do repeat I&D tomorrow with possible VAC change versus wound closure  NPO after midnight  ATTENDING  Reported increasing pain earlier today but now better  Ambulated with physical therapy  Denies chest pain shortness of breath  Eating well, agreeable with fluid restriction  Afebrile with stable vital signs  Right knee with wound VAC in place draining serosanguineous drainage  In no significant surrounding inflammation     Plan  -Continue IV Rocephin, patient will need outpatient IV antimicrobial treatment after discharge  Discussed with the patient  -Repeat debridement in AM   Follow-up leukocytosis  -Continue fluid restriction and pain control  -Discussed with Dr Keara Casanova and case management      PER ID  This is a middle-age man with diabetes mellitus type 2, hyperlipidemia, hypertension, depression, who had the presented with signs of sepsis, and noted to have acute septic arthritis of the right knee along with prepatellar and infrapatellar bursitis  Methicillin sensitive Staphylococcus aureus infection  Persistent tachycardia, potent pathogen, rule out metastatic infection such as that causing bacterial endocarditis  Recommend echocardiographic evaluation    Continue current antimicrobial treatment with ceftriaxone

## 2018-06-11 NOTE — OP NOTE
OPERATIVE REPORT  PATIENT NAME: Roge Smart    :  1955  MRN: [id number]  Pt Location: [facility] OR ROOM 01    SURGERY DATE: 2018    Surgeon(s) and Role:     * Anselmo Ladd MD - Primary     * Hossein Ward PA-C - Assisting necessary for the procedure for assistance with debridement of the bursa and the joint    Preop Diagnosis:  Effusion of right knee [M25 461] with septic arthritis  Infrapatellar bursitis of right knee [M70 51]    Post-Op Diagnosis Codes:     * Effusion of right knee [M25 461] with septic arthritis     * Infrapatellar bursitis of right knee [M70 51] septic    Procedure:  Right knee open irrigation debridement of prepatellar bursa and right knee arthroscopic irrigation and debridement of the joint with partial closure prepatellar bursal wound and placement of VAC sponge    Specimen(s):  * No specimens in log *    Estimated Blood Loss:   Minimal    Drains:  Closed/Suction Drain Right;Lateral Knee Accordion 10 Fr  (Active)   Number of days: 0       Negative Pressure Wound Therapy (V A C ) Knee Anterior (Active)   Black foam- # applied 1 2018  5:08 PM   Cycle Continuous 2018  5:08 PM   Target Pressure (mmHg) 125 2018  5:08 PM   Dressing Status Clean;Dry; Intact; New drainage 2018  5:08 PM   Number of days: 0       Anesthesia Type:   General    Operative Indications:  Effusion of right knee [M25 461]  Infrapatellar bursitis of right knee Mamadou Zurita is a 72-year-old male who has been suffering with right knee infection  I have performed 1 arthroscopic irrigation and debridement on Friday as well as a prepatellar open irrigation and debridement on Friday  He initially had his white blood cell count go down however we were planning to bring him back anyway today and his white blood cell count did go up once again  We did consent him for right knee open irrigation debridement of the prepatellar bursa as well as arthroscopic irrigation debridement of the joint  He understood the risks and benefits of that procedure wished to go ahead  Risks are inclusive of but not limited to persistent infection, failure to alleviate discomfort, worsening of symptoms, blood clots, medical problems perioperatively, and need for further surgery  Operative Findings:  Right knee prepatellar bursa did look significantly improved since I operate on him on Friday  In fact, there was no sign of purulence or any sign of necrotic tissue  However, the joint did have once again the look of persistent infection with joint fluid that was still cloudy  We did irrigate this out with 6 L and also irrigated the prepatellar bursa with 3 L of fluid  We did leave a Hemovac in the joint to remove persistent drainage and a VAC sponge in the prepatellar bursa  Complications:   None    Procedure and Technique:  Fransisco Anna was taken to the operating room and placed supine on the OR table  He was given preoperative IV antibiotics as per his general routine  This was every 24 hr   General anesthesia was induced  The right lower extremity was prepped and draped in the usual sterile fashion  A surgical time-out was taken  We then began our irrigation debridement of the prepatellar bursa  We did use a pulsed lavage for irrigation and debridement  We did not observe any purulence as we did thoroughly examine the entirety of the prepatellar bursal region of the knee  Once we were pleased with that, we did demonstrated there was excellent pink granulation tissue that was quite healthy  We then began arthroscopic portion of the procedure and did remove to stitches from the previous arthroscopic portal sites  We did utilized was previous arthroscopic portal sites  We did place the arthroscope in 1 portal and the shaver in the other portal   We then irrigated and debrided with a shaver the knee  We did a limited synovectomy  The fluid in the joint was cloudy    We were quite pleased however at the end of the procedure  We did notice some chondromalacia but did not debride that  We then removed the arthroscope and did place a Hemovac drain  We then placed a VAC sponge after we closed a portion of the prepatellar bursal wound both superiorly and inferiorly with 3 O nylon suture  We did close the arthroscopic portals with 4-0 nylon suture  We did sew the drain in place as well  We applied a knee immobilizer again  He tolerated the procedure well and transferred to recovery room in stable condition  We will continue to monitor him closely  He may require another washout on Thursday although we may be able to start doing VAC sponge changes on the floor  I am not certain if we will be able to close the prepatellar wound any further although we may try that on Thursday  I am more concerned however about the joint     I was present for the entire procedure and A qualified resident physician was not available    Patient Disposition:  PACU     SIGNATURE: David Baker MD  DATE: June 11, 2018  TIME: 5:31 PM

## 2018-06-11 NOTE — POST OP PROGRESS NOTES
Progress Note - Orthopedics   Radhawilliamjuan a Srinivasan 66 y o  male MRN: [id number]  Unit/Bed#: [address] [place] [address] Encounter: [id number]    Assessment:  POD #3 s/p right incision and drainage of infrapatellar bursa and irrigation/washout of right knee- patient reports pain and swelling have improved, wound VAC in place, in knee immobilizer, pain appears well controlled, neurovascularly intact, leukocytosis is trending up, appears to be consistent with septic arthritis and infrapatellar bursitis, AVSS    Plan:  POD #3 s/p right incision and drainage of infrapatellar bursa and irrigation/washout of right knee-   - Antibiotics:  Continue antibiotics per primary care team and Infectious Disease, coverage for staff appears to be appropriate, staff appears to be susceptible to multiple agents per cultures  - Anticoagulation:  Hold DVT prophylaxis for procedure today, SCDs, can reinstitute into tomorrow  - Activity:  Weightbearing as tolerated, PT/OT, to continue knee immobilizer  - AM lab draw:  Repeat a m  labs with CBC, BMP, Mag, phos  - Analgesia: Continue p r n  medication  - Dressing:  Continue wound VAC in place, seal is good currently, continue knee immobilizer  - Disposition:  In light of the leukocytosis trending upwards, we will continue with doing a repeat irrigation of the joint and bursa as well as any necessary debridements from a surgical standpoint this will be necessary in order to obtain better source control    Weight bearing:  Weightbearing as tolerated    VTE Pharmacologic Prophylaxis: Heparin  VTE Mechanical Prophylaxis: sequential compression device    Subjective: Pt was seen and examined at bedside  Pt denies any acute events overnight  Patient reports that he feels like he all little bit better but a little bit worse  Patient reports that his pain and swelling and feels a give gone down  Patient reports that he has good sensation in his distal lower right extremity    Patient denies any numbness, tingling, lack of motor movement, lack of sensation in the distal lower right extremity  Patient does have some intermittent sharp pain in the right knee on occasion that is not radiating  Vitals: Blood pressure 138/76, pulse 98, temperature 98 7 °F (37 1 °C), temperature source Oral, resp  rate 18, height 5' 9" (1 753 m), weight 86 2 kg (190 lb 0 2 oz), SpO2 95 %  ,Body mass index is 28 06 kg/m²  Intake/Output Summary (Last 24 hours) at 06/11/18 1223  Last data filed at 06/10/18 1433   Gross per 24 hour   Intake              100 ml   Output                0 ml   Net              100 ml       Invasive Devices     Peripheral Intravenous Line            Long-Dwell Peripheral IV (Midline) 98/12/13 Left Basilic 4 days          Drain            Negative Pressure Wound Therapy (V A C ) Knee Right 2 days                Physical Exam: /76 (BP Location: Right arm)   Pulse 98   Temp 98 7 °F (37 1 °C) (Oral)   Resp 18   Ht 5' 9" (1 753 m)   Wt 86 2 kg (190 lb 0 2 oz)   SpO2 95%   BMI 28 06 kg/m²   General appearance: alert and oriented, in no acute distress  Head: Normocephalic, without obvious abnormality, atraumatic  Extremities: in knee immobilizer with wound vac  Skin: wound vac in place, erythema present, swelling reduced  Ortho Exam:   right Lower Extremity: Thigh and calf compartments are soft and nontender to palpation  + L3-S1 SILT  + DF/PF + EHL  No pain with passive stretch/extension of toes  DP 2+, capillary refill less than 2 seconds, and foot is warm B/L  Incision has wound vac in place    Lab, Imaging and other studies:   I have personally reviewed pertinent lab results    CBC:   Lab Results   Component Value Date    WBC 13 77 (H) 06/11/2018    HGB 11 3 (L) 06/11/2018    HCT 34 7 (L) 06/11/2018    MCV 95 06/11/2018     06/11/2018    MCH 31 0 06/11/2018    MCHC 32 6 06/11/2018    RDW 13 0 06/11/2018    MPV 8 9 06/11/2018     CMP:   Lab Results   Component Value Date     (L) 06/11/2018    CL 98 (L) 06/11/2018    CO2 28 06/11/2018    ANIONGAP 8 06/11/2018    BUN 11 06/11/2018    CREATININE 0 77 06/11/2018    GLUCOSE 116 06/11/2018    CALCIUM 8 9 06/11/2018    EGFR 98 06/11/2018

## 2018-06-11 NOTE — ANESTHESIA PREPROCEDURE EVALUATION
Review of Systems/Medical History  Patient summary reviewed  Chart reviewed  No history of anesthetic complications     Cardiovascular  Hyperlipidemia, Hypertension ,    Pulmonary       GI/Hepatic            Endo/Other     GYN       Hematology      Comment: Sepsis secondary to infected joint  Hyponatremia  Musculoskeletal    Arthritis     Neurology   Psychology   Depression ,              Physical Exam    Airway    Mallampati score: II  TM Distance: >3 FB  Neck ROM: full     Dental       Cardiovascular  Rhythm: regular, Rate: normal,     Pulmonary  Breath sounds clear to auscultation,     Other Findings        Anesthesia Plan  ASA Score- 3     Anesthesia Type- general with ASA Monitors  Additional Monitors:   Airway Plan: LMA  Plan Factors-    Induction- intravenous  Postoperative Plan- Plan for postoperative opioid use  Planned trial extubation    Informed Consent- Anesthetic plan and risks discussed with patient  I personally reviewed this patient with the CRNA  Discussed and agreed on the Anesthesia Plan with the CRNA  Winnie Chirinos

## 2018-06-11 NOTE — PROGRESS NOTES
Progress Note - Orthopedics   Collin Srinivasan 66 y o  male MRN: [id number]  Unit/Bed#: OR POOL Encounter: [id number]        Subjective: Patient status post I&D right knee infected burs and joint 3 days ago  Patient feels well  Notes mild pain about the knee  Vac sponge has been in place and draining appropriately  WBC count has been increasing mildly  Vitals: Blood pressure 132/74, pulse (!) 110, temperature 99 3 °F (37 4 °C), temperature source Oral, resp  rate 19, height 5' 9" (1 753 m), weight 86 2 kg (190 lb 0 2 oz), SpO2 96 %  ,Body mass index is 28 06 kg/m²  No intake or output data in the 24 hours ending 06/11/18 1600    Invasive Devices     Peripheral Intravenous Line            Long-Dwell Peripheral IV (Midline) 67/01/68 Left Basilic 4 days          Drain            Negative Pressure Wound Therapy (V A C ) Knee Right 2 days                    Ortho Exam: Right knee: Vac in place  Mild swelling and erythema  Compartments soft  Moves toes freely  Good capillary refill  2+ DP pulse  Sensation intact saphenous, sural, deep/superficial peroneal nerve distributions  Lab, Imaging and other studies: I have personally reviewed pertinent lab results    CBC:   Lab Results   Component Value Date    WBC 13 77 (H) 06/11/2018    HGB 11 3 (L) 06/11/2018    HCT 34 7 (L) 06/11/2018    MCV 95 06/11/2018     06/11/2018    MCH 31 0 06/11/2018    MCHC 32 6 06/11/2018    RDW 13 0 06/11/2018    MPV 8 9 06/11/2018    NRBC 0 06/08/2018     CMP:   Lab Results   Component Value Date     (L) 06/11/2018    CL 98 (L) 06/11/2018    CO2 28 06/11/2018    ANIONGAP 8 06/11/2018    BUN 11 06/11/2018    CREATININE 0 77 06/11/2018    GLUCOSE 116 06/11/2018    CALCIUM 8 9 06/11/2018    AST 35 06/05/2018    ALT 74 06/05/2018    ALKPHOS 103 06/05/2018    PROT 8 2 06/05/2018    BILITOT 0 30 06/05/2018    EGFR 98 06/11/2018     PT/INR:   Lab Results   Component Value Date    INR 0 92 06/05/2018       Assessment:  Infected right knee bursa and joint  Plan:  Repeat I&D joint and bursa today  New Vac placement versus wound closure

## 2018-06-11 NOTE — CASE MANAGEMENT
Continued Stay Review    Date: 6/9/18    Vital Signs: /76 (BP Location: Right arm)   Pulse 98   Temp 98 7 °F (37 1 °C) (Oral)   Resp 18   Ht 5' 9" (1 753 m)   Wt 86 2 kg (190 lb 0 2 oz)   SpO2 95%   BMI 28 06 kg/m²       POD 1  IV MORPHINE  IV MAG SULFATE 2GM  URINE OSMOLALITY NA URINE RANDOM  ANAEROBIC CULT AND GS  NEUROVASCULAR CHECKS     Medications:   Scheduled Meds:   Current Facility-Administered Medications:  acetaminophen 650 mg Oral Q8H [address] SANCHO Root    ALPRAZolam 0 5 mg Oral BID PRN SANCHO Melvin    aluminum-magnesium hydroxide-simethicone 30 mL Oral Q6H PRN SANCHO Melvin    cefTRIAXone 2,000 mg Intravenous Q24H SANCHO Root Last Rate: 2,000 mg (06/10/18 1433)   docusate sodium 100 mg Oral BID SANCHO Root    enalapril 15 mg Oral Daily SANCHO Melvin    famotidine 20 mg Oral Daily SANCHO Root    heparin (porcine) 5,000 Units Subcutaneous Q12H [address] SANCHO Root    ibuprofen 400 mg Oral Q12H SANCHO Root    insulin lispro 1-5 Units Subcutaneous TID AC SANCHO Melvin    insulin lispro 1-5 Units Subcutaneous HS SANCHO Melvin    morphine injection 2 mg Intravenous Q4H PRN SANCHO Root    ondansetron 4 mg Intravenous Q6H PRN SANCHO Melvni    oxyCODONE-acetaminophen 1 tablet Oral Q6H PRN Papo Alberto, SANCHO    polyethylene glycol 17 g Oral Daily PRN SANCHO Root    pravastatin 80 mg Oral Daily With Dinner Papo Alberto, SANCHO    saccharomyces boulardii 250 mg Oral BID Cuijosé Alberto, SANCHO    venlafaxine 75 mg Oral BID With Meals Papo Alberto, SANCHO    zolpidem 5 mg Oral HS PRN SANCHO Root      Continuous Infusions:    PRN Meds: ALPRAZolam    aluminum-magnesium hydroxide-simethicone    morphine injection    ondansetron    oxyCODONE-acetaminophen    polyethylene glycol    zolpidem    Abnormal Labs/Diagnostic Results:  CL 95 GLUCOSE 144   Tissue Culture 4+ Growth of Staphylococcus aureus               Age/Sex: 66 y o  male     OR:6/8/18  Procedure:  Right knee arthroscopy with irrigation and debridement of the joint and right knee irrigation and extensive debridement of the prepatellar bursa with placement of VAC sponge  Operative Findings:  Right knee with what appeared to be infected joint fluid that was irrigated thoroughly utilizing the arthroscope with 3 L of irrigation  It was however not nearly as purulent with infection as the prepatellar bursa which was infected in the infrapatellar region as well as prepatellar and suprapatellar region  We did start with multiple smaller incisions about the right knee in the areas of fluctuance however we did make a large anterior incision as we demonstrated copious amounts of purulence and what appeared to be necrotic bursa tissue  This required extensive debridement  We did get an excellent visualization of this with excellent debridement sharply with scissors  We placed a VAC sponge and we do plan to come back on Monday for further irrigation and debridement      SURGERY POD 1  And is now postoperative day 1  Status post right knee irrigation debridement of grossly infected bursa as well as irrigation and debridement of infected joint  He states that his pain is much better today  He has a VAC sponge that is on and working well  Physical examination:  The right knee has  less swelling and less erythema today on examination  The VAC sponge is working nicely and is intact  He has good neurovascular function into the right lower extremity  He does have a white blood cell count is trending down quite nicely    Assessment:  Right knee prepatellar septic bursitis as well as septic arthritis    ATTENDING  * Sepsis ([address] )  · POD #1 S/p right knee irrigation debridement of infected bursa and irrigation and debridement of infected joint on 6/8/18  · Per ID, continue on Rocephin 2gm IVPB daily until line has been ruled out, day #4  · Right knee aspirate culture growing Staph Aureus   · Follow-up final blood cultures, aspiration fluid for gram stain and culture and Lyme, and Lyme PCR   · Monitor for worsening signs of infection  · Repeat CBC in am   · Probiotic while on ABX  Infrapatellar bursitis of right knee  · POD day #1 S/p right knee irrigation debridement of infected bursa as well as irrigation and debridement of infected joint  · Wound VAC intact and functioning well  · Plan for repeat irrigation and debridement of the right knee on Monday with VAC change  ? Consider performing closure on Monday if wound looks good per Ortho  · Continue Rocephin 2gm IVPB daily per ID recs, day #4  · Preliminary right knee aspirate growing Staph Aureus   · Pain control   · Rest, elevation  · Neurovascular checks  · PT in light of right knee wound VAC and leg brace  Hyponatremia   Assessment & Plan     Mild  Sodium 134 -> 132 -> 133 -> 130, possible medication side effect from Effexor  · Check urine sodium and osmolality as well as serum osmolality  · Continue IV fluids, normal saline solution   · Repeat BMP in am        PER ID  Assessment/Plan: This is a middle-age man with above list of comorbidities who had presented with signs of sepsis, found to have prepatellar/infrapatellar bursitis and septic arthritis of the right knee along with cellulitis of the right leg  Methicillin sensitive Staphylococcus aureus is the microbial etiology  He has had arthroscopic surgery for debridement and irrigation  He is postoperative day 1  His courses satisfactory  Continue current treatment      The Lyme disease serology occult tests are reported negative

## 2018-06-11 NOTE — PROGRESS NOTES
Progress Note - Nelson Bhandari 1955, 66 y o  male MRN: [id number]    Unit/Bed#: 2 [place] [address] Encounter: [id number]    Primary Care Provider: Tram Dillon MD   Date and time admitted to hospital: 6/5/2018  7:38 PM        * Sepsis [facility])   Assessment & Plan    As evidenced by WBC 14 22, , due to right knee bursitis and septic arthritis with right lower extremity cellulitis   Afebrile  Persistent tachycardia  WBC 12 44 -> 13 77   Lyme negative  Right knee aspirate and tissue cultures growing MSSA  · ID and orthopedics following, appreciate recommendations  · POD #3 S/p right knee surgical irrigation debridement of bursa and infected joint on 6/8/18  · Plan for repeat surgical irrigation and debridement today, 6/11/18   · Per ID, continue on Rocephin 2gm IVPB daily, day #6  · Will need outpatient ABX with daily Rocephin in the infusion center for septic arthritis   · Check echocardiogram today for possible endocarditis considering persistent tachycardia   · Monitor for worsening signs of infection  · Repeat CBC in am   · Probiotic while on ABX        Staphylococcal arthritis of right knee [facility])   Assessment & Plan    ID and orthopedics following, appreciate recommendations  POD #3 S/p right knee surgical irrigation debridement of bursa and infected joint on 6/8/18  Wound VAC therapy to right knee   Right knee aspirate and tissue cultures growing MSSA  · Plan for repeat surgical irrigation and debridement today, 6/11/18   · Per ID, continue on Rocephin 2gm IVPB daily, day #6  · Will need outpatient ABX with daily Rocephin in the infusion center for septic arthritis         Infrapatellar bursitis of right knee   Assessment & Plan    ID and orthopedics following, appreciate recommendations  POD #3 S/p right knee surgical irrigation debridement of bursa and infected joint on 6/8/18  Wound VAC therapy to right knee   Right knee aspirate and tissue cultures growing MSSA  · Plan for repeat surgical irrigation and debridement today, 6/11/18   · Per ID, continue on Rocephin 2gm IVPB daily, day #6  · Will need outpatient ABX with daily Rocephin in the infusion center for septic arthritis         Cellulitis of right lower extremity   Assessment & Plan    With prepatellar bursitis and septic arthritis   · Right knee x-ray, small suprapatellar joint effusion, prepatellar bursitis, and more focal soft tissue swelling overlying the anterior tibial tubercle, suspicious for superimposed infrapatellar bursitis   If there is clinical concern for septic arthritis, consider follow-up arthrocentesis  · Venous Doppler to rule out DVT, negative   · Right knee aspirate and tissue culture, growing MSSA  · On Rocephin 2gm IVPB daily per ID recs, day #6  · Will need outpatient Rocephin infusions for septic arthritis   · Pain control with scheduled Motrin, Percocet and Morphine PRN  · Rest, elevation  · Neurovascular checks        Hyponatremia   Assessment & Plan    Worsening during admission  Sodium 134 -> 132 -> 133 -> 130, possible SIADH from uncontrolled pain  Less likely due to Effexor as patient's Na has worsened  Serum osmo low, however, urine osmo wnl  No improvement on IVF  TSH and urine acid wnl  With fluid restriction, Na imrpved from 130 -> 132 -> 134   · Continue fluid restriction, 1200 mL/day   · Adequate pain control    · Repeat BMP in am         Thrombocytosis (HCC)resolved as of 6/10/2018   Assessment & Plan    Mild  Resolved  Likely reactive to sepsis  · Continue Heparin subcu for DVT prophylaxis  Depression   Assessment & Plan    Continue Effexor        Hypertension   Assessment & Plan    Continue Enalapril  Pain control  Monitor          Borderline diabetes   Assessment & Plan    Check A1c, 7 0  · Enforce TLCs  · Outpatient F/U        Hyperlipidemia   Assessment & Plan    Continue statin            VTE Pharmacologic Prophylaxis:   Pharmacologic: Heparin  Mechanical VTE Prophylaxis in Place: Yes    Patient Centered Rounds: I have performed bedside rounds with nursing staff today  Discussions with Specialists or Other Care Team Provider: Nursing, CM, my attending, ID/ ortho notes appreciated     Education and Discussions with Family / Patient: I have answered all questions to the best of my ability  Time Spent for Care: 20 minutes  More than 50% of total time spent on counseling and coordination of care as described above  Current Length of Stay: 6 day(s)    Current Patient Status: Inpatient   Certification Statement: The patient will continue to require additional inpatient hospital stay due to sepsis 2/2 right knee cellulitis with bursitis and septic arthritis requiring OR debridement and washout with another washout scheduled for today    Discharge Plan: Patient is not medically stable for discharge today, likely in 24 hours pending progress  Code Status: Level 1 - Full Code      Subjective:   Patient observed in place  Completed echocardiogram this morning  Awaiting surgery this afternoon  Patient educated on NPO status  Patient states he ate crackers with salt on top yesterday in hopes to increase his sodium  Reports right knee pain is a 3/10 but better controlled than yesterday  Denies HA, CP, SOB  Objective:     Vitals:   Temp (24hrs), Av 8 °F (37 1 °C), Min:97 6 °F (36 4 °C), Max:100 °F (37 8 °C)    HR:  [] 98  Resp:  [18] 18  BP: (138-149)/(76-78) 138/76  SpO2:  [95 %-96 %] 95 %  Body mass index is 28 06 kg/m²  Input and Output Summary (last 24 hours): Intake/Output Summary (Last 24 hours) at 18 0840  Last data filed at 06/10/18 1433   Gross per 24 hour   Intake              100 ml   Output                0 ml   Net              100 ml       Physical Exam:     Physical Exam   Constitutional: He is oriented to person, place, and time  He appears well-developed  No distress  HENT:   Head: Normocephalic  Neck: Normal range of motion     Cardiovascular: Normal rate, regular rhythm and intact distal pulses  Pulmonary/Chest: Effort normal and breath sounds normal  No respiratory distress  He has no wheezes  He has no rhonchi  He has no rales  Abdominal: Soft  Bowel sounds are normal  He exhibits no distension  There is no tenderness  Musculoskeletal: He exhibits edema (RLE trace) and tenderness (right knee)  Neurological: He is alert and oriented to person, place, and time  Skin: Skin is warm and dry  He is not diaphoretic  There is erythema (RLE, greater around right knee with bruising)  Right knee with VAC therapy in place, draining moderate amount of sanguinous fluid  Seal leak checked, no leak  Psychiatric: He has a normal mood and affect  Judgment normal    Nursing note and vitals reviewed  Additional Data:     Labs:      Results from last 7 days  Lab Units 06/11/18  0556  06/08/18  0623   WBC Thousand/uL 13 77*  < > 10 19*   HEMOGLOBIN g/dL 11 3*  < > 11 2*   HEMATOCRIT % 34 7*  < > 35 0*   PLATELETS Thousands/uL 372  < > 307   NEUTROS PCT %  --   --  68   LYMPHS PCT %  --   --  20   MONOS PCT %  --   --  10   EOS PCT %  --   --  1   < > = values in this interval not displayed  Results from last 7 days  Lab Units 06/11/18  0556  06/05/18 2128   SODIUM mmol/L 134*  < > 134*   POTASSIUM mmol/L 4 1  < > 3 8   CHLORIDE mmol/L 98*  < > 97*   CO2 mmol/L 28  < > 28   BUN mg/dL 11  < > 19   CREATININE mg/dL 0 77  < > 0 77   CALCIUM mg/dL 8 9  < > 9 4   TOTAL PROTEIN g/dL  --   --  8 2   BILIRUBIN TOTAL mg/dL  --   --  0 30   ALK PHOS U/L  --   --  103   ALT U/L  --   --  74   AST U/L  --   --  35   GLUCOSE RANDOM mg/dL 116  < > 95   < > = values in this interval not displayed  Results from last 7 days  Lab Units 06/05/18 2128   INR  0 92       * I Have Reviewed All Lab Data Listed Above  * Additional Pertinent Lab Tests Reviewed:  All Labs Within Last 24 Hours Reviewed    Imaging:    Imaging Reports Reviewed Today Include: Right knee xray  Imaging Personally Reviewed by Myself Includes: None    Recent Cultures (last 7 days):       Results from last 7 days  Lab Units 06/08/18 2050 06/08/18 2045 06/08/18 2040 06/08/18 2028 06/06/18 1924 06/06/18 1923 06/05/18 2128   BLOOD CULTURE   --   --   --   --   --   --  No Growth After 4 Days  No Growth After 4 Days     GRAM STAIN RESULT  1+ Polys  3+ Gram positive cocci in pairs Rare Polys  1+ Gram positive cocci in pairs 1+ Polys  1+ Gram positive cocci in pairs 1+ Polys  No bacteria seen 4+ Polys  4+ Gram positive cocci in pairs, chains and clusters 2+ Polys  No bacteria seen  --    BODY FLUID CULTURE, STERILE   --  3+ Growth of Staphylococcus aureus* 3+ Growth of Staphylococcus aureus* 1+ Growth of Staphylococcus aureus* 4+ Growth of Staphylococcus aureus* Few Colonies of Staphylococcus aureus*  --        Last 24 Hours Medication List:     Current Facility-Administered Medications:  acetaminophen 650 mg Oral Q8H [facility] [place], SANCHO    ALPRAZolam 0 5 mg Oral BID PRN SANCHO Melvin    aluminum-magnesium hydroxide-simethicone 30 mL Oral Q6H PRN SANCHO Melvin    cefTRIAXone 2,000 mg Intravenous Q24H Derrick FuelSANCHO Last Rate: 2,000 mg (06/10/18 1433)   docusate sodium 100 mg Oral BID Derrick Fuel, SANCHO    enalapril 15 mg Oral Daily SANCHO Melvin    famotidine 20 mg Oral Daily Derrick Fuel, SANCHO    heparin (porcine) 5,000 Units Subcutaneous Q12H [facility] Derrick Fuel, SANCHO    ibuprofen 400 mg Oral Q12H Derrick Fuel, SANCHO    insulin lispro 1-5 Units Subcutaneous TID AC SANCHO Melivn    insulin lispro 1-5 Units Subcutaneous HS SANCHO Melvin    morphine injection 2 mg Intravenous Q4H PRN Derrick Fuel, SANCHO    ondansetron 4 mg Intravenous Q6H PRN SANCHO Melvin    oxyCODONE-acetaminophen 1 tablet Oral Q6H PRN SANCHO Melvin    polyethylene glycol 17 g Oral Daily PRN Derrick Fuel, CRNP    pravastatin 80 mg Oral Daily With Dinner SANCHO Melvin    saccharomyces boulardii 250 mg Oral BID SANCHO Melvin    venlafaxine 75 mg Oral BID With Meals SANCHO Melvin    zolpidem 5 mg Oral HS PRN SANCHO Rodríguez         Today, Patient Was Seen By: SANCHO Rodríguez    ** Please Note: Dictation voice to text software may have been used in the creation of this document   **

## 2018-06-12 LAB
ALBUMIN SERPL BCP-MCNC: 2 G/DL (ref 3.5–5)
ALP SERPL-CCNC: 77 U/L (ref 46–116)
ALT SERPL W P-5'-P-CCNC: 38 U/L (ref 12–78)
ANION GAP SERPL CALCULATED.3IONS-SCNC: 8 MMOL/L (ref 4–13)
AST SERPL W P-5'-P-CCNC: 25 U/L (ref 5–45)
BASOPHILS # BLD AUTO: 0.03 THOUSANDS/ΜL (ref 0–0.1)
BASOPHILS NFR BLD AUTO: 0 % (ref 0–1)
BILIRUB SERPL-MCNC: 0.2 MG/DL (ref 0.2–1)
BUN SERPL-MCNC: 9 MG/DL (ref 5–25)
CALCIUM SERPL-MCNC: 8.7 MG/DL (ref 8.3–10.1)
CHLORIDE SERPL-SCNC: 102 MMOL/L (ref 100–108)
CO2 SERPL-SCNC: 28 MMOL/L (ref 21–32)
CREAT SERPL-MCNC: 0.75 MG/DL (ref 0.6–1.3)
EOSINOPHIL # BLD AUTO: 0.13 THOUSAND/ΜL (ref 0–0.61)
EOSINOPHIL NFR BLD AUTO: 1 % (ref 0–6)
ERYTHROCYTE [DISTWIDTH] IN BLOOD BY AUTOMATED COUNT: 13.2 % (ref 11.6–15.1)
GFR SERPL CREATININE-BSD FRML MDRD: 99 ML/MIN/1.73SQ M
GLUCOSE SERPL-MCNC: 126 MG/DL (ref 65–140)
GLUCOSE SERPL-MCNC: 149 MG/DL (ref 65–140)
GLUCOSE SERPL-MCNC: 185 MG/DL (ref 65–140)
GLUCOSE SERPL-MCNC: 230 MG/DL (ref 65–140)
GLUCOSE SERPL-MCNC: 98 MG/DL (ref 65–140)
HCT VFR BLD AUTO: 33.4 % (ref 36.5–49.3)
HGB BLD-MCNC: 10.6 G/DL (ref 12–17)
IMM GRANULOCYTES # BLD AUTO: 0.03 THOUSAND/UL (ref 0–0.2)
IMM GRANULOCYTES NFR BLD AUTO: 0 % (ref 0–2)
LYMPHOCYTES # BLD AUTO: 1.68 THOUSANDS/ΜL (ref 0.6–4.47)
LYMPHOCYTES NFR BLD AUTO: 16 % (ref 14–44)
MAGNESIUM SERPL-MCNC: 1.9 MG/DL (ref 1.6–2.6)
MAGNESIUM SERPL-MCNC: 2 MG/DL (ref 1.6–2.6)
MCH RBC QN AUTO: 30.7 PG (ref 26.8–34.3)
MCHC RBC AUTO-ENTMCNC: 31.7 G/DL (ref 31.4–37.4)
MCV RBC AUTO: 97 FL (ref 82–98)
MONOCYTES # BLD AUTO: 0.82 THOUSAND/ΜL (ref 0.17–1.22)
MONOCYTES NFR BLD AUTO: 8 % (ref 4–12)
NEUTROPHILS # BLD AUTO: 8.13 THOUSANDS/ΜL (ref 1.85–7.62)
NEUTS SEG NFR BLD AUTO: 75 % (ref 43–75)
NRBC BLD AUTO-RTO: 0 /100 WBCS
PLATELET # BLD AUTO: 371 THOUSANDS/UL (ref 149–390)
PMV BLD AUTO: 8.7 FL (ref 8.9–12.7)
POTASSIUM SERPL-SCNC: 4 MMOL/L (ref 3.5–5.3)
PROT SERPL-MCNC: 6.3 G/DL (ref 6.4–8.2)
RBC # BLD AUTO: 3.45 MILLION/UL (ref 3.88–5.62)
SODIUM SERPL-SCNC: 138 MMOL/L (ref 136–145)
WBC # BLD AUTO: 10.82 THOUSAND/UL (ref 4.31–10.16)

## 2018-06-12 PROCEDURE — 80053 COMPREHEN METABOLIC PANEL: CPT | Performed by: NURSE PRACTITIONER

## 2018-06-12 PROCEDURE — 97110 THERAPEUTIC EXERCISES: CPT

## 2018-06-12 PROCEDURE — 82948 REAGENT STRIP/BLOOD GLUCOSE: CPT

## 2018-06-12 PROCEDURE — 83735 ASSAY OF MAGNESIUM: CPT | Performed by: NURSE PRACTITIONER

## 2018-06-12 PROCEDURE — 93306 TTE W/DOPPLER COMPLETE: CPT | Performed by: INTERNAL MEDICINE

## 2018-06-12 PROCEDURE — 99233 SBSQ HOSP IP/OBS HIGH 50: CPT | Performed by: INTERNAL MEDICINE

## 2018-06-12 PROCEDURE — 99024 POSTOP FOLLOW-UP VISIT: CPT | Performed by: PHYSICIAN ASSISTANT

## 2018-06-12 PROCEDURE — 85025 COMPLETE CBC W/AUTO DIFF WBC: CPT | Performed by: NURSE PRACTITIONER

## 2018-06-12 RX ORDER — ALPRAZOLAM 0.5 MG/1
0.5 TABLET ORAL 2 TIMES DAILY
Status: DISCONTINUED | OUTPATIENT
Start: 2018-06-12 | End: 2018-06-16 | Stop reason: HOSPADM

## 2018-06-12 RX ORDER — ALPRAZOLAM 0.5 MG/1
0.5 TABLET ORAL ONCE
Status: COMPLETED | OUTPATIENT
Start: 2018-06-12 | End: 2018-06-12

## 2018-06-12 RX ADMIN — ALPRAZOLAM 0.5 MG: 0.5 TABLET ORAL at 07:44

## 2018-06-12 RX ADMIN — OXYCODONE HYDROCHLORIDE AND ACETAMINOPHEN 1 TABLET: 5; 325 TABLET ORAL at 06:40

## 2018-06-12 RX ADMIN — HEPARIN SODIUM 5000 UNITS: 5000 INJECTION, SOLUTION INTRAVENOUS; SUBCUTANEOUS at 22:18

## 2018-06-12 RX ADMIN — ACETAMINOPHEN 650 MG: 325 TABLET ORAL at 22:13

## 2018-06-12 RX ADMIN — ACETAMINOPHEN 650 MG: 325 TABLET ORAL at 14:03

## 2018-06-12 RX ADMIN — INSULIN LISPRO 2 UNITS: 100 INJECTION, SOLUTION INTRAVENOUS; SUBCUTANEOUS at 17:01

## 2018-06-12 RX ADMIN — ALPRAZOLAM 0.5 MG: 0.5 TABLET ORAL at 18:37

## 2018-06-12 RX ADMIN — ALPRAZOLAM 0.5 MG: 0.5 TABLET ORAL at 01:13

## 2018-06-12 RX ADMIN — SODIUM CHLORIDE 75 ML/HR: 0.9 INJECTION, SOLUTION INTRAVENOUS at 10:54

## 2018-06-12 RX ADMIN — IBUPROFEN 400 MG: 400 TABLET ORAL at 22:15

## 2018-06-12 RX ADMIN — CEFTRIAXONE 2000 MG: 2 INJECTION, SOLUTION INTRAVENOUS at 14:05

## 2018-06-12 RX ADMIN — PRAVASTATIN SODIUM 80 MG: 80 TABLET ORAL at 15:36

## 2018-06-12 RX ADMIN — ALPRAZOLAM 0.5 MG: 0.5 TABLET ORAL at 14:03

## 2018-06-12 RX ADMIN — Medication 250 MG: at 17:02

## 2018-06-12 RX ADMIN — OXYCODONE HYDROCHLORIDE AND ACETAMINOPHEN 1 TABLET: 5; 325 TABLET ORAL at 22:21

## 2018-06-12 RX ADMIN — ACETAMINOPHEN 650 MG: 325 TABLET ORAL at 06:28

## 2018-06-12 RX ADMIN — ZOLPIDEM TARTRATE 10 MG: 5 TABLET ORAL at 22:23

## 2018-06-12 RX ADMIN — DOCUSATE SODIUM 100 MG: 100 CAPSULE, LIQUID FILLED ORAL at 22:14

## 2018-06-12 RX ADMIN — VENLAFAXINE 75 MG: 37.5 TABLET ORAL at 15:34

## 2018-06-12 RX ADMIN — Medication 250 MG: at 14:05

## 2018-06-12 RX ADMIN — FAMOTIDINE 20 MG: 20 TABLET ORAL at 14:07

## 2018-06-12 NOTE — PHYSICAL THERAPY NOTE
PT TREATMENT     06/12/18 1600   Pain Assessment   Pain Assessment Retana-Baker FACES   Retana-Baker FACES Pain Rating 4  (LS and Bhip areas with" inactivity")   Restrictions/Precautions   Braces or Orthoses LE Immobilizer  (R LE knee immobilizer)   Other Precautions Fall Risk;Hard of hearing  (wound vac)   General   Chart Reviewed Yes   Family/Caregiver Present No   Cognition   Arousal/Participation Cooperative   Subjective   Subjective patient reports wanting to walk more due to development of LS and B hip "ache" with bed rest   Bed Mobility   Supine to Sit 5  Supervision   Transfers   Sit to Stand 5  Supervision   Stand to Sit 5  Supervision   Ambulation/Elevation   Gait Assistance 5  Supervision   Assistive Device Rolling walker  (R LE knee immobilizer)   Distance 300 feet with change in direction   Stair Management Assistance 5  Supervision   Stair Management Technique Two rails; Step to pattern   Number of Stairs 4   Exercises   Balance training  sidestepping, backward walking and  several changes in direction with gait all completed without balance loss  patient also ambulated to bathroom for independent toileting at end of session and nurse aware of patient in bathroom and will call when finished  for guidance back to bed   Assessment   Assessment Patient tolerating gait and transfers well with R LE knee immobilizer in place  Pateint also wanting to continue gait with roller walker, feeling secure with the assistance given by equipment  Patient able to maneuver stairs with 2 rails as simulating home setting but with only one step at home  Nurse aware of patient's functional ability and to encourge increased OOB time  Patient will benefit from conitnued PT with progression to independent function  Plan   Treatment/Interventions ADL retraining;Functional transfer training;LE strengthening/ROM; Elevations; Therapeutic exercise; Endurance training;Patient/family training;Equipment eval/education; Bed mobility;Gait training   Recommendation   Recommendation (home)   Licensure   [place] License Number  August Led PT [id number]

## 2018-06-12 NOTE — NURSING NOTE
Found pt in room with wound vac disconnected, and pt stated he disconnected himself because he stated "I am sick of being connected to this thing " Provided teaching reinforcement about importance of maintaining wound vac as ordered  Re-connected wound vac as ordered  Will continue to monitor

## 2018-06-12 NOTE — PROGRESS NOTES
Progress Note - Orthopedics   Devyn Srinivasan 66 y o  male MRN: [id number]  Unit/Bed#: 2 [place] [address] Encounter: [id number]    Assessment:  POD #1 s/p repeat right incision and drainage of infrapatellar bursa and irrigation/washout of right knee - patient reports pain and swelling have improved, wound VAC in place, in knee immobilizer, pain appears well controlled, neurovascularly intact, leukocytosis reduced compared to yesterday, appears to be consistent with septic arthritis and infrapatellar bursitis, AVSS, 3+ staph in bursa and synovial fluid       Plan:  POD #1 s/p repeat right incision and drainage of infrapatellar bursa and irrigation/washout of right knee-   - Antibiotics:  Continue antibiotics per primary care team and Infectious Disease, coverage for staff appears to be appropriate, staff appears to be susceptible to multiple agents per cultures  - Anticoagulation: continue DVT prophylaxis, SCDs  - Activity:  Weightbearing as tolerated, PT/OT, to continue knee immobilizer  - AM lab draw:  Repeat a m  labs with CBC, BMP, Mag, phos  - Analgesia: Continue p r n  medication  - Dressing:  Continue wound VAC in place, seal is good currently, continue knee immobilizer, continue VIJAY and wound VAC  - Disposition: We will continue to follow clinically and possibly take back to the OR for 1 more repeat irrigation and washout likely for Thursday if necessary    Weight bearing:  Weightbearing as tolerated    VTE Pharmacologic Prophylaxis: Heparin  VTE Mechanical Prophylaxis: sequential compression device    Subjective:  Patient was seen examined at bedside  Patient denies any acute events overnight  Patient reports that he was described dull by the fact that the nursing staff was not appropriately giving pain medication  Patient currently though has his pain well controlled and his lower right extremity  Patient denies any neurovascular changes    Patient denie any numbness, tingling, lack of motor movement, lack of sensation in his distal lower extremity  Patient reports that he feels that the mild erythema and swelling in the knee has gone down a little bit  Patient does report that he has some dull and aching pain up his hamstrings into his ischial tuberosity  Patient has been prolonged extension and is only sore with palpation and prolonged immobility  Vitals: Blood pressure 115/72, pulse 102, temperature 99 4 °F (37 4 °C), temperature source Oral, resp  rate 19, height 5' 9" (1 753 m), weight 86 2 kg (190 lb 0 2 oz), SpO2 98 %  ,Body mass index is 28 06 kg/m²  Intake/Output Summary (Last 24 hours) at 06/12/18 1537  Last data filed at 06/12/18 0601   Gross per 24 hour   Intake           1007 5 ml   Output              650 ml   Net            357 5 ml       Invasive Devices     Peripheral Intravenous Line            Long-Dwell Peripheral IV (Midline) 63/69/60 Left Basilic 5 days          Drain            Closed/Suction Drain Right;Lateral Knee Accordion 10 Fr  less than 1 day    Negative Pressure Wound Therapy (V A C ) Knee Anterior less than 1 day                Physical Exam: /72 (BP Location: Right arm)   Pulse 102   Temp 99 4 °F (37 4 °C) (Oral)   Resp 19   Ht 5' 9" (1 753 m)   Wt 86 2 kg (190 lb 0 2 oz)   SpO2 98%   BMI 28 06 kg/m²   General appearance: alert and oriented, in no acute distress  Head: Normocephalic, without obvious abnormality, atraumatic  Extremities: no edema, in knee immobilizer, with wound VAC and VIJAY drain  Skin: erythema and swelling around bursa and effusion, reduced minimally compared to yesterday  Ortho Exam:   Right Lower Extremity: Thigh and calf compartments are soft and nontender to palpation  + L3-S1 SILT  + DF/PF + EHL  No pain with passive stretch/extension of toes  DP 2+, capillary refill less than 2 seconds, and foot is warm B/L   Incision is open with wound vac and VIJAY drain    Lab, Imaging and other studies:   I have personally reviewed pertinent lab results    CBC:   Lab Results   Component Value Date    WBC 10 82 (H) 06/12/2018    HGB 10 6 (L) 06/12/2018    HCT 33 4 (L) 06/12/2018    MCV 97 06/12/2018     06/12/2018    MCH 30 7 06/12/2018    MCHC 31 7 06/12/2018    RDW 13 2 06/12/2018    MPV 8 7 (L) 06/12/2018    NRBC 0 06/12/2018     CMP:   Lab Results   Component Value Date     06/12/2018     06/12/2018    CO2 28 06/12/2018    ANIONGAP 8 06/12/2018    BUN 9 06/12/2018    CREATININE 0 75 06/12/2018    GLUCOSE 98 06/12/2018    CALCIUM 8 7 06/12/2018    AST 25 06/12/2018    ALT 38 06/12/2018    ALKPHOS 77 06/12/2018    PROT 6 3 (L) 06/12/2018    BILITOT 0 20 06/12/2018    EGFR 99 06/12/2018

## 2018-06-12 NOTE — PROGRESS NOTES
Patient refusing all treatments and medications, patient refused the EKG  Patient states he wants to sign out AMA and leave  Patient educated on the risks of signing out AMA without completing his treatment  Patient educated about the wound vac and hemovac drain in his right knee  Patient verbally abusive, hospitalist notified

## 2018-06-12 NOTE — PROGRESS NOTES
Progress Note - Infectious Disease   Dion Srinivasan 66 y o  male MRN: [id number]  Unit/Bed#: [address] [place] [address] Encounter: [id number]      Subjective/Objective   Subjective: The events since last seen were noted  Patient had undergone open irrigation, debridement of prepatellar bursa and right knee arthroscopic irrigation and debridement of the joint with partial closure of prepatellar bursal wound and placement of VAC sponge, yesterday  The surgery was uneventful  His interaction last night, with the nurse, noted  Apparently, he was agitated, wanted to sign out against medical advise, and was refusing treatment  Today, much calmer, realized that he had an attitude  He does have pain in his right leg/knee  Also reported that he had received four injections in his right knee in 3 weeks, from his primary physician, who was treating him for acute gout  The echocardiogram report is pending, was done yesterday      Meds/Allergies     Current Facility-Administered Medications:     acetaminophen (TYLENOL) tablet 650 mg, 650 mg, Oral, Q8H [address], SANCHO Guerrero, 650 mg at 06/12/18 9157    ALPRAZolam Noris Jethro) tablet 0 5 mg, 0 5 mg, Oral, BID PRN, SANCHO Melvin, 0 5 mg at 06/12/18 0744    aluminum-magnesium hydroxide-simethicone (MYLANTA) 200-200-20 mg/5 mL oral suspension 30 mL, 30 mL, Oral, Q6H PRN, SANCHO Melvin    cefTRIAXone (ROCEPHIN) IVPB (premix) 2,000 mg, 2,000 mg, Intravenous, Q24H, SANCHO Gamez, Last Rate: 100 mL/hr at 06/11/18 1441, 2,000 mg at 06/11/18 1441    docusate sodium (COLACE) capsule 100 mg, 100 mg, Oral, BID, SANCHO Gamez, 100 mg at 06/11/18 2134    enalapril (VASOTEC) tablet 15 mg, 15 mg, Oral, Daily, SANCHO Melvin, 15 mg at 06/11/18 1228    famotidine (PEPCID) tablet 20 mg, 20 mg, Oral, Daily, SANCHO Guerrero, Stopped at 06/11/18 1136    heparin (porcine) subcutaneous injection 5,000 Units, 5,000 Units, Subcutaneous, Q12H [address], 5,000 Units at 06/11/18 2134 **AND** [CANCELED] Platelet count, , , Once, SANCHO Melvin    ibuprofen (MOTRIN) tablet 400 mg, 400 mg, Oral, Q12H, Cher Costello, SANCHO, 400 mg at 06/11/18 2134    insulin lispro (HumaLOG) 100 units/mL subcutaneous injection 1-5 Units, 1-5 Units, Subcutaneous, TID AC, 1 Units at 06/09/18 0938 **AND** Fingerstick Glucose (POCT), , , TID AC, SANCHO Melvin    insulin lispro (HumaLOG) 100 units/mL subcutaneous injection 1-5 Units, 1-5 Units, Subcutaneous, HS, SANCHO Melvin    morphine injection 2 mg, 2 mg, Intravenous, Q4H PRN, SANCHO Horne, 2 mg at 06/10/18 1305    ondansetron (ZOFRAN) injection 4 mg, 4 mg, Intravenous, Q6H PRN, SANCHO Melvin    oxyCODONE-acetaminophen (PERCOCET) 5-325 mg per tablet 1 tablet, 1 tablet, Oral, Q6H PRN, SANCHO Melvin, 1 tablet at 06/12/18 0640    polyethylene glycol (MIRALAX) packet 17 g, 17 g, Oral, Daily PRN, SANCHO Horne    pravastatin (PRAVACHOL) tablet 80 mg, 80 mg, Oral, Daily With Dinner, SANCHO Melvin, 80 mg at 06/10/18 1756    saccharomyces boulardii (FLORASTOR) capsule 250 mg, 250 mg, Oral, BID, SANCHO Melvin, Stopped at 06/11/18 1137    sodium chloride 0 9 % infusion, 75 mL/hr, Intravenous, Continuous, Brittany Mora MD, Last Rate: 75 mL/hr at 06/11/18 2027, 75 mL/hr at 06/11/18 2027    venlafaxine (EFFEXOR) tablet 75 mg, 75 mg, Oral, BID With Meals, SANCHO Melvin, 75 mg at 06/10/18 1757    zolpidem (AMBIEN) tablet 10 mg, 10 mg, Oral, HS PRN, SANCHO Melvin, 10 mg at 06/11/18 4616  Allergies   Allergen Reactions    Iodine Anaphylaxis    Shellfish-Derived Products      all current active meds have been reviewed    discontinued meds:   Medications Discontinued During This Encounter   Medication Reason    acetaminophen (TYLENOL) tablet 650 mg     heparin (porcine) subcutaneous injection 5,000 Units     heparin (porcine) subcutaneous injection 5,000 Units     vancomycin (VANCOCIN) 1,250 mg in sodium chloride 0 9 % 250 mL IVPB     sodium chloride 0 9 % infusion     sodium chloride 0 9 % irrigation solution Patient Discharge    fentaNYL (SUBLIMAZE) injection 50 mcg Patient Transfer    HYDROmorphone (DILAUDID) injection 0 5 mg Patient Transfer    ondansetron (ZOFRAN) injection 4 mg Patient Transfer    promethazine (PHENERGAN) injection 12 5 mg Patient Transfer    meperidine (DEMEROL) injection 12 5 mg Patient Transfer    oxyCODONE-acetaminophen (PERCOCET) 5-325 mg per tablet 1 tablet Patient Transfer    cefTRIAXone (ROCEPHIN) IVPB (premix) 2,000 mg     magnesium oxide (MAG-OX) tablet 400 mg     sodium chloride 0 9 % infusion     ibuprofen (MOTRIN) tablet 400 mg     acetaminophen (TYLENOL) tablet 650 mg     zolpidem (AMBIEN) tablet 10 mg     sodium chloride 0 9 % infusion Patient Discharge    fentaNYL (SUBLIMAZE) injection 25 mcg Patient Transfer    HYDROmorphone (DILAUDID) injection 0 5 mg Patient Transfer    ondansetron (ZOFRAN) injection 4 mg Patient Transfer    diphenhydrAMINE (BENADRYL) injection 12 5 mg Patient Transfer    fentaNYL (SUBLIMAZE) injection 50 mcg Patient Transfer    HYDROmorphone (DILAUDID) injection 0 5 mg Patient Transfer    meperidine (DEMEROL) injection 12 5 mg Patient Transfer    ondansetron (ZOFRAN) injection 4 mg Patient Transfer    promethazine (PHENERGAN) injection 12 5 mg Patient Transfer    zolpidem (AMBIEN) tablet 5 mg        Physical Exam: Physical Exam    HR:  [100-110] 102  Resp:  [16-19] 19  BP: (112-137)/(69-82) 112/69  SpO2:  [96 %-98 %] 98 %  Body mass index is 28 06 kg/m²    Weight (last 2 days)     None        Temp (24hrs), Av 5 °F (36 9 °C), Min:97 6 °F (36 4 °C), Max:99 4 °F (37 4 °C)  Current: Temperature: 99 4 °F (37 4 °C)AGE@    Intake/Output Summary (Last 24 hours) at 18 1026  Last data filed at 18 0601   Gross per 24 hour   Intake           1007 5 ml   Output 650 ml   Net            357 5 ml    He is alert and not uncomfortable  His maximum temperature 99 4 degree F, periods of tachycardia  The lungs are clear  Heart sounds are regular  Murmur is unchanged  Abdomen is soft and nontender  The right lower extremity is mostly covered with the brace  The wound VAC dressing is in place and there is scant serosanguineous drainage  There is a Hemovac as well with negligible contents  The midline catheter exit site is clean          Invasive Devices     Peripheral Intravenous Line            Long-Dwell Peripheral IV (Midline) 88/47/40 Left Basilic 4 days          Drain            Closed/Suction Drain Right;Lateral Knee Accordion 10 Fr  less than 1 day    Negative Pressure Wound Therapy (V A C ) Knee Anterior less than 1 day                            Lab, Imaging and other studies:    Recent Results (from the past 96 hour(s))   Fingerstick Glucose (POCT)    Collection Time: 06/08/18 11:32 AM   Result Value Ref Range    POC Glucose 92 65 - 140 mg/dl   Fingerstick Glucose (POCT)    Collection Time: 06/08/18  4:26 PM   Result Value Ref Range    POC Glucose 72 65 - 140 mg/dl   Anaerobic culture and Gram stain    Collection Time: 06/08/18  8:28 PM   Result Value Ref Range    Anaerobic Culture No anaerobes isolated    Body fluid culture and Gram stain    Collection Time: 06/08/18  8:28 PM   Result Value Ref Range    Body Fluid Culture, Sterile 1+ Growth of Staphylococcus aureus (A)     Gram Stain Result 1+ Polys     Gram Stain Result No bacteria seen        Susceptibility    Staphylococcus aureus - TRIPP     Ampicillin ($$) >8 00 Resistant ug/ml     Cefazolin ($) <=4 00 Susceptible ug/ml     Clindamycin ($) 0 50 Susceptible ug/ml     Erythromycin ($$$$) <=0 25 Susceptible ug/ml     Gentamicin ($$) <=1 Susceptible ug/ml     Oxacillin <=0 25 Susceptible ug/ml     Tetracycline <=2 Susceptible ug/ml     Trimethoprim + Sulfamethoxazole ($$$) <=0 5/9 5 Susceptible ug/ml     Vancomycin ($) 1 00 Susceptible ug/ml   Fingerstick Glucose (POCT)    Collection Time: 06/08/18  8:39 PM   Result Value Ref Range    POC Glucose 143 (H) 65 - 140 mg/dl   Anaerobic culture and Gram stain    Collection Time: 06/08/18  8:40 PM   Result Value Ref Range    Anaerobic Culture No anaerobes isolated    Anaerobic culture and Gram stain    Collection Time: 06/08/18  8:40 PM   Result Value Ref Range    Anaerobic Culture Culture results to follow      Body fluid culture and Gram stain    Collection Time: 06/08/18  8:40 PM   Result Value Ref Range    Body Fluid Culture, Sterile 3+ Growth of Staphylococcus aureus (A)     Gram Stain Result 1+ Polys     Gram Stain Result 1+ Gram positive cocci in pairs        Susceptibility    Staphylococcus aureus - TRIPP     Ampicillin ($$) >8 00 Resistant ug/ml     Cefazolin ($) <=4 00 Susceptible ug/ml     Clindamycin ($) 0 50 Susceptible ug/ml     Erythromycin ($$$$) <=0 25 Susceptible ug/ml     Gentamicin ($$) <=1 Susceptible ug/ml     Oxacillin <=0 25 Susceptible ug/ml     Tetracycline <=2 Susceptible ug/ml     Trimethoprim + Sulfamethoxazole ($$$) <=0 5/9 5 Susceptible ug/ml     Vancomycin ($) 1 00 Susceptible ug/ml   Body fluid culture and Gram stain    Collection Time: 06/08/18  8:45 PM   Result Value Ref Range    Body Fluid Culture, Sterile 3+ Growth of Staphylococcus aureus (A)     Gram Stain Result Rare Polys     Gram Stain Result 1+ Gram positive cocci in pairs        Susceptibility    Staphylococcus aureus - TRIPP     Ampicillin ($$) >8 00 Resistant ug/ml     Cefazolin ($) <=4 00 Susceptible ug/ml     Clindamycin ($) 0 50 Susceptible ug/ml     Erythromycin ($$$$) <=0 25 Susceptible ug/ml     Gentamicin ($$) <=1 Susceptible ug/ml     Oxacillin <=0 25 Susceptible ug/ml     Tetracycline <=2 Susceptible ug/ml     Trimethoprim + Sulfamethoxazole ($$$) <=0 5/9 5 Susceptible ug/ml     Vancomycin ($) 1 00 Susceptible ug/ml   Culture, tissue and Gram stain    Collection Time: 06/08/18  8:50 PM   Result Value Ref Range    Tissue Culture 4+ Growth of Staphylococcus aureus (A)     Gram Stain Result 1+ Polys     Gram Stain Result 3+ Gram positive cocci in pairs        Susceptibility    Staphylococcus aureus - TRIPP     Ampicillin ($$) >8 00 Resistant ug/ml     Cefazolin ($) <=4 00 Susceptible ug/ml     Clindamycin ($) <=0 25 Susceptible ug/ml     Erythromycin ($$$$) <=0 25 Susceptible ug/ml     Gentamicin ($$) <=1 Susceptible ug/ml     Oxacillin <=0 25 Susceptible ug/ml     Tetracycline <=2 Susceptible ug/ml     Trimethoprim + Sulfamethoxazole ($$$) <=0 5/9 5 Susceptible ug/ml     Vancomycin ($) 1 00 Susceptible ug/ml   Basic metabolic panel    Collection Time: 06/09/18  6:17 AM   Result Value Ref Range    Sodium 130 (L) 136 - 145 mmol/L    Potassium 4 0 3 5 - 5 3 mmol/L    Chloride 95 (L) 100 - 108 mmol/L    CO2 27 21 - 32 mmol/L    Anion Gap 8 4 - 13 mmol/L    BUN 8 5 - 25 mg/dL    Creatinine 0 71 0 60 - 1 30 mg/dL    Glucose 144 (H) 65 - 140 mg/dL    Calcium 8 4 8 3 - 10 1 mg/dL    eGFR 101 ml/min/1 73sq m   Magnesium    Collection Time: 06/09/18  6:17 AM   Result Value Ref Range    Magnesium 1 7 1 6 - 2 6 mg/dL   CBC    Collection Time: 06/09/18  6:17 AM   Result Value Ref Range    WBC 9 53 4 31 - 10 16 Thousand/uL    RBC 3 51 (L) 3 88 - 5 62 Million/uL    Hemoglobin 10 9 (L) 12 0 - 17 0 g/dL    Hematocrit 33 5 (L) 36 5 - 49 3 %    MCV 95 82 - 98 fL    MCH 31 1 26 8 - 34 3 pg    MCHC 32 5 31 4 - 37 4 g/dL    RDW 13 0 11 6 - 15 1 %    Platelets 987 728 - 356 Thousands/uL    MPV 8 8 (L) 8 9 - 12 7 fL   Fingerstick Glucose (POCT)    Collection Time: 06/09/18  8:10 AM   Result Value Ref Range    POC Glucose 155 (H) 65 - 140 mg/dl   Fingerstick Glucose (POCT)    Collection Time: 06/09/18 11:06 AM   Result Value Ref Range    POC Glucose 110 65 - 140 mg/dl   Osmolality    Collection Time: 06/09/18 11:57 AM   Result Value Ref Range    Osmolality Serum 274 (L) 282 - 298 mmol/KG   Fingerstick Glucose (POCT) Collection Time: 06/09/18  4:22 PM   Result Value Ref Range    POC Glucose 130 65 - 140 mg/dl   Osmolality, urine    Collection Time: 06/09/18  7:08 PM   Result Value Ref Range    Osmolality, Ur 465 250 - 900 mmol/KG   Sodium, urine, random    Collection Time: 06/09/18  7:08 PM   Result Value Ref Range    Sodium, Ur 29    Fingerstick Glucose (POCT)    Collection Time: 06/09/18 10:21 PM   Result Value Ref Range    POC Glucose 100 65 - 140 mg/dl   Fingerstick Glucose (POCT)    Collection Time: 06/10/18  7:38 AM   Result Value Ref Range    POC Glucose 119 65 - 140 mg/dl   Basic metabolic panel    Collection Time: 06/10/18 10:25 AM   Result Value Ref Range    Sodium 132 (L) 136 - 145 mmol/L    Potassium 3 7 3 5 - 5 3 mmol/L    Chloride 97 (L) 100 - 108 mmol/L    CO2 27 21 - 32 mmol/L    Anion Gap 8 4 - 13 mmol/L    BUN 10 5 - 25 mg/dL    Creatinine 0 85 0 60 - 1 30 mg/dL    Glucose 198 (H) 65 - 140 mg/dL    Calcium 8 9 8 3 - 10 1 mg/dL    eGFR 94 ml/min/1 73sq m   CBC    Collection Time: 06/10/18 10:25 AM   Result Value Ref Range    WBC 12 44 (H) 4 31 - 10 16 Thousand/uL    RBC 3 63 (L) 3 88 - 5 62 Million/uL    Hemoglobin 11 3 (L) 12 0 - 17 0 g/dL    Hematocrit 34 5 (L) 36 5 - 49 3 %    MCV 95 82 - 98 fL    MCH 31 1 26 8 - 34 3 pg    MCHC 32 8 31 4 - 37 4 g/dL    RDW 13 0 11 6 - 15 1 %    Platelets 249 964 - 355 Thousands/uL    MPV 8 8 (L) 8 9 - 12 7 fL   TSH, 3rd generation    Collection Time: 06/10/18 10:25 AM   Result Value Ref Range    TSH 3RD GENERATON 1 082 0 358 - 3 740 uIU/mL   Uric acid    Collection Time: 06/10/18 10:25 AM   Result Value Ref Range    Uric Acid 4 2 4 2 - 8 0 mg/dL   Fingerstick Glucose (POCT)    Collection Time: 06/10/18 11:20 AM   Result Value Ref Range    POC Glucose 138 65 - 140 mg/dl   Fingerstick Glucose (POCT)    Collection Time: 06/10/18  4:11 PM   Result Value Ref Range    POC Glucose 109 65 - 140 mg/dl   Fingerstick Glucose (POCT)    Collection Time: 06/10/18  8:20 PM   Result Value Ref Range    POC Glucose 107 65 - 140 mg/dl   Magnesium    Collection Time: 06/11/18  4:19 AM   Result Value Ref Range    Magnesium 2 0 1 6 - 2 6 mg/dL   Basic metabolic panel    Collection Time: 06/11/18  5:56 AM   Result Value Ref Range    Sodium 134 (L) 136 - 145 mmol/L    Potassium 4 1 3 5 - 5 3 mmol/L    Chloride 98 (L) 100 - 108 mmol/L    CO2 28 21 - 32 mmol/L    Anion Gap 8 4 - 13 mmol/L    BUN 11 5 - 25 mg/dL    Creatinine 0 77 0 60 - 1 30 mg/dL    Glucose 116 65 - 140 mg/dL    Calcium 8 9 8 3 - 10 1 mg/dL    eGFR 98 ml/min/1 73sq m   CBC    Collection Time: 06/11/18  5:56 AM   Result Value Ref Range    WBC 13 77 (H) 4 31 - 10 16 Thousand/uL    RBC 3 64 (L) 3 88 - 5 62 Million/uL    Hemoglobin 11 3 (L) 12 0 - 17 0 g/dL    Hematocrit 34 7 (L) 36 5 - 49 3 %    MCV 95 82 - 98 fL    MCH 31 0 26 8 - 34 3 pg    MCHC 32 6 31 4 - 37 4 g/dL    RDW 13 0 11 6 - 15 1 %    Platelets 198 240 - 517 Thousands/uL    MPV 8 9 8 9 - 12 7 fL   Fingerstick Glucose (POCT)    Collection Time: 06/11/18  7:29 AM   Result Value Ref Range    POC Glucose 131 65 - 140 mg/dl   Fingerstick Glucose (POCT)    Collection Time: 06/11/18 11:11 AM   Result Value Ref Range    POC Glucose 121 65 - 140 mg/dl   Fingerstick Glucose (POCT)    Collection Time: 06/11/18  9:25 PM   Result Value Ref Range    POC Glucose 106 65 - 140 mg/dl   Comprehensive metabolic panel    Collection Time: 06/12/18  6:43 AM   Result Value Ref Range    Sodium 138 136 - 145 mmol/L    Potassium 4 0 3 5 - 5 3 mmol/L    Chloride 102 100 - 108 mmol/L    CO2 28 21 - 32 mmol/L    Anion Gap 8 4 - 13 mmol/L    BUN 9 5 - 25 mg/dL    Creatinine 0 75 0 60 - 1 30 mg/dL    Glucose 98 65 - 140 mg/dL    Calcium 8 7 8 3 - 10 1 mg/dL    AST 25 5 - 45 U/L    ALT 38 12 - 78 U/L    Alkaline Phosphatase 77 46 - 116 U/L    Total Protein 6 3 (L) 6 4 - 8 2 g/dL    Albumin 2 0 (L) 3 5 - 5 0 g/dL    Total Bilirubin 0 20 0 20 - 1 00 mg/dL    eGFR 99 ml/min/1 73sq m   CBC and differential Collection Time: 06/12/18  6:43 AM   Result Value Ref Range    WBC 10 82 (H) 4 31 - 10 16 Thousand/uL    RBC 3 45 (L) 3 88 - 5 62 Million/uL    Hemoglobin 10 6 (L) 12 0 - 17 0 g/dL    Hematocrit 33 4 (L) 36 5 - 49 3 %    MCV 97 82 - 98 fL    MCH 30 7 26 8 - 34 3 pg    MCHC 31 7 31 4 - 37 4 g/dL    RDW 13 2 11 6 - 15 1 %    MPV 8 7 (L) 8 9 - 12 7 fL    Platelets 309 381 - 583 Thousands/uL    nRBC 0 /100 WBCs    Neutrophils Relative 75 43 - 75 %    Immat GRANS % 0 0 - 2 %    Lymphocytes Relative 16 14 - 44 %    Monocytes Relative 8 4 - 12 %    Eosinophils Relative 1 0 - 6 %    Basophils Relative 0 0 - 1 %    Neutrophils Absolute 8 13 (H) 1 85 - 7 62 Thousands/µL    Immature Grans Absolute 0 03 0 00 - 0 20 Thousand/uL    Lymphocytes Absolute 1 68 0 60 - 4 47 Thousands/µL    Monocytes Absolute 0 82 0 17 - 1 22 Thousand/µL    Eosinophils Absolute 0 13 0 00 - 0 61 Thousand/µL    Basophils Absolute 0 03 0 00 - 0 10 Thousands/µL   Magnesium    Collection Time: 06/12/18  6:43 AM   Result Value Ref Range    Magnesium 1 9 1 6 - 2 6 mg/dL   Fingerstick Glucose (POCT)    Collection Time: 06/12/18  8:32 AM   Result Value Ref Range    POC Glucose 185 (H) 65 - 140 mg/dl       Results from last 7 days  Lab Units 06/12/18  0643 06/11/18  0419 06/09/18  0617   MAGNESIUM mg/dL 1 9 2 0 1 7           Results from last 7 days  Lab Units 06/05/18 2128   INR  0 92   PTT seconds 29     No results found for: TROPONINT  ABG:No results found for: PHART, BYT4BIF, PO2ART, XBG5XJZ, V9FVKSQC, BEART, SOURCE        Cultures    Results from last 7 days  Lab Units 06/08/18 2050 06/08/18 2045 06/08/18 2040 06/08/18 2028 06/06/18 1924 06/06/18 1923 06/05/18 2128   BLOOD CULTURE   --   --   --   --   --   --  No Growth After 5 Days  No Growth After 5 Days     GRAM STAIN RESULT  1+ Polys  3+ Gram positive cocci in pairs Rare Polys  1+ Gram positive cocci in pairs 1+ Polys  1+ Gram positive cocci in pairs 1+ Polys  No bacteria seen 4+ Polys 4+ Gram positive cocci in pairs, chains and clusters 2+ Polys  No bacteria seen  --    BODY FLUID CULTURE, STERILE   --  3+ Growth of Staphylococcus aureus* 3+ Growth of Staphylococcus aureus* 1+ Growth of Staphylococcus aureus* 4+ Growth of Staphylococcus aureus* Few Colonies of Staphylococcus aureus*  --       Xr Spine Lumbar 2 Or 3 Views Injury    Result Date: 6/11/2018  Narrative: LUMBAR SPINE INDICATION:   r/o implants  History of back surgery  COMPARISON:  None VIEWS:  XR SPINE LUMBAR 2 OR 3 VIEWS INJURY Images: 2 FINDINGS: There is no evidence of acute fracture or destructive osseous lesion  Alignment is unremarkable  Mild disc space narrowing diffusely throughout the lumbar spine with associated facet hypertrophic changes  Postoperative changes in the L5 vertebral body  The pedicles appear intact  Visualized soft tissues appear unremarkable  Impression: Degenerative and postoperative changes  Workstation performed: [id number]     Xr Knee 1 Or 2 Vw Right    Result Date: 6/6/2018  Narrative: RIGHT KNEE INDICATION:   infection,suspect spetic joint     Redness and swelling  COMPARISON:  Plain radiographs May 15, 2016 VIEWS:  XR KNEE 1 OR 2 VW RIGHT Images: 2 FINDINGS: There is no acute fracture or dislocation  Small suprapatellar joint effusion  Mild medial joint space narrowing  Mild narrowing of the patellofemoral space  Mild sharpening of the tibial spines  No lytic or blastic lesions are seen  Diffuse soft tissue swelling, extending from above the patella, inferiorly to the anterior tibial tubercle  More focal soft tissue swelling overlying the anterior tibial tubercle  No radiopaque foreign bodies are seen  Impression: 1  Mild degenerative changes with small suprapatellar joint effusion  If there is clinical concern for septic arthritis, consider follow-up arthrocentesis  2   Diffuse soft tissue swelling anterior to the patella, most compatible with prepatellar bursitis   3   More focal soft tissue swelling overlying the anterior tibial tubercle, suspicious for superimposed infrapatellar bursitis  The study was marked in [facility] for immediate notification  Workstation performed: [id number]     Vas Lower Limb Venous Duplex Study, Unilateral/limited    Result Date: 6/7/2018  Narrative:  [facility] REPORT CLINICAL: Indications: Patient presents with right lower extremity edema  Risk Factors: The patient has no history of DVT  The patient current BMI is 28 06, Weight is 190 lb and Height is 69 in  CONCLUSION: Impression: RIGHT LOWER LIMB No evidence of acute or chronic deep vein thrombosis   No evidence of superficial thrombophlebitis noted  Doppler evaluation shows a normal response to augmentation maneuvers  Popliteal, posterior tibial and anterior tibial arterial Doppler waveforms are triphasic  LEFT LOWER LIMB LIMITED Evaluation shows no evidence of thrombus in the common femoral vein  Doppler evaluation shows a normal response to augmentation maneuvers  SIGNATURE: Electronically Signed by: Mague Etienne MD, RPVI on 2018-06-07 03:44:21 PM    I have personally reviewed pertinent reports  Principal Problem:    Sepsis ([address] )  Active Problems:    Hyperlipidemia    Borderline diabetes    Hypertension    Cellulitis of right lower extremity    Hyponatremia    Depression    Infrapatellar bursitis of right knee    Effusion of right knee    Staphylococcal arthritis of right knee (HCC)      Assessment/Plan: This is a middle-age man with above list of comorbidities, had presented with sepsis, due to right knee septic arthritis as well as prepatellar and infrapatellar bursitis  Staphylococcus aureus, methicillin sensitive, the microbial etiology  He has had surgical debridement of the joint as well as bursa, twice, postoperative days # 3 and 1  Possible post operative delirium, overnight, now much improved  He was counseled at length, also counseled by the nurse manager     Continue ceftriaxone, which can be given in the [facility] infusion room after discharge to complete a prolonged course of antimicrobial treatment, intravenously  Will repeat sedimentation rate and C-reactive protein tomorrow

## 2018-06-12 NOTE — PROGRESS NOTES
Patient turned of the wound vac and disconnected the canister and pulled the power cord, wound vac therapy reconnected and restarted, patency of lines ensured  Patient educated about the wound vac therapy and the importance of having the  wound vac unit on at all times

## 2018-06-12 NOTE — CASE MANAGEMENT
Continued Stay Review    Date: 6/12/18    Vital Signs: /69 (BP Location: Right arm)   Pulse 102   Temp 99 4 °F (37 4 °C) (Oral)   Resp 19   Ht 5' 9" (1 753 m)   Wt 86 2 kg (190 lb 0 2 oz)   SpO2 98%   BMI 28 06 kg/m²       gsf  Sed rate c reactive protein  Medications:   Scheduled Meds:   Current Facility-Administered Medications:  acetaminophen 650 mg Oral Q8H [address] Emeigh Lemme, CRNP    ALPRAZolam 0 5 mg Oral BID PRN Cuicalen Cristarik, CRNP    aluminum-magnesium hydroxide-simethicone 30 mL Oral Q6H PRN Papo Patelk, CRNP    cefTRIAXone 2,000 mg Intravenous Q24H Vista Lemme, CRNP Last Rate: 2,000 mg (06/11/18 1441)   docusate sodium 100 mg Oral BID Emeigh Lemme, CRNP    enalapril 15 mg Oral Daily Cuijosé Alberto, CRNP    famotidine 20 mg Oral Daily Emeigh Lemme, CRNP    heparin (porcine) 5,000 Units Subcutaneous Q12H [address] Emeigh Lemme, CRNP    ibuprofen 400 mg Oral Q12H Emeigh Lemme, CRNP    insulin lispro 1-5 Units Subcutaneous TID AC Papo Alberto, CRNP    insulin lispro 1-5 Units Subcutaneous HS Papo Alberto, CRNP    morphine injection 2 mg Intravenous Q4H PRN Vista Lemme, CRNP    ondansetron 4 mg Intravenous Q6H PRN Papo Alberto, CRNP    oxyCODONE-acetaminophen 1 tablet Oral Q6H PRN Papo Alberto, CRNP    polyethylene glycol 17 g Oral Daily PRN Emeigh Lemme, CRNP    pravastatin 80 mg Oral Daily With Dinner Papo Alberto, KORINNP    saccharomyces boulardii 250 mg Oral BID Papo Alberto, SANCHO    sodium chloride 75 mL/hr Intravenous Continuous Kenan Parikh MD Last Rate: 75 mL/hr (06/12/18 1054)   venlafaxine 75 mg Oral BID With Meals Papo Alberto, SANCHO    zolpidem 10 mg Oral HS PRN Cuiyin Yurik, CRNP      Continuous Infusions:   sodium chloride 75 mL/hr Last Rate: 75 mL/hr (06/12/18 8066)     PRN Meds: ALPRAZolam    aluminum-magnesium hydroxide-simethicone    morphine injection    ondansetron    oxyCODONE-acetaminophen    polyethylene glycol   zolpidem    Abnormal Labs/Diagnostic Results: ALB 2 0 WBC 10 82 H/H 10 6/33 4    Age/Sex: 66 y o  male     PER ID  Assessment/Plan: This is a middle-age man with above list of comorbidities, had presented with sepsis, due to right knee septic arthritis as well as prepatellar and infrapatellar bursitis  Staphylococcus aureus, methicillin sensitive, the microbial etiology  He has had surgical debridement of the joint as well as bursa, twice, postoperative days # 3 and 1  Possible post operative delirium, overnight, now much improved  He was counseled at length, also counseled by the nurse manager  Continue ceftriaxone, which can be given in the [facility] infusion room after discharge to complete a prolonged course of antimicrobial treatment, intravenously  Will repeat sedimentation rate and C-reactive protein tomorrow  ATTENDING  POD#1 repeat right I&D of infrapatellar bursa and irrigation/washout of right knee  Right knee pain fairly controlled with narcotics prn  Septic joint of right knee joint (HCC)   Assessment & Plan     As evidenced by WBC 14 22, , due to right knee bursitis and septic arthritis with right lower extremity cellulitis   Afebrile  Persistent tachycardia  WBC 12 44 -> 13 77   Lyme negative  Right knee aspirate and tissue cultures growing MSSA  ID and orthopedics following, appreciate recommendations  POD #4 S/p right knee surgical irrigation debridement of bursa and infected joint on 6/8/18  POD#1 repeat right I&D of infrapatellar bursa and irrigation/washout of right knee on 6/11/18  Wound VAC therapy to right knee   Per ID, continue on Rocephin 2gm IVPB daily, day #6  Will need outpatient ABX with daily Rocephin in the infusion center for septic arthritis   TTE pending      Staphylococcal arthritis of right knee ([facility] [address] )   Assessment & Plan     ID and orthopedics following, appreciate recommendations      Infrapatellar bursitis of right knee   Assessment & Plan     ID and orthopedics following, appreciate recommendations      Depression   Assessment & Plan     Continue Effexor      Hyponatremia   Assessment & Plan     Worsening during admission  Sodium 134 -> 132 -> 133 -> 130, possible SIADH from uncontrolled pain  Less likely due to Effexor as patient's Na has worsened  Serum osmo low, however, urine osmo wnl  No improvement on IVF  TSH and urine acid wnl  With fluid restriction, Na normalized  Continue fluid restriction, 1200 mL/day       Cellulitis of right lower extremity   Assessment & Plan     With prepatellar bursitis and septic arthritis   Right knee x-ray, small suprapatellar joint effusion, prepatellar bursitis, and more focal soft tissue swelling overlying the anterior tibial tubercle, suspicious for superimposed infrapatellar bursitis   If there is clinical concern for septic arthritis, consider follow-up arthrocentesis  Venous Doppler to rule out DVT, negative       Hypertension   Assessment & Plan     Continue Enalapril  Pain control  Monitor       Borderline diabetes   Assessment & Plan   Diabetic diet    Psychiatry consult  Diagnosis:  Major depression recurrent  Panic disorder  Marijuana abuse  History of alcohol abuse  Insomnia  Plan:  Continue Effexor 75 mg b i d  Continue Xanax 0 5 mg b i d  p r n  Continue Ambien 10 mg HS PRN  Xanax 0 5 mg p o  b i d  Psychotherapy  Psychiatric follow-up recommended after the discharge  I will follow up during the hospital stay      Surgeon  Assessment:  POD #1 s/p repeat right incision and drainage of infrapatellar bursa and irrigation/washout of right knee - patient reports pain and swelling have improved, wound VAC in place, in knee immobilizer, pain appears well controlled, neurovascularly intact, leukocytosis reduced compared to yesterday, appears to be consistent with septic arthritis and infrapatellar bursitis, AVSS, 3+ staph in bursa and synovial fluid        Plan:  POD #1 s/p repeat right incision and drainage of infrapatellar bursa and irrigation/washout of right knee-   - Antibiotics:  Continue antibiotics per primary care team and Infectious Disease, coverage for staff appears to be appropriate, staff appears to be susceptible to multiple agents per cultures  - Anticoagulation: continue DVT prophylaxis, SCDs  - Activity:  Weightbearing as tolerated, PT/OT, to continue knee immobilizer  - AM lab draw:  Repeat a m  labs with CBC, BMP, Mag, phos  - Analgesia: Continue p r n  medication  - Dressing:  Continue wound VAC in place, seal is good currently, continue knee immobilizer, continue VIJAY and wound VAC  - Disposition:   We will continue to follow clinically and possibly take back to the OR for 1 more repeat irrigation and washout likely for Thursday if necessary

## 2018-06-12 NOTE — PROGRESS NOTES
[facility] Progress Note  Patient: Chin  66 y o  male   MRN: [id number]  PCP: Brittany Lal MD  Unit/Bed#: [address] [place] [address] Encounter: [id number]  Date Of Visit: 18    Subjective:   POD#1 repeat right I&D of infrapatellar bursa and irrigation/washout of right knee  Right knee pain fairly controlled with narcotics prn  No N/V, abd pain  No cp, sob    Objective:   Vitals:   Temp (24hrs), Av 5 °F (36 9 °C), Min:97 6 °F (36 4 °C), Max:99 4 °F (37 4 °C)    HR:  [100-110] 102  Resp:  [16-19] 19  BP: (112-137)/(69-82) 112/69  SpO2:  [96 %-98 %] 98 %  Body mass index is 28 06 kg/m²         Intake/Output Summary (Last 24 hours) at 18 1103  Last data filed at 18 0601   Gross per 24 hour   Intake           1007 5 ml   Output              650 ml   Net            357 5 ml       Physical Exam:   General: NAD  HEENT: EOMI, anicteric, oral moist, no oral thrush or mucosal lesion, neck supple, no mass or JVD  Chest: CTAB, no wheeze/rales  Cardiac: RRR, S1/S2, No murmur  Abd: S/ND/NT/BS+  MSK: Right knee dressing applied, VAC in place, pedal pulse intact  Neuro: AAOx3, moving all extremities  Psychiatric: Mood with normal affect    Additional Data:     Labs:    Results from last 7 days  Lab Units 18  0643   WBC Thousand/uL 10 82*   HEMOGLOBIN g/dL 10 6*   HEMATOCRIT % 33 4*   PLATELETS Thousands/uL 371   NEUTROS PCT % 75   LYMPHS PCT % 16   MONOS PCT % 8   EOS PCT % 1       Results from last 7 days  Lab Units 18  0643   SODIUM mmol/L 138   POTASSIUM mmol/L 4 0   CHLORIDE mmol/L 102   CO2 mmol/L 28   BUN mg/dL 9   CREATININE mg/dL 0 75   CALCIUM mg/dL 8 7   TOTAL PROTEIN g/dL 6 3*   BILIRUBIN TOTAL mg/dL 0 20   ALK PHOS U/L 77   ALT U/L 38   AST U/L 25   GLUCOSE RANDOM mg/dL 98       Results from last 7 days  Lab Units 18  2128   INR  0 92       Recent Results (from the past 24 hour(s))   Fingerstick Glucose (POCT)    Collection Time: 18 11:11 AM   Result Value Ref Range    POC Glucose 121 65 - 140 mg/dl   Fingerstick Glucose (POCT)    Collection Time: 06/11/18  9:25 PM   Result Value Ref Range    POC Glucose 106 65 - 140 mg/dl   Comprehensive metabolic panel    Collection Time: 06/12/18  6:43 AM   Result Value Ref Range    Sodium 138 136 - 145 mmol/L    Potassium 4 0 3 5 - 5 3 mmol/L    Chloride 102 100 - 108 mmol/L    CO2 28 21 - 32 mmol/L    Anion Gap 8 4 - 13 mmol/L    BUN 9 5 - 25 mg/dL    Creatinine 0 75 0 60 - 1 30 mg/dL    Glucose 98 65 - 140 mg/dL    Calcium 8 7 8 3 - 10 1 mg/dL    AST 25 5 - 45 U/L    ALT 38 12 - 78 U/L    Alkaline Phosphatase 77 46 - 116 U/L    Total Protein 6 3 (L) 6 4 - 8 2 g/dL    Albumin 2 0 (L) 3 5 - 5 0 g/dL    Total Bilirubin 0 20 0 20 - 1 00 mg/dL    eGFR 99 ml/min/1 73sq m   CBC and differential    Collection Time: 06/12/18  6:43 AM   Result Value Ref Range    WBC 10 82 (H) 4 31 - 10 16 Thousand/uL    RBC 3 45 (L) 3 88 - 5 62 Million/uL    Hemoglobin 10 6 (L) 12 0 - 17 0 g/dL    Hematocrit 33 4 (L) 36 5 - 49 3 %    MCV 97 82 - 98 fL    MCH 30 7 26 8 - 34 3 pg    MCHC 31 7 31 4 - 37 4 g/dL    RDW 13 2 11 6 - 15 1 %    MPV 8 7 (L) 8 9 - 12 7 fL    Platelets 593 299 - 246 Thousands/uL    nRBC 0 /100 WBCs    Neutrophils Relative 75 43 - 75 %    Immat GRANS % 0 0 - 2 %    Lymphocytes Relative 16 14 - 44 %    Monocytes Relative 8 4 - 12 %    Eosinophils Relative 1 0 - 6 %    Basophils Relative 0 0 - 1 %    Neutrophils Absolute 8 13 (H) 1 85 - 7 62 Thousands/µL    Immature Grans Absolute 0 03 0 00 - 0 20 Thousand/uL    Lymphocytes Absolute 1 68 0 60 - 4 47 Thousands/µL    Monocytes Absolute 0 82 0 17 - 1 22 Thousand/µL    Eosinophils Absolute 0 13 0 00 - 0 61 Thousand/µL    Basophils Absolute 0 03 0 00 - 0 10 Thousands/µL   Magnesium    Collection Time: 06/12/18  6:43 AM   Result Value Ref Range    Magnesium 1 9 1 6 - 2 6 mg/dL   Fingerstick Glucose (POCT)    Collection Time: 06/12/18  8:32 AM   Result Value Ref Range    POC Glucose 185 (H) 65 - 140 mg/dl       Cultures:     Results from last 7 days  Lab Units 06/08/18 2050 06/08/18 2045 06/08/18 2040 06/08/18 2028 06/06/18 1924 06/06/18  1923 06/05/18  2128   BLOOD CULTURE   --   --   --   --   --   --  No Growth After 5 Days  No Growth After 5 Days  GRAM STAIN RESULT  1+ Polys  3+ Gram positive cocci in pairs Rare Polys  1+ Gram positive cocci in pairs 1+ Polys  1+ Gram positive cocci in pairs 1+ Polys  No bacteria seen 4+ Polys  4+ Gram positive cocci in pairs, chains and clusters 2+ Polys  No bacteria seen  --    BODY FLUID CULTURE, STERILE   --  3+ Growth of Staphylococcus aureus* 3+ Growth of Staphylococcus aureus* 1+ Growth of Staphylococcus aureus* 4+ Growth of Staphylococcus aureus* Few Colonies of Staphylococcus aureus*  --        Imaging:  Xr Spine Lumbar 2 Or 3 Views Injury    Result Date: 6/11/2018  Narrative: LUMBAR SPINE INDICATION:   r/o implants  History of back surgery  COMPARISON:  None VIEWS:  XR SPINE LUMBAR 2 OR 3 VIEWS INJURY Images: 2 FINDINGS: There is no evidence of acute fracture or destructive osseous lesion  Alignment is unremarkable  Mild disc space narrowing diffusely throughout the lumbar spine with associated facet hypertrophic changes  Postoperative changes in the L5 vertebral body  The pedicles appear intact  Visualized soft tissues appear unremarkable  Impression: Degenerative and postoperative changes  Workstation performed: [id number]     Xr Knee 1 Or 2 Vw Right    Result Date: 6/6/2018  Narrative: RIGHT KNEE INDICATION:   infection,suspect spetic joint     Redness and swelling  COMPARISON:  Plain radiographs May 15, 2016 VIEWS:  XR KNEE 1 OR 2 VW RIGHT Images: 2 FINDINGS: There is no acute fracture or dislocation  Small suprapatellar joint effusion  Mild medial joint space narrowing  Mild narrowing of the patellofemoral space  Mild sharpening of the tibial spines  No lytic or blastic lesions are seen   Diffuse soft tissue swelling, extending from above the patella, inferiorly to the anterior tibial tubercle  More focal soft tissue swelling overlying the anterior tibial tubercle  No radiopaque foreign bodies are seen  Impression: 1  Mild degenerative changes with small suprapatellar joint effusion  If there is clinical concern for septic arthritis, consider follow-up arthrocentesis  2   Diffuse soft tissue swelling anterior to the patella, most compatible with prepatellar bursitis  3   More focal soft tissue swelling overlying the anterior tibial tubercle, suspicious for superimposed infrapatellar bursitis  The study was marked in [facility] for immediate notification  Workstation performed: [id number]     Vas Lower Limb Venous Duplex Study, Unilateral/limited    Result Date: 6/7/2018  Narrative:  [facility] REPORT CLINICAL: Indications: Patient presents with right lower extremity edema  Risk Factors: The patient has no history of DVT  The patient current BMI is 28 06, Weight is 190 lb and Height is 69 in  CONCLUSION: Impression: RIGHT LOWER LIMB No evidence of acute or chronic deep vein thrombosis   No evidence of superficial thrombophlebitis noted  Doppler evaluation shows a normal response to augmentation maneuvers  Popliteal, posterior tibial and anterior tibial arterial Doppler waveforms are triphasic  LEFT LOWER LIMB LIMITED Evaluation shows no evidence of thrombus in the common femoral vein  Doppler evaluation shows a normal response to augmentation maneuvers    SIGNATURE: Electronically Signed by: Laya Brooks MD, RPVI on 2018-06-07 03:44:21 PM    Imaging Reports Reviewed by myself    Last 24 Hours Medication List:     Current Facility-Administered Medications:     acetaminophen (TYLENOL) tablet 650 mg, 650 mg, Oral, Q8H [address], GenaroSANCHO Mota, 650 mg at 06/12/18 4680    ALPRAZolam Miguelito Pates) tablet 0 5 mg, 0 5 mg, Oral, BID PRN, SANCHO Melvin, 0 5 mg at 06/12/18 0744    aluminum-magnesium hydroxide-simethicone (MYLANTA) 200-200-20 mg/5 mL oral suspension 30 mL, 30 mL, Oral, Q6H PRN, SANCHO Melvin    cefTRIAXone (ROCEPHIN) IVPB (premix) 2,000 mg, 2,000 mg, Intravenous, Q24H, SANCHO Gamez, Last Rate: 100 mL/hr at 06/11/18 1441, 2,000 mg at 06/11/18 1441    docusate sodium (COLACE) capsule 100 mg, 100 mg, Oral, BID, SANCHO Gamez, 100 mg at 06/11/18 2134    enalapril (VASOTEC) tablet 15 mg, 15 mg, Oral, Daily, SANCHO Melvin, 15 mg at 06/11/18 1228    famotidine (PEPCID) tablet 20 mg, 20 mg, Oral, Daily, SANCHO Gamez, Stopped at 06/11/18 1136    heparin (porcine) subcutaneous injection 5,000 Units, 5,000 Units, Subcutaneous, Q12H [address], 5,000 Units at 06/11/18 2134 **AND** [CANCELED] Platelet count, , , Once, SANCHO Melvin    ibuprofen (MOTRIN) tablet 400 mg, 400 mg, Oral, Q12H, SANCHO Gamez, 400 mg at 06/11/18 2134    insulin lispro (HumaLOG) 100 units/mL subcutaneous injection 1-5 Units, 1-5 Units, Subcutaneous, TID AC, 1 Units at 06/09/18 0938 **AND** Fingerstick Glucose (POCT), , , TID AC, SANCHO Melvin    insulin lispro (HumaLOG) 100 units/mL subcutaneous injection 1-5 Units, 1-5 Units, Subcutaneous, HS, SANCHO Melvin    morphine injection 2 mg, 2 mg, Intravenous, Q4H PRN, SANCHO Up, 2 mg at 06/10/18 1305    ondansetron (ZOFRAN) injection 4 mg, 4 mg, Intravenous, Q6H PRN, SANCHO eMlvin    oxyCODONE-acetaminophen (PERCOCET) 5-325 mg per tablet 1 tablet, 1 tablet, Oral, Q6H PRN, SANCHO Melvin, 1 tablet at 06/12/18 0640    polyethylene glycol (MIRALAX) packet 17 g, 17 g, Oral, Daily PRN, SANCHO Up    pravastatin (PRAVACHOL) tablet 80 mg, 80 mg, Oral, Daily With Dinner, SANCHO Melvin, 80 mg at 06/10/18 1756    saccharomyces boulardii (FLORASTOR) capsule 250 mg, 250 mg, Oral, BID, SANCHO Melvin, Stopped at 06/11/18 1137    sodium chloride 0 9 % infusion, 75 mL/hr, Intravenous, Continuous, Marylin Davidson Yobany Starks MD, Last Rate: 75 mL/hr at 06/12/18 1054, 75 mL/hr at 06/12/18 1054    venlafaxine (EFFEXOR) tablet 75 mg, 75 mg, Oral, BID With Meals, SANCHO Melvin, 75 mg at 06/10/18 1757    zolpidem (AMBIEN) tablet 10 mg, 10 mg, Oral, HS PRN, SANCHO Melvin, 10 mg at 06/11/18 2134     Assessment and Plans:  Hospital Problem List:   Principal Problem:    Sepsis ([facility] [address] )  Active Problems:    Hyperlipidemia    Borderline diabetes    Hypertension    Cellulitis of right lower extremity    Hyponatremia    Depression    Infrapatellar bursitis of right knee    Effusion of right knee    Staphylococcal arthritis of right knee ([facility])    * Septic joint of right knee joint ([facility] [address] )   Assessment & Plan    As evidenced by WBC 14 22, , due to right knee bursitis and septic arthritis with right lower extremity cellulitis   Afebrile  Persistent tachycardia  WBC 12 44 -> 13 77  Lyme negative  Right knee aspirate and tissue cultures growing MSSA  ID and orthopedics following, appreciate recommendations  POD #4 S/p right knee surgical irrigation debridement of bursa and infected joint on 6/8/18  POD#1 repeat right I&D of infrapatellar bursa and irrigation/washout of right knee on 6/11/18  Wound VAC therapy to right knee   Per ID, continue on Rocephin 2gm IVPB daily, day #6  Will need outpatient ABX with daily Rocephin in the infusion center for septic arthritis   TTE pending        Staphylococcal arthritis of right knee [facility])   Assessment & Plan    ID and orthopedics following, appreciate recommendations        Infrapatellar bursitis of right knee   Assessment & Plan    ID and orthopedics following, appreciate recommendations        Depression   Assessment & Plan    Continue Effexor        Hyponatremia   Assessment & Plan    Worsening during admission  Sodium 134 -> 132 -> 133 -> 130, possible SIADH from uncontrolled pain  Less likely due to Effexor as patient's Na has worsened  Serum osmo low, however, urine osmo wnl   No improvement on IVF  TSH and urine acid wnl  With fluid restriction, Na normalized  Continue fluid restriction, 1200 mL/day         Cellulitis of right lower extremity   Assessment & Plan    With prepatellar bursitis and septic arthritis   Right knee x-ray, small suprapatellar joint effusion, prepatellar bursitis, and more focal soft tissue swelling overlying the anterior tibial tubercle, suspicious for superimposed infrapatellar bursitis   If there is clinical concern for septic arthritis, consider follow-up arthrocentesis  Venous Doppler to rule out DVT, negative         Hypertension   Assessment & Plan    Continue Enalapril  Pain control  Monitor  Borderline diabetes   Assessment & Plan    HbA1c, 7 0  On diabetic diet        Hyperlipidemia   Assessment & Plan    Continue statin        Thrombocytosis (HCC)resolved as of 6/10/2018   Assessment & Plan    Mild  Resolved  Likely reactive to sepsis  · Continue Heparin subcu for DVT prophylaxis  DVT Prophylaxis: on Heparin sc, SCD, OOB to chair, early ambulation  Code Status: Level 1 - Full Code  Disposition: anticipate d/c home once clinically improved      Signed by:  Anju Knox MD  Attending Hospitalist  Page#: [phone] (Hours 7am to 7pm)  6/12/2018 11:03 AM

## 2018-06-12 NOTE — CONSULTS
Consultation - Robbie Arevalo 66 y o  male MRN: [id number]    Unit/Bed#: [address] [address] Encounter: [id number]      Identifying Data:  66years old white male is admitted at [facility] on June 5, 2018 with complaining of left leg pain reportedly he was treated for gout by the PCP and he had injection and his knee 10 days ago then it became worse and painful patient is admitted for further treatment and stabilization  Patient is complaining of severe pain  Psychiatric consultation is asked for depression and anxiety  Patient examined spoke with the nurse history physical medications labs reviewed and noted patient is very hard of hearing and poor historian but he was able to tell me the name of his medications for depression anxiety and insomnia from the family physician and he admits being depressed and anxious he has trouble in sleeping he has ongoing medical problems very difficult to communicate with him I have to talk very loud to asked him when the questions  Patient reports that he has a lot of stressors to deal with his ex-wife he is  in 200 he lives alone he has 2 children patient denies smoking denies abusing alcohol or drugs but he used to drink alcohol with history of 2 DUIs in the past never been in the rehab he smokes pot here and there but no other drugs no history of IV drug abuse patient has high school graduation and then 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 and he was working as a  but because of his disability he stopped working in 2010  Patient is suffering from depression anxiety insomnia borderline diabetes high cholesterol hypertension history of lumbar epidural injection shoulder surgery spine surgery and gout patient takes medications from the family physician Effexor Xanax and Ambien patient is allergic to iodine and shellfish    Chief Complaints:  Depression and anxiety  Family History: History reviewed  No pertinent family history    Two brothers and 1 sister have depression and anxiety    Legal History:  Patient denies  Mental Status Exam:  66years old white male is alert awake oriented x3 cooperative but very poor historian very hard of hearing he gets anxious and nervous he needed to give stat dose of Ativan earlier to calm him down he gets panic attacks  Poor sleep poor appetite he looks mild nourished memory intact  Patient denies auditory or visual hallucinations  Patient denies suicidal or homicidal ideations  No paranoia and no delusion elucidated  Poor concentration insight and judgments are adequate  History of Present Illness     HPI: Sophia Whitlock is a 66y o  year old male who presents with knee pain    Consults      Historical Information   Past Psychiatric History:  Patient has been depressed and anxious he has been taking depression and anxiety pill sleeping pill since long time from the family physician he is a poor historian he never seen a psychiatrist no psychiatric admissions no suicide attempts in the past   Currently patient is taking Effexor 75 mg b i d  Xanax 0 5 mg p o  b i d  p r n  and Ambien 10 mg HS p r n  from the family physician  Patient has history of alcohol abuse with history of 2 DUIs in the past but he never went to rehab  Patient reports smoking pot off and on and he claims that it helps him with anxiety he denies abusing any other drugs no history of IV drug abuse currently patient is not under psychiatric care  Past Medical History:   Diagnosis Date    Borderline diabetes     Depression     Hyperlipidemia     Hypertension      Past Surgical History:   Procedure Laterality Date    LUMBAR EPIDURAL INJECTION      SHOULDER SURGERY Left     SPINE SURGERY      WOUND DEBRIDEMENT Right 6/8/2018    Procedure: RIGHT KNEE ARTHROSCOPY, IRRIGATION AND DEBRIDEMENT; RIGHT KNEE IRRIGATION AND EXTENSIVE DEBRIDEMENT OF INFECTED BURSA;   Surgeon: Damien Gill MD;  Location: [address] [place] [address];  Service: Orthopedics  WOUND DEBRIDEMENT Right 6/11/2018    Procedure: open DEBRIDEMENT patellar bursa, arthroscopic right knee joint irrigation and debridement;  Surgeon: Ghazala Zepeda MD;  Location: [address] [place] [address];  Service: Orthopedics     Social History   History   Alcohol Use No     History   Drug Use No     History   Smoking Status    Current Some Day Smoker    Types: Cigars   Smokeless Tobacco    Never Used       Meds/Allergies   current meds:   Current Facility-Administered Medications   Medication Dose Route Frequency    acetaminophen (TYLENOL) tablet 650 mg  650 mg Oral Q8H [facility]    ALPRAZolam (XANAX) tablet 0 5 mg  0 5 mg Oral BID PRN    aluminum-magnesium hydroxide-simethicone (MYLANTA) 200-200-20 mg/5 mL oral suspension 30 mL  30 mL Oral Q6H PRN    cefTRIAXone (ROCEPHIN) IVPB (premix) 2,000 mg  2,000 mg Intravenous Q24H    docusate sodium (COLACE) capsule 100 mg  100 mg Oral BID    enalapril (VASOTEC) tablet 15 mg  15 mg Oral Daily    famotidine (PEPCID) tablet 20 mg  20 mg Oral Daily    heparin (porcine) subcutaneous injection 5,000 Units  5,000 Units Subcutaneous Q12H KERLINE    ibuprofen (MOTRIN) tablet 400 mg  400 mg Oral Q12H    insulin lispro (HumaLOG) 100 units/mL subcutaneous injection 1-5 Units  1-5 Units Subcutaneous TID AC    insulin lispro (HumaLOG) 100 units/mL subcutaneous injection 1-5 Units  1-5 Units Subcutaneous HS    morphine injection 2 mg  2 mg Intravenous Q4H PRN    ondansetron (ZOFRAN) injection 4 mg  4 mg Intravenous Q6H PRN    oxyCODONE-acetaminophen (PERCOCET) 5-325 mg per tablet 1 tablet  1 tablet Oral Q6H PRN    polyethylene glycol (MIRALAX) packet 17 g  17 g Oral Daily PRN    pravastatin (PRAVACHOL) tablet 80 mg  80 mg Oral Daily With Dinner    saccharomyces boulardii (FLORASTOR) capsule 250 mg  250 mg Oral BID    sodium chloride 0 9 % infusion  75 mL/hr Intravenous Continuous    venlafaxine (EFFEXOR) tablet 75 mg  75 mg Oral BID With Meals    zolpidem (AMBIEN) tablet 10 mg 10 mg Oral HS PRN    and PTA meds:    Prescriptions Prior to Admission   Medication    ALPRAZolam (XANAX) 0 5 mg tablet    ENALAPRIL MALEATE PO    metFORMIN (GLUCOPHAGE) 1000 MG tablet    rosuvastatin (CRESTOR) 10 MG tablet    venlafaxine (EFFEXOR) 75 mg tablet    zolpidem (AMBIEN) 10 mg tablet    traMADol-acetaminophen (ULTRACET) 37 5-325 mg per tablet     Allergies   Allergen Reactions    Iodine Anaphylaxis    Shellfish-Derived Products        Objective   Vitals: Blood pressure 112/69, pulse 102, temperature 99 4 °F (37 4 °C), temperature source Oral, resp  rate 19, height 5' 9" (1 753 m), weight 86 2 kg (190 lb 0 2 oz), SpO2 98 %  Routine Lab Results:   Admission on 06/05/2018   No results displayed because visit has over 200 results  Diagnosis:  Major depression recurrent  Panic disorder  Marijuana abuse  History of alcohol abuse  Insomnia  Plan:  Continue Effexor 75 mg b i d  Continue Xanax 0 5 mg b i d  p r n  Continue Ambien 10 mg HS PRN  Xanax 0 5 mg p o  b i d  Psychotherapy  Psychiatric follow-up recommended after the discharge  I will follow up during the hospital stay      Arben Bishop MD

## 2018-06-13 PROBLEM — M00.9 SEPTIC JOINT OF RIGHT KNEE JOINT (HCC): Status: ACTIVE | Noted: 2018-06-05

## 2018-06-13 PROBLEM — M00.061 STAPHYLOCOCCAL ARTHRITIS OF RIGHT KNEE (HCC): Chronic | Status: ACTIVE | Noted: 2018-06-10

## 2018-06-13 LAB
BACTERIA UR QL AUTO: ABNORMAL /HPF
BILIRUB UR QL STRIP: NEGATIVE
CLARITY UR: CLEAR
COLOR UR: YELLOW
CRP SERPL QL: >90 MG/L
ERYTHROCYTE [SEDIMENTATION RATE] IN BLOOD: 74 MM/HOUR (ref 2–10)
GLUCOSE SERPL-MCNC: 109 MG/DL (ref 65–140)
GLUCOSE SERPL-MCNC: 126 MG/DL (ref 65–140)
GLUCOSE SERPL-MCNC: 146 MG/DL (ref 65–140)
GLUCOSE SERPL-MCNC: 194 MG/DL (ref 65–140)
GLUCOSE UR STRIP-MCNC: NEGATIVE MG/DL
HGB UR QL STRIP.AUTO: ABNORMAL
KETONES UR STRIP-MCNC: NEGATIVE MG/DL
LEUKOCYTE ESTERASE UR QL STRIP: NEGATIVE
NITRITE UR QL STRIP: NEGATIVE
NON-SQ EPI CELLS URNS QL MICRO: ABNORMAL /HPF
OTHER STN SPEC: ABNORMAL
PH UR STRIP.AUTO: 6 [PH] (ref 5–9)
PROT UR STRIP-MCNC: NEGATIVE MG/DL
RBC #/AREA URNS AUTO: ABNORMAL /HPF
SP GR UR STRIP.AUTO: <=1.005 (ref 1–1.03)
UROBILINOGEN UR QL STRIP.AUTO: 0.2 E.U./DL
WBC #/AREA URNS AUTO: ABNORMAL /HPF

## 2018-06-13 PROCEDURE — 99232 SBSQ HOSP IP/OBS MODERATE 35: CPT | Performed by: INTERNAL MEDICINE

## 2018-06-13 PROCEDURE — 81001 URINALYSIS AUTO W/SCOPE: CPT | Performed by: SPECIALIST

## 2018-06-13 PROCEDURE — 86140 C-REACTIVE PROTEIN: CPT | Performed by: SPECIALIST

## 2018-06-13 PROCEDURE — 82948 REAGENT STRIP/BLOOD GLUCOSE: CPT

## 2018-06-13 PROCEDURE — 85652 RBC SED RATE AUTOMATED: CPT | Performed by: SPECIALIST

## 2018-06-13 PROCEDURE — 97530 THERAPEUTIC ACTIVITIES: CPT

## 2018-06-13 PROCEDURE — 99024 POSTOP FOLLOW-UP VISIT: CPT | Performed by: PHYSICIAN ASSISTANT

## 2018-06-13 PROCEDURE — 87040 BLOOD CULTURE FOR BACTERIA: CPT | Performed by: SPECIALIST

## 2018-06-13 PROCEDURE — 87086 URINE CULTURE/COLONY COUNT: CPT | Performed by: SPECIALIST

## 2018-06-13 RX ORDER — CLOBETASOL PROPIONATE 0.5 MG/G
CREAM TOPICAL 2 TIMES DAILY
Status: DISCONTINUED | OUTPATIENT
Start: 2018-06-13 | End: 2018-06-16 | Stop reason: HOSPADM

## 2018-06-13 RX ORDER — CLOTRIMAZOLE 1 %
CREAM (GRAM) TOPICAL 2 TIMES DAILY
Status: DISCONTINUED | OUTPATIENT
Start: 2018-06-13 | End: 2018-06-14

## 2018-06-13 RX ADMIN — IBUPROFEN 400 MG: 400 TABLET ORAL at 21:40

## 2018-06-13 RX ADMIN — HEPARIN SODIUM 5000 UNITS: 5000 INJECTION, SOLUTION INTRAVENOUS; SUBCUTANEOUS at 21:39

## 2018-06-13 RX ADMIN — INSULIN LISPRO 1 UNITS: 100 INJECTION, SOLUTION INTRAVENOUS; SUBCUTANEOUS at 07:43

## 2018-06-13 RX ADMIN — CLOBETASOL PROPIONATE: 0.5 CREAM TOPICAL at 18:14

## 2018-06-13 RX ADMIN — ACETAMINOPHEN 650 MG: 325 TABLET ORAL at 15:30

## 2018-06-13 RX ADMIN — ENALAPRIL MALEATE 15 MG: 10 TABLET ORAL at 09:02

## 2018-06-13 RX ADMIN — CEFAZOLIN SODIUM 2000 MG: 2 SOLUTION INTRAVENOUS at 11:18

## 2018-06-13 RX ADMIN — CLOBETASOL PROPIONATE 1 APPLICATION: 0.5 CREAM TOPICAL at 11:18

## 2018-06-13 RX ADMIN — ALPRAZOLAM 0.5 MG: 0.5 TABLET ORAL at 09:01

## 2018-06-13 RX ADMIN — CLOTRIMAZOLE 1 APPLICATION: 10 CREAM TOPICAL at 11:18

## 2018-06-13 RX ADMIN — VENLAFAXINE 75 MG: 37.5 TABLET ORAL at 18:14

## 2018-06-13 RX ADMIN — ZOLPIDEM TARTRATE 10 MG: 5 TABLET ORAL at 21:40

## 2018-06-13 RX ADMIN — HEPARIN SODIUM 5000 UNITS: 5000 INJECTION, SOLUTION INTRAVENOUS; SUBCUTANEOUS at 09:03

## 2018-06-13 RX ADMIN — FAMOTIDINE 20 MG: 20 TABLET ORAL at 09:02

## 2018-06-13 RX ADMIN — CLOTRIMAZOLE: 10 CREAM TOPICAL at 21:45

## 2018-06-13 RX ADMIN — Medication 250 MG: at 18:14

## 2018-06-13 RX ADMIN — ACETAMINOPHEN 650 MG: 325 TABLET ORAL at 06:25

## 2018-06-13 RX ADMIN — SODIUM CHLORIDE 75 ML/HR: 0.9 INJECTION, SOLUTION INTRAVENOUS at 19:21

## 2018-06-13 RX ADMIN — Medication 250 MG: at 09:01

## 2018-06-13 RX ADMIN — ALPRAZOLAM 0.5 MG: 0.5 TABLET ORAL at 18:14

## 2018-06-13 RX ADMIN — PRAVASTATIN SODIUM 80 MG: 80 TABLET ORAL at 18:14

## 2018-06-13 RX ADMIN — VENLAFAXINE 75 MG: 37.5 TABLET ORAL at 09:02

## 2018-06-13 RX ADMIN — CEFAZOLIN SODIUM 2000 MG: 2 SOLUTION INTRAVENOUS at 18:14

## 2018-06-13 RX ADMIN — ACETAMINOPHEN 650 MG: 325 TABLET ORAL at 21:40

## 2018-06-13 RX ADMIN — IBUPROFEN 400 MG: 400 TABLET ORAL at 09:02

## 2018-06-13 NOTE — PROGRESS NOTES
Patient examined spoke with the nurse patient is happy to see me he is very hard of hearing but he is able to communicate he reports that he has been taking anxiety medications he also wanted to report me that he has 2 sisters and 2 brothers also have anxiety  Patient is taking medications Effexor Xanax and Ambien from family physician patient is recommended for psychiatric follow-up but he reports that he is happy with his family doctor who is helping him since long time and he will continue his medicine after the discharge  Patient also reports that he wants brown name medications patient is explained that hospital does not carry the brand names but he can get the brand names after the discharge as an outpatient he has to get the prescription of brand name medication from the family physician he understood and agreed to continue current medications the way it is ordered he is also asking to make sure that he get Ambien p r n  because he takes it at home and it helps him to sleep  Patient is talkative he gets anxious and nervous he feels depressed he reports that he lives alone and he feels lonely patient is advised to find some hobby to socialize after the discharge  Nurse reports no behavior problems  No psychosis  No hallucinations  Denies feeling suicidal   Therapy done with good response  I will follow up

## 2018-06-13 NOTE — PROGRESS NOTES
Progress Note - Infectious Disease   Marita Boxer Woronowicz 66 y o  male MRN: [id number]  Unit/Bed#: [address] [place] [address] Encounter: [id number]      Subjective/Objective   Subjective: The events since last seen were noted  The psychiatrist's input appreciated  Patient reports that he slept very well last night, discomfort in his right leg LEs  However, this morning the nurse found the Hemovac drain lying outside on the bed  The patient does not recall when this happened  He denies any chest pain, cough or phlegm  There is no shortness of breath  He has not had any abdominal pain, nausea, vomiting, dysuria or hematuria  He does report having multiple loose stools yesterday and a couple today  He had been taking Colace, almost daily          Meds/Allergies     Current Facility-Administered Medications:     acetaminophen (TYLENOL) tablet 650 mg, 650 mg, Oral, Q8H [facility], SANCHO Bee, 650 mg at 06/13/18 7561    ALPRAZolam Mona Barron) tablet 0 5 mg, 0 5 mg, Oral, BID PRN, SANCHO Melvin, 0 5 mg at 06/12/18 0744    ALPRAZolam (XANAX) tablet 0 5 mg, 0 5 mg, Oral, BID, Deisi Ramirez MD, 0 5 mg at 06/13/18 0901    aluminum-magnesium hydroxide-simethicone (MYLANTA) 200-200-20 mg/5 mL oral suspension 30 mL, 30 mL, Oral, Q6H PRN, SANCHO Melvin    cefTRIAXone (ROCEPHIN) IVPB (premix) 2,000 mg, 2,000 mg, Intravenous, Q24H, SANCHO Gamez, Last Rate: 100 mL/hr at 06/12/18 1405, 2,000 mg at 06/12/18 1405    docusate sodium (COLACE) capsule 100 mg, 100 mg, Oral, BID, SANCHO Gamez, 100 mg at 06/12/18 2214    enalapril (VASOTEC) tablet 15 mg, 15 mg, Oral, Daily, SANCHO Melvin, 15 mg at 06/13/18 0902    famotidine (PEPCID) tablet 20 mg, 20 mg, Oral, Daily, SANCHO Gamez, 20 mg at 06/13/18 0902    heparin (porcine) subcutaneous injection 5,000 Units, 5,000 Units, Subcutaneous, Q12H [facility], 5,000 Units at 06/13/18 0903 **AND** [CANCELED] Platelet count, , , Once, Cuiyin SANCHO Reinoso    ibuprofen (MOTRIN) tablet 400 mg, 400 mg, Oral, Q12H, Cher SANCHO Rodriguez, 400 mg at 06/13/18 0902    insulin lispro (HumaLOG) 100 units/mL subcutaneous injection 1-5 Units, 1-5 Units, Subcutaneous, TID AC, 1 Units at 06/13/18 0743 **AND** Fingerstick Glucose (POCT), , , TID AC, SANCHO Melvin    insulin lispro (HumaLOG) 100 units/mL subcutaneous injection 1-5 Units, 1-5 Units, Subcutaneous, HS, SANCHO Melvin    morphine injection 2 mg, 2 mg, Intravenous, Q4H PRN, SANCHO Lopez, 2 mg at 06/10/18 1305    ondansetron (ZOFRAN) injection 4 mg, 4 mg, Intravenous, Q6H PRN, SANCHO Melvin    oxyCODONE-acetaminophen (PERCOCET) 5-325 mg per tablet 1 tablet, 1 tablet, Oral, Q6H PRN, SANCHO Melvin, 1 tablet at 06/12/18 2221    polyethylene glycol (MIRALAX) packet 17 g, 17 g, Oral, Daily PRN, SANCHO Lopez    pravastatin (PRAVACHOL) tablet 80 mg, 80 mg, Oral, Daily With Dinner, SANCHO Melvin, 80 mg at 06/12/18 1536    saccharomyces boulardii (FLORASTOR) capsule 250 mg, 250 mg, Oral, BID, SANCHO Melvin, 250 mg at 06/13/18 0901    sodium chloride 0 9 % infusion, 75 mL/hr, Intravenous, Continuous, Rah Torres MD, Last Rate: 75 mL/hr at 06/12/18 1054, 75 mL/hr at 06/12/18 1054    venlafaxine (EFFEXOR) tablet 75 mg, 75 mg, Oral, BID With Meals, SANCHO Melvin, 75 mg at 06/13/18 0902    zolpidem (AMBIEN) tablet 10 mg, 10 mg, Oral, HS PRN, SANCHO Melvin, 10 mg at 06/12/18 8754  Allergies   Allergen Reactions    Iodine Anaphylaxis    Shellfish-Derived Products      all current active meds have been reviewed    discontinued meds:   Medications Discontinued During This Encounter   Medication Reason    acetaminophen (TYLENOL) tablet 650 mg     heparin (porcine) subcutaneous injection 5,000 Units     heparin (porcine) subcutaneous injection 5,000 Units     vancomycin (VANCOCIN) 1,250 mg in sodium chloride 0 9 % 250 mL IVPB     sodium chloride 0 9 % infusion     sodium chloride 0 9 % irrigation solution Patient Discharge    fentaNYL (SUBLIMAZE) injection 50 mcg Patient Transfer    HYDROmorphone (DILAUDID) injection 0 5 mg Patient Transfer    ondansetron (ZOFRAN) injection 4 mg Patient Transfer    promethazine (PHENERGAN) injection 12 5 mg Patient Transfer    meperidine (DEMEROL) injection 12 5 mg Patient Transfer    oxyCODONE-acetaminophen (PERCOCET) 5-325 mg per tablet 1 tablet Patient Transfer    cefTRIAXone (ROCEPHIN) IVPB (premix) 2,000 mg     magnesium oxide (MAG-OX) tablet 400 mg     sodium chloride 0 9 % infusion     ibuprofen (MOTRIN) tablet 400 mg     acetaminophen (TYLENOL) tablet 650 mg     zolpidem (AMBIEN) tablet 10 mg     sodium chloride 0 9 % infusion Patient Discharge    fentaNYL (SUBLIMAZE) injection 25 mcg Patient Transfer    HYDROmorphone (DILAUDID) injection 0 5 mg Patient Transfer    ondansetron (ZOFRAN) injection 4 mg Patient Transfer    diphenhydrAMINE (BENADRYL) injection 12 5 mg Patient Transfer    fentaNYL (SUBLIMAZE) injection 50 mcg Patient Transfer    HYDROmorphone (DILAUDID) injection 0 5 mg Patient Transfer    meperidine (DEMEROL) injection 12 5 mg Patient Transfer    ondansetron (ZOFRAN) injection 4 mg Patient Transfer    promethazine (PHENERGAN) injection 12 5 mg Patient Transfer    zolpidem (AMBIEN) tablet 5 mg     cefTRIAXone (ROCEPHIN) IVPB (premix) 2,000 mg        Physical Exam: Physical Exam    HR:  [100-104] 100  Resp:  [18] 18  BP: (115-139)/(68-75) 138/75  SpO2:  [95 %-96 %] 95 %  Body mass index is 28 06 kg/m²    Weight (last 2 days)     None        Temp (24hrs), Av °F (37 8 °C), Min:98 9 °F (37 2 °C), Max:102 1 °F (38 9 °C)  Current: Temperature: 99 1 °F (37 3 °C)AGE@    Intake/Output Summary (Last 24 hours) at 18 0954  Last data filed at 18 0933   Gross per 24 hour   Intake             1140 ml   Output              475 ml   Net              665 ml    His temperature was 102 1 degree F last night, persistent tachycardia  He appears alert and cheerful  The lungs are clear  Heart sounds are regular  Abdomen is soft and nontender with no palpable masses  Bowel sounds are active  Lower extremities: The brace on the right leg was removed, there is a pruritic maculopapular, erythematous eruption on the medial and the posterior leg, almost the entire length of the brace  The wound, of the Hemovac, is draining thin yellowish fluid, there is mild diffuse swelling  In both groin he has in erythematous, maculopapular eruption  ( This is not seen In the axilla or elsewhere)  The calf is supple  The left upper arm midline catheter exit site is clean, dressing to be changed              Invasive Devices     Peripheral Intravenous Line            Long-Dwell Peripheral IV (Midline) 79/64/44 Left Basilic 5 days          Drain            Closed/Suction Drain Right;Lateral Knee Accordion 10 Fr  1 day    Negative Pressure Wound Therapy (V A C ) Knee Anterior 1 day                            Lab, Imaging and other studies:    Recent Results (from the past 96 hour(s))   Fingerstick Glucose (POCT)    Collection Time: 06/09/18 11:06 AM   Result Value Ref Range    POC Glucose 110 65 - 140 mg/dl   Osmolality    Collection Time: 06/09/18 11:57 AM   Result Value Ref Range    Osmolality Serum 274 (L) 282 - 298 mmol/KG   Fingerstick Glucose (POCT)    Collection Time: 06/09/18  4:22 PM   Result Value Ref Range    POC Glucose 130 65 - 140 mg/dl   Osmolality, urine    Collection Time: 06/09/18  7:08 PM   Result Value Ref Range    Osmolality, Ur 465 250 - 900 mmol/KG   Sodium, urine, random    Collection Time: 06/09/18  7:08 PM   Result Value Ref Range    Sodium, Ur 29    Fingerstick Glucose (POCT)    Collection Time: 06/09/18 10:21 PM   Result Value Ref Range    POC Glucose 100 65 - 140 mg/dl   Fingerstick Glucose (POCT)    Collection Time: 06/10/18  7:38 AM   Result Value Ref Range    POC Glucose 119 65 - 140 mg/dl   Basic metabolic panel    Collection Time: 06/10/18 10:25 AM   Result Value Ref Range    Sodium 132 (L) 136 - 145 mmol/L    Potassium 3 7 3 5 - 5 3 mmol/L    Chloride 97 (L) 100 - 108 mmol/L    CO2 27 21 - 32 mmol/L    Anion Gap 8 4 - 13 mmol/L    BUN 10 5 - 25 mg/dL    Creatinine 0 85 0 60 - 1 30 mg/dL    Glucose 198 (H) 65 - 140 mg/dL    Calcium 8 9 8 3 - 10 1 mg/dL    eGFR 94 ml/min/1 73sq m   CBC    Collection Time: 06/10/18 10:25 AM   Result Value Ref Range    WBC 12 44 (H) 4 31 - 10 16 Thousand/uL    RBC 3 63 (L) 3 88 - 5 62 Million/uL    Hemoglobin 11 3 (L) 12 0 - 17 0 g/dL    Hematocrit 34 5 (L) 36 5 - 49 3 %    MCV 95 82 - 98 fL    MCH 31 1 26 8 - 34 3 pg    MCHC 32 8 31 4 - 37 4 g/dL    RDW 13 0 11 6 - 15 1 %    Platelets 090 792 - 929 Thousands/uL    MPV 8 8 (L) 8 9 - 12 7 fL   TSH, 3rd generation    Collection Time: 06/10/18 10:25 AM   Result Value Ref Range    TSH 3RD GENERATON 1 082 0 358 - 3 740 uIU/mL   Uric acid    Collection Time: 06/10/18 10:25 AM   Result Value Ref Range    Uric Acid 4 2 4 2 - 8 0 mg/dL   Fingerstick Glucose (POCT)    Collection Time: 06/10/18 11:20 AM   Result Value Ref Range    POC Glucose 138 65 - 140 mg/dl   Fingerstick Glucose (POCT)    Collection Time: 06/10/18  4:11 PM   Result Value Ref Range    POC Glucose 109 65 - 140 mg/dl   Fingerstick Glucose (POCT)    Collection Time: 06/10/18  8:20 PM   Result Value Ref Range    POC Glucose 107 65 - 140 mg/dl   Magnesium    Collection Time: 06/11/18  4:19 AM   Result Value Ref Range    Magnesium 2 0 1 6 - 2 6 mg/dL   Basic metabolic panel    Collection Time: 06/11/18  5:56 AM   Result Value Ref Range    Sodium 134 (L) 136 - 145 mmol/L    Potassium 4 1 3 5 - 5 3 mmol/L    Chloride 98 (L) 100 - 108 mmol/L    CO2 28 21 - 32 mmol/L    Anion Gap 8 4 - 13 mmol/L    BUN 11 5 - 25 mg/dL    Creatinine 0 77 0 60 - 1 30 mg/dL    Glucose 116 65 - 140 mg/dL    Calcium 8 9 8 3 - 10 1 mg/dL    eGFR 98 ml/min/1 73sq m CBC    Collection Time: 06/11/18  5:56 AM   Result Value Ref Range    WBC 13 77 (H) 4 31 - 10 16 Thousand/uL    RBC 3 64 (L) 3 88 - 5 62 Million/uL    Hemoglobin 11 3 (L) 12 0 - 17 0 g/dL    Hematocrit 34 7 (L) 36 5 - 49 3 %    MCV 95 82 - 98 fL    MCH 31 0 26 8 - 34 3 pg    MCHC 32 6 31 4 - 37 4 g/dL    RDW 13 0 11 6 - 15 1 %    Platelets 100 939 - 153 Thousands/uL    MPV 8 9 8 9 - 12 7 fL   Fingerstick Glucose (POCT)    Collection Time: 06/11/18  7:29 AM   Result Value Ref Range    POC Glucose 131 65 - 140 mg/dl   Fingerstick Glucose (POCT)    Collection Time: 06/11/18 11:11 AM   Result Value Ref Range    POC Glucose 121 65 - 140 mg/dl   Fingerstick Glucose (POCT)    Collection Time: 06/11/18  9:25 PM   Result Value Ref Range    POC Glucose 106 65 - 140 mg/dl   Comprehensive metabolic panel    Collection Time: 06/12/18  6:43 AM   Result Value Ref Range    Sodium 138 136 - 145 mmol/L    Potassium 4 0 3 5 - 5 3 mmol/L    Chloride 102 100 - 108 mmol/L    CO2 28 21 - 32 mmol/L    Anion Gap 8 4 - 13 mmol/L    BUN 9 5 - 25 mg/dL    Creatinine 0 75 0 60 - 1 30 mg/dL    Glucose 98 65 - 140 mg/dL    Calcium 8 7 8 3 - 10 1 mg/dL    AST 25 5 - 45 U/L    ALT 38 12 - 78 U/L    Alkaline Phosphatase 77 46 - 116 U/L    Total Protein 6 3 (L) 6 4 - 8 2 g/dL    Albumin 2 0 (L) 3 5 - 5 0 g/dL    Total Bilirubin 0 20 0 20 - 1 00 mg/dL    eGFR 99 ml/min/1 73sq m   CBC and differential    Collection Time: 06/12/18  6:43 AM   Result Value Ref Range    WBC 10 82 (H) 4 31 - 10 16 Thousand/uL    RBC 3 45 (L) 3 88 - 5 62 Million/uL    Hemoglobin 10 6 (L) 12 0 - 17 0 g/dL    Hematocrit 33 4 (L) 36 5 - 49 3 %    MCV 97 82 - 98 fL    MCH 30 7 26 8 - 34 3 pg    MCHC 31 7 31 4 - 37 4 g/dL    RDW 13 2 11 6 - 15 1 %    MPV 8 7 (L) 8 9 - 12 7 fL    Platelets 564 180 - 538 Thousands/uL    nRBC 0 /100 WBCs    Neutrophils Relative 75 43 - 75 %    Immat GRANS % 0 0 - 2 %    Lymphocytes Relative 16 14 - 44 %    Monocytes Relative 8 4 - 12 % Eosinophils Relative 1 0 - 6 %    Basophils Relative 0 0 - 1 %    Neutrophils Absolute 8 13 (H) 1 85 - 7 62 Thousands/µL    Immature Grans Absolute 0 03 0 00 - 0 20 Thousand/uL    Lymphocytes Absolute 1 68 0 60 - 4 47 Thousands/µL    Monocytes Absolute 0 82 0 17 - 1 22 Thousand/µL    Eosinophils Absolute 0 13 0 00 - 0 61 Thousand/µL    Basophils Absolute 0 03 0 00 - 0 10 Thousands/µL   Magnesium    Collection Time: 06/12/18  6:43 AM   Result Value Ref Range    Magnesium 1 9 1 6 - 2 6 mg/dL   Fingerstick Glucose (POCT)    Collection Time: 06/12/18  8:32 AM   Result Value Ref Range    POC Glucose 185 (H) 65 - 140 mg/dl   Fingerstick Glucose (POCT)    Collection Time: 06/12/18 11:17 AM   Result Value Ref Range    POC Glucose 149 (H) 65 - 140 mg/dl   Fingerstick Glucose (POCT)    Collection Time: 06/12/18  4:23 PM   Result Value Ref Range    POC Glucose 230 (H) 65 - 140 mg/dl   Fingerstick Glucose (POCT)    Collection Time: 06/12/18  8:49 PM   Result Value Ref Range    POC Glucose 126 65 - 140 mg/dl   Sedimentation rate, automated    Collection Time: 06/13/18  6:57 AM   Result Value Ref Range    Sed Rate 74 (H) 2 - 10 mm/hour   C-reactive protein    Collection Time: 06/13/18  6:57 AM   Result Value Ref Range    CRP >90 0 (H) <3 0 mg/L   Fingerstick Glucose (POCT)    Collection Time: 06/13/18  7:25 AM   Result Value Ref Range    POC Glucose 194 (H) 65 - 140 mg/dl       Results from last 7 days  Lab Units 06/12/18  0643 06/11/18  0419 06/09/18  0617   MAGNESIUM mg/dL 1 9 2 0 1 7             No results found for: TROPONINT  ABG:No results found for: PHART, WAS3OEU, PO2ART, DPW7IDZ, M6NKFPKN, BEART, SOURCE        Cultures    Results from last 7 days  Lab Units 06/08/18 2050 06/08/18 2045 06/08/18 2040 06/08/18 2028 06/06/18  1924 06/06/18  1923   GRAM STAIN RESULT  1+ Polys  3+ Gram positive cocci in pairs Rare Polys  1+ Gram positive cocci in pairs 1+ Polys  1+ Gram positive cocci in pairs 1+ Polys  No bacteria seen 4+ Polys  4+ Gram positive cocci in pairs, chains and clusters 2+ Polys  No bacteria seen   BODY FLUID CULTURE, STERILE   --  3+ Growth of Staphylococcus aureus* 3+ Growth of Staphylococcus aureus* 1+ Growth of Staphylococcus aureus* 4+ Growth of Staphylococcus aureus* Few Colonies of Staphylococcus aureus*      Xr Spine Lumbar 2 Or 3 Views Injury    Result Date: 6/11/2018  Narrative: LUMBAR SPINE INDICATION:   r/o implants  History of back surgery  COMPARISON:  None VIEWS:  XR SPINE LUMBAR 2 OR 3 VIEWS INJURY Images: 2 FINDINGS: There is no evidence of acute fracture or destructive osseous lesion  Alignment is unremarkable  Mild disc space narrowing diffusely throughout the lumbar spine with associated facet hypertrophic changes  Postoperative changes in the L5 vertebral body  The pedicles appear intact  Visualized soft tissues appear unremarkable  Impression: Degenerative and postoperative changes  Workstation performed: [id number]     Xr Knee 1 Or 2 Vw Right    Result Date: 6/6/2018  Narrative: RIGHT KNEE INDICATION:   infection,suspect spetic joint     Redness and swelling  COMPARISON:  Plain radiographs May 15, 2016 VIEWS:  XR KNEE 1 OR 2 VW RIGHT Images: 2 FINDINGS: There is no acute fracture or dislocation  Small suprapatellar joint effusion  Mild medial joint space narrowing  Mild narrowing of the patellofemoral space  Mild sharpening of the tibial spines  No lytic or blastic lesions are seen  Diffuse soft tissue swelling, extending from above the patella, inferiorly to the anterior tibial tubercle  More focal soft tissue swelling overlying the anterior tibial tubercle  No radiopaque foreign bodies are seen  Impression: 1  Mild degenerative changes with small suprapatellar joint effusion  If there is clinical concern for septic arthritis, consider follow-up arthrocentesis  2   Diffuse soft tissue swelling anterior to the patella, most compatible with prepatellar bursitis   3   More focal soft tissue swelling overlying the anterior tibial tubercle, suspicious for superimposed infrapatellar bursitis  The study was marked in [facility] for immediate notification  Workstation performed: [id number]     Vas Lower Limb Venous Duplex Study, Unilateral/limited    Result Date: 6/7/2018  Narrative:  [facility] REPORT CLINICAL: Indications: Patient presents with right lower extremity edema  Risk Factors: The patient has no history of DVT  The patient current BMI is 28 06, Weight is 190 lb and Height is 69 in  CONCLUSION: Impression: RIGHT LOWER LIMB No evidence of acute or chronic deep vein thrombosis   No evidence of superficial thrombophlebitis noted  Doppler evaluation shows a normal response to augmentation maneuvers  Popliteal, posterior tibial and anterior tibial arterial Doppler waveforms are triphasic  LEFT LOWER LIMB LIMITED Evaluation shows no evidence of thrombus in the common femoral vein  Doppler evaluation shows a normal response to augmentation maneuvers  SIGNATURE: Electronically Signed by: Dennis Caldera MD, RPVI on 2018-06-07 03:44:21 PM    I have personally reviewed pertinent reports  Principal Problem:    Septic joint of right knee joint (HCC)  Active Problems:    Hyperlipidemia    Borderline diabetes    Hypertension    Cellulitis of right lower extremity    Hyponatremia    Depression    Infrapatellar bursitis of right knee    Effusion of right knee    Staphylococcal arthritis of right knee (HCC)      Assessment/Plan: This is a middle-age man with above list of comorbidities, recurrent depression, alcohol/marijuana use, being treated for right lower extremity cellulitis, septic bursitis/arthritis of the knee, microbial etiology of which is methicillin sensitive Staphylococcus aureus  Has had 2 surgical debridements/washouts, postoperative day # 4 and 2  There is plan for repeat irrigation/debridement for June 14    Overnight, he had high fever, and probable dislodgement of the Hemovac  Whether, he has a new healthcare associated infection or indeed on going right knee infection is causing the high fever is unclear  He has mentioned diarrhea, is certainly at risk for C difficile infection  Recommend blood, urine culture  Recommend keeping a stool chart and if diarrhea is persistent, send stool for C difficile  He seems to have developed a contact dermatitis(pruritic rash along the course of the brace), which can be treated with topical steroid cream   He also seems to have a dermatophyte infection tenia cruris, in the groin, for which topical clotrimazole has been ordered  Discussed with patient's nurse

## 2018-06-13 NOTE — PROGRESS NOTES
Received pt at [phone]  Found Hemovac drain disconnected from patient during bedside report and lying on the floor, dressing saturated  Dr Caterina Saavedra notified  No new orders

## 2018-06-13 NOTE — PROGRESS NOTES
Dr Nuris Goncalves notified of drainage coming from removed drain site and stitches still present at site  Enforcement of dressing present recommended  No new orders

## 2018-06-13 NOTE — PROGRESS NOTES
[facility] Progress Note  Patient: Nelson Bhandari 66 y o  male   MRN: [id number]  PCP: Tram Dillon MD  Unit/Bed#: 2 [place] [address] Encounter: [id number]  Date Of Visit: 18    Subjective:   Fever last night 38 9C at 10pm  POD#2 repeat right I&D of infrapatellar bursa and irrigation/washout of right knee  Right knee pain fairly controlled with Percocet prn, Tylenol    Objective:   Vitals:   Temp (24hrs), Av 6 °F (37 6 °C), Min:98 4 °F (36 9 °C), Max:102 1 °F (38 9 °C)    HR:  [] 98  Resp:  [18] 18  BP: (125-139)/(68-75) 128/69  SpO2:  [95 %-99 %] 99 %  Body mass index is 28 06 kg/m²         Intake/Output Summary (Last 24 hours) at 18 1813  Last data filed at 18 0933   Gross per 24 hour   Intake              240 ml   Output              400 ml   Net             -160 ml       Physical Exam:   General: NAD  HEENT: EOMI, anicteric, oral moist, neck supple, no mass or JVD  Chest: CTAB, no wheeze/rales  Cardiac: RRR, S1/S2, No murmur  Abd: S/ND/NT/BS+  MSK: Right knee with VAC in place, pedal pulses intact  Neuro: AAOx3, moving all extremities  Psychiatric: Mood with normal affect    Additional Data:     Labs:    Results from last 7 days  Lab Units 18  0643   WBC Thousand/uL 10 82*   HEMOGLOBIN g/dL 10 6*   HEMATOCRIT % 33 4*   PLATELETS Thousands/uL 371   NEUTROS PCT % 75   LYMPHS PCT % 16   MONOS PCT % 8   EOS PCT % 1       Results from last 7 days  Lab Units 18  0643   SODIUM mmol/L 138   POTASSIUM mmol/L 4 0   CHLORIDE mmol/L 102   CO2 mmol/L 28   BUN mg/dL 9   CREATININE mg/dL 0 75   CALCIUM mg/dL 8 7   TOTAL PROTEIN g/dL 6 3*   BILIRUBIN TOTAL mg/dL 0 20   ALK PHOS U/L 77   ALT U/L 38   AST U/L 25   GLUCOSE RANDOM mg/dL 98           Recent Results (from the past 24 hour(s))   Fingerstick Glucose (POCT)    Collection Time: 18  8:49 PM   Result Value Ref Range    POC Glucose 126 65 - 140 mg/dl   Sedimentation rate, automated    Collection Time: 18  6:57 AM   Result Value Ref Range    Sed Rate 74 (H) 2 - 10 mm/hour   C-reactive protein    Collection Time: 06/13/18  6:57 AM   Result Value Ref Range    CRP >90 0 (H) <3 0 mg/L   Fingerstick Glucose (POCT)    Collection Time: 06/13/18  7:25 AM   Result Value Ref Range    POC Glucose 194 (H) 65 - 140 mg/dl   Fingerstick Glucose (POCT)    Collection Time: 06/13/18 11:38 AM   Result Value Ref Range    POC Glucose 146 (H) 65 - 140 mg/dl   Urinalysis with reflex to microscopic    Collection Time: 06/13/18 12:20 PM   Result Value Ref Range    Color, UA Yellow     Clarity, UA Clear     Specific Gravity, UA <=1 005 1 000 - 1 030    pH, UA 6 0 5 0 - 9 0    Leukocytes, UA Negative Negative    Nitrite, UA Negative Negative    Protein, UA Negative Negative mg/dl    Glucose, UA Negative Negative mg/dl    Ketones, UA Negative Negative mg/dl    Urobilinogen, UA 0 2 0 2, 1 0 E U /dl E U /dl    Bilirubin, UA Negative Negative    Blood, UA Trace-Intact (A) Negative   Urine Microscopic    Collection Time: 06/13/18 12:20 PM   Result Value Ref Range    RBC, UA 0-1 (A) None Seen, 0-5 /hpf    WBC, UA 0-1 (A) None Seen, 0-5, 5-55, 5-65 /hpf    Epithelial Cells None Seen None Seen, Occasional /hpf    Bacteria, UA Occasional None Seen, Occasional /hpf    OTHER OBSERVATIONS Yeast Cells Present    Fingerstick Glucose (POCT)    Collection Time: 06/13/18  4:28 PM   Result Value Ref Range    POC Glucose 126 65 - 140 mg/dl       Cultures:     Results from last 7 days  Lab Units 06/08/18 2050 06/08/18 2045 06/08/18 2040 06/08/18 2028 06/06/18  1924 06/06/18  1923   GRAM STAIN RESULT  1+ Polys  3+ Gram positive cocci in pairs Rare Polys  1+ Gram positive cocci in pairs 1+ Polys  1+ Gram positive cocci in pairs 1+ Polys  No bacteria seen 4+ Polys  4+ Gram positive cocci in pairs, chains and clusters 2+ Polys  No bacteria seen   BODY FLUID CULTURE, STERILE   --  3+ Growth of Staphylococcus aureus* 3+ Growth of Staphylococcus aureus* 1+ Growth of Staphylococcus aureus* 4+ Growth of Staphylococcus aureus* Few Colonies of Staphylococcus aureus*       Imaging:  Xr Spine Lumbar 2 Or 3 Views Injury    Result Date: 6/11/2018  Narrative: LUMBAR SPINE INDICATION:   r/o implants  History of back surgery  COMPARISON:  None VIEWS:  XR SPINE LUMBAR 2 OR 3 VIEWS INJURY Images: 2 FINDINGS: There is no evidence of acute fracture or destructive osseous lesion  Alignment is unremarkable  Mild disc space narrowing diffusely throughout the lumbar spine with associated facet hypertrophic changes  Postoperative changes in the L5 vertebral body  The pedicles appear intact  Visualized soft tissues appear unremarkable  Impression: Degenerative and postoperative changes  Workstation performed: [id number]     Xr Knee 1 Or 2 Vw Right    Result Date: 6/6/2018  Narrative: RIGHT KNEE INDICATION:   infection,suspect spetic joint     Redness and swelling  COMPARISON:  Plain radiographs May 15, 2016 VIEWS:  XR KNEE 1 OR 2 VW RIGHT Images: 2 FINDINGS: There is no acute fracture or dislocation  Small suprapatellar joint effusion  Mild medial joint space narrowing  Mild narrowing of the patellofemoral space  Mild sharpening of the tibial spines  No lytic or blastic lesions are seen  Diffuse soft tissue swelling, extending from above the patella, inferiorly to the anterior tibial tubercle  More focal soft tissue swelling overlying the anterior tibial tubercle  No radiopaque foreign bodies are seen  Impression: 1  Mild degenerative changes with small suprapatellar joint effusion  If there is clinical concern for septic arthritis, consider follow-up arthrocentesis  2   Diffuse soft tissue swelling anterior to the patella, most compatible with prepatellar bursitis  3   More focal soft tissue swelling overlying the anterior tibial tubercle, suspicious for superimposed infrapatellar bursitis  The study was marked in [facility] for immediate notification   Workstation performed: [id number]     Vas Lower Limb Venous Duplex Study, Unilateral/limited    Result Date: 6/7/2018  Narrative:  [facility] REPORT CLINICAL: Indications: Patient presents with right lower extremity edema  Risk Factors: The patient has no history of DVT  The patient current BMI is 28 06, Weight is 190 lb and Height is 69 in  CONCLUSION: Impression: RIGHT LOWER LIMB No evidence of acute or chronic deep vein thrombosis   No evidence of superficial thrombophlebitis noted  Doppler evaluation shows a normal response to augmentation maneuvers  Popliteal, posterior tibial and anterior tibial arterial Doppler waveforms are triphasic  LEFT LOWER LIMB LIMITED Evaluation shows no evidence of thrombus in the common femoral vein  Doppler evaluation shows a normal response to augmentation maneuvers    SIGNATURE: Electronically Signed by: Mary Kate Stanton MD, RPVI on 2018-06-07 03:44:21 PM    Imaging Reports Reviewed by myself    Last 24 Hours Medication List:     Current Facility-Administered Medications:     acetaminophen (TYLENOL) tablet 650 mg, 650 mg, Oral, Q8H [facility], SANCHO Mitchell, 650 mg at 06/13/18 1530    ALPRAZolam Hiwot Bump) tablet 0 5 mg, 0 5 mg, Oral, BID PRN, SANCHO Melvin, 0 5 mg at 06/12/18 0744    ALPRAZolam (XANAX) tablet 0 5 mg, 0 5 mg, Oral, BID, Enrique Bedolla MD, 0 5 mg at 06/13/18 0901    aluminum-magnesium hydroxide-simethicone (MYLANTA) 200-200-20 mg/5 mL oral suspension 30 mL, 30 mL, Oral, Q6H PRN, SANCHO Melvin    ceFAZolin (ANCEF) IVPB (premix) 2,000 mg, 2,000 mg, Intravenous, Q8H, Yoanna Brar MD, Last Rate: 100 mL/hr at 06/13/18 1118, 2,000 mg at 06/13/18 1118    clobetasol (TEMOVATE) 0 05 % cream, , Topical, BID, Yoanna Brar MD, 1 application at 42/09/24 1118    clotrimazole (LOTRIMIN) 1 % cream, , Topical, BID, Yoanna Brar MD, 1 application at 98/32/63 1118    enalapril (VASOTEC) tablet 15 mg, 15 mg, Oral, Daily, SANCHO Melvin, 15 mg at 06/13/18 0902    famotidine (PEPCID) tablet 20 mg, 20 mg, Oral, Daily, Cher RODGERS SANCHO Costello, 20 mg at 06/13/18 0902    heparin (porcine) subcutaneous injection 5,000 Units, 5,000 Units, Subcutaneous, Q12H [address], 5,000 Units at 06/13/18 0903 **AND** [CANCELED] Platelet count, , , Once, SANCHO Melvin    ibuprofen (MOTRIN) tablet 400 mg, 400 mg, Oral, Q12H, Cher RODGERS SANCHO Costello, 400 mg at 06/13/18 0902    insulin lispro (HumaLOG) 100 units/mL subcutaneous injection 1-5 Units, 1-5 Units, Subcutaneous, TID AC, 1 Units at 06/13/18 0743 **AND** Fingerstick Glucose (POCT), , , TID AC, SANCHO Melvin    insulin lispro (HumaLOG) 100 units/mL subcutaneous injection 1-5 Units, 1-5 Units, Subcutaneous, HS, SANCHO Melvin    morphine injection 2 mg, 2 mg, Intravenous, Q4H PRN, SANCHO Plunkett, 2 mg at 06/10/18 1305    ondansetron (ZOFRAN) injection 4 mg, 4 mg, Intravenous, Q6H PRN, SANCHO Melvin    oxyCODONE-acetaminophen (PERCOCET) 5-325 mg per tablet 1 tablet, 1 tablet, Oral, Q6H PRN, SACNHO Melvin, 1 tablet at 06/12/18 2221    polyethylene glycol (MIRALAX) packet 17 g, 17 g, Oral, Daily PRN, SANCHO Plunkett    pravastatin (PRAVACHOL) tablet 80 mg, 80 mg, Oral, Daily With Dinner, SANCHO Melvin, 80 mg at 06/12/18 1536    saccharomyces boulardii (FLORASTOR) capsule 250 mg, 250 mg, Oral, BID, SANCHO Melvin, 250 mg at 06/13/18 0901    sodium chloride 0 9 % infusion, 75 mL/hr, Intravenous, Continuous, Leonardo Slater MD, Last Rate: 75 mL/hr at 06/12/18 1054, 75 mL/hr at 06/12/18 1054    venlafaxine (EFFEXOR) tablet 75 mg, 75 mg, Oral, BID With Meals, SANCHO Melvin, 75 mg at 06/13/18 0902    zolpidem (AMBIEN) tablet 10 mg, 10 mg, Oral, HS PRN, SANCHO Melvin, 10 mg at 06/12/18 2223     Assessment and Plans:  Hospital Problem List:   Principal Problem:    Septic joint of right knee joint ([facility] [address] )  Active Problems:    Infrapatellar bursitis of right knee Staphylococcal arthritis of right knee (HCC)    Hyperlipidemia    Borderline diabetes    Hypertension    Cellulitis of right lower extremity    Hyponatremia    Depression    Effusion of right knee    66year old male with a history of borderline diabetes, HTN, HLD, who presents with septic right knee infection  * Septic joint of right knee joint ([address] )   Assessment & Plan    Sepsis as evidenced by leukocytosis, tachycardia  Right knee aspirate and tissue cultures growing MSSA  Orthopedics and ID consult following  POD #5 S/p right knee surgical irrigation debridement of bursa and infected joint on 6/8/18  POD#2 repeat right I&D of infrapatellar bursa and irrigation/washout of right knee on 6/11/18  Wound VAC in place right knee   Per ID, continue on Ancef iv day #7        Staphylococcal arthritis of right knee (HCC)   Assessment & Plan    ID and orthopedics following, appreciate recommendations        Infrapatellar bursitis of right knee   Assessment & Plan    ID and orthopedics following, appreciate recommendations        Depression   Assessment & Plan    Continue Effexor        Hyponatremia   Assessment & Plan    Worsening during admission  Sodium 134 -> 132 -> 133 -> 130, possible SIADH from uncontrolled pain  Less likely due to Effexor as patient's Na has worsened  Serum osmo low, however, urine osmo wnl  No improvement on IVF  TSH and urine acid wnl  With fluid restriction, Na normalized  Continue fluid restriction, 1200 mL/day         Cellulitis of right lower extremity   Assessment & Plan    With prepatellar bursitis and septic arthritis   Right knee x-ray, small suprapatellar joint effusion, prepatellar bursitis, and more focal soft tissue swelling overlying the anterior tibial tubercle, suspicious for superimposed infrapatellar bursitis   If there is clinical concern for septic arthritis, consider follow-up arthrocentesis    Venous Doppler to rule out DVT, negative         Hypertension   Assessment & Plan    Continue Enalapril  Pain control  Monitor  Borderline diabetes   Assessment & Plan    HbA1c, 7 0  On diabetic diet        Hyperlipidemia   Assessment & Plan    Continue statin        Thrombocytosis (HCC)resolved as of 6/10/2018   Assessment & Plan    Mild  Resolved  Likely reactive to sepsis  · Continue Heparin subcu for DVT prophylaxis  DVT Prophylaxis: on Heparin sc, SCD, OOB to chair, early ambulation  Code Status: Level 1 - Full Code  Disposition: anticipate d/c home once clinically improved      Signed by:  Shannon Gordon MD  Attending Hospitalist  Page#: [phone] (Hours 7am to 7pm)  6/13/2018 6:13 PM

## 2018-06-13 NOTE — POST OP PROGRESS NOTES
Progress Note - Orthopedics   Sneha Srinivasan 66 y o  male MRN: [id number]  Unit/Bed#: 2 [place] [address] Encounter: [id number]    Assessment:  POD #2 s/p repeat right incision and drainage of infrapatellar bursa and irrigation/washout of right knee -pain/swelling/erythema markedly improved, wound VAC in place, in knee immobilizer, pain appears well controlled, neurovascularly intact, leukocytosis almost WNL, appears to be consistent with septic arthritis and infrapatellar bursitis, AVSS, 3+ staph in bursa and synovial fluid    Plan:  POD #2 s/p repeat right incision and drainage of infrapatellar bursa and irrigation/washout of right knee-   - Antibiotics:  Continue antibiotics per primary care team and Infectious Disease, coverage for staff appears to be appropriate, staff appears to be susceptible to multiple agents per cultures  - Anticoagulation: continue DVT prophylaxis, SCDs  - Activity:  Weightbearing as tolerated, PT/OT, to continue knee immobilizer  - AM lab draw:  Repeat a m  labs with CBC, BMP, Mag, phos  - Analgesia: Continue p r n  medication  - Dressing:  Continue wound VAC in place, seal is good currently, continue knee immobilizer, continue VIJAY and wound VAC  - Disposition:   we will continue to plan for irrigation and possible open arthrotomy for a washout of the right knee and right bursa, please keep NPO and hold DVT prophylaxis by midnight    Weight bearing: WBAT    VTE Pharmacologic Prophylaxis: Enoxaparin (Lovenox)  VTE Mechanical Prophylaxis: sequential compression device    Subjective:  Patient was seen examined at bedside  Patient denies any acute events overnight  Patient reports that he sees the visual improvement of his right knee  Patient believes that his erythema and swelling have his gone down  Patient reports that he get the best in his sleep that he has well being in the hospital   Patient reports that his knee pain at was minimal compared to his other days    Patient also reports that his hamstring tenderness is also resolved  Patient denies any neurovascular changes such as numbness, tingling, lack of motor movement, lack of sensation in the distal lower right extremity  Vitals: Blood pressure 128/69, pulse 98, temperature 98 4 °F (36 9 °C), temperature source Oral, resp  rate 18, height 5' 9" (1 753 m), weight 86 2 kg (190 lb 0 2 oz), SpO2 99 %  ,Body mass index is 28 06 kg/m²  Intake/Output Summary (Last 24 hours) at 06/13/18 1439  Last data filed at 06/13/18 0933   Gross per 24 hour   Intake             1140 ml   Output              475 ml   Net              665 ml       Invasive Devices     Peripheral Intravenous Line            Long-Dwell Peripheral IV (Midline) 31/98/44 Left Basilic 6 days          Drain            Closed/Suction Drain Right;Lateral Knee Accordion 10 Fr  1 day    Negative Pressure Wound Therapy (V A C ) Knee Anterior 1 day                Physical Exam: /69 (BP Location: Right arm)   Pulse 98   Temp 98 4 °F (36 9 °C) (Oral)   Resp 18   Ht 5' 9" (1 753 m)   Wt 86 2 kg (190 lb 0 2 oz)   SpO2 99%   BMI 28 06 kg/m²    General appearance: alert and oriented, in no acute distress  Head: Normocephalic, without obvious abnormality, atraumatic  Extremities: no edema, in knee immobilizer, with wound VAC and VIJAY drain  Skin: erythema and swelling around bursa and effusion, markedly reduced  Ortho Exam:  right Lower Extremity: Thigh and calf compartments are soft and nontender to palpation  + L3-S1 SILT  + DF/PF + EHL  No pain with passive stretch/extension of toes  DP 2+, capillary refill less than 2 seconds, and foot is warm B/L  Incision has wound vac in place    Lab, Imaging and other studies: I have personally reviewed pertinent lab results

## 2018-06-14 ENCOUNTER — ANESTHESIA (INPATIENT)
Dept: PERIOP | Facility: HOSPITAL | Age: 63
DRG: 486 | End: 2018-06-14
Payer: COMMERCIAL

## 2018-06-14 ENCOUNTER — ANESTHESIA EVENT (INPATIENT)
Dept: PERIOP | Facility: HOSPITAL | Age: 63
DRG: 486 | End: 2018-06-14
Payer: COMMERCIAL

## 2018-06-14 PROBLEM — L30.9 DERMATITIS: Status: ACTIVE | Noted: 2018-06-14

## 2018-06-14 LAB
ANION GAP SERPL CALCULATED.3IONS-SCNC: 10 MMOL/L (ref 4–13)
BACTERIA UR CULT: NORMAL
BASOPHILS # BLD AUTO: 0.02 THOUSANDS/ΜL (ref 0–0.1)
BASOPHILS NFR BLD AUTO: 0 % (ref 0–1)
BUN SERPL-MCNC: 6 MG/DL (ref 5–25)
CALCIUM SERPL-MCNC: 8.4 MG/DL (ref 8.3–10.1)
CHLORIDE SERPL-SCNC: 102 MMOL/L (ref 100–108)
CO2 SERPL-SCNC: 26 MMOL/L (ref 21–32)
CREAT SERPL-MCNC: 0.63 MG/DL (ref 0.6–1.3)
EOSINOPHIL # BLD AUTO: 0.19 THOUSAND/ΜL (ref 0–0.61)
EOSINOPHIL NFR BLD AUTO: 2 % (ref 0–6)
ERYTHROCYTE [DISTWIDTH] IN BLOOD BY AUTOMATED COUNT: 13.2 % (ref 11.6–15.1)
GFR SERPL CREATININE-BSD FRML MDRD: 106 ML/MIN/1.73SQ M
GLUCOSE SERPL-MCNC: 101 MG/DL (ref 65–140)
GLUCOSE SERPL-MCNC: 115 MG/DL (ref 65–140)
GLUCOSE SERPL-MCNC: 142 MG/DL (ref 65–140)
GLUCOSE SERPL-MCNC: 96 MG/DL (ref 65–140)
GLUCOSE SERPL-MCNC: 96 MG/DL (ref 65–140)
HCT VFR BLD AUTO: 29.9 % (ref 36.5–49.3)
HGB BLD-MCNC: 9.6 G/DL (ref 12–17)
IMM GRANULOCYTES # BLD AUTO: 0.07 THOUSAND/UL (ref 0–0.2)
IMM GRANULOCYTES NFR BLD AUTO: 1 % (ref 0–2)
LYMPHOCYTES # BLD AUTO: 1.74 THOUSANDS/ΜL (ref 0.6–4.47)
LYMPHOCYTES NFR BLD AUTO: 18 % (ref 14–44)
MAGNESIUM SERPL-MCNC: 1.6 MG/DL (ref 1.6–2.6)
MCH RBC QN AUTO: 30.7 PG (ref 26.8–34.3)
MCHC RBC AUTO-ENTMCNC: 32.1 G/DL (ref 31.4–37.4)
MCV RBC AUTO: 96 FL (ref 82–98)
MONOCYTES # BLD AUTO: 0.67 THOUSAND/ΜL (ref 0.17–1.22)
MONOCYTES NFR BLD AUTO: 7 % (ref 4–12)
NEUTROPHILS # BLD AUTO: 7.09 THOUSANDS/ΜL (ref 1.85–7.62)
NEUTS SEG NFR BLD AUTO: 72 % (ref 43–75)
NRBC BLD AUTO-RTO: 0 /100 WBCS
PHOSPHATE SERPL-MCNC: 3.3 MG/DL (ref 2.3–4.1)
PLATELET # BLD AUTO: 421 THOUSANDS/UL (ref 149–390)
PMV BLD AUTO: 8.9 FL (ref 8.9–12.7)
POTASSIUM SERPL-SCNC: 3.7 MMOL/L (ref 3.5–5.3)
RBC # BLD AUTO: 3.13 MILLION/UL (ref 3.88–5.62)
SODIUM SERPL-SCNC: 138 MMOL/L (ref 136–145)
WBC # BLD AUTO: 9.78 THOUSAND/UL (ref 4.31–10.16)

## 2018-06-14 PROCEDURE — 27310 EXPLORATION OF KNEE JOINT: CPT | Performed by: ORTHOPAEDIC SURGERY

## 2018-06-14 PROCEDURE — 85025 COMPLETE CBC W/AUTO DIFF WBC: CPT | Performed by: INTERNAL MEDICINE

## 2018-06-14 PROCEDURE — 97110 THERAPEUTIC EXERCISES: CPT

## 2018-06-14 PROCEDURE — 0JQN0ZZ REPAIR RIGHT LOWER LEG SUBCUTANEOUS TISSUE AND FASCIA, OPEN APPROACH: ICD-10-PCS | Performed by: INTERNAL MEDICINE

## 2018-06-14 PROCEDURE — 80048 BASIC METABOLIC PNL TOTAL CA: CPT | Performed by: INTERNAL MEDICINE

## 2018-06-14 PROCEDURE — 0SBC0ZZ EXCISION OF RIGHT KNEE JOINT, OPEN APPROACH: ICD-10-PCS | Performed by: ORTHOPAEDIC SURGERY

## 2018-06-14 PROCEDURE — 84100 ASSAY OF PHOSPHORUS: CPT | Performed by: INTERNAL MEDICINE

## 2018-06-14 PROCEDURE — 99232 SBSQ HOSP IP/OBS MODERATE 35: CPT | Performed by: INTERNAL MEDICINE

## 2018-06-14 PROCEDURE — 27310 EXPLORATION OF KNEE JOINT: CPT | Performed by: PHYSICIAN ASSISTANT

## 2018-06-14 PROCEDURE — 82948 REAGENT STRIP/BLOOD GLUCOSE: CPT

## 2018-06-14 PROCEDURE — 83735 ASSAY OF MAGNESIUM: CPT | Performed by: INTERNAL MEDICINE

## 2018-06-14 RX ORDER — OXYCODONE HYDROCHLORIDE AND ACETAMINOPHEN 5; 325 MG/1; MG/1
1 TABLET ORAL EVERY 4 HOURS PRN
Status: DISCONTINUED | OUTPATIENT
Start: 2018-06-14 | End: 2018-06-15 | Stop reason: ALTCHOICE

## 2018-06-14 RX ORDER — DIPHENHYDRAMINE HCL 25 MG
25 TABLET ORAL EVERY 6 HOURS PRN
Status: DISCONTINUED | OUTPATIENT
Start: 2018-06-14 | End: 2018-06-16 | Stop reason: HOSPADM

## 2018-06-14 RX ORDER — DIPHENHYDRAMINE HCL 25 MG
25 TABLET ORAL ONCE AS NEEDED
Status: COMPLETED | OUTPATIENT
Start: 2018-06-14 | End: 2018-06-14

## 2018-06-14 RX ORDER — DOCUSATE SODIUM 100 MG/1
100 CAPSULE, LIQUID FILLED ORAL 2 TIMES DAILY
Status: DISCONTINUED | OUTPATIENT
Start: 2018-06-14 | End: 2018-06-16 | Stop reason: HOSPADM

## 2018-06-14 RX ORDER — NYSTATIN 100000 [USP'U]/G
1 POWDER TOPICAL 2 TIMES DAILY
Status: DISCONTINUED | OUTPATIENT
Start: 2018-06-14 | End: 2018-06-16 | Stop reason: HOSPADM

## 2018-06-14 RX ORDER — HEPARIN SODIUM 5000 [USP'U]/ML
5000 INJECTION, SOLUTION INTRAVENOUS; SUBCUTANEOUS EVERY 8 HOURS SCHEDULED
Status: DISCONTINUED | OUTPATIENT
Start: 2018-06-15 | End: 2018-06-16 | Stop reason: HOSPADM

## 2018-06-14 RX ORDER — PROPOFOL 10 MG/ML
INJECTION, EMULSION INTRAVENOUS AS NEEDED
Status: DISCONTINUED | OUTPATIENT
Start: 2018-06-14 | End: 2018-06-14 | Stop reason: SURG

## 2018-06-14 RX ORDER — MIDAZOLAM HYDROCHLORIDE 1 MG/ML
INJECTION INTRAMUSCULAR; INTRAVENOUS AS NEEDED
Status: DISCONTINUED | OUTPATIENT
Start: 2018-06-14 | End: 2018-06-14 | Stop reason: SURG

## 2018-06-14 RX ORDER — IODINE/SODIUM IODIDE 2 %
TINCTURE TOPICAL 2 TIMES DAILY
Status: DISCONTINUED | OUTPATIENT
Start: 2018-06-14 | End: 2018-06-16 | Stop reason: HOSPADM

## 2018-06-14 RX ORDER — MAGNESIUM HYDROXIDE 1200 MG/15ML
LIQUID ORAL AS NEEDED
Status: DISCONTINUED | OUTPATIENT
Start: 2018-06-14 | End: 2018-06-14 | Stop reason: HOSPADM

## 2018-06-14 RX ORDER — DIPHENHYDRAMINE HYDROCHLORIDE, ZINC ACETATE 2; .1 G/100G; G/100G
CREAM TOPICAL 3 TIMES DAILY PRN
Status: DISCONTINUED | OUTPATIENT
Start: 2018-06-14 | End: 2018-06-16 | Stop reason: HOSPADM

## 2018-06-14 RX ORDER — LIDOCAINE HYDROCHLORIDE 10 MG/ML
INJECTION, SOLUTION INFILTRATION; PERINEURAL AS NEEDED
Status: DISCONTINUED | OUTPATIENT
Start: 2018-06-14 | End: 2018-06-14 | Stop reason: SURG

## 2018-06-14 RX ORDER — OXYCODONE HYDROCHLORIDE AND ACETAMINOPHEN 5; 325 MG/1; MG/1
1 TABLET ORAL EVERY 4 HOURS PRN
Status: DISCONTINUED | OUTPATIENT
Start: 2018-06-14 | End: 2018-06-15

## 2018-06-14 RX ORDER — FENTANYL CITRATE/PF 50 MCG/ML
25 SYRINGE (ML) INJECTION
Status: DISCONTINUED | OUTPATIENT
Start: 2018-06-14 | End: 2018-06-14 | Stop reason: HOSPADM

## 2018-06-14 RX ORDER — FENTANYL CITRATE 50 UG/ML
INJECTION, SOLUTION INTRAMUSCULAR; INTRAVENOUS AS NEEDED
Status: DISCONTINUED | OUTPATIENT
Start: 2018-06-14 | End: 2018-06-14 | Stop reason: SURG

## 2018-06-14 RX ORDER — ONDANSETRON 2 MG/ML
4 INJECTION INTRAMUSCULAR; INTRAVENOUS ONCE AS NEEDED
Status: DISCONTINUED | OUTPATIENT
Start: 2018-06-14 | End: 2018-06-14 | Stop reason: HOSPADM

## 2018-06-14 RX ADMIN — OXYCODONE HYDROCHLORIDE AND ACETAMINOPHEN 1 TABLET: 5; 325 TABLET ORAL at 22:20

## 2018-06-14 RX ADMIN — Medication 250 MG: at 09:01

## 2018-06-14 RX ADMIN — FENTANYL CITRATE 25 MCG: 50 INJECTION INTRAMUSCULAR; INTRAVENOUS at 20:21

## 2018-06-14 RX ADMIN — IBUPROFEN 400 MG: 400 TABLET ORAL at 09:01

## 2018-06-14 RX ADMIN — DEXAMETHASONE SODIUM PHOSPHATE 4 MG: 10 INJECTION INTRAMUSCULAR; INTRAVENOUS at 18:41

## 2018-06-14 RX ADMIN — FAMOTIDINE 20 MG: 20 TABLET ORAL at 09:01

## 2018-06-14 RX ADMIN — DIPHENHYDRAMINE HCL 25 MG: 25 TABLET ORAL at 14:18

## 2018-06-14 RX ADMIN — FENTANYL CITRATE 25 MCG: 50 INJECTION INTRAMUSCULAR; INTRAVENOUS at 21:03

## 2018-06-14 RX ADMIN — PROPOFOL 200 MG: 10 INJECTION, EMULSION INTRAVENOUS at 18:41

## 2018-06-14 RX ADMIN — LIDOCAINE HYDROCHLORIDE 50 MG: 10 INJECTION, SOLUTION INFILTRATION; PERINEURAL at 18:41

## 2018-06-14 RX ADMIN — CEFAZOLIN SODIUM 2000 MG: 2 SOLUTION INTRAVENOUS at 18:38

## 2018-06-14 RX ADMIN — FENTANYL CITRATE 25 MCG: 50 INJECTION INTRAMUSCULAR; INTRAVENOUS at 20:11

## 2018-06-14 RX ADMIN — FENTANYL CITRATE 25 MCG: 50 INJECTION, SOLUTION INTRAMUSCULAR; INTRAVENOUS at 19:03

## 2018-06-14 RX ADMIN — CLOBETASOL PROPIONATE: 0.5 CREAM TOPICAL at 09:10

## 2018-06-14 RX ADMIN — ACETAMINOPHEN 650 MG: 325 TABLET ORAL at 22:25

## 2018-06-14 RX ADMIN — DIPHENHYDRAMINE HCL 25 MG: 25 TABLET ORAL at 04:28

## 2018-06-14 RX ADMIN — SODIUM CHLORIDE: 0.9 INJECTION, SOLUTION INTRAVENOUS at 18:30

## 2018-06-14 RX ADMIN — ZOLPIDEM TARTRATE 10 MG: 5 TABLET ORAL at 22:41

## 2018-06-14 RX ADMIN — OXYCODONE HYDROCHLORIDE AND ACETAMINOPHEN 1 TABLET: 5; 325 TABLET ORAL at 04:00

## 2018-06-14 RX ADMIN — PROPOFOL 30 MG: 10 INJECTION, EMULSION INTRAVENOUS at 18:58

## 2018-06-14 RX ADMIN — DIPHENHYDRAMINE HCL 25 MG: 25 TABLET ORAL at 22:41

## 2018-06-14 RX ADMIN — ENALAPRIL MALEATE 15 MG: 10 TABLET ORAL at 09:01

## 2018-06-14 RX ADMIN — FENTANYL CITRATE 50 MCG: 50 INJECTION, SOLUTION INTRAMUSCULAR; INTRAVENOUS at 18:41

## 2018-06-14 RX ADMIN — FENTANYL CITRATE 25 MCG: 50 INJECTION INTRAMUSCULAR; INTRAVENOUS at 20:28

## 2018-06-14 RX ADMIN — FENTANYL CITRATE 25 MCG: 50 INJECTION, SOLUTION INTRAMUSCULAR; INTRAVENOUS at 19:19

## 2018-06-14 RX ADMIN — FENTANYL CITRATE 25 MCG: 50 INJECTION, SOLUTION INTRAMUSCULAR; INTRAVENOUS at 19:10

## 2018-06-14 RX ADMIN — ONDANSETRON 4 MG: 2 INJECTION INTRAMUSCULAR; INTRAVENOUS at 18:41

## 2018-06-14 RX ADMIN — FENTANYL CITRATE 25 MCG: 50 INJECTION INTRAMUSCULAR; INTRAVENOUS at 20:45

## 2018-06-14 RX ADMIN — ACETAMINOPHEN 650 MG: 325 TABLET ORAL at 14:18

## 2018-06-14 RX ADMIN — FENTANYL CITRATE 25 MCG: 50 INJECTION, SOLUTION INTRAMUSCULAR; INTRAVENOUS at 19:33

## 2018-06-14 RX ADMIN — CEFAZOLIN SODIUM 2000 MG: 2 SOLUTION INTRAVENOUS at 12:27

## 2018-06-14 RX ADMIN — MORPHINE SULFATE 2 MG: 2 INJECTION, SOLUTION INTRAMUSCULAR; INTRAVENOUS at 22:25

## 2018-06-14 RX ADMIN — FENTANYL CITRATE 25 MCG: 50 INJECTION, SOLUTION INTRAMUSCULAR; INTRAVENOUS at 19:12

## 2018-06-14 RX ADMIN — CLOTRIMAZOLE: 10 CREAM TOPICAL at 09:10

## 2018-06-14 RX ADMIN — OXYCODONE HYDROCHLORIDE AND ACETAMINOPHEN 1 TABLET: 5; 325 TABLET ORAL at 11:04

## 2018-06-14 RX ADMIN — CEFAZOLIN SODIUM 2000 MG: 2 SOLUTION INTRAVENOUS at 03:34

## 2018-06-14 RX ADMIN — MIDAZOLAM HYDROCHLORIDE 2 MG: 1 INJECTION, SOLUTION INTRAMUSCULAR; INTRAVENOUS at 18:37

## 2018-06-14 RX ADMIN — ALPRAZOLAM 0.5 MG: 0.5 TABLET ORAL at 09:01

## 2018-06-14 RX ADMIN — FERRIC OXIDE RED: 8; 8 LOTION TOPICAL at 16:04

## 2018-06-14 RX ADMIN — VENLAFAXINE 75 MG: 37.5 TABLET ORAL at 07:02

## 2018-06-14 RX ADMIN — ACETAMINOPHEN 650 MG: 325 TABLET ORAL at 06:23

## 2018-06-14 RX ADMIN — ALPRAZOLAM 0.5 MG: 0.5 TABLET ORAL at 22:41

## 2018-06-14 RX ADMIN — FENTANYL CITRATE 25 MCG: 50 INJECTION, SOLUTION INTRAMUSCULAR; INTRAVENOUS at 18:58

## 2018-06-14 RX ADMIN — FENTANYL CITRATE 25 MCG: 50 INJECTION INTRAMUSCULAR; INTRAVENOUS at 20:55

## 2018-06-14 RX ADMIN — NYSTATIN 1 APPLICATION: 100000 POWDER TOPICAL at 14:18

## 2018-06-14 RX ADMIN — FENTANYL CITRATE 25 MCG: 50 INJECTION INTRAMUSCULAR; INTRAVENOUS at 20:16

## 2018-06-14 RX ADMIN — FENTANYL CITRATE 25 MCG: 50 INJECTION INTRAMUSCULAR; INTRAVENOUS at 20:39

## 2018-06-14 NOTE — PROGRESS NOTES
Pt complained of itchiness on his back and was found to have an erythematous rash  Currently on Ancef and previously on Ceftriaxone  Denies any PCN allergy in the past   Will order Benadryl prn

## 2018-06-14 NOTE — ANESTHESIA PREPROCEDURE EVALUATION
Review of Systems/Medical History  Patient summary reviewed  Chart reviewed  No history of anesthetic complications     Cardiovascular  Hyperlipidemia, Hypertension ,    Pulmonary  Negative pulmonary ROS        GI/Hepatic            Endo/Other     GYN       Hematology      Comment: Sepsis secondary to infected joint  Hyponatremia  Musculoskeletal    Arthritis     Neurology   Psychology   Depression ,              Physical Exam    Airway    Mallampati score: II  TM Distance: >3 FB  Neck ROM: full     Dental       Cardiovascular  Rhythm: regular, Rate: normal,     Pulmonary  Breath sounds clear to auscultation,     Other Findings        Anesthesia Plan  ASA Score- 3     Anesthesia Type- general with ASA Monitors  Additional Monitors:   Airway Plan: LMA  Plan Factors-    Induction- intravenous  Postoperative Plan- Plan for postoperative opioid use  Planned trial extubation    Informed Consent- Anesthetic plan and risks discussed with patient

## 2018-06-14 NOTE — CASE MANAGEMENT
Continued Stay Review  Date: 6/13/18  Vital Signs:TMAX 102 1     /75   Pulse 100   Temp 99 1 °F (37 3 °C) (Oral)   Resp 18   Ht 5' 9" (1 753 m)   Wt 86 2 kg (190 lb 0 2 oz)   SpO2 95%   BMI 28 06 kg/m²   Medications:   Scheduled Meds:   Current Facility-Administered Medications:  acetaminophen 650 mg Oral Q8H [address] SANCHO Allen     ALPRAZolam 0 5 mg Oral BID PRN CuiSANCHO Hickman     ALPRAZolam 0 5 mg Oral BID Sandra Cortez MD     aluminum-magnesium hydroxide-simethicone 30 mL Oral Q6H PRN SANCHO Melvin     cefazolin 2,000 mg Intravenous Q8H Yoanna Brar MD Last Rate: 2,000 mg (06/13/18 1118)   clobetasol   Topical BID Yoanna Brar MD     clotrimazole   Topical BID Yoanna Brar MD     enalapril 15 mg Oral Daily MeliiSANCHO Hickman     famotidine 20 mg Oral Daily SANCHO Allen     heparin (porcine) 5,000 Units Subcutaneous Q12H [address] SANCHO Allen     ibuprofen 400 mg Oral Q12H SANCHO Allen     insulin lispro 1-5 Units Subcutaneous TID AC CuiSANCHO Hickman     insulin lispro 1-5 Units Subcutaneous HS SANCHO Melvin     morphine injection 2 mg Intravenous Q4H PRN SANCHO Allen     ondansetron 4 mg Intravenous Q6H PRN SANCHO Melvin     oxyCODONE-acetaminophen 1 tablet Oral Q6H PRN SANCHO Melvin     polyethylene glycol 17 g Oral Daily PRN SANCHO Allen     pravastatin 80 mg Oral Daily With Dinner Cuijosé Alberto, SANCHO     saccharomyces boulardii 250 mg Oral BID Cuijosé Alberto, SANCHO     sodium chloride 75 mL/hr Intravenous Continuous Ct Garrido MD Last Rate: 75 mL/hr (06/12/18 1054)   venlafaxine 75 mg Oral BID With Meals Cuiyin Yurik, CRNP     zolpidem 10 mg Oral HS PRN SANCHO Melvin        Continuous Infusions:   sodium chloride 75 mL/hr Last Rate: 75 mL/hr (06/12/18 1054)      PRN Meds: ALPRAZolam    aluminum-magnesium hydroxide-simethicone    morphine injection   ondansetron    oxyCODONE-acetaminophen    polyethylene glycol    zolpidem  Abnormal Labs/Diagnostic Results:    SED RATE 74 CRP >90 0   U/A+BLOOD  Age/Sex: 66 y o  male   PER ID  Assessment/Plan:   Subjective:   Fever last night 38 9C at 10pm  POD #5 S/p right knee surgical irrigation debridement of bursa and infected joint on 6/8/18  POD#2 repeat right I&D of infrapatellar bursa and irrigation/washout of right knee  MSK: Right knee with VAC in place, pedal pulses intact  PERSISTENT ERYTHEMA & EDEMA  IVABT IN PROGRESS FOR CELLULITIS WITH SEPTIC BURSITIS/ARTHRITIS OF KNEE  C/O DIARRHEA, SOO CHART TO BEDSIDE ORDERED  WOUND VAC IN PLACE

## 2018-06-14 NOTE — PROGRESS NOTES
Progress Note - Infectious Disease   Corinne Srinivasan 66 y o  male MRN: [id number]  Unit/Bed#: 2 [place] [address] Encounter: [id number]      Subjective/Objective   Subjective:  Events since last seen were noted  The patient reports the pruritic rash in the back  This is not unlike what he had on his right leg under the brace  Only 2 stools since last seen  There are formed  Colace was discontinued  He has not had any periods of agitation overnight  Repeat orthopedic surgery is anticipated today      Meds/Allergies     Current Facility-Administered Medications:     acetaminophen (TYLENOL) tablet 650 mg, 650 mg, Oral, Q8H [facility], ethan SANCHO Meza, 650 mg at 06/14/18 2663    ALPRAZolam Virginia Flirt) tablet 0 5 mg, 0 5 mg, Oral, BID PRN, SANCHO Melvin, 0 5 mg at 06/12/18 0744    ALPRAZolam (XANAX) tablet 0 5 mg, 0 5 mg, Oral, BID, Sidney Beavers MD, 0 5 mg at 06/14/18 0901    aluminum-magnesium hydroxide-simethicone (MYLANTA) 200-200-20 mg/5 mL oral suspension 30 mL, 30 mL, Oral, Q6H PRN, SANCHO Melvin    ceFAZolin (ANCEF) IVPB (premix) 2,000 mg, 2,000 mg, Intravenous, Q8H, Yoanna Brar MD, Last Rate: 100 mL/hr at 06/14/18 0334, 2,000 mg at 06/14/18 0334    clobetasol (TEMOVATE) 0 05 % cream, , Topical, BID, Yoanna Brar MD    clotrimazole (LOTRIMIN) 1 % cream, , Topical, BID, Yoanna Brar MD    diphenhydrAMINE (BENADRYL) tablet 25 mg, 25 mg, Oral, Q6H PRN, Rudolph Moscoso MD    diphenhydrAMINE-zinc acetate (BENADRYL) 2-0 1 % cream, , Topical, TID PRN, Rudolph Moscoso MD    enalapril (VASOTEC) tablet 15 mg, 15 mg, Oral, Daily, SANCHO Melvin, 15 mg at 06/14/18 0901    famotidine (PEPCID) tablet 20 mg, 20 mg, Oral, Daily, SANCHO Gamez, 20 mg at 06/14/18 0901    heparin (porcine) subcutaneous injection 5,000 Units, 5,000 Units, Subcutaneous, Q12H [facility], 5,000 Units at 06/13/18 1229 **AND** [CANCELED] Platelet count, , , Once, SANCHO Melvin    ibuprofen (MOTRIN) tablet 400 mg, 400 mg, Oral, Q12H, Rashmi Meza, KORINNP, 400 mg at 06/14/18 0901    insulin lispro (HumaLOG) 100 units/mL subcutaneous injection 1-5 Units, 1-5 Units, Subcutaneous, TID AC, 1 Units at 06/13/18 0743 **AND** Fingerstick Glucose (POCT), , , TID AC, Papo Tobinrik, CRNP    insulin lispro (HumaLOG) 100 units/mL subcutaneous injection 1-5 Units, 1-5 Units, Subcutaneous, HS, Papo Tobinrik, CRNP    morphine injection 2 mg, 2 mg, Intravenous, Q4H PRN, SANCHO Chau, 2 mg at 06/10/18 1305    ondansetron (ZOFRAN) injection 4 mg, 4 mg, Intravenous, Q6H PRN, Papo Alberto, CRNP    oxyCODONE-acetaminophen (PERCOCET) 5-325 mg per tablet 1 tablet, 1 tablet, Oral, Q4H PRN, Rudolph Moscoso MD    polyethylene glycol (MIRALAX) packet 17 g, 17 g, Oral, Daily PRN, KORIN ChauNP    pravastatin (PRAVACHOL) tablet 80 mg, 80 mg, Oral, Daily With Dinner, SANCHO Melvin, 80 mg at 06/13/18 1814    saccharomyces boulardii (FLORASTOR) capsule 250 mg, 250 mg, Oral, BID, Papo Alberto, CRNP, 250 mg at 06/14/18 0901    sodium chloride 0 9 % infusion, 75 mL/hr, Intravenous, Continuous, Panda Echols MD, Last Rate: 75 mL/hr at 06/13/18 1921, 75 mL/hr at 06/13/18 1921    [facility] tablet 75 mg, 75 mg, Oral, BID With Meals, Papo Tobinrijossie, CRNP, 75 mg at 06/14/18 0702    zolpidem (AMBIEN) tablet 10 mg, 10 mg, Oral, HS PRN, Papo Tobinrik, CRNP, 10 mg at 06/13/18 2140  Allergies   Allergen Reactions    Iodine Anaphylaxis    Shellfish-Derived Products      all current active meds have been reviewed    discontinued meds:   Medications Discontinued During This Encounter   Medication Reason    acetaminophen (TYLENOL) tablet 650 mg     heparin (porcine) subcutaneous injection 5,000 Units     heparin (porcine) subcutaneous injection 5,000 Units     vancomycin (VANCOCIN) 1,250 mg in sodium chloride 0 9 % 250 mL IVPB     sodium chloride 0 9 % infusion     sodium chloride 0 9 % irrigation solution Patient Discharge    fentaNYL (SUBLIMAZE) injection 50 mcg Patient Transfer    HYDROmorphone (DILAUDID) injection 0 5 mg Patient Transfer    ondansetron (ZOFRAN) injection 4 mg Patient Transfer    promethazine (PHENERGAN) injection 12 5 mg Patient Transfer    meperidine (DEMEROL) injection 12 5 mg Patient Transfer    oxyCODONE-acetaminophen (PERCOCET) 5-325 mg per tablet 1 tablet Patient Transfer    cefTRIAXone (ROCEPHIN) IVPB (premix) 2,000 mg     magnesium oxide (MAG-OX) tablet 400 mg     sodium chloride 0 9 % infusion     ibuprofen (MOTRIN) tablet 400 mg     acetaminophen (TYLENOL) tablet 650 mg     zolpidem (AMBIEN) tablet 10 mg     sodium chloride 0 9 % infusion Patient Discharge    fentaNYL (SUBLIMAZE) injection 25 mcg Patient Transfer    HYDROmorphone (DILAUDID) injection 0 5 mg Patient Transfer    ondansetron (ZOFRAN) injection 4 mg Patient Transfer    diphenhydrAMINE (BENADRYL) injection 12 5 mg Patient Transfer    fentaNYL (SUBLIMAZE) injection 50 mcg Patient Transfer    HYDROmorphone (DILAUDID) injection 0 5 mg Patient Transfer    meperidine (DEMEROL) injection 12 5 mg Patient Transfer    ondansetron (ZOFRAN) injection 4 mg Patient Transfer    promethazine (PHENERGAN) injection 12 5 mg Patient Transfer    zolpidem (AMBIEN) tablet 5 mg     cefTRIAXone (ROCEPHIN) IVPB (premix) 2,000 mg     docusate sodium (COLACE) capsule 100 mg     oxyCODONE-acetaminophen (PERCOCET) 5-325 mg per tablet 1 tablet        Physical Exam: Physical Exam    HR:  [92-98] 92  Resp:  [18] 18  BP: (128-143)/(69-95) 143/75  SpO2:  [98 %-99 %] 98 %  Body mass index is 28 06 kg/m²    Weight (last 2 days)     None        Temp (24hrs), Av 7 °F (37 1 °C), Min:98 4 °F (36 9 °C), Max:99 1 °F (37 3 °C)  Current: Temperature: 99 1 °F (37 3 °C)AGE@    Intake/Output Summary (Last 24 hours) at 18 1147  Last data filed at 18 0342   Gross per 24 hour   Intake             1900 ml   Output 440 ml   Net             1460 ml    He is alert and cheerful today  Maximum temperature 99 1 degree F, mild tachycardia  The lungs are clear  Heart sounds are regular  Abdomen is soft and nontender  The wound VAC drainage is serosanguineous  The rash in the groin unchanged  The skin of the back reveals maculopapular erythematous eruption, probable miliaria, due to sweating  The eruption on his right leg is reportedly improved  IV site is clean         Invasive Devices     Peripheral Intravenous Line            Long-Dwell Peripheral IV (Midline) 94/04/09 Left Basilic 7 days          Drain            Closed/Suction Drain Right;Lateral Knee Accordion 10 Fr  2 days    Negative Pressure Wound Therapy (V A C ) Knee Anterior 2 days                            Lab, Imaging and other studies:    Recent Results (from the past 96 hour(s))   Fingerstick Glucose (POCT)    Collection Time: 06/10/18  4:11 PM   Result Value Ref Range    POC Glucose 109 65 - 140 mg/dl   Fingerstick Glucose (POCT)    Collection Time: 06/10/18  8:20 PM   Result Value Ref Range    POC Glucose 107 65 - 140 mg/dl   Magnesium    Collection Time: 06/11/18  4:19 AM   Result Value Ref Range    Magnesium 2 0 1 6 - 2 6 mg/dL   Basic metabolic panel    Collection Time: 06/11/18  5:56 AM   Result Value Ref Range    Sodium 134 (L) 136 - 145 mmol/L    Potassium 4 1 3 5 - 5 3 mmol/L    Chloride 98 (L) 100 - 108 mmol/L    CO2 28 21 - 32 mmol/L    Anion Gap 8 4 - 13 mmol/L    BUN 11 5 - 25 mg/dL    Creatinine 0 77 0 60 - 1 30 mg/dL    Glucose 116 65 - 140 mg/dL    Calcium 8 9 8 3 - 10 1 mg/dL    eGFR 98 ml/min/1 73sq m   CBC    Collection Time: 06/11/18  5:56 AM   Result Value Ref Range    WBC 13 77 (H) 4 31 - 10 16 Thousand/uL    RBC 3 64 (L) 3 88 - 5 62 Million/uL    Hemoglobin 11 3 (L) 12 0 - 17 0 g/dL    Hematocrit 34 7 (L) 36 5 - 49 3 %    MCV 95 82 - 98 fL    MCH 31 0 26 8 - 34 3 pg    MCHC 32 6 31 4 - 37 4 g/dL    RDW 13 0 11 6 - 15 1 %    Platelets 574 149 - 390 Thousands/uL    MPV 8 9 8 9 - 12 7 fL   Fingerstick Glucose (POCT)    Collection Time: 06/11/18  7:29 AM   Result Value Ref Range    POC Glucose 131 65 - 140 mg/dl   Fingerstick Glucose (POCT)    Collection Time: 06/11/18 11:11 AM   Result Value Ref Range    POC Glucose 121 65 - 140 mg/dl   Fingerstick Glucose (POCT)    Collection Time: 06/11/18  9:25 PM   Result Value Ref Range    POC Glucose 106 65 - 140 mg/dl   Comprehensive metabolic panel    Collection Time: 06/12/18  6:43 AM   Result Value Ref Range    Sodium 138 136 - 145 mmol/L    Potassium 4 0 3 5 - 5 3 mmol/L    Chloride 102 100 - 108 mmol/L    CO2 28 21 - 32 mmol/L    Anion Gap 8 4 - 13 mmol/L    BUN 9 5 - 25 mg/dL    Creatinine 0 75 0 60 - 1 30 mg/dL    Glucose 98 65 - 140 mg/dL    Calcium 8 7 8 3 - 10 1 mg/dL    AST 25 5 - 45 U/L    ALT 38 12 - 78 U/L    Alkaline Phosphatase 77 46 - 116 U/L    Total Protein 6 3 (L) 6 4 - 8 2 g/dL    Albumin 2 0 (L) 3 5 - 5 0 g/dL    Total Bilirubin 0 20 0 20 - 1 00 mg/dL    eGFR 99 ml/min/1 73sq m   CBC and differential    Collection Time: 06/12/18  6:43 AM   Result Value Ref Range    WBC 10 82 (H) 4 31 - 10 16 Thousand/uL    RBC 3 45 (L) 3 88 - 5 62 Million/uL    Hemoglobin 10 6 (L) 12 0 - 17 0 g/dL    Hematocrit 33 4 (L) 36 5 - 49 3 %    MCV 97 82 - 98 fL    MCH 30 7 26 8 - 34 3 pg    MCHC 31 7 31 4 - 37 4 g/dL    RDW 13 2 11 6 - 15 1 %    MPV 8 7 (L) 8 9 - 12 7 fL    Platelets 969 798 - 129 Thousands/uL    nRBC 0 /100 WBCs    Neutrophils Relative 75 43 - 75 %    Immat GRANS % 0 0 - 2 %    Lymphocytes Relative 16 14 - 44 %    Monocytes Relative 8 4 - 12 %    Eosinophils Relative 1 0 - 6 %    Basophils Relative 0 0 - 1 %    Neutrophils Absolute 8 13 (H) 1 85 - 7 62 Thousands/µL    Immature Grans Absolute 0 03 0 00 - 0 20 Thousand/uL    Lymphocytes Absolute 1 68 0 60 - 4 47 Thousands/µL    Monocytes Absolute 0 82 0 17 - 1 22 Thousand/µL    Eosinophils Absolute 0 13 0 00 - 0 61 Thousand/µL    Basophils Absolute 0  03 0 00 - 0 10 Thousands/µL   Magnesium    Collection Time: 06/12/18  6:43 AM   Result Value Ref Range    Magnesium 1 9 1 6 - 2 6 mg/dL   Fingerstick Glucose (POCT)    Collection Time: 06/12/18  8:32 AM   Result Value Ref Range    POC Glucose 185 (H) 65 - 140 mg/dl   Fingerstick Glucose (POCT)    Collection Time: 06/12/18 11:17 AM   Result Value Ref Range    POC Glucose 149 (H) 65 - 140 mg/dl   Fingerstick Glucose (POCT)    Collection Time: 06/12/18  4:23 PM   Result Value Ref Range    POC Glucose 230 (H) 65 - 140 mg/dl   Fingerstick Glucose (POCT)    Collection Time: 06/12/18  8:49 PM   Result Value Ref Range    POC Glucose 126 65 - 140 mg/dl   Sedimentation rate, automated    Collection Time: 06/13/18  6:57 AM   Result Value Ref Range    Sed Rate 74 (H) 2 - 10 mm/hour   C-reactive protein    Collection Time: 06/13/18  6:57 AM   Result Value Ref Range    CRP >90 0 (H) <3 0 mg/L   Fingerstick Glucose (POCT)    Collection Time: 06/13/18  7:25 AM   Result Value Ref Range    POC Glucose 194 (H) 65 - 140 mg/dl   Fingerstick Glucose (POCT)    Collection Time: 06/13/18 11:38 AM   Result Value Ref Range    POC Glucose 146 (H) 65 - 140 mg/dl   Urinalysis with reflex to microscopic    Collection Time: 06/13/18 12:20 PM   Result Value Ref Range    Color, UA Yellow     Clarity, UA Clear     Specific Gravity, UA <=1 005 1 000 - 1 030    pH, UA 6 0 5 0 - 9 0    Leukocytes, UA Negative Negative    Nitrite, UA Negative Negative    Protein, UA Negative Negative mg/dl    Glucose, UA Negative Negative mg/dl    Ketones, UA Negative Negative mg/dl    Urobilinogen, UA 0 2 0 2, 1 0 E U /dl E U /dl    Bilirubin, UA Negative Negative    Blood, UA Trace-Intact (A) Negative   Urine Microscopic    Collection Time: 06/13/18 12:20 PM   Result Value Ref Range    RBC, UA 0-1 (A) None Seen, 0-5 /hpf    WBC, UA 0-1 (A) None Seen, 0-5, 5-55, 5-65 /hpf    Epithelial Cells None Seen None Seen, Occasional /hpf    Bacteria, UA Occasional None Seen, Occasional /hpf    OTHER OBSERVATIONS Yeast Cells Present    Fingerstick Glucose (POCT)    Collection Time: 06/13/18  4:28 PM   Result Value Ref Range    POC Glucose 126 65 - 140 mg/dl   Fingerstick Glucose (POCT)    Collection Time: 06/13/18  9:05 PM   Result Value Ref Range    POC Glucose 109 65 - 140 mg/dl   CBC and differential    Collection Time: 06/14/18  6:29 AM   Result Value Ref Range    WBC 9 78 4 31 - 10 16 Thousand/uL    RBC 3 13 (L) 3 88 - 5 62 Million/uL    Hemoglobin 9 6 (L) 12 0 - 17 0 g/dL    Hematocrit 29 9 (L) 36 5 - 49 3 %    MCV 96 82 - 98 fL    MCH 30 7 26 8 - 34 3 pg    MCHC 32 1 31 4 - 37 4 g/dL    RDW 13 2 11 6 - 15 1 %    MPV 8 9 8 9 - 12 7 fL    Platelets 276 (H) 354 - 390 Thousands/uL    nRBC 0 /100 WBCs    Neutrophils Relative 72 43 - 75 %    Immat GRANS % 1 0 - 2 %    Lymphocytes Relative 18 14 - 44 %    Monocytes Relative 7 4 - 12 %    Eosinophils Relative 2 0 - 6 %    Basophils Relative 0 0 - 1 %    Neutrophils Absolute 7 09 1 85 - 7 62 Thousands/µL    Immature Grans Absolute 0 07 0 00 - 0 20 Thousand/uL    Lymphocytes Absolute 1 74 0 60 - 4 47 Thousands/µL    Monocytes Absolute 0 67 0 17 - 1 22 Thousand/µL    Eosinophils Absolute 0 19 0 00 - 0 61 Thousand/µL    Basophils Absolute 0 02 0 00 - 0 10 Thousands/µL   Basic metabolic panel    Collection Time: 06/14/18  6:29 AM   Result Value Ref Range    Sodium 138 136 - 145 mmol/L    Potassium 3 7 3 5 - 5 3 mmol/L    Chloride 102 100 - 108 mmol/L    CO2 26 21 - 32 mmol/L    Anion Gap 10 4 - 13 mmol/L    BUN 6 5 - 25 mg/dL    Creatinine 0 63 0 60 - 1 30 mg/dL    Glucose 96 65 - 140 mg/dL    Calcium 8 4 8 3 - 10 1 mg/dL    eGFR 106 ml/min/1 73sq m   Magnesium    Collection Time: 06/14/18  6:29 AM   Result Value Ref Range    Magnesium 1 6 1 6 - 2 6 mg/dL   Phosphorus    Collection Time: 06/14/18  6:29 AM   Result Value Ref Range    Phosphorus 3 3 2 3 - 4 1 mg/dL   Fingerstick Glucose (POCT)    Collection Time: 06/14/18  7:29 AM   Result Value Ref Range    POC Glucose 96 65 - 140 mg/dl   Urine culture    Collection Time: 06/14/18  8:16 AM   Result Value Ref Range    Urine Culture No Growth <1000 cfu/mL        Results from last 7 days  Lab Units 06/14/18  0629 06/12/18  0643 06/11/18  0419   MAGNESIUM mg/dL 1 6 1 9 2 0       Results from last 7 days  Lab Units 06/14/18  0629   PHOSPHORUS mg/dL 3 3         No results found for: TROPONINT  ABG:No results found for: PHART, DAO6PIV, PO2ART, DBJ0RDM, T3ODCEUG, BEART, SOURCE        Cultures    Results from last 7 days  Lab Units 06/14/18  0816 06/08/18 2050 06/08/18 2045 06/08/18 2040 06/08/18 2028   GRAM STAIN RESULT   --  1+ Polys  3+ Gram positive cocci in pairs Rare Polys  1+ Gram positive cocci in pairs 1+ Polys  1+ Gram positive cocci in pairs 1+ Polys  No bacteria seen   URINE CULTURE  No Growth <1000 cfu/mL  --   --   --   --    BODY FLUID CULTURE, STERILE   --   --  3+ Growth of Staphylococcus aureus* 3+ Growth of Staphylococcus aureus* 1+ Growth of Staphylococcus aureus*      Xr Spine Lumbar 2 Or 3 Views Injury    Result Date: 6/11/2018  Narrative: LUMBAR SPINE INDICATION:   r/o implants  History of back surgery  COMPARISON:  None VIEWS:  XR SPINE LUMBAR 2 OR 3 VIEWS INJURY Images: 2 FINDINGS: There is no evidence of acute fracture or destructive osseous lesion  Alignment is unremarkable  Mild disc space narrowing diffusely throughout the lumbar spine with associated facet hypertrophic changes  Postoperative changes in the L5 vertebral body  The pedicles appear intact  Visualized soft tissues appear unremarkable  Impression: Degenerative and postoperative changes  Workstation performed: [id number]     Xr Knee 1 Or 2 Vw Right    Result Date: 6/6/2018  Narrative: RIGHT KNEE INDICATION:   infection,suspect spetic joint     Redness and swelling  COMPARISON:  Plain radiographs May 15, 2016 VIEWS:  XR KNEE 1 OR 2 VW RIGHT Images: 2 FINDINGS: There is no acute fracture or dislocation   Small suprapatellar joint effusion  Mild medial joint space narrowing  Mild narrowing of the patellofemoral space  Mild sharpening of the tibial spines  No lytic or blastic lesions are seen  Diffuse soft tissue swelling, extending from above the patella, inferiorly to the anterior tibial tubercle  More focal soft tissue swelling overlying the anterior tibial tubercle  No radiopaque foreign bodies are seen  Impression: 1  Mild degenerative changes with small suprapatellar joint effusion  If there is clinical concern for septic arthritis, consider follow-up arthrocentesis  2   Diffuse soft tissue swelling anterior to the patella, most compatible with prepatellar bursitis  3   More focal soft tissue swelling overlying the anterior tibial tubercle, suspicious for superimposed infrapatellar bursitis  The study was marked in [facility] for immediate notification  Workstation performed: [id number]     Vas Lower Limb Venous Duplex Study, Unilateral/limited    Result Date: 6/7/2018  Narrative:  [facility] REPORT CLINICAL: Indications: Patient presents with right lower extremity edema  Risk Factors: The patient has no history of DVT  The patient current BMI is 28 06, Weight is 190 lb and Height is 69 in  CONCLUSION: Impression: RIGHT LOWER LIMB No evidence of acute or chronic deep vein thrombosis   No evidence of superficial thrombophlebitis noted  Doppler evaluation shows a normal response to augmentation maneuvers  Popliteal, posterior tibial and anterior tibial arterial Doppler waveforms are triphasic  LEFT LOWER LIMB LIMITED Evaluation shows no evidence of thrombus in the common femoral vein  Doppler evaluation shows a normal response to augmentation maneuvers  SIGNATURE: Electronically Signed by: Ramon Goins MD, RPVI on 2018-06-07 03:44:21 PM    I have personally reviewed pertinent reports        Principal Problem:    Septic joint of right knee joint (HCC)  Active Problems:    Hyperlipidemia    Borderline diabetes    Hypertension    Cellulitis of right lower extremity    Hyponatremia    Depression    Infrapatellar bursitis of right knee    Effusion of right knee    Staphylococcal arthritis of right knee (HCC)      Assessment/Plan: This is a middle-age man with hypertension, hyperlipidemia, diabetes mellitus type 2, depression, had presented with right leg cellulitis, septic arthritis of the right knee with prepatellar/infrapatellar bursitis  Infection caused by methicillin sensitive Staphylococcus aureus  He has had two arthroscopic surgical irrigation/debridement, postoperative days 6 and 3  Today, he is to undergo another arthroscopic surgery  He has had skin eruption, on the right leg, area covered by the brace, posterior trunk, suggestive of a contact dermatitis or more likely miliaria (prickly heat) caused by excessive sweating in these locations  He may benefit by taking a good shower, and calamine lotion  The dermatitis is not related to antimicrobial use  His complained of "diarrhea", really not documented when stool chart was maintained  No evidence clinically, of C difficile infection  Continue cefazolin  Discussed with patient's nurse    Discussed with Dr Joseph

## 2018-06-14 NOTE — PROGRESS NOTES
[facility] Progress Note  Patient: Hermila Kaur 66 y o  male   MRN: [id number]  PCP: Elijah Lanier MD  Unit/Bed#: [address] [place] [address] Encounter: [id number]  Date Of Visit: 18    Subjective:   POD#3 repeat right I&D of infrapatellar bursa and irrigation/washout of right knee  Afebrile > 24h  Right knee pain fairly controlled with Percocet prn, Tylenol  Pruritic [apular rash noted on his back and right lower extremity under immobilizer    Objective:   Vitals:   Temp (24hrs), Av 7 °F (37 1 °C), Min:98 3 °F (36 8 °C), Max:99 1 °F (37 3 °C)    HR:  [92-98] 97  Resp:  [18] 18  BP: (142-147)/(67-95) 147/67  SpO2:  [98 %] 98 %  Body mass index is 28 06 kg/m²         Intake/Output Summary (Last 24 hours) at 18 1626  Last data filed at 18 0342   Gross per 24 hour   Intake             1900 ml   Output              440 ml   Net             1460 ml       Physical Exam:   General: NAD  HEENT: EOMI, anicteric, oral moist, neck supple, no mass or JVD  Chest: CTAB, no wheeze/rales  Cardiac: RRR, S1/S2, No murmur  Abd: S/ND/NT/BS+  MSK: Right knee with VAC in place, pedal pulses intact  Neuro: AAOx3, moving all extremities  Psychiatric: Mood with normal affect    Additional Data:     Labs:    Results from last 7 days  Lab Units 18  0629   WBC Thousand/uL 9 78   HEMOGLOBIN g/dL 9 6*   HEMATOCRIT % 29 9*   PLATELETS Thousands/uL 421*   NEUTROS PCT % 72   LYMPHS PCT % 18   MONOS PCT % 7   EOS PCT % 2       Results from last 7 days  Lab Units 18  0629 18  0643   SODIUM mmol/L 138 138   POTASSIUM mmol/L 3 7 4 0   CHLORIDE mmol/L 102 102   CO2 mmol/L 26 28   BUN mg/dL 6 9   CREATININE mg/dL 0 63 0 75   CALCIUM mg/dL 8 4 8 7   TOTAL PROTEIN g/dL  --  6 3*   BILIRUBIN TOTAL mg/dL  --  0 20   ALK PHOS U/L  --  77   ALT U/L  --  38   AST U/L  --  25   GLUCOSE RANDOM mg/dL 96 98           Recent Results (from the past 24 hour(s))   Fingerstick Glucose (POCT)    Collection Time: 18  4:28 PM   Result Value Ref Range    POC Glucose 126 65 - 140 mg/dl   Fingerstick Glucose (POCT)    Collection Time: 06/13/18  9:05 PM   Result Value Ref Range    POC Glucose 109 65 - 140 mg/dl   CBC and differential    Collection Time: 06/14/18  6:29 AM   Result Value Ref Range    WBC 9 78 4 31 - 10 16 Thousand/uL    RBC 3 13 (L) 3 88 - 5 62 Million/uL    Hemoglobin 9 6 (L) 12 0 - 17 0 g/dL    Hematocrit 29 9 (L) 36 5 - 49 3 %    MCV 96 82 - 98 fL    MCH 30 7 26 8 - 34 3 pg    MCHC 32 1 31 4 - 37 4 g/dL    RDW 13 2 11 6 - 15 1 %    MPV 8 9 8 9 - 12 7 fL    Platelets 322 (H) 991 - 390 Thousands/uL    nRBC 0 /100 WBCs    Neutrophils Relative 72 43 - 75 %    Immat GRANS % 1 0 - 2 %    Lymphocytes Relative 18 14 - 44 %    Monocytes Relative 7 4 - 12 %    Eosinophils Relative 2 0 - 6 %    Basophils Relative 0 0 - 1 %    Neutrophils Absolute 7 09 1 85 - 7 62 Thousands/µL    Immature Grans Absolute 0 07 0 00 - 0 20 Thousand/uL    Lymphocytes Absolute 1 74 0 60 - 4 47 Thousands/µL    Monocytes Absolute 0 67 0 17 - 1 22 Thousand/µL    Eosinophils Absolute 0 19 0 00 - 0 61 Thousand/µL    Basophils Absolute 0 02 0 00 - 0 10 Thousands/µL   Basic metabolic panel    Collection Time: 06/14/18  6:29 AM   Result Value Ref Range    Sodium 138 136 - 145 mmol/L    Potassium 3 7 3 5 - 5 3 mmol/L    Chloride 102 100 - 108 mmol/L    CO2 26 21 - 32 mmol/L    Anion Gap 10 4 - 13 mmol/L    BUN 6 5 - 25 mg/dL    Creatinine 0 63 0 60 - 1 30 mg/dL    Glucose 96 65 - 140 mg/dL    Calcium 8 4 8 3 - 10 1 mg/dL    eGFR 106 ml/min/1 73sq m   Magnesium    Collection Time: 06/14/18  6:29 AM   Result Value Ref Range    Magnesium 1 6 1 6 - 2 6 mg/dL   Phosphorus    Collection Time: 06/14/18  6:29 AM   Result Value Ref Range    Phosphorus 3 3 2 3 - 4 1 mg/dL   Fingerstick Glucose (POCT)    Collection Time: 06/14/18  7:29 AM   Result Value Ref Range    POC Glucose 96 65 - 140 mg/dl   Urine culture    Collection Time: 06/14/18  8:16 AM   Result Value Ref Range Urine Culture No Growth <1000 cfu/mL    Fingerstick Glucose (POCT)    Collection Time: 06/14/18 12:55 PM   Result Value Ref Range    POC Glucose 115 65 - 140 mg/dl   Fingerstick Glucose (POCT)    Collection Time: 06/14/18  4:10 PM   Result Value Ref Range    POC Glucose 101 65 - 140 mg/dl       Cultures:     Results from last 7 days  Lab Units 06/14/18  0816 06/13/18  1117 06/08/18 2050 06/08/18 2045 06/08/18 2040 06/08/18 2028   BLOOD CULTURE   --  No Growth at 24 hrs   --   --   --   --    GRAM STAIN RESULT   --   --  1+ Polys  3+ Gram positive cocci in pairs Rare Polys  1+ Gram positive cocci in pairs 1+ Polys  1+ Gram positive cocci in pairs 1+ Polys  No bacteria seen   URINE CULTURE  No Growth <1000 cfu/mL  --   --   --   --   --    BODY FLUID CULTURE, STERILE   --   --   --  3+ Growth of Staphylococcus aureus* 3+ Growth of Staphylococcus aureus* 1+ Growth of Staphylococcus aureus*       Imaging:  Xr Spine Lumbar 2 Or 3 Views Injury    Result Date: 6/11/2018  Narrative: LUMBAR SPINE INDICATION:   r/o implants  History of back surgery  COMPARISON:  None VIEWS:  XR SPINE LUMBAR 2 OR 3 VIEWS INJURY Images: 2 FINDINGS: There is no evidence of acute fracture or destructive osseous lesion  Alignment is unremarkable  Mild disc space narrowing diffusely throughout the lumbar spine with associated facet hypertrophic changes  Postoperative changes in the L5 vertebral body  The pedicles appear intact  Visualized soft tissues appear unremarkable  Impression: Degenerative and postoperative changes  Workstation performed: [id number]     Xr Knee 1 Or 2 Vw Right    Result Date: 6/6/2018  Narrative: RIGHT KNEE INDICATION:   infection,suspect spetic joint     Redness and swelling  COMPARISON:  Plain radiographs May 15, 2016 VIEWS:  XR KNEE 1 OR 2 VW RIGHT Images: 2 FINDINGS: There is no acute fracture or dislocation  Small suprapatellar joint effusion  Mild medial joint space narrowing    Mild narrowing of the patellofemoral space  Mild sharpening of the tibial spines  No lytic or blastic lesions are seen  Diffuse soft tissue swelling, extending from above the patella, inferiorly to the anterior tibial tubercle  More focal soft tissue swelling overlying the anterior tibial tubercle  No radiopaque foreign bodies are seen  Impression: 1  Mild degenerative changes with small suprapatellar joint effusion  If there is clinical concern for septic arthritis, consider follow-up arthrocentesis  2   Diffuse soft tissue swelling anterior to the patella, most compatible with prepatellar bursitis  3   More focal soft tissue swelling overlying the anterior tibial tubercle, suspicious for superimposed infrapatellar bursitis  The study was marked in [facility] for immediate notification  Workstation performed: [id number]     Vas Lower Limb Venous Duplex Study, Unilateral/limited    Result Date: 6/7/2018  Narrative:  [facility] REPORT CLINICAL: Indications: Patient presents with right lower extremity edema  Risk Factors: The patient has no history of DVT  The patient current BMI is 28 06, Weight is 190 lb and Height is 69 in  CONCLUSION: Impression: RIGHT LOWER LIMB No evidence of acute or chronic deep vein thrombosis   No evidence of superficial thrombophlebitis noted  Doppler evaluation shows a normal response to augmentation maneuvers  Popliteal, posterior tibial and anterior tibial arterial Doppler waveforms are triphasic  LEFT LOWER LIMB LIMITED Evaluation shows no evidence of thrombus in the common femoral vein  Doppler evaluation shows a normal response to augmentation maneuvers    SIGNATURE: Electronically Signed by: Denis Rebollar MD, RPVI on 2018-06-07 03:44:21 PM    Imaging Reports Reviewed by myself    Last 24 Hours Medication List:     Current Facility-Administered Medications:     acetaminophen (TYLENOL) tablet 650 mg, 650 mg, Oral, Q8H [address], SANCHO Cassidy, 650 mg at 06/14/18 1418    ALPRAZolam Kandee Sharma) tablet 0 5 mg, 0 5 mg, Oral, BID PRN, SANCHO Melvin, 0 5 mg at 06/12/18 0744    ALPRAZolam (XANAX) tablet 0 5 mg, 0 5 mg, Oral, BID, Lashonda Deras MD, 0 5 mg at 06/14/18 0901    aluminum-magnesium hydroxide-simethicone (MYLANTA) 200-200-20 mg/5 mL oral suspension 30 mL, 30 mL, Oral, Q6H PRN, SANCHO Melvin    calamine-zinc oxide lotion, , Topical, BID, Yoanna Brar MD    ceFAZolin (ANCEF) IVPB (premix) 2,000 mg, 2,000 mg, Intravenous, Q8H, Yoanna Brar MD, Last Rate: 100 mL/hr at 06/14/18 1227, 2,000 mg at 06/14/18 1227    clobetasol (TEMOVATE) 0 05 % cream, , Topical, BID, Yoanna Brar MD    diphenhydrAMINE (BENADRYL) tablet 25 mg, 25 mg, Oral, Q6H PRN, Rebecca Harman MD, 25 mg at 06/14/18 1418    diphenhydrAMINE-zinc acetate (BENADRYL) 2-0 1 % cream, , Topical, TID PRN, Rebecca Harman MD    enalapril (VASOTEC) tablet 15 mg, 15 mg, Oral, Daily, SANCHO Melvin, 15 mg at 06/14/18 0901    famotidine (PEPCID) tablet 20 mg, 20 mg, Oral, Daily, SANCHO Gamez, 20 mg at 06/14/18 0901    heparin (porcine) subcutaneous injection 5,000 Units, 5,000 Units, Subcutaneous, Q12H [facility], 5,000 Units at 06/13/18 2139 **AND** [CANCELED] Platelet count, , , Once, SANCHO Melvin    ibuprofen (MOTRIN) tablet 400 mg, 400 mg, Oral, Q12H, SANCHO Gamez, 400 mg at 06/14/18 0901    insulin lispro (HumaLOG) 100 units/mL subcutaneous injection 1-5 Units, 1-5 Units, Subcutaneous, TID AC, 1 Units at 06/13/18 0743 **AND** Fingerstick Glucose (POCT), , , TID AC, SANCHO Melvin    insulin lispro (HumaLOG) 100 units/mL subcutaneous injection 1-5 Units, 1-5 Units, Subcutaneous, HS, SANCHO Melvin    morphine injection 2 mg, 2 mg, Intravenous, Q4H PRN, Foreston Sox, CRNP, 2 mg at 06/10/18 1305    nystatin (MYCOSTATIN) powder 1 application, 1 application, Topical, BID, Yoanna Brar MD, 1 application at 54/30/65 1418    ondansetron (ZOFRAN) injection 4 mg, 4 mg, Intravenous, Q6H PRN, SANCHO Melvin    oxyCODONE-acetaminophen (PERCOCET) 5-325 mg per tablet 1 tablet, 1 tablet, Oral, Q4H PRN, Cierra Ames MD    polyethylene glycol (MIRALAX) packet 17 g, 17 g, Oral, Daily PRN, SANCHO Marin    pravastatin (PRAVACHOL) tablet 80 mg, 80 mg, Oral, Daily With Dinner, SANCHO Melvin, 80 mg at 06/13/18 1814    saccharomyces boulardii (FLORASTOR) capsule 250 mg, 250 mg, Oral, BID, Papo Alberto CRREJI, 250 mg at 06/14/18 0901    sodium chloride 0 9 % infusion, 75 mL/hr, Intravenous, Continuous, Vladimir Roberson MD, Last Rate: 75 mL/hr at 06/13/18 1921, 75 mL/hr at 06/13/18 1921    venlafaxine [facility]) tablet 75 mg, 75 mg, Oral, BID With Meals, SANCHO Melvin, 75 mg at 06/14/18 0702    zolpidem (AMBIEN) tablet 10 mg, 10 mg, Oral, HS PRN, SANCHO Melvin, 10 mg at 06/13/18 2140     Assessment and Plans:  Hospital Problem List:   Principal Problem:    Septic joint of right knee joint ([facility] [address] )  Active Problems:    Infrapatellar bursitis of right knee    Staphylococcal arthritis of right knee ([address] )    Dermatitis    Hyperlipidemia    Borderline diabetes    Hypertension    Cellulitis of right lower extremity    Thrombocytosis (HCC)    Hyponatremia    Depression    Effusion of right knee    66year old male with a history of borderline diabetes, HTN, HLD, who presents with septic right knee infection      * Septic joint of right knee joint ([facility] [address] )   Assessment & Plan    Sepsis as evidenced by leukocytosis, tachycardia  Right knee aspirate and tissue cultures growing MSSA  Orthopedics and ID consult following  POD #6 S/p right knee surgical irrigation debridement of bursa and infected joint on 6/8/18  POD#3 repeat right I&D of infrapatellar bursa and irrigation/washout of right knee on 6/11/18  Wound VAC in place right knee   Plan another right knee I&D today on 6/14/2018  Per ID, continue on Ancef iv day #8        Staphylococcal arthritis of right knee ([facility] [address] ) Assessment & Plan    ID and orthopedics following, appreciate recommendations        Infrapatellar bursitis of right knee   Assessment & Plan    ID and orthopedics following, appreciate recommendations        Dermatitis   Assessment & Plan    Pruritic papular rash noted on his back and right lower extremity under immobilizer  As per ID, less likely related to antimicrobial use  Continue Benadryl, calamine cream          Depression   Assessment & Plan    Continue Effexor        Hyponatremia   Assessment & Plan    Worsening during admission  Sodium 134 -> 132 -> 133 -> 130, possible SIADH from uncontrolled pain  Less likely due to Effexor as patient's Na has worsened  Serum osmo low, however, urine osmo wnl  No improvement on IVF  TSH and urine acid wnl  With fluid restriction, Na normalized  Continue fluid restriction, 1200 mL/day         Thrombocytosis (HCC)   Assessment & Plan    Mild  Likely reactive to sepsis  Cellulitis of right lower extremity   Assessment & Plan    With prepatellar bursitis and septic arthritis   Right knee x-ray, small suprapatellar joint effusion, prepatellar bursitis, and more focal soft tissue swelling overlying the anterior tibial tubercle, suspicious for superimposed infrapatellar bursitis   If there is clinical concern for septic arthritis, consider follow-up arthrocentesis  Venous Doppler to rule out DVT, negative         Hypertension   Assessment & Plan    Continue Enalapril  Pain control  Monitor  Borderline diabetes   Assessment & Plan    HbA1c, 7 0  On diabetic diet        Hyperlipidemia   Assessment & Plan    Continue statin          DVT Prophylaxis: on Heparin sc, ambulation  Code Status: Level 1 - Full Code  Disposition: anticipate d/c home once clinically improved      Signed by:  Dany Vegas MD  Attending Hospitalist  Page#: [phone] (Hours 7am to 7pm)  6/14/2018 4:26 PM

## 2018-06-14 NOTE — ASSESSMENT & PLAN NOTE
Pruritic papular rash noted on his back and right lower extremity under immobilizer  As per ID, less likely related to antimicrobial use    Continue Benadryl, calamine cream

## 2018-06-14 NOTE — PHYSICAL THERAPY NOTE
PT TREATMENT     06/14/18 3565   Pain Assessment   Pain Assessment 0-10   Pain Score 4   Pain Type Acute pain;Surgical pain   Pain Location Knee   Pain Orientation Right   Restrictions/Precautions   Braces or Orthoses LE Immobilizer   Other Precautions Pain  (wound vac)   General   Chart Reviewed Yes   Family/Caregiver Present No   Subjective   Subjective "I have to go for another clean out of my knee this afternoon"   Bed Mobility   Sit to Supine 7  Independent   Transfers   Sit to Stand 7  Independent   Stand to Sit 7  Independent   Ambulation/Elevation   Gait Assistance 5  Supervision   Assistive Device Rolling walker   Distance 300 feet   Stair Management Assistance 5  Supervision   Stair Management Technique Step to pattern  (one rail with pt holding wound vac)   Number of Stairs 4  (x 2)   Balance   Static Sitting Good   Static Standing Good  (with RW)   Dynamic Standing Fair +  (with RW)   Ambulatory Fair +  (with RW)   Assessment   Assessment Pt continues to do well with amb on levels with RW and on stairs  Endurance continues to improve  Goals   Treatment Day 3   Plan   Treatment/Interventions ADL retraining;Functional transfer training;LE strengthening/ROM; Elevations; Therapeutic exercise; Endurance training;Patient/family training;Bed mobility;Gait training   PT Frequency Once a day   Recommendation   Recommendation (Home with services as needed)   Licensure   [place] License Number  Frances Mariee [place] [id number]

## 2018-06-14 NOTE — SOCIAL WORK
CM FOLLOWING FOR DCP, PT SCHEDULED FOR IRRIGATION AND POSSIBLE OPEN ARTHROTOMY FOR WASHOUT OF THE RIGHT KNEE AND RIGHT BURSA  PER MD AND SURGICAL PA, UNSURE AT THIS TIME IF PT WILL NEED WOUND VAC FOR D/C TO HOME, CM WILL FOLLOW FOR POSSIBLE NEED FOR LONG TERM IV ABT AND WOUND VAC THERAPY

## 2018-06-14 NOTE — PROGRESS NOTES
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  and he is like an   Patient reports that he is feeling better with his left knee but he is going to the OR to clean of the joint  Patient has septic arthritis I reviewed the medical progress note  Patient is chronically depressed anxious and he has trouble in sleeping he is happy with current medications Effexor Xanax and Xanax p r n  and Ambien patient reports that he needs all these medications he is suffering from chronic insomnia he has anxiety and he is getting all these medications from his family physician Dr Mohsen Ornelas   The patient is excited about talking to me  Nurse reports that he is doing all right  No new behavior problems  Patient is not lethargic not agitated no psychosis denies feeling suicidal   I will continue medications and therapy  I will continue follow up during the hospital stay

## 2018-06-14 NOTE — PROGRESS NOTES
Progress Note - Orthopedics   Collin Srinivasan 66 y o  male MRN: [id number]  Unit/Bed#: [address] [place] [address] Encounter: [id number]    Assessment:  POD #3 s/p repeat right incision and drainage of infrapatellar bursa and irrigation/washout of right knee - pain/swelling/erythema markedly improved, wound VAC in place, in knee immobilizer, pain appears well controlled, neurovascularly intact, leukocytosis WNL, appears to be consistent with septic arthritis and infrapatellar bursitis, AVSS, 3+ staph in bursa and synovial fluid, patient feels much better and improved, onset of itching and rash on back, no new medications introduced except Ambien and Ancef in last 48hours, possible beta lactam allergic reaction    Plan:  POD #3 s/p repeat right incision and drainage of infrapatellar bursa and irrigation/washout of right knee-   - Antibiotics:  Due to antibiotics possibly being the suspect source of his allergic reaction and rash on his lower back, coordinated with [address] [place] [address] Internal Medicine and discussed discontinuing Ancef in light of the rash and possibly finding alternative medication that patient infection has susceptibility to  We will default to Infectious Disease and [facility] Internal Medicine in order to determine do antibiotic regiment    - Anticoagulation: continue DVT prophylaxis, SCDs  - Activity:  Weightbearing as tolerated, PT/OT, to continue knee immobilizer  - AM lab draw:  Repeat a m  labs with CBC, BMP, Mag, phos  - Analgesia: Continue p r n  medication  - Dressing:  Continue wound VAC in place, seal is good currently, continue knee immobilizer, continue VIJAY and wound VAC   - Disposition:   we will continue to plan for irrigation and possible open arthrotomy for a washout of the right knee and right bursa, please keep NPO and hold DVT prophylaxis by midnight     Weight bearing: WBAT    VTE Pharmacologic Prophylaxis: Heparin , on hold for procedure  VTE Mechanical Prophylaxis: sequential compression device    Subjective:  Patient was seen examined at bedside  Patient and patient nurses reports that he began to develop overnight a rash that has now become a confluent macular rash on his lower back to mid back  Patient denies any sort of respiratory distress include shortness of breath, chest tightness, cough, chest pain, palpitations, racing heart  Patient actually reports that he feels pretty well other than the pruritus that he is experiencing on his lower back  Patient denies any neurovascular changes in his lower right extremity such as numbness, tingling, lack of motor movement, lack of sensation  Patient reports that he feels like his erythema and swelling has been reduced significantly  Vitals: Blood pressure 143/75, pulse 92, temperature 99 1 °F (37 3 °C), temperature source Oral, resp  rate 18, height 5' 9" (1 753 m), weight 86 2 kg (190 lb 0 2 oz), SpO2 98 %  ,Body mass index is 28 06 kg/m²  Intake/Output Summary (Last 24 hours) at 06/14/18 1210  Last data filed at 06/14/18 0342   Gross per 24 hour   Intake             1900 ml   Output              440 ml   Net             1460 ml       Invasive Devices     Peripheral Intravenous Line            Long-Dwell Peripheral IV (Midline) 09/97/55 Left Basilic 7 days          Drain            Closed/Suction Drain Right;Lateral Knee Accordion 10 Fr  2 days    Negative Pressure Wound Therapy (V A C ) Knee Anterior 2 days                Physical Exam: /75 (BP Location: Right arm)   Pulse 92   Temp 99 1 °F (37 3 °C) (Oral)   Resp 18   Ht 5' 9" (1 753 m)   Wt 86 2 kg (190 lb 0 2 oz)   SpO2 98%   BMI 28 06 kg/m²   General appearance: alert and oriented, in no acute distress  Head: Normocephalic, without obvious abnormality, atraumatic  Skin: Significant reduction but still persisting mild erythema around the prepatellar in infrapatellar bursa as and they medial/lateral joint lines of the knee    Ortho Exam:  Right Lower Extremity: Thigh and calf compartments are soft and nontender to palpation  + L3-S1 SILT  + DF/PF + EHL  No pain with passive stretch/extension of toes  DP 2+, capillary refill less than 2 seconds, and foot is warm B/L  Incision is has wound vac and is open, swelling surrounding right knee has significantly reduced, effusion appears to almost completely have resolved, minimal to no edema, erythema has significantly reduced, pain to palpation is significantly reduced  Lab, Imaging and other studies:   I have personally reviewed pertinent lab results    CBC:   Lab Results   Component Value Date    WBC 9 78 06/14/2018    HGB 9 6 (L) 06/14/2018    HCT 29 9 (L) 06/14/2018    MCV 96 06/14/2018     (H) 06/14/2018    MCH 30 7 06/14/2018    MCHC 32 1 06/14/2018    RDW 13 2 06/14/2018    MPV 8 9 06/14/2018    NRBC 0 06/14/2018     CMP:   Lab Results   Component Value Date     06/14/2018     06/14/2018    CO2 26 06/14/2018    ANIONGAP 10 06/14/2018    BUN 6 06/14/2018    CREATININE 0 63 06/14/2018    GLUCOSE 96 06/14/2018    CALCIUM 8 4 06/14/2018    EGFR 106 06/14/2018

## 2018-06-15 ENCOUNTER — APPOINTMENT (INPATIENT)
Dept: NON INVASIVE DIAGNOSTICS | Facility: HOSPITAL | Age: 63
DRG: 486 | End: 2018-06-15
Attending: INTERNAL MEDICINE
Payer: COMMERCIAL

## 2018-06-15 LAB
ANION GAP SERPL CALCULATED.3IONS-SCNC: 9 MMOL/L (ref 4–13)
BACTERIA SPEC ANAEROBE CULT: NORMAL
BUN SERPL-MCNC: 6 MG/DL (ref 5–25)
CALCIUM SERPL-MCNC: 8.5 MG/DL (ref 8.3–10.1)
CHLORIDE SERPL-SCNC: 102 MMOL/L (ref 100–108)
CO2 SERPL-SCNC: 27 MMOL/L (ref 21–32)
CREAT SERPL-MCNC: 0.71 MG/DL (ref 0.6–1.3)
ERYTHROCYTE [DISTWIDTH] IN BLOOD BY AUTOMATED COUNT: 13.3 % (ref 11.6–15.1)
GFR SERPL CREATININE-BSD FRML MDRD: 101 ML/MIN/1.73SQ M
GLUCOSE SERPL-MCNC: 109 MG/DL (ref 65–140)
GLUCOSE SERPL-MCNC: 120 MG/DL (ref 65–140)
GLUCOSE SERPL-MCNC: 127 MG/DL (ref 65–140)
GLUCOSE SERPL-MCNC: 132 MG/DL (ref 65–140)
GLUCOSE SERPL-MCNC: 141 MG/DL (ref 65–140)
GLUCOSE SERPL-MCNC: 224 MG/DL (ref 65–140)
HCT VFR BLD AUTO: 30 % (ref 36.5–49.3)
HGB BLD-MCNC: 9.5 G/DL (ref 12–17)
MAGNESIUM SERPL-MCNC: 1.7 MG/DL (ref 1.6–2.6)
MCH RBC QN AUTO: 30.6 PG (ref 26.8–34.3)
MCHC RBC AUTO-ENTMCNC: 31.7 G/DL (ref 31.4–37.4)
MCV RBC AUTO: 97 FL (ref 82–98)
PHOSPHATE SERPL-MCNC: 3.7 MG/DL (ref 2.3–4.1)
PLATELET # BLD AUTO: 473 THOUSANDS/UL (ref 149–390)
PMV BLD AUTO: 8.8 FL (ref 8.9–12.7)
POTASSIUM SERPL-SCNC: 4.4 MMOL/L (ref 3.5–5.3)
RBC # BLD AUTO: 3.1 MILLION/UL (ref 3.88–5.62)
SODIUM SERPL-SCNC: 138 MMOL/L (ref 136–145)
WBC # BLD AUTO: 10.32 THOUSAND/UL (ref 4.31–10.16)

## 2018-06-15 PROCEDURE — 82948 REAGENT STRIP/BLOOD GLUCOSE: CPT

## 2018-06-15 PROCEDURE — 83735 ASSAY OF MAGNESIUM: CPT | Performed by: PHYSICIAN ASSISTANT

## 2018-06-15 PROCEDURE — 99232 SBSQ HOSP IP/OBS MODERATE 35: CPT | Performed by: INTERNAL MEDICINE

## 2018-06-15 PROCEDURE — 80048 BASIC METABOLIC PNL TOTAL CA: CPT | Performed by: PHYSICIAN ASSISTANT

## 2018-06-15 PROCEDURE — 77001 FLUOROGUIDE FOR VEIN DEVICE: CPT | Performed by: RADIOLOGY

## 2018-06-15 PROCEDURE — 97110 THERAPEUTIC EXERCISES: CPT

## 2018-06-15 PROCEDURE — 77001 FLUOROGUIDE FOR VEIN DEVICE: CPT

## 2018-06-15 PROCEDURE — 02HV33Z INSERTION OF INFUSION DEVICE INTO SUPERIOR VENA CAVA, PERCUTANEOUS APPROACH: ICD-10-PCS | Performed by: INTERNAL MEDICINE

## 2018-06-15 PROCEDURE — 36584 COMPL RPLCMT PICC RS&I: CPT | Performed by: RADIOLOGY

## 2018-06-15 PROCEDURE — 85027 COMPLETE CBC AUTOMATED: CPT | Performed by: PHYSICIAN ASSISTANT

## 2018-06-15 PROCEDURE — 84100 ASSAY OF PHOSPHORUS: CPT | Performed by: PHYSICIAN ASSISTANT

## 2018-06-15 PROCEDURE — C1751 CATH, INF, PER/CENT/MIDLINE: HCPCS

## 2018-06-15 PROCEDURE — 36569 INSJ PICC 5 YR+ W/O IMAGING: CPT

## 2018-06-15 PROCEDURE — 99024 POSTOP FOLLOW-UP VISIT: CPT | Performed by: PHYSICIAN ASSISTANT

## 2018-06-15 RX ORDER — OXYCODONE HYDROCHLORIDE AND ACETAMINOPHEN 5; 325 MG/1; MG/1
1 TABLET ORAL EVERY 4 HOURS PRN
Status: DISCONTINUED | OUTPATIENT
Start: 2018-06-15 | End: 2018-06-16 | Stop reason: HOSPADM

## 2018-06-15 RX ORDER — TRAMADOL HYDROCHLORIDE 50 MG/1
50 TABLET ORAL EVERY 6 HOURS PRN
Status: DISCONTINUED | OUTPATIENT
Start: 2018-06-15 | End: 2018-06-15

## 2018-06-15 RX ORDER — LIDOCAINE HYDROCHLORIDE 10 MG/ML
INJECTION, SOLUTION INFILTRATION; PERINEURAL CODE/TRAUMA/SEDATION MEDICATION
Status: COMPLETED | OUTPATIENT
Start: 2018-06-15 | End: 2018-06-15

## 2018-06-15 RX ADMIN — HEPARIN SODIUM 5000 UNITS: 5000 INJECTION, SOLUTION INTRAVENOUS; SUBCUTANEOUS at 15:00

## 2018-06-15 RX ADMIN — ACETAMINOPHEN 650 MG: 325 TABLET ORAL at 23:13

## 2018-06-15 RX ADMIN — CLOBETASOL PROPIONATE: 0.5 CREAM TOPICAL at 17:30

## 2018-06-15 RX ADMIN — ZOLPIDEM TARTRATE 10 MG: 5 TABLET ORAL at 21:40

## 2018-06-15 RX ADMIN — Medication 250 MG: at 17:29

## 2018-06-15 RX ADMIN — CLOBETASOL PROPIONATE: 0.5 CREAM TOPICAL at 08:42

## 2018-06-15 RX ADMIN — OXYCODONE HYDROCHLORIDE AND ACETAMINOPHEN 1 TABLET: 5; 325 TABLET ORAL at 13:10

## 2018-06-15 RX ADMIN — NYSTATIN 1 APPLICATION: 100000 POWDER TOPICAL at 21:57

## 2018-06-15 RX ADMIN — ACETAMINOPHEN 650 MG: 325 TABLET ORAL at 15:02

## 2018-06-15 RX ADMIN — FERRIC OXIDE RED: 8; 8 LOTION TOPICAL at 08:42

## 2018-06-15 RX ADMIN — PRAVASTATIN SODIUM 80 MG: 80 TABLET ORAL at 17:29

## 2018-06-15 RX ADMIN — CEFTRIAXONE 2000 MG: 2 INJECTION, SOLUTION INTRAVENOUS at 16:20

## 2018-06-15 RX ADMIN — IBUPROFEN 400 MG: 400 TABLET ORAL at 08:41

## 2018-06-15 RX ADMIN — HEPARIN SODIUM 5000 UNITS: 5000 INJECTION, SOLUTION INTRAVENOUS; SUBCUTANEOUS at 21:40

## 2018-06-15 RX ADMIN — ACETAMINOPHEN 650 MG: 325 TABLET ORAL at 05:57

## 2018-06-15 RX ADMIN — IBUPROFEN 400 MG: 400 TABLET ORAL at 21:39

## 2018-06-15 RX ADMIN — OXYCODONE HYDROCHLORIDE AND ACETAMINOPHEN 1 TABLET: 5; 325 TABLET ORAL at 03:28

## 2018-06-15 RX ADMIN — DIPHENHYDRAMINE HCL 25 MG: 25 TABLET ORAL at 05:58

## 2018-06-15 RX ADMIN — VENLAFAXINE 75 MG: 37.5 TABLET ORAL at 08:42

## 2018-06-15 RX ADMIN — MORPHINE SULFATE 2 MG: 2 INJECTION, SOLUTION INTRAMUSCULAR; INTRAVENOUS at 06:26

## 2018-06-15 RX ADMIN — OXYCODONE HYDROCHLORIDE AND ACETAMINOPHEN 1 TABLET: 5; 325 TABLET ORAL at 17:29

## 2018-06-15 RX ADMIN — ALPRAZOLAM 0.5 MG: 0.5 TABLET ORAL at 08:42

## 2018-06-15 RX ADMIN — OXYCODONE HYDROCHLORIDE AND ACETAMINOPHEN 1 TABLET: 5; 325 TABLET ORAL at 21:39

## 2018-06-15 RX ADMIN — HEPARIN SODIUM 5000 UNITS: 5000 INJECTION, SOLUTION INTRAVENOUS; SUBCUTANEOUS at 05:58

## 2018-06-15 RX ADMIN — Medication 250 MG: at 08:42

## 2018-06-15 RX ADMIN — HYDROMORPHONE HYDROCHLORIDE 0.5 MG: 1 INJECTION, SOLUTION INTRAMUSCULAR; INTRAVENOUS; SUBCUTANEOUS at 02:24

## 2018-06-15 RX ADMIN — ENALAPRIL MALEATE 15 MG: 10 TABLET ORAL at 08:42

## 2018-06-15 RX ADMIN — LIDOCAINE HYDROCHLORIDE 1 ML: 10 INJECTION, SOLUTION INFILTRATION; PERINEURAL at 14:18

## 2018-06-15 RX ADMIN — VENLAFAXINE 75 MG: 37.5 TABLET ORAL at 17:29

## 2018-06-15 RX ADMIN — ALPRAZOLAM 0.5 MG: 0.5 TABLET ORAL at 21:44

## 2018-06-15 RX ADMIN — OXYCODONE HYDROCHLORIDE AND ACETAMINOPHEN 1 TABLET: 5; 325 TABLET ORAL at 08:41

## 2018-06-15 RX ADMIN — NYSTATIN 1 APPLICATION: 100000 POWDER TOPICAL at 08:42

## 2018-06-15 RX ADMIN — CEFAZOLIN SODIUM 2000 MG: 2 SOLUTION INTRAVENOUS at 02:25

## 2018-06-15 RX ADMIN — ALPRAZOLAM 0.5 MG: 0.5 TABLET ORAL at 17:29

## 2018-06-15 RX ADMIN — FERRIC OXIDE RED: 8; 8 LOTION TOPICAL at 23:14

## 2018-06-15 RX ADMIN — CEFAZOLIN SODIUM 2000 MG: 2 SOLUTION INTRAVENOUS at 11:10

## 2018-06-15 NOTE — SOCIAL WORK
Call placed to [employer] ([phone]) to confirm in network status for pt to received outpatient infusion  Per Michelle Low  [place] is in network (Call reference number [id number])

## 2018-06-15 NOTE — PROGRESS NOTES
Progress Note - Infectious Disease   Silvia Srinivasan 66 y o  male MRN: [id number]  Unit/Bed#: [address] [place] [address] Encounter: [id number]      Subjective/Objective   Subjective:  Events since last seen were noted  Yesterday evening, patient underwent I and D of the right knee and complex wound closure  Today, the itching is less, but reports being anxious, and having uncontrolled pain  He wishes to stay on his opioids  He admits to smoking marijuana couple of times a week      Meds/Allergies     Current Facility-Administered Medications:     acetaminophen (TYLENOL) tablet 650 mg, 650 mg, Oral, Q8H [address], SANCHO Lopez, 650 mg at 06/15/18 0557    ALPRAZolam Virgene Reeve) tablet 0 5 mg, 0 5 mg, Oral, BID PRN, SANCHO Melvin, 0 5 mg at 06/14/18 2241    ALPRAZolam (XANAX) tablet 0 5 mg, 0 5 mg, Oral, BID, Dayne Mosley MD, 0 5 mg at 06/15/18 0842    aluminum-magnesium hydroxide-simethicone (MYLANTA) 200-200-20 mg/5 mL oral suspension 30 mL, 30 mL, Oral, Q6H PRN, SANCHO Melvin    calamine-zinc oxide lotion, , Topical, BID, Yoanna Brar MD    ceFAZolin (ANCEF) IVPB (premix) 2,000 mg, 2,000 mg, Intravenous, Q8H, Yoanna Brar MD, Last Rate: 100 mL/hr at 06/15/18 0225, 2,000 mg at 06/15/18 0225    clobetasol (TEMOVATE) 0 05 % cream, , Topical, BID, Yoanna Brar MD    diphenhydrAMINE (BENADRYL) tablet 25 mg, 25 mg, Oral, Q6H PRN, Brissa Murphy MD, 25 mg at 06/15/18 0558    diphenhydrAMINE-zinc acetate (BENADRYL) 2-0 1 % cream, , Topical, TID PRN, Brissa Murphy MD    docusate sodium (COLACE) capsule 100 mg, 100 mg, Oral, BID, Stacey Pedraza PA-C    enalapril (VASOTEC) tablet 15 mg, 15 mg, Oral, Daily, SANCHO Melvin, 15 mg at 06/15/18 0842    famotidine (PEPCID) tablet 20 mg, 20 mg, Oral, Daily, SANCHO Gamez, 20 mg at 06/14/18 0901    heparin (porcine) subcutaneous injection 5,000 Units, 5,000 Units, Subcutaneous, Q8H [address], 5,000 Units at 06/15/18 0558 **AND** Platelet count, , , Once, Ramos Estevez PA-C    ibuprofen (MOTRIN) tablet 400 mg, 400 mg, Oral, Q12H, Derrick Fuel, CRNP, 400 mg at 06/15/18 0841    insulin lispro (HumaLOG) 100 units/mL subcutaneous injection 1-5 Units, 1-5 Units, Subcutaneous, TID AC, 1 Units at 06/13/18 0743 **AND** Fingerstick Glucose (POCT), , , TID AC, Cuijosé Tobinrik, CRNP    insulin lispro (HumaLOG) 100 units/mL subcutaneous injection 1-5 Units, 1-5 Units, Subcutaneous, HS, Cuiyimyke Tobinrik, CRNP    nystatin (MYCOSTATIN) powder 1 application, 1 application, Topical, BID, Yoanna Brar MD, 1 application at 99/27/19 0842    ondansetron (ZOFRAN) injection 4 mg, 4 mg, Intravenous, Q6H PRN, Papo Alberto, CRREJI, 4 mg at 06/14/18 1841    polyethylene glycol (MIRALAX) packet 17 g, 17 g, Oral, Daily PRN, Derrick Fuel, CRNP    pravastatin (PRAVACHOL) tablet 80 mg, 80 mg, Oral, Daily With Dinner, SANCHO Eisenberg, Stopped at 06/14/18 1713    saccharomyces boulardii (FLORASTOR) capsule 250 mg, 250 mg, Oral, BID, Cuijosé Tobinrik, CRNP, 250 mg at 06/15/18 0842    sodium chloride 0 9 % infusion, 75 mL/hr, Intravenous, Continuous, Arabella Mackey MD, Last Rate: 75 mL/hr at 06/13/18 1921    traMADol (ULTRAM) tablet 50 mg, 50 mg, Oral, Q6H PRN, Shannon Gordon MD    [facility]) tablet 75 mg, 75 mg, Oral, BID With Meals, Papo Tobinrijossie, CRNP, 75 mg at 06/15/18 0842    zolpidem (AMBIEN) tablet 10 mg, 10 mg, Oral, HS PRN, Meliijosé Tobinrik, CRNP, 10 mg at 06/14/18 2241  Allergies   Allergen Reactions    Iodine Anaphylaxis    Shellfish-Derived Products      all current active meds have been reviewed    discontinued meds:   Medications Discontinued During This Encounter   Medication Reason    acetaminophen (TYLENOL) tablet 650 mg     heparin (porcine) subcutaneous injection 5,000 Units     heparin (porcine) subcutaneous injection 5,000 Units     vancomycin (VANCOCIN) 1,250 mg in sodium chloride 0 9 % 250 mL IVPB     sodium chloride 0 9 % infusion     sodium chloride 0 9 % irrigation solution Patient Discharge    fentaNYL (SUBLIMAZE) injection 50 mcg Patient Transfer    HYDROmorphone (DILAUDID) injection 0 5 mg Patient Transfer    ondansetron (ZOFRAN) injection 4 mg Patient Transfer    promethazine (PHENERGAN) injection 12 5 mg Patient Transfer    meperidine (DEMEROL) injection 12 5 mg Patient Transfer    oxyCODONE-acetaminophen (PERCOCET) 5-325 mg per tablet 1 tablet Patient Transfer    cefTRIAXone (ROCEPHIN) IVPB (premix) 2,000 mg     magnesium oxide (MAG-OX) tablet 400 mg     sodium chloride 0 9 % infusion     ibuprofen (MOTRIN) tablet 400 mg     acetaminophen (TYLENOL) tablet 650 mg     zolpidem (AMBIEN) tablet 10 mg     sodium chloride 0 9 % infusion Patient Discharge    fentaNYL (SUBLIMAZE) injection 25 mcg Patient Transfer    HYDROmorphone (DILAUDID) injection 0 5 mg Patient Transfer    ondansetron (ZOFRAN) injection 4 mg Patient Transfer    diphenhydrAMINE (BENADRYL) injection 12 5 mg Patient Transfer    fentaNYL (SUBLIMAZE) injection 50 mcg Patient Transfer    HYDROmorphone (DILAUDID) injection 0 5 mg Patient Transfer    meperidine (DEMEROL) injection 12 5 mg Patient Transfer    ondansetron (ZOFRAN) injection 4 mg Patient Transfer    promethazine (PHENERGAN) injection 12 5 mg Patient Transfer    zolpidem (AMBIEN) tablet 5 mg     cefTRIAXone (ROCEPHIN) IVPB (premix) 2,000 mg     docusate sodium (COLACE) capsule 100 mg     oxyCODONE-acetaminophen (PERCOCET) 5-325 mg per tablet 1 tablet     clotrimazole (LOTRIMIN) 1 % cream     sodium chloride 0 9 % irrigation solution Patient Discharge    fentaNYL (SUBLIMAZE) injection 25 mcg Patient Transfer    ondansetron (ZOFRAN) injection 4 mg Patient Transfer    heparin (porcine) subcutaneous injection 5,000 Units     oxyCODONE-acetaminophen (PERCOCET) 5-325 mg per tablet 1 tablet Therapy completed    morphine injection 2 mg     oxyCODONE-acetaminophen (PERCOCET) 5-325 mg per tablet 1 tablet        Physical Exam: Physical Exam    HR:  [] 92  Resp:  [18-26] 18  BP: (127-179)/() 134/78  SpO2:  [96 %-100 %] 100 %  Body mass index is 28 06 kg/m²  Weight (last 2 days)     None        Temp (24hrs), Av 2 °F (36 8 °C), Min:98 °F (36 7 °C), Max:98 4 °F (36 9 °C)  Current: Temperature: 98 2 °F (36 8 °C)AGE@    Intake/Output Summary (Last 24 hours) at 06/15/18 1038  Last data filed at 06/15/18 0601   Gross per 24 hour   Intake                0 ml   Output             1600 ml   Net            -1600 ml    He has been afebrile, tachycardia improving  Urine output noted  The lungs are clear  Heart sounds are regular  Abdomen is obese and soft and nontender  The posterior truncal rash is much improved  Right lower extremity, wound dressing is intact  Could not examine the posterior aspect, as it was restrained in the brace  The IV site is clean          Invasive Devices     Peripheral Intravenous Line            Long-Dwell Peripheral IV (Midline) 91/39/43 Left Basilic 7 days          Drain            Closed/Suction Drain Right;Lateral Knee Accordion 10 Fr  3 days                            Lab, Imaging and other studies:    Recent Results (from the past 96 hour(s))   Fingerstick Glucose (POCT)    Collection Time: 18 11:11 AM   Result Value Ref Range    POC Glucose 121 65 - 140 mg/dl   Fingerstick Glucose (POCT)    Collection Time: 18  9:25 PM   Result Value Ref Range    POC Glucose 106 65 - 140 mg/dl   Comprehensive metabolic panel    Collection Time: 18  6:43 AM   Result Value Ref Range    Sodium 138 136 - 145 mmol/L    Potassium 4 0 3 5 - 5 3 mmol/L    Chloride 102 100 - 108 mmol/L    CO2 28 21 - 32 mmol/L    Anion Gap 8 4 - 13 mmol/L    BUN 9 5 - 25 mg/dL    Creatinine 0 75 0 60 - 1 30 mg/dL    Glucose 98 65 - 140 mg/dL    Calcium 8 7 8 3 - 10 1 mg/dL    AST 25 5 - 45 U/L    ALT 38 12 - 78 U/L    Alkaline Phosphatase 77 46 - 116 U/L    Total Protein 6 3 (L) 6 4 - 8 2 g/dL    Albumin 2 0 (L) 3 5 - 5 0 g/dL    Total Bilirubin 0 20 0 20 - 1 00 mg/dL    eGFR 99 ml/min/1 73sq m   CBC and differential    Collection Time: 06/12/18  6:43 AM   Result Value Ref Range    WBC 10 82 (H) 4 31 - 10 16 Thousand/uL    RBC 3 45 (L) 3 88 - 5 62 Million/uL    Hemoglobin 10 6 (L) 12 0 - 17 0 g/dL    Hematocrit 33 4 (L) 36 5 - 49 3 %    MCV 97 82 - 98 fL    MCH 30 7 26 8 - 34 3 pg    MCHC 31 7 31 4 - 37 4 g/dL    RDW 13 2 11 6 - 15 1 %    MPV 8 7 (L) 8 9 - 12 7 fL    Platelets 941 869 - 252 Thousands/uL    nRBC 0 /100 WBCs    Neutrophils Relative 75 43 - 75 %    Immat GRANS % 0 0 - 2 %    Lymphocytes Relative 16 14 - 44 %    Monocytes Relative 8 4 - 12 %    Eosinophils Relative 1 0 - 6 %    Basophils Relative 0 0 - 1 %    Neutrophils Absolute 8 13 (H) 1 85 - 7 62 Thousands/µL    Immature Grans Absolute 0 03 0 00 - 0 20 Thousand/uL    Lymphocytes Absolute 1 68 0 60 - 4 47 Thousands/µL    Monocytes Absolute 0 82 0 17 - 1 22 Thousand/µL    Eosinophils Absolute 0 13 0 00 - 0 61 Thousand/µL    Basophils Absolute 0 03 0 00 - 0 10 Thousands/µL   Magnesium    Collection Time: 06/12/18  6:43 AM   Result Value Ref Range    Magnesium 1 9 1 6 - 2 6 mg/dL   Fingerstick Glucose (POCT)    Collection Time: 06/12/18  8:32 AM   Result Value Ref Range    POC Glucose 185 (H) 65 - 140 mg/dl   Fingerstick Glucose (POCT)    Collection Time: 06/12/18 11:17 AM   Result Value Ref Range    POC Glucose 149 (H) 65 - 140 mg/dl   Fingerstick Glucose (POCT)    Collection Time: 06/12/18  4:23 PM   Result Value Ref Range    POC Glucose 230 (H) 65 - 140 mg/dl   Fingerstick Glucose (POCT)    Collection Time: 06/12/18  8:49 PM   Result Value Ref Range    POC Glucose 126 65 - 140 mg/dl   Sedimentation rate, automated    Collection Time: 06/13/18  6:57 AM   Result Value Ref Range    Sed Rate 74 (H) 2 - 10 mm/hour   C-reactive protein    Collection Time: 06/13/18  6:57 AM   Result Value Ref Range    CRP >90 0 (H) <3 0 mg/L   Fingerstick Glucose (POCT)    Collection Time: 06/13/18  7:25 AM   Result Value Ref Range    POC Glucose 194 (H) 65 - 140 mg/dl   Blood culture    Collection Time: 06/13/18 11:17 AM   Result Value Ref Range    Blood Culture No Growth at 24 hrs      Fingerstick Glucose (POCT)    Collection Time: 06/13/18 11:38 AM   Result Value Ref Range    POC Glucose 146 (H) 65 - 140 mg/dl   Urinalysis with reflex to microscopic    Collection Time: 06/13/18 12:20 PM   Result Value Ref Range    Color, UA Yellow     Clarity, UA Clear     Specific Gravity, UA <=1 005 1 000 - 1 030    pH, UA 6 0 5 0 - 9 0    Leukocytes, UA Negative Negative    Nitrite, UA Negative Negative    Protein, UA Negative Negative mg/dl    Glucose, UA Negative Negative mg/dl    Ketones, UA Negative Negative mg/dl    Urobilinogen, UA 0 2 0 2, 1 0 E U /dl E U /dl    Bilirubin, UA Negative Negative    Blood, UA Trace-Intact (A) Negative   Urine Microscopic    Collection Time: 06/13/18 12:20 PM   Result Value Ref Range    RBC, UA 0-1 (A) None Seen, 0-5 /hpf    WBC, UA 0-1 (A) None Seen, 0-5, 5-55, 5-65 /hpf    Epithelial Cells None Seen None Seen, Occasional /hpf    Bacteria, UA Occasional None Seen, Occasional /hpf    OTHER OBSERVATIONS Yeast Cells Present    Fingerstick Glucose (POCT)    Collection Time: 06/13/18  4:28 PM   Result Value Ref Range    POC Glucose 126 65 - 140 mg/dl   Fingerstick Glucose (POCT)    Collection Time: 06/13/18  9:05 PM   Result Value Ref Range    POC Glucose 109 65 - 140 mg/dl   CBC and differential    Collection Time: 06/14/18  6:29 AM   Result Value Ref Range    WBC 9 78 4 31 - 10 16 Thousand/uL    RBC 3 13 (L) 3 88 - 5 62 Million/uL    Hemoglobin 9 6 (L) 12 0 - 17 0 g/dL    Hematocrit 29 9 (L) 36 5 - 49 3 %    MCV 96 82 - 98 fL    MCH 30 7 26 8 - 34 3 pg    MCHC 32 1 31 4 - 37 4 g/dL    RDW 13 2 11 6 - 15 1 %    MPV 8 9 8 9 - 12 7 fL    Platelets 169 (H) 122 - 390 Thousands/uL    nRBC 0 /100 WBCs    Neutrophils Relative 72 43 - 75 %    Immat GRANS % 1 0 - 2 %    Lymphocytes Relative 18 14 - 44 %    Monocytes Relative 7 4 - 12 %    Eosinophils Relative 2 0 - 6 %    Basophils Relative 0 0 - 1 %    Neutrophils Absolute 7 09 1 85 - 7 62 Thousands/µL    Immature Grans Absolute 0 07 0 00 - 0 20 Thousand/uL    Lymphocytes Absolute 1 74 0 60 - 4 47 Thousands/µL    Monocytes Absolute 0 67 0 17 - 1 22 Thousand/µL    Eosinophils Absolute 0 19 0 00 - 0 61 Thousand/µL    Basophils Absolute 0 02 0 00 - 0 10 Thousands/µL   Basic metabolic panel    Collection Time: 06/14/18  6:29 AM   Result Value Ref Range    Sodium 138 136 - 145 mmol/L    Potassium 3 7 3 5 - 5 3 mmol/L    Chloride 102 100 - 108 mmol/L    CO2 26 21 - 32 mmol/L    Anion Gap 10 4 - 13 mmol/L    BUN 6 5 - 25 mg/dL    Creatinine 0 63 0 60 - 1 30 mg/dL    Glucose 96 65 - 140 mg/dL    Calcium 8 4 8 3 - 10 1 mg/dL    eGFR 106 ml/min/1 73sq m   Magnesium    Collection Time: 06/14/18  6:29 AM   Result Value Ref Range    Magnesium 1 6 1 6 - 2 6 mg/dL   Phosphorus    Collection Time: 06/14/18  6:29 AM   Result Value Ref Range    Phosphorus 3 3 2 3 - 4 1 mg/dL   Fingerstick Glucose (POCT)    Collection Time: 06/14/18  7:29 AM   Result Value Ref Range    POC Glucose 96 65 - 140 mg/dl   Urine culture    Collection Time: 06/14/18  8:16 AM   Result Value Ref Range    Urine Culture No Growth <1000 cfu/mL    Fingerstick Glucose (POCT)    Collection Time: 06/14/18 12:55 PM   Result Value Ref Range    POC Glucose 115 65 - 140 mg/dl   Fingerstick Glucose (POCT)    Collection Time: 06/14/18  4:10 PM   Result Value Ref Range    POC Glucose 101 65 - 140 mg/dl   Fingerstick Glucose (POCT)    Collection Time: 06/14/18  9:51 PM   Result Value Ref Range    POC Glucose 142 (H) 65 - 140 mg/dl   Basic metabolic panel    Collection Time: 06/15/18  6:24 AM   Result Value Ref Range    Sodium 138 136 - 145 mmol/L    Potassium 4 4 3 5 - 5 3 mmol/L    Chloride 102 100 - 108 mmol/L    CO2 27 21 - 32 mmol/L    Anion Gap 9 4 - 13 mmol/L    BUN 6 5 - 25 mg/dL    Creatinine 0 71 0 60 - 1 30 mg/dL    Glucose 127 65 - 140 mg/dL    Calcium 8 5 8 3 - 10 1 mg/dL    eGFR 101 ml/min/1 73sq m   Magnesium    Collection Time: 06/15/18  6:24 AM   Result Value Ref Range    Magnesium 1 7 1 6 - 2 6 mg/dL   CBC    Collection Time: 06/15/18  6:24 AM   Result Value Ref Range    WBC 10 32 (H) 4 31 - 10 16 Thousand/uL    RBC 3 10 (L) 3 88 - 5 62 Million/uL    Hemoglobin 9 5 (L) 12 0 - 17 0 g/dL    Hematocrit 30 0 (L) 36 5 - 49 3 %    MCV 97 82 - 98 fL    MCH 30 6 26 8 - 34 3 pg    MCHC 31 7 31 4 - 37 4 g/dL    RDW 13 3 11 6 - 15 1 %    Platelets 962 (H) 392 - 390 Thousands/uL    MPV 8 8 (L) 8 9 - 12 7 fL   Phosphorus    Collection Time: 06/15/18  6:24 AM   Result Value Ref Range    Phosphorus 3 7 2 3 - 4 1 mg/dL   Fingerstick Glucose (POCT)    Collection Time: 06/15/18  7:03 AM   Result Value Ref Range    POC Glucose 109 65 - 140 mg/dl       Results from last 7 days  Lab Units 06/15/18  0624 06/14/18  0629 06/12/18  0643   MAGNESIUM mg/dL 1 7 1 6 1 9       Results from last 7 days  Lab Units 06/15/18  0624 06/14/18  0629   PHOSPHORUS mg/dL 3 7 3 3         No results found for: TROPONINT  ABG:No results found for: PHART, XPR5FSP, PO2ART, TLR3QZE, W3CXPXJO, BEART, SOURCE        Cultures    Results from last 7 days  Lab Units 06/14/18  0816 06/13/18  1117 06/08/18 2050 06/08/18 2045 06/08/18 2040 06/08/18 2028   BLOOD CULTURE   --  No Growth at 24 hrs   --   --   --   --    GRAM STAIN RESULT   --   --  1+ Polys  3+ Gram positive cocci in pairs Rare Polys  1+ Gram positive cocci in pairs 1+ Polys  1+ Gram positive cocci in pairs 1+ Polys  No bacteria seen   URINE CULTURE  No Growth <1000 cfu/mL  --   --   --   --   --    BODY FLUID CULTURE, STERILE   --   --   --  3+ Growth of Staphylococcus aureus* 3+ Growth of Staphylococcus aureus* 1+ Growth of Staphylococcus aureus*      Xr Spine Lumbar 2 Or 3 Views Injury    Result Date: 6/11/2018  Narrative: LUMBAR SPINE INDICATION:   r/o implants  History of back surgery  COMPARISON:  None VIEWS:  XR SPINE LUMBAR 2 OR 3 VIEWS INJURY Images: 2 FINDINGS: There is no evidence of acute fracture or destructive osseous lesion  Alignment is unremarkable  Mild disc space narrowing diffusely throughout the lumbar spine with associated facet hypertrophic changes  Postoperative changes in the L5 vertebral body  The pedicles appear intact  Visualized soft tissues appear unremarkable  Impression: Degenerative and postoperative changes  Workstation performed: [id number]     Xr Knee 1 Or 2 Vw Right    Result Date: 6/6/2018  Narrative: RIGHT KNEE INDICATION:   infection,suspect spetic joint     Redness and swelling  COMPARISON:  Plain radiographs May 15, 2016 VIEWS:  XR KNEE 1 OR 2 VW RIGHT Images: 2 FINDINGS: There is no acute fracture or dislocation  Small suprapatellar joint effusion  Mild medial joint space narrowing  Mild narrowing of the patellofemoral space  Mild sharpening of the tibial spines  No lytic or blastic lesions are seen  Diffuse soft tissue swelling, extending from above the patella, inferiorly to the anterior tibial tubercle  More focal soft tissue swelling overlying the anterior tibial tubercle  No radiopaque foreign bodies are seen  Impression: 1  Mild degenerative changes with small suprapatellar joint effusion  If there is clinical concern for septic arthritis, consider follow-up arthrocentesis  2   Diffuse soft tissue swelling anterior to the patella, most compatible with prepatellar bursitis  3   More focal soft tissue swelling overlying the anterior tibial tubercle, suspicious for superimposed infrapatellar bursitis  The study was marked in [place] [id number] for immediate notification   Workstation performed: [id number]     Vas Lower Limb Venous Duplex Study, Unilateral/limited    Result Date: 6/7/2018  Narrative:  [facility] REPORT CLINICAL: Indications: Patient presents with right lower extremity edema  Risk Factors: The patient has no history of DVT  The patient current BMI is 28 06, Weight is 190 lb and Height is 69 in  CONCLUSION: Impression: RIGHT LOWER LIMB No evidence of acute or chronic deep vein thrombosis   No evidence of superficial thrombophlebitis noted  Doppler evaluation shows a normal response to augmentation maneuvers  Popliteal, posterior tibial and anterior tibial arterial Doppler waveforms are triphasic  LEFT LOWER LIMB LIMITED Evaluation shows no evidence of thrombus in the common femoral vein  Doppler evaluation shows a normal response to augmentation maneuvers  SIGNATURE: Electronically Signed by: Nancy Perez MD, RPVI on 2018-06-07 03:44:21 PM    I have personally reviewed pertinent reports  Principal Problem:    Septic joint of right knee joint (HCC)  Active Problems:    Hyperlipidemia    Borderline diabetes    Hypertension    Cellulitis of right lower extremity    Thrombocytosis (HCC)    Hyponatremia    Depression    Infrapatellar bursitis of right knee    Effusion of right knee    Staphylococcal arthritis of right knee (HCC)    Dermatitis      Assessment/Plan: This is a middle-age man with above list of comorbidities, depression, marijuana use, had presented with septic arthritis of the right knee with prepatellar and infrapatellar septic bursitis, microbial etiology methicillin sensitive Staphylococcus aureus  He has undergone arthroscopic surgical debridement, and yesterday with the wound closure  Recommend continuing anti staphylococcal treatment for 2 more weeks  He had complained of diarrhea  The stool chart indicated only formed stools  Colace was discontinued  Stool C difficile has been negative  Because of diarrhea ceftriaxone was changed to cefazolin    At discharge, if, he is not transferred to short-term rehab, then may need to change over to ceftriaxone to facilitate once daily dosing, in the infusion room here at [facility] to complete the course  Repeat sedimentation rate and C-reactive protein, periodically,to gauge progress  I counseled the patient about need for compliance, use of the brace

## 2018-06-15 NOTE — PHYSICAL THERAPY NOTE
PT TREATMENT     06/15/18 1130   Pain Assessment   Pain Assessment Retana-Baker FACES   Retana-Baker FACES Pain Rating (R knee area)   Restrictions/Precautions   Braces or Orthoses (RLE knee immobilizer)   Other Precautions Pain   General   Chart Reviewed Yes   Family/Caregiver Present No   Cognition   Arousal/Participation Cooperative   Subjective   Subjective patient with minimal pain complaints with movement   Bed Mobility   Supine to Sit 7  Independent   Sit to Supine 7  Independent   Transfers   Sit to Stand 7  Independent   Stand to Sit 7  Independent   Ambulation/Elevation   Gait Assistance 7  Independent   Assistive Device Rolling walker   Distance 300 feet with change in direction   Stair Management Assistance 7  Independent   Stair Management Technique One rail L;Step to pattern   Number of Stairs 8   Assessment   Assessment patient independent with gait on level and unlevel surfaces and no longer requires skilled PT services at this time  patient will benefit from out patient PT as allowed after DC for follow up with R knee as doctor allows   DC PT   Recommendation   Recommendation (home with follow up for R knee care with physician)   Licensure   [place] License Number  Prateek Claros PT [id number]

## 2018-06-15 NOTE — PROGRESS NOTES
Patient examined spoke with the nurse reportedly patient is paranoid and he admitted to other doctors that he has been smoking marijuana  I sat down with the patient after the discussion with the nurse at length  Patient has a good rapport with me and he admitted that he has been smoking pot off and on and he had smoked marijuana 3-4 days before he came here after the discussion I recommended not to smoke pot he agreed that he will not smoke pot anymore no more I had a long discussion about not abusing any street drugs or alcohol he also is recommended for psychiatric follow-up after the discharge he is given my office  [phone]  He will call my office after the discharge to get an appointment for outpatient psychiatric follow-up  Patient seems to be suffering from bipolar disorder he is grandiose and he verbalized that he is very smart he worked as a  but he is like an  etc   Affect is elated talkative pressured speech no evidence of hallucination at this time  Patient's medication for the pain is adjusted now he is only on Percocet p r n  Doreen Lightning Patient will need 6 weeks IV antibiotics he refused to go for inpatient rehab but he agreed to come to the hospital as an outpatient for IV antibiotic therapy  At present time patient is stable at his baseline with the behavior no side effects of Effexor and Xanax noted  Patient is not agitated denies feeling suicidal   I will continue medications as ordered and follow up

## 2018-06-15 NOTE — CASE MANAGEMENT
Continued Stay Review  Date: 6/14/18  Vital Signs: /75 (BP Location: Right arm)   Pulse 92   Temp 99 1 °F (37 3 °C) (Oral)   Resp 18   Ht 5' 9" (1 753 m)   Wt 86 2 kg (190 lb 0 2 oz)   SpO2 98%   BMI 28 06 kg/m²     OR  IS  MONITOR WOUND DRAINAGE  C REACTIVE PROTEIN SED RATE  BMP MG CBC PHOS PLT  NON WEIGHT BEARING   IV DILAUDID  XANAX  Medications:   Scheduled Meds:   Current Facility-Administered Medications:  acetaminophen 650 mg Oral Q8H [facility] SANCHO Clemons     ALPRAZolam 0 5 mg Oral BID PRN SANCHO Melvin     ALPRAZolam 0 5 mg Oral BID Rozina Paz MD     aluminum-magnesium hydroxide-simethicone 30 mL Oral Q6H PRN SANCHO Melvin     cefazolin 2,000 mg Intravenous Q8H Yoanna Brar MD Last Rate: 2,000 mg (06/14/18 0334)   clobetasol   Topical BID Yoanna Brar MD     clotrimazole   Topical BID Yoanna Brar MD     enalapril 15 mg Oral Daily SANCHO Melvin     famotidine 20 mg Oral Daily SANCHO Clemons     heparin (porcine) 5,000 Units Subcutaneous Q12H [facility] SANCHO Clemons     ibuprofen 400 mg Oral Q12H SANCHO Clemons     insulin lispro 1-5 Units Subcutaneous TID AC SANCHO Melvin     insulin lispro 1-5 Units Subcutaneous HS SANCHO Melvin     morphine injection 2 mg Intravenous Q4H PRN SANCHO Clemons     ondansetron 4 mg Intravenous Q6H PRN SANCHO Melvin     oxyCODONE-acetaminophen 1 tablet Oral Q6H PRN Papo Alberto, SANCHO     polyethylene glycol 17 g Oral Daily PRN SANCHO Clemons     pravastatin 80 mg Oral Daily With Dinner SANCHO Melvin     saccharomyces boulardii 250 mg Oral BID SANCHO Melvin     sodium chloride 75 mL/hr Intravenous Continuous Abdulaziz Murillo MD Last Rate: 75 mL/hr (06/13/18 1921)   venlafaxine 75 mg Oral BID With Meals SANCHO Melvin     zolpidem 10 mg Oral HS PRN SANCHO Melvin     Continuous Infusions:   sodium chloride 75 mL/hr Last Rate: 75 mL/hr (06/13/18 1921)   PRN Meds: ALPRAZolam    aluminum-magnesium hydroxide-simethicone    morphine injection    ondansetron    oxyCODONE-acetaminophen    polyethylene glycol    zolpidem  Abnormal Labs/Diagnostic Results:   HGB 9 6      Age/Sex: 66 y o  male   Assessment/Plan:    PER ID  Assessment/Plan: This is a middle-age man with hypertension, hyperlipidemia, diabetes mellitus type 2, depression, had presented with right leg cellulitis, septic arthritis of the right knee with prepatellar/infrapatellar bursitis  Infection caused by methicillin sensitive Staphylococcus aureus  He has had two arthroscopic surgical irrigation/debridement, postoperative days 6 and 3  Today, he is to undergo another arthroscopic surgery  He has had skin eruption, on the right leg, area covered by the brace, posterior trunk, suggestive of a contact dermatitis or more likely miliaria (prickly heat) caused by excessive sweating in these locations  He may benefit by taking a good shower, and calamine lotion  The dermatitis is not related to antimicrobial use  His complained of "diarrhea", really not documented when stool chart was maintained  No evidence clinically, of C difficile infection  Continue cefazolin      ORTHOPEDIC SURGEON  Assessment:  POD #3 s/p repeat right incision and drainage of infrapatellar bursa and irrigation/washout of right knee - pain/swelling/erythema markedly improved, wound VAC in place, in knee immobilizer, pain appears well controlled, neurovascularly intact, leukocytosis WNL, appears to be consistent with septic arthritis and infrapatellar bursitis, AVSS, 3+ staph in bursa and synovial fluid, patient feels much better and improved, onset of itching and rash on back, no new medications introduced except Ambien and Ancef in last 48hours, possible beta lactam allergic reaction     Plan:  POD #3 s/p repeat right incision and drainage of infrapatellar bursa and irrigation/washout of right knee- - Antibiotics:  Due to antibiotics possibly being the suspect source of his allergic reaction and rash on his lower back, coordinated with [facility] and discussed discontinuing Ancef in light of the rash and possibly finding alternative medication that patient infection has susceptibility to  We will default to Infectious Disease and [facility] in order to determine do antibiotic regiment    - Anticoagulation: continue DVT prophylaxis, SCDs  - Activity:  Weightbearing as tolerated, PT/OT, to continue knee immobilizer  - AM lab draw:  Repeat a m  labs with CBC, BMP, Mag, phos  - Analgesia: Continue p r n  medication  - Dressing:  Continue wound VAC in place, seal is good currently, continue knee immobilizer, continue VIJAY and wound VAC   - Disposition:   we will continue to plan for irrigation and possible open arthrotomy for a washout of the right knee and right bursa, please keep NPO and hold DVT prophylaxis by midnight   Weight bearing: WBAT  VTE Pharmacologic Prophylaxis: Heparin , on hold for procedure  VTE Mechanical Prophylaxis: sequential compression device   Discharge Plan: TBD    ATTENDING  POD#3 repeat right I&D of infrapatellar bursa and irrigation/washout of right knee  Afebrile > 24h  Right knee pain fairly controlled with Percocet prn, Tylenol  Pruritic [apular rash noted on his back and right lower extremity under immobilizer  Septic joint of right knee joint (HCC)   Assessment & Plan     Sepsis as evidenced by leukocytosis, tachycardia  Right knee aspirate and tissue cultures growing MSSA  Orthopedics and ID consult following  POD #6 S/p right knee surgical irrigation debridement of bursa and infected joint on 6/8/18  POD#3 repeat right I&D of infrapatellar bursa and irrigation/washout of right knee on 6/11/18  Wound VAC in place right knee   Plan another right knee I&D today on 6/14/2018  Per ID, continue on Ancef iv day #8        Dermatitis   Assessment & Plan   Pruritic papular rash noted on his back and right lower extremity under immobilizer  As per ID, less likely related to antimicrobial use  Continue Benadryl, calamine cream     Hyponatremia   Assessment & Plan     Worsening during admission  Sodium 134 -> 132 -> 133 -> 130, possible SIADH from uncontrolled pain  Less likely due to Effexor as patient's Na has worsened  Serum osmo low, however, urine osmo wnl  No improvement on IVF  TSH and urine acid wnl  With fluid restriction, Na normalized  Continue fluid restriction, 1200 mL/day        OR  Procedure(s) (LRB):  INCISION AND DRAINAGE (I&D) EXTREMITY (Right) knee with also complex wound closure  Operative Findings:  Right knee with significantly necrotic synovium that was debrided extensively upon arthrotomy  The bursa appeared however very nicely healthy and intact  We did an extensive debridement of the knee joint itself via the arthrotomy and performed a complex wound closure as we were able to close the entire wound in layers with ultimately little tension          Thank you,  [address] [place] [address] in the [facility] by James Muniz for 2017  Network Utilization Review Department  Phone: [phone]; Fax [phone]  ATTENTION: The Network Utilization Review Department is now centralized for our 7 Facilities  Make a note that we have a new phone and fax numbers for our Department  Please call with any questions or concerns to [phone] and carefully follow the prompts so that you are directed to the right person  All voicemails are confidential  Fax any determinations, approvals, denials, and requests for initial or continue stay review clinical to [phone]   Due to HIGH CALL volume, it would be easier if you could please send faxed requests to expedite your requests and in part, help us provide discharge notifications faster

## 2018-06-15 NOTE — SOCIAL WORK
SW following to monitor needs and assist with DCP  Case discussed with Dr Jairo Corrales  Pt will need total of 6 weeks of IV antibiotics  Dr Jairo Corrales spoke to pt about short term rehab placement however pt declined  Outpatient IV antibiotic treatment will be planned  SW met with pt to confirm plan and review rehab option  Pt does not want placement  Pt will also need rolling walker  Care Manager aware of plan and will assist with planning as well  SW/CM will follow to assist as needed

## 2018-06-15 NOTE — PLAN OF CARE
Problem: DISCHARGE PLANNING - CARE MANAGEMENT  Goal: Discharge to post-acute care or home with appropriate resources  INTERVENTIONS:  - Conduct assessment to determine patient/family and health care team treatment goals, and need for post-acute services based on payer coverage, community resources, and patient preferences, and barriers to discharge  - Address psychosocial, clinical, and financial barriers to discharge as identified in assessment in conjunction with the patient/family and health care team  - Arrange appropriate level of post-acute services according to patient's   needs and preference and payer coverage in collaboration with the physician and health care team  - Communicate with and update the patient/family, physician, and health care team regarding progress on the discharge plan  - Arrange appropriate transportation to post-acute venues    [address] [place] [address]     6/15 WITH OUTPATIENT IV ANTIBIOTIC TREATMENT ARRANGED  Outcome: Progressing  Pt will need total of 6 weeks of IV antibiotics  Dr Debora Peterson spoke to pt about short term rehab placement however pt declined  Outpatient IV antibiotic treatment will be planned

## 2018-06-15 NOTE — SOCIAL WORK
PT TO D/C TO HOME ON 6/16/18 WITH CONTINUED IV ABT THERAPY ARRANGED VIA [facility] INFUSION  INFUSION APPOINTMENT SCHEDULED FOR 6/17/18 @ 9:30 AM  PT AWARE AND AGREEABLE WITH DCP

## 2018-06-15 NOTE — OP NOTE
OPERATIVE REPORT  PATIENT NAME: Mellisa Sendyanely    :  1955  MRN: [id number]  Pt Location: [facility] OR ROOM 02    SURGERY DATE: 2018    Surgeon(s) and Role:     * Jenniffer Gibbs MD - Primary     * Renetta Hand PA-C - Assisting necessary for the procedure for assistance with debridement and complex wound closure    Preop Diagnosis:  Staphylococcal arthritis of right knee ([address] ) [M00 061]    Post-Op Diagnosis Codes:     * Staphylococcal arthritis of right knee ([address] ) [M00 061]    Procedure(s) (LRB):  INCISION AND DRAINAGE (I&D) EXTREMITY (Right) knee with also complex wound closure    Specimen(s):  * No specimens in log *    Estimated Blood Loss:   100 cc    Drains:  Closed/Suction Drain Right;Lateral Knee Accordion 10 Fr  (Active)   Site Description Unable to view 2018 11:13 PM   Dressing Status Clean;Dry; Intact 2018 11:13 PM   Drainage Appearance Serosanguineous 2018 11:13 PM   Status Accordion suction 2018 11:13 PM   Output (mL) 50 mL 2018  6:01 AM   Number of days: 3       Anesthesia Type:   General    Operative Indications:  Staphylococcal arthritis of right knee ([address] ) Sanket Stock is a 24-year-old male who has unfortunately a right knee with septic arthritis and septic bursitis  He has had irrigation and debridement on 2 occasions previously but continues to have some swelling  His white blood cell count has gone down quite a bit and he is looking better however he did spike a fever 2 nights ago and we months to make certain that we have eradicated all infection from his knee  I did consent him for irrigation debridement of the right knee and possible wound closure  The risks are inclusive of but not limited to persistent infection, swelling, difficulty healing the wound, stiffness, failure to regain full strength and ability, and need for further surgery      Operative Findings:  Right knee with significantly necrotic synovium that was debrided extensively upon arthrotomy  The bursa appeared however very nicely healthy and intact  We did an extensive debridement of the knee joint itself via the arthrotomy and performed a complex wound closure as we were able to close the entire wound in layers with ultimately little tension  Complications:   None    Procedure and Technique:  Jose L Wilcox was taken to the operating room and placed supine on the OR table  He was given preoperative IV antibiotics  General anesthesia was induced the right lower extremity was prepped and draped in usual sterile fashion  A surgical time-out was taken  We did go through the previous anterior incision and also made an arthrotomy with a deep blade  We did find significant necrotic tissue which appeared to be synovial tissue in the joint and we did debride that with heavy pickups and scissors  The articular cartilage appeared to be in good condition however in the trochlear groove  After extensive debridement, we then irrigated with 9 L of fluid  We irrigated the bursa as well as the joint  We then began our closure with a 1  PDS for the arthrotomy followed by 2-0 PDS for subcutaneous tissue and 3 0 nylon for skin with complex closure of simple and horizontal mattress sutures in interrupted fashion  We felt we had good closure and had good capillary refill along the wound edges without any obvious signs of necrosis or decreased blood flow  We were pleased with our outcome and did place dry, sterile dressings with a knee immobilizer  He tolerated the procedure well and transferred to recovery room in stable condition  We will continue to monitor his progress  He will continue on antibiotics  This will hopefully be his last trip back to the operating room for this problem     I was present for the entire procedure and A qualified resident physician was not available    Patient Disposition:  PACU     SIGNATURE: Samm Bryant MD  DATE: June 14, 2018  TIME: 8:03 PM

## 2018-06-15 NOTE — PROCEDURES
PICC Line Insertion  Date/Time: 6/15/2018 2:23 PM  Performed by: Michelle Mandel by: Marian Gregg     Other Assisting Provider: No    Consent:     Consent obtained:  Written    Consent given by:  Patient    Risks discussed:  Bleeding and arterial puncture    Alternatives discussed:  No treatment  Universal protocol:     Procedure explained and questions answered to patient or proxy's satisfaction: yes      Relevant documents present and verified: yes      Test results available and properly labeled: yes      Radiology Images displayed and confirmed  If images not available, report reviewed: yes      Required blood products, implants, devices, and special equipment available: yes      Site/side marked: yes      Immediately prior to procedure, a time out was called: yes      Patient identity confirmed:  Arm band and verbally with patient  Pre-procedure details:     Hand hygiene: Hand hygiene performed prior to insertion      Sterile barrier technique: All elements of maximal sterile technique followed      Skin preparation:  2% chlorhexidine    Skin preparation agent: Skin preparation agent completely dried prior to procedure    Indications:     PICC line indications: long term antibiotics    Anesthesia (see MAR for exact dosages): Anesthesia method:  Local infiltration  Procedure details:     Location:  Basilic    Vessel type: vein      Laterality:  Left    Approach: percutaneous technique used      Patient position:  Flat    Catheter size:  5 5 Fr    Landmarks identified: yes      Ultrasound guidance: no      Successful placement: yes      Total catheter length (cm):  50    Catheter out on skin (cm):  1    Arm circumference:  29  Post-procedure details:     Post-procedure:  Dressing applied and securement device placed    Assessment:  Blood return through all ports and placement verified by x-ray    Post-procedure complications: none      Patient tolerance of procedure:   Tolerated well, no immediate complications    Observer: Yes      Observer name:  Hilton Arambula rn

## 2018-06-15 NOTE — PROGRESS NOTES
[facility] Progress Note  Patient: Samir Nation 66 y o  male   MRN: [id number]  PCP: Lawrence Amaro MD  Unit/Bed#: [address] [place] [address] Encounter: [id number]  Date Of Visit: 06/15/18    Subjective:   POD#1 (No  3 during this admission) right knee arthrotomy, I&D and irrigation debridement  Right knee pain fairly controlled with Percocet prn, Tylenol  Afebrile  No diarrhea  Improving pruritic papular rash noted on his back and right lower extremity under immobilizer    Objective:   Vitals:   Temp (24hrs), Av 2 °F (36 8 °C), Min:98 °F (36 7 °C), Max:98 4 °F (36 9 °C)    HR:  [] 92  Resp:  [18-26] 18  BP: (127-179)/() 134/78  SpO2:  [96 %-100 %] 100 %  Body mass index is 28 06 kg/m²         Intake/Output Summary (Last 24 hours) at 06/15/18 1518  Last data filed at 06/15/18 0601   Gross per 24 hour   Intake                0 ml   Output             1600 ml   Net            -1600 ml       Physical Exam:   General: NAD  HEENT: EOMI, anicteric, oral moist, neck supple, no mass or JVD  Chest: CTAB, no wheeze/rales  Cardiac: RRR, S1/S2, No murmur  Abd: S/ND/NT/BS+  MSK: Right knee with dressing applied, C/D/I, pedal pulses intact  Neuro: AAOx3, moving all extremities  Psychiatric: Mood with normal affect    Additional Data:     Labs:    Results from last 7 days  Lab Units 06/15/18  0624 18  0629   WBC Thousand/uL 10 32* 9 78   HEMOGLOBIN g/dL 9 5* 9 6*   HEMATOCRIT % 30 0* 29 9*   PLATELETS Thousands/uL 473* 421*   NEUTROS PCT %  --  72   LYMPHS PCT %  --  18   MONOS PCT %  --  7   EOS PCT %  --  2       Results from last 7 days  Lab Units 06/15/18  0624  18  0643   SODIUM mmol/L 138  < > 138   POTASSIUM mmol/L 4 4  < > 4 0   CHLORIDE mmol/L 102  < > 102   CO2 mmol/L 27  < > 28   BUN mg/dL 6  < > 9   CREATININE mg/dL 0 71  < > 0 75   CALCIUM mg/dL 8 5  < > 8 7   TOTAL PROTEIN g/dL  --   --  6 3*   BILIRUBIN TOTAL mg/dL  --   --  0 20   ALK PHOS U/L  --   --  77   ALT U/L  --   --  38 AST U/L  --   --  25   GLUCOSE RANDOM mg/dL 127  < > 98   < > = values in this interval not displayed          Recent Results (from the past 24 hour(s))   Fingerstick Glucose (POCT)    Collection Time: 06/14/18  4:10 PM   Result Value Ref Range    POC Glucose 101 65 - 140 mg/dl   Fingerstick Glucose (POCT)    Collection Time: 06/14/18  9:51 PM   Result Value Ref Range    POC Glucose 142 (H) 65 - 140 mg/dl   Basic metabolic panel    Collection Time: 06/15/18  6:24 AM   Result Value Ref Range    Sodium 138 136 - 145 mmol/L    Potassium 4 4 3 5 - 5 3 mmol/L    Chloride 102 100 - 108 mmol/L    CO2 27 21 - 32 mmol/L    Anion Gap 9 4 - 13 mmol/L    BUN 6 5 - 25 mg/dL    Creatinine 0 71 0 60 - 1 30 mg/dL    Glucose 127 65 - 140 mg/dL    Calcium 8 5 8 3 - 10 1 mg/dL    eGFR 101 ml/min/1 73sq m   Magnesium    Collection Time: 06/15/18  6:24 AM   Result Value Ref Range    Magnesium 1 7 1 6 - 2 6 mg/dL   CBC    Collection Time: 06/15/18  6:24 AM   Result Value Ref Range    WBC 10 32 (H) 4 31 - 10 16 Thousand/uL    RBC 3 10 (L) 3 88 - 5 62 Million/uL    Hemoglobin 9 5 (L) 12 0 - 17 0 g/dL    Hematocrit 30 0 (L) 36 5 - 49 3 %    MCV 97 82 - 98 fL    MCH 30 6 26 8 - 34 3 pg    MCHC 31 7 31 4 - 37 4 g/dL    RDW 13 3 11 6 - 15 1 %    Platelets 834 (H) 603 - 390 Thousands/uL    MPV 8 8 (L) 8 9 - 12 7 fL   Phosphorus    Collection Time: 06/15/18  6:24 AM   Result Value Ref Range    Phosphorus 3 7 2 3 - 4 1 mg/dL   Fingerstick Glucose (POCT)    Collection Time: 06/15/18  7:03 AM   Result Value Ref Range    POC Glucose 109 65 - 140 mg/dl   Fingerstick Glucose (POCT)    Collection Time: 06/15/18 10:59 AM   Result Value Ref Range    POC Glucose 224 (H) 65 - 140 mg/dl   Fingerstick Glucose (POCT)    Collection Time: 06/15/18  2:55 PM   Result Value Ref Range    POC Glucose 132 65 - 140 mg/dl       Cultures:     Results from last 7 days  Lab Units 06/14/18  0816 06/13/18  1117 06/08/18 2050 06/08/18 2045 06/08/18 2040 06/08/18 2028 BLOOD CULTURE   --  No Growth at 48 hrs   --   --   --   --    GRAM STAIN RESULT   --   --  1+ Polys  3+ Gram positive cocci in pairs Rare Polys  1+ Gram positive cocci in pairs 1+ Polys  1+ Gram positive cocci in pairs 1+ Polys  No bacteria seen   URINE CULTURE  No Growth <1000 cfu/mL  --   --   --   --   --    BODY FLUID CULTURE, STERILE   --   --   --  3+ Growth of Staphylococcus aureus* 3+ Growth of Staphylococcus aureus* 1+ Growth of Staphylococcus aureus*       Imaging:  Xr Spine Lumbar 2 Or 3 Views Injury    Result Date: 6/11/2018  Narrative: LUMBAR SPINE INDICATION:   r/o implants  History of back surgery  COMPARISON:  None VIEWS:  XR SPINE LUMBAR 2 OR 3 VIEWS INJURY Images: 2 FINDINGS: There is no evidence of acute fracture or destructive osseous lesion  Alignment is unremarkable  Mild disc space narrowing diffusely throughout the lumbar spine with associated facet hypertrophic changes  Postoperative changes in the L5 vertebral body  The pedicles appear intact  Visualized soft tissues appear unremarkable  Impression: Degenerative and postoperative changes  Workstation performed: [id number]     Xr Knee 1 Or 2 Vw Right    Result Date: 6/6/2018  Narrative: RIGHT KNEE INDICATION:   infection,suspect spetic joint     Redness and swelling  COMPARISON:  Plain radiographs May 15, 2016 VIEWS:  XR KNEE 1 OR 2 VW RIGHT Images: 2 FINDINGS: There is no acute fracture or dislocation  Small suprapatellar joint effusion  Mild medial joint space narrowing  Mild narrowing of the patellofemoral space  Mild sharpening of the tibial spines  No lytic or blastic lesions are seen  Diffuse soft tissue swelling, extending from above the patella, inferiorly to the anterior tibial tubercle  More focal soft tissue swelling overlying the anterior tibial tubercle  No radiopaque foreign bodies are seen  Impression: 1  Mild degenerative changes with small suprapatellar joint effusion    If there is clinical concern for septic arthritis, consider follow-up arthrocentesis  2   Diffuse soft tissue swelling anterior to the patella, most compatible with prepatellar bursitis  3   More focal soft tissue swelling overlying the anterior tibial tubercle, suspicious for superimposed infrapatellar bursitis  The study was marked in [facility] for immediate notification  Workstation performed: [id number]     Vas Lower Limb Venous Duplex Study, Unilateral/limited    Result Date: 6/7/2018  Narrative:  [facility] REPORT CLINICAL: Indications: Patient presents with right lower extremity edema  Risk Factors: The patient has no history of DVT  The patient current BMI is 28 06, Weight is 190 lb and Height is 69 in  CONCLUSION: Impression: RIGHT LOWER LIMB No evidence of acute or chronic deep vein thrombosis   No evidence of superficial thrombophlebitis noted  Doppler evaluation shows a normal response to augmentation maneuvers  Popliteal, posterior tibial and anterior tibial arterial Doppler waveforms are triphasic  LEFT LOWER LIMB LIMITED Evaluation shows no evidence of thrombus in the common femoral vein  Doppler evaluation shows a normal response to augmentation maneuvers    SIGNATURE: Electronically Signed by: Cordell Jauregui MD, RPVI on 2018-06-07 03:44:21 PM    Imaging Reports Reviewed by myself    Last 24 Hours Medication List:     Current Facility-Administered Medications:     acetaminophen (TYLENOL) tablet 650 mg, 650 mg, Oral, Q8H [address], SANCHO Dudley, 650 mg at 06/15/18 1502    ALPRAZolam Joneen Root) tablet 0 5 mg, 0 5 mg, Oral, BID PRN, SANCHO Melvin, 0 5 mg at 06/14/18 2241    ALPRAZolam (XANAX) tablet 0 5 mg, 0 5 mg, Oral, BID, Lisa Mayorga MD, 0 5 mg at 06/15/18 0842    aluminum-magnesium hydroxide-simethicone (MYLANTA) 200-200-20 mg/5 mL oral suspension 30 mL, 30 mL, Oral, Q6H PRN, SANCHO Melvin    calamine-zinc oxide lotion, , Topical, BID, Yoanna Brar MD    cefTRIAXone (ROCEPHIN) IVPB (premix) 2,000 mg, 2,000 mg, Intravenous, Q24H, Lou Willis MD    clobetasol (TEMOVATE) 0 05 % cream, , Topical, BID, Yoanna Brar MD    diphenhydrAMINE (BENADRYL) tablet 25 mg, 25 mg, Oral, Q6H PRN, Lou Willis MD, 25 mg at 06/15/18 0558    diphenhydrAMINE-zinc acetate (BENADRYL) 2-0 1 % cream, , Topical, TID PRN, Lou Willis MD    docusate sodium (COLACE) capsule 100 mg, 100 mg, Oral, BID, Cornell Cobb PA-C    enalapril (VASOTEC) tablet 15 mg, 15 mg, Oral, Daily, SANCHO Melvin, 15 mg at 06/15/18 0842    famotidine (PEPCID) tablet 20 mg, 20 mg, Oral, Daily, SANCHO Gamez, 20 mg at 06/14/18 0901    heparin (porcine) subcutaneous injection 5,000 Units, 5,000 Units, Subcutaneous, Q8H [address], 5,000 Units at 06/15/18 1500 **AND** Platelet count, , , Once, Cornell Cobb PA-C    ibuprofen (MOTRIN) tablet 400 mg, 400 mg, Oral, Q12H, SANCHO Gamez, 400 mg at 06/15/18 0841    insulin lispro (HumaLOG) 100 units/mL subcutaneous injection 1-5 Units, 1-5 Units, Subcutaneous, TID AC, 1 Units at 06/13/18 0743 **AND** Fingerstick Glucose (POCT), , , TID AC, SANCHO Melvin    insulin lispro (HumaLOG) 100 units/mL subcutaneous injection 1-5 Units, 1-5 Units, Subcutaneous, HS, SANCHO Melvin    nystatin (MYCOSTATIN) powder 1 application, 1 application, Topical, BID, Yoanna Brar MD, 1 application at 64/37/40 0842    ondansetron (ZOFRAN) injection 4 mg, 4 mg, Intravenous, Q6H PRN, SANCHO Melvin, 4 mg at 06/14/18 1841    oxyCODONE-acetaminophen (PERCOCET) 5-325 mg per tablet 1 tablet, 1 tablet, Oral, Q4H PRN, Lou Willis MD, 1 tablet at 06/15/18 1310    pravastatin (PRAVACHOL) tablet 80 mg, 80 mg, Oral, Daily With Dinner, SANCHO Melvin, Stopped at 06/14/18 1713    saccharomyces boulardii (FLORASTOR) capsule 250 mg, 250 mg, Oral, BID, SANCHO Melvin, 250 mg at 06/15/18 0842    sodium chloride 0 9 % infusion, 75 mL/hr, Intravenous, Continuous, Nancy Smith, MD, Last Rate: 75 mL/hr at 06/13/18 1921    venlafaxine [facility]) tablet 75 mg, 75 mg, Oral, BID With Meals, SANCHO Melvin, 75 mg at 06/15/18 4647    zolpidem (AMBIEN) tablet 10 mg, 10 mg, Oral, HS PRN, Papo Alberto CRNP, 10 mg at 06/14/18 2241     Assessment and Plans:  Hospital Problem List:   Principal Problem:    Septic joint of right knee joint ([address] )  Active Problems:    Infrapatellar bursitis of right knee    Staphylococcal arthritis of right knee ([facility] [address] )    Dermatitis    Hyperlipidemia    Borderline diabetes    Hypertension    Cellulitis of right lower extremity    Thrombocytosis (HCC)    Hyponatremia    Depression    Effusion of right knee    66year old male with a history of borderline diabetes, HTN, HLD, who presents with septic right knee infection  * Septic joint of right knee joint ([facility] [address] )   Assessment & Plan    Sepsis as evidenced by leukocytosis, tachycardia, resolved  Right knee aspirate and tissue cultures growing MSSA  Orthopedics and ID consult following  S/p right knee surgical irrigation debridement of bursa and infected joint on 6/8/18  S/p repeat right I&D of infrapatellar bursa and irrigation/washout of right knee on 6/11/18  S/p right knee arthrotomy, I&D, irrigation debridement with complex wound closure on 6/14/18  Patient declined STR placement  He insists to go home  Continue on IV abx, switch back to ceftriaxone 2g iv daily to facilitate outpatient therapy in the infusion center  Plan PICC at [place] [id number] on 6/15/18  CM/SW notified  Anticipate d/c home w/ home service tomorrow            Staphylococcal arthritis of right knee [facility])   Assessment & Plan    ID and orthopedics following, appreciate recommendations        Infrapatellar bursitis of right knee   Assessment & Plan    ID and orthopedics following, appreciate recommendations        Dermatitis   Assessment & Plan    Pruritic papular rash noted on his back and right lower extremity under immobilizer  As per ID, less likely related to antimicrobial use  Continue Benadryl, calamine cream          Depression   Assessment & Plan    Continue Effexor        Hyponatremia   Assessment & Plan    Possible SIADH from uncontrolled pain  Less likely due to Effexor as patient's Na has worsened  Serum osmo low, however, urine osmo wnl  No improvement on IVF  TSH and urine acid wnl  With fluid restriction, Na normalized  Continue fluid restriction, 1500 mL/day         Thrombocytosis (HCC)   Assessment & Plan    Mild  Likely reactive to sepsis  Cellulitis of right lower extremity   Assessment & Plan    With prepatellar bursitis and septic arthritis   Right knee x-ray, small suprapatellar joint effusion, prepatellar bursitis, and more focal soft tissue swelling overlying the anterior tibial tubercle, suspicious for superimposed infrapatellar bursitis   If there is clinical concern for septic arthritis, consider follow-up arthrocentesis  Venous Doppler to rule out DVT, negative         Hypertension   Assessment & Plan    Continue Enalapril  Pain control  Monitor  Borderline diabetes   Assessment & Plan    HbA1c, 7 0  On diabetic diet        Hyperlipidemia   Assessment & Plan    Continue statin          DVT Prophylaxis: on Heparin sc, ambulation  Code Status: Level 1 - Full Code  Disposition: anticipate d/c home once clinically improved      Signed by:  Rebecca Harman MD  Attending Hospitalist  Page#: [phone] (Hours 7am to 7pm)  6/15/2018 3:18 PM

## 2018-06-15 NOTE — PHYSICAL THERAPY NOTE
Patient requesting cane for home use as needed on stairs and for progression with gait  Cane given and fitted to patient, patient demonstrated independent use of cane as well     Mau Grimaldo [id number]

## 2018-06-15 NOTE — CASE MANAGEMENT
Continued Stay Review    Date: 6/15/18    Vital Signs: /91 (BP Location: Right arm)   Pulse 105   Temp 98 4 °F (36 9 °C) (Oral)   Resp 20   Ht 5' 9" (1 753 m)   Wt 86 2 kg (190 lb 0 2 oz)   SpO2 99%   BMI 28 06 kg/m²       PICC LINE IR  Medications:   Scheduled Meds:   Current Facility-Administered Medications:  acetaminophen 650 mg Oral Q8H [address] SANCHO Plunkett    ALPRAZolam 0 5 mg Oral BID PRN SANCHO Melvin    ALPRAZolam 0 5 mg Oral BID Nataly Burr MD    aluminum-magnesium hydroxide-simethicone 30 mL Oral Q6H PRN SANCHO eMlvin    calamine-zinc oxide  Topical BID Yoanna Brar MD    cefazolin 2,000 mg Intravenous Q8H Yoanna Brar MD Last Rate: 2,000 mg (06/15/18 0225)   clobetasol  Topical BID Yoanna Brar MD    diphenhydrAMINE 25 mg Oral Q6H PRN Lahsanda Diane MD    diphenhydrAMINE-zinc acetate  Topical TID PRN Lashanda Dinae MD    docusate sodium 100 mg Oral BID Mesfin Smith PA-C    enalapril 15 mg Oral Daily SANCHO Melvin    famotidine 20 mg Oral Daily SANCHO Plunkett    heparin (porcine) 5,000 Units Subcutaneous [facility] Mesfin Smith PA-C    ibuprofen 400 mg Oral Q12H SANCHO Plunkett    insulin lispro 1-5 Units Subcutaneous TID AC SANCHO Melvin    insulin lispro 1-5 Units Subcutaneous HS SANCHO Melvin    morphine injection 2 mg Intravenous Q4H PRN SANCHO Plunkett    nystatin 1 application Topical BID Yoanna Brar MD    ondansetron 4 mg Intravenous Q6H PRN SANCHO Melvin    oxyCODONE-acetaminophen 1 tablet Oral Q4H PRN Lashanda Diane MD    polyethylene glycol 17 g Oral Daily PRN SANCHO Plunkett    pravastatin 80 mg Oral Daily With Dinner SANCHO Melvin    saccharomyces boulardii 250 mg Oral BID SANCHO Melvin    sodium chloride 75 mL/hr Intravenous Continuous Leonardo Slater MD Last Rate: 75 mL/hr (06/13/18 1921)   venlafaxine 75 mg Oral BID With Meals SANCHO Melvin    zolpidem 10 mg Oral HS PRN SANCHO Melvin      Continuous Infusions:   sodium chloride 75 mL/hr Last Rate: 75 mL/hr (06/13/18 1921)     PRN Meds: ALPRAZolam    aluminum-magnesium hydroxide-simethicone    diphenhydrAMINE    diphenhydrAMINE-zinc acetate    morphine injection    ondansetron    oxyCODONE-acetaminophen    polyethylene glycol    zolpidem    Abnormal Labs/Diagnostic Results: WBC 10 32 H/H 9 5/30      Age/Sex: 66 y o  male     PER ID  Assessment/Plan: This is a middle-age man with above list of comorbidities, depression, marijuana use, had presented with septic arthritis of the right knee with prepatellar and infrapatellar septic bursitis, microbial etiology methicillin sensitive Staphylococcus aureus  He has undergone arthroscopic surgical debridement, and yesterday with the wound closure  Recommend continuing anti staphylococcal treatment for 2 more weeks  He had complained of diarrhea  The stool chart indicated only formed stools  Colace was discontinued  Stool C difficile has been negative  Because of diarrhea ceftriaxone was changed to cefazolin  Repeat sedimentation rate and C-reactive protein, periodically,to gauge progress  I counseled the patient about need for compliance, use of the brace      ORTHOPEDIC SURGEON  Assessment:  POD #1 s/p   Right knee arthrotomy and massive irrigation debridement of right knee - erythema minimal to none, swelling minimal present, no tenderness,in knee immobilizer, pain appears well controlled, neurovascularly intact, leukocytosis 10 32 likely reactive from surgery, appears to beconsistent  with septic arthritis and infrapatellar bursitis, AVSS, 3+ staph in bursa and synovial fluid, patient feels much better and improved, onset of itching and rash on back, no new medications introduced except Ambien and Ancef in last 48hours, possible beta lactam allergic reaction, per ID not related to Ancef     Plan:  POD #1 s/p   Right knee arthrotomy and massive irrigation debridement of right knee -   - Antibiotics:  Per Infectious Disease they are not concerned about the Ancef being the source of his rash, may continue Ancef per Infectious Disease and slim  - Anticoagulation: continue DVT prophylaxis, SCDs  - Activity:  Weightbearing as tolerated, PT/OT, to continue knee immobilizer  - AM lab draw:  Repeat a m  labs with CBC, BMP, Mag, phos  - Analgesia: Continue p r n  medication  - Dressing:   continue Mepilex until postoperative day 7, no drains are in place, must continue knee immobilizer in extension as much as can be tolerated, patient can leave knee immobilizer open when on bed with knee in extension  - Disposition:  In light of septic arthritis will continue to observe over the course of the weekend to see how he responds with antibiotics and to see how clinically his knee looks    Patient in the near future provided he continues to improve clinically will likely be ready for discharge with was extended course of long-term IV antibiotics per Infectious Disease    ATTENDING  POD#1 (No  3 during this admission) right knee arthrotomy, I&D and irrigation debridement  Right knee pain fairly controlled with Percocet prn, Tylenol  * Septic joint of right knee joint ([address] )   Assessment & Plan     Sepsis as evidenced by leukocytosis, tachycardia, resolved  Right knee aspirate and tissue cultures growing MSSA  Orthopedics and ID consult following  S/p right knee surgical irrigation debridement of bursa and infected joint on 6/8/18  S/p repeat right I&D of infrapatellar bursa and irrigation/washout of right knee on 6/11/18  S/p right knee arthrotomy, I&D, irrigation debridement with complex wound closure on 6/14/18  Continue on IV abx, switch back to ceftriaxone 2g iv daily

## 2018-06-15 NOTE — POST OP PROGRESS NOTES
Progress Note - Orthopedics   Dion Srinivasan 66 y o  male MRN: [id number]  Unit/Bed#: [address] [place] [address] Encounter: [id number]    Assessment:  POD #1 s/p   Right knee arthrotomy and massive irrigation debridement of right knee - erythema minimal to none, swelling minimal present, no tenderness,in knee immobilizer, pain appears well controlled, neurovascularly intact, leukocytosis 10 32 likely reactive from surgery, appears to beconsistent  with septic arthritis and infrapatellar bursitis, AVSS, 3+ staph in bursa and synovial fluid, patient feels much better and improved, onset of itching and rash on back, no new medications introduced except Ambien and Ancef in last 48hours, possible beta lactam allergic reaction, per ID not related to Ancef       Plan:  POD #1 s/p   Right knee arthrotomy and massive irrigation debridement of right knee -   - Antibiotics:  Per Infectious Disease they are not concerned about the Ancef being the source of his rash, may continue Ancef per Infectious Disease and slim  - Anticoagulation: continue DVT prophylaxis, SCDs  - Activity:  Weightbearing as tolerated, PT/OT, to continue knee immobilizer  - AM lab draw:  Repeat a m  labs with CBC, BMP, Mag, phos  - Analgesia: Continue p r n  medication  - Dressing:   continue Mepilex until postoperative day 7, no drains are in place, must continue knee immobilizer in extension as much as can be tolerated, patient can leave knee immobilizer open when on bed with knee in extension  - Disposition:  In light of septic arthritis will continue to observe over the course of the weekend to see how he responds with antibiotics and to see how clinically his knee looks    Patient in the near future provided he continues to improve clinically will likely be ready for discharge with was extended course of long-term IV antibiotics per Infectious Disease    Weight bearing:  Weight-bearing as tolerated with knee immobilizer with leg in complete extension    VTE Pharmacologic Prophylaxis: Heparin  VTE Mechanical Prophylaxis: sequential compression device    Subjective:  Patient was seen examined at bedside  Patient denies any acute events overnight  Patient is sitting upright any eating  However, patient currently has his knee flexed with his knee immobilizer off  Patient is educated that this is not appropriate based on the incision having a lot of tension  It does not look like  The incision of the wound  Was reopened  Patient denies any neurovascular changes such as numbness, tingling, lack of motor movement, lack of sensation in his distal lower right extremity  Patient reports that he believes his redness and swelling have improved since admission  Vitals: Blood pressure 134/78, pulse 92, temperature 98 2 °F (36 8 °C), temperature source Oral, resp  rate 18, height 5' 9" (1 753 m), weight 86 2 kg (190 lb 0 2 oz), SpO2 100 %  ,Body mass index is 28 06 kg/m²        Intake/Output Summary (Last 24 hours) at 06/15/18 1350  Last data filed at 06/15/18 0601   Gross per 24 hour   Intake                0 ml   Output             1600 ml   Net            -1600 ml       Invasive Devices     Peripheral Intravenous Line            Long-Dwell Peripheral IV (Midline) 47/92/93 Left Basilic 8 days          Drain            Closed/Suction Drain Right;Lateral Knee Accordion 10 Fr  3 days                Physical Exam: /78 (BP Location: Right arm)   Pulse 92   Temp 98 2 °F (36 8 °C) (Oral)   Resp 18   Ht 5' 9" (1 753 m)   Wt 86 2 kg (190 lb 0 2 oz)   SpO2 100%   BMI 28 06 kg/m²   General appearance: alert and oriented, in no acute distress  Head: Normocephalic, without obvious abnormality, atraumatic  Extremities: Mild to moderate knee effusion, bursitis has calmed down, minimal to no erythema, no tenderness, no edema  Skin: Minimal to no erythema, incision is closed, incision is clean, dry, intact  Ortho Exam:   right Lower Extremity: Thigh and calf compartments are soft and nontender to palpation  + L3-S1 SILT  + DF/PF + EHL  No pain with passive stretch/extension of toes  DP 2+, capillary refill less than 2 seconds, and foot is warm B/L  Incision is c/d/i    Lab, Imaging and other studies:   I have personally reviewed pertinent lab results    CBC:   Lab Results   Component Value Date    WBC 10 32 (H) 06/15/2018    HGB 9 5 (L) 06/15/2018    HCT 30 0 (L) 06/15/2018    MCV 97 06/15/2018     (H) 06/15/2018    MCH 30 6 06/15/2018    MCHC 31 7 06/15/2018    RDW 13 3 06/15/2018    MPV 8 8 (L) 06/15/2018     CMP:   Lab Results   Component Value Date     06/15/2018     06/15/2018    CO2 27 06/15/2018    ANIONGAP 9 06/15/2018    BUN 6 06/15/2018    CREATININE 0 71 06/15/2018    GLUCOSE 127 06/15/2018    CALCIUM 8 5 06/15/2018    EGFR 101 06/15/2018

## 2018-06-16 VITALS
WEIGHT: 190.04 LBS | SYSTOLIC BLOOD PRESSURE: 122 MMHG | BODY MASS INDEX: 28.15 KG/M2 | RESPIRATION RATE: 16 BRPM | HEART RATE: 95 BPM | HEIGHT: 69 IN | OXYGEN SATURATION: 96 % | TEMPERATURE: 98.2 F | DIASTOLIC BLOOD PRESSURE: 73 MMHG

## 2018-06-16 LAB
GLUCOSE SERPL-MCNC: 104 MG/DL (ref 65–140)
GLUCOSE SERPL-MCNC: 130 MG/DL (ref 65–140)

## 2018-06-16 PROCEDURE — 82948 REAGENT STRIP/BLOOD GLUCOSE: CPT

## 2018-06-16 PROCEDURE — 99239 HOSP IP/OBS DSCHRG MGMT >30: CPT | Performed by: INTERNAL MEDICINE

## 2018-06-16 RX ORDER — SACCHAROMYCES BOULARDII 250 MG
250 CAPSULE ORAL 2 TIMES DAILY
Qty: 60 CAPSULE | Refills: 0 | Status: SHIPPED | OUTPATIENT
Start: 2018-06-16 | End: 2018-07-16

## 2018-06-16 RX ORDER — ACETAMINOPHEN 325 MG/1
650 TABLET ORAL EVERY 6 HOURS PRN
Qty: 30 TABLET | Refills: 0 | Status: SHIPPED | OUTPATIENT
Start: 2018-06-16 | End: 2018-06-26

## 2018-06-16 RX ORDER — NYSTATIN 100000 [USP'U]/G
1 POWDER TOPICAL 2 TIMES DAILY
Qty: 30 G | Refills: 0 | Status: SHIPPED | OUTPATIENT
Start: 2018-06-16 | End: 2018-07-25 | Stop reason: HOSPADM

## 2018-06-16 RX ORDER — CLOBETASOL PROPIONATE 0.5 MG/G
CREAM TOPICAL 2 TIMES DAILY
Qty: 30 G | Refills: 0 | Status: SHIPPED | OUTPATIENT
Start: 2018-06-16

## 2018-06-16 RX ORDER — OXYCODONE HYDROCHLORIDE AND ACETAMINOPHEN 5; 325 MG/1; MG/1
1 TABLET ORAL EVERY 4 HOURS PRN
Qty: 20 TABLET | Refills: 0 | Status: SHIPPED | OUTPATIENT
Start: 2018-06-16 | End: 2018-06-26

## 2018-06-16 RX ORDER — DOCUSATE SODIUM 100 MG/1
100 CAPSULE, LIQUID FILLED ORAL 2 TIMES DAILY
Qty: 60 CAPSULE | Refills: 0 | Status: SHIPPED | OUTPATIENT
Start: 2018-06-16 | End: 2018-07-20

## 2018-06-16 RX ORDER — DIPHENHYDRAMINE HYDROCHLORIDE, ZINC ACETATE 2; .1 G/100G; G/100G
CREAM TOPICAL 3 TIMES DAILY PRN
Qty: 28.4 G | Refills: 0 | Status: SHIPPED | OUTPATIENT
Start: 2018-06-16

## 2018-06-16 RX ORDER — IBUPROFEN 400 MG/1
400 TABLET ORAL EVERY 8 HOURS PRN
Qty: 20 TABLET | Refills: 0 | Status: SHIPPED | OUTPATIENT
Start: 2018-06-16 | End: 2018-07-25 | Stop reason: HOSPADM

## 2018-06-16 RX ORDER — IODINE/SODIUM IODIDE 2 %
TINCTURE TOPICAL 2 TIMES DAILY
Qty: 118 ML | Refills: 0 | Status: SHIPPED | OUTPATIENT
Start: 2018-06-16

## 2018-06-16 RX ADMIN — FERRIC OXIDE RED 1 APPLICATION: 8; 8 LOTION TOPICAL at 08:14

## 2018-06-16 RX ADMIN — OXYCODONE HYDROCHLORIDE AND ACETAMINOPHEN 1 TABLET: 5; 325 TABLET ORAL at 06:42

## 2018-06-16 RX ADMIN — ACETAMINOPHEN 650 MG: 325 TABLET ORAL at 06:38

## 2018-06-16 RX ADMIN — CEFTRIAXONE 2000 MG: 2 INJECTION, SOLUTION INTRAVENOUS at 10:50

## 2018-06-16 RX ADMIN — SODIUM CHLORIDE 75 ML/HR: 0.9 INJECTION, SOLUTION INTRAVENOUS at 01:51

## 2018-06-16 RX ADMIN — FAMOTIDINE 20 MG: 20 TABLET ORAL at 08:13

## 2018-06-16 RX ADMIN — Medication 250 MG: at 08:13

## 2018-06-16 RX ADMIN — DIPHENHYDRAMINE HYDROCHLORIDE, ZINC ACETATE: 2; .1 CREAM TOPICAL at 08:14

## 2018-06-16 RX ADMIN — CLOBETASOL PROPIONATE 1 APPLICATION: 0.5 CREAM TOPICAL at 08:16

## 2018-06-16 RX ADMIN — OXYCODONE HYDROCHLORIDE AND ACETAMINOPHEN 1 TABLET: 5; 325 TABLET ORAL at 01:45

## 2018-06-16 RX ADMIN — HEPARIN SODIUM 5000 UNITS: 5000 INJECTION, SOLUTION INTRAVENOUS; SUBCUTANEOUS at 06:39

## 2018-06-16 RX ADMIN — IBUPROFEN 400 MG: 400 TABLET ORAL at 08:13

## 2018-06-16 RX ADMIN — ALPRAZOLAM 0.5 MG: 0.5 TABLET ORAL at 08:13

## 2018-06-16 RX ADMIN — OXYCODONE HYDROCHLORIDE AND ACETAMINOPHEN 1 TABLET: 5; 325 TABLET ORAL at 10:57

## 2018-06-16 RX ADMIN — VENLAFAXINE 75 MG: 37.5 TABLET ORAL at 08:13

## 2018-06-16 NOTE — SOCIAL WORK
CM advised that pt is stable for DC today  Pt in need of infusion at the infusion center which is arranged for 0930 on Sunday, pt to report directly to infusion as there will be staff that day available  CM updated Brie Shah the RN to this instruction  Pt had questions re: his Yadi Arambula received from [employer]  Pt concerned over the billing for this and noted that he is concerned it will get over billed  Pt described having medicare and Hz 2nd however, he has an [employer] plan  CM explained the the device will get billed through the insurance company and unless he has copay that he is responsible and not covered by any secondary insurance, this will likely be a covered device  Pt concerned re: rental fees, CM unable to guarantee billing practices for [employer] but advised that this will likely be a lump sum bill and not a monthly rental   Advised pt to contact [employer] directly with any further questions that he may have with re: to how [employer] may bill this out to his insurance  CM also d/w him, PCP and RN need for VNA for wound care  Pt in need of Mepilex dsg changes, qod per PCP  RN indicated that she will change the dsg and provide education, however, pt is independent and should be able to provide his own care  No VNA needed  CM spoke with the pt re: this and he also felt he should be able to provide his own care re: wound care at home  RN will update the CM if pt unable to learn his wound care during teaching  Pt stated that he is able to drive and noted that his truck is equipped with sensors and he can brake with his left foot  CM asked if he had plan B for transportation, pt noted that he has a brother and a friend that he was going to ask to assist with a ride if he is unable to drive to infusion    Pt denied any other DCP needs at this time

## 2018-06-16 NOTE — DISCHARGE INSTRUCTIONS
· Continue ceftriaxone 2 grams iv daily, via left arm PICC, in the infusion center, for 30 more days  · Activity: Weightbearing as tolerated with knee immobilizer with leg in complete extension  · Dressing:  continue Mepilex until postoperative day 7, no drains are in place, must continue knee immobilizer in extension as much as can be tolerated, patient can leave knee immobilizer open when on bed with knee in extension  · Encourage ambulation as tolerated for DVT prophylaxis  · Follow up with Orthopedics as outpatient within 1 week  · Follow up with PCP as outpatient within 1 week

## 2018-06-16 NOTE — PROGRESS NOTES
Patient verbalized he is upset after reading the delivery ticket for his walker  He wasn't sure whether the walker was purchased or was a rental based on his ticket  He was concerned about being billed monthly for the walker and would rather not have it  Attempted to explain to patient that walkers are usually purchased and not rented  However, he was not satisfied with the explanation as the ticket document was saying otherwise (as he thought)  Patient admits becoming more anxious and all worked up over these small details  Encouraged patient to relax and assured him that it will be straightened out in the morning when case management is available to answer all his questions

## 2018-06-16 NOTE — DISCHARGE SUMMARY
Discharge Summary - [facility]  Patient Information: Cyndee Copeland 66 y o  male MRN: [id number]  Unit/Bed#: [address] [place] [address] Encounter: [id number]    Admission Date: 6/5/2018  Discharge Date: 6/16/2018    Admitting Physician: Josefina Rubin DO  Discharging Physician/Practitioner: John Beck MD  PCP: Michael Mayorga MD    Reason for Admission: Leg Swelling (Pt reports right let swollen, red, warm to touch since 5/27  PCP treating for gout   )    Admission Diagnoses:  Effusion of right knee [M25 461]  Cellulitis of right lower extremity [L03 115]  Sepsis ([address] ) [A41 9]  Pain and swelling of right knee [M25 561, M25 461]    Discharge Diagnoses:    Septic joint of right knee joint ([address] )    Staphylococcal arthritis of right knee (HCC)    Infrapatellar bursitis of right knee    Effusion of right knee    Dermatitis    Hyperlipidemia    Borderline diabetes    Hypertension    Thrombocytosis ([facility] )    Hyponatremia    Depression    Consultations During Hospital Stay:  IP CONSULT TO ORTHOPEDIC SURGERY  IP CONSULT TO INFECTIOUS DISEASES  IP CONSULT TO PSYCHIATRY    Discharge Instructions: (provided to patient)  · Continue ceftriaxone 2 grams iv daily, via left arm PICC, in the infusion center, for 30 more days  · Activity: Weightbearing as tolerated with knee immobilizer with leg in complete extension  · Dressing:  continue Mepilex until postoperative day 7, no drains are in place, must continue knee immobilizer in extension as much as can be tolerated, patient can leave knee immobilizer open when on bed with knee in extension  · Encourage ambulation as tolerated for DVT prophylaxis  · Follow up with Orthopedics as outpatient within 1 week  · Follow up with PCP as outpatient within 1 week      Hospital Course:   66year old male with a history of borderline diabetes, HTN, HLD, who presents with septic right knee infection      Septic joint of right knee joint  Staphylococcal arthritis of right knee  Infrapatellar bursitis of right knee  Sepsis as evidenced by leukocytosis, tachycardia, resolved  Right knee aspirate and tissue cultures growing MSSA  Venous Doppler to rule out DVT, negative   Orthopedics and ID consult following  S/p right knee surgical irrigation debridement of bursa and infected joint on 6/8/18 (Dr Domenic Cevallos)  S/p repeat right I&D of infrapatellar bursa and irrigation/washout of right knee on 6/11/18  S/p right knee arthrotomy, I&D, irrigation debridement with complex wound closure on 6/14/18  Patient declined STR placement  He insists to go home  Continue ceftriaxone 2g iv daily to facilitate outpatient therapy in the infusion center  Continue Tylenol prn, Percocet prn for pain control  S/p PICC at [place] [id number] on 6/15/18      Dermatitis  Pruritic papular rash noted on his back and right lower extremity under immobilizer  As per ID, less likely related to antimicrobial use  Improving with calamine, Benadryl cream     Depression  Psychiatry consulted  Continue Effexor     Hyponatremia  Possible SIADH from uncontrolled pain  Less likely due to Effexor as patient's Na has worsened  Serum osmo low, however, urine osmo wnl  No improvement on IVF  TSH and urine acid wnl  With fluid restriction, Na normalized  Continue fluid restriction, 1500 mL/day      Thrombocytosis (HCC)  Mild  Likely reactive to sepsis      Hypertension  Continue Enalapril  Pain control  Monitor      Hyperlipidemia  Continue statin     Borderline diabetes  HbA1c, 7 0  On diabetic diet     DVT Prophylaxis: on Heparin sc, ambulation  Code Status: Level 1 - Full Code      Imaging while in hospital:  Xr Spine Lumbar 2 Or 3 Views Injury    Result Date: 6/11/2018  Narrative: LUMBAR SPINE INDICATION:   r/o implants  History of back surgery  COMPARISON:  None VIEWS:  XR SPINE LUMBAR 2 OR 3 VIEWS INJURY Images: 2 FINDINGS: There is no evidence of acute fracture or destructive osseous lesion   Alignment is unremarkable  Mild disc space narrowing diffusely throughout the lumbar spine with associated facet hypertrophic changes  Postoperative changes in the L5 vertebral body  The pedicles appear intact  Visualized soft tissues appear unremarkable  Impression: Degenerative and postoperative changes  Workstation performed: [id number]     Xr Knee 1 Or 2 Vw Right    Result Date: 6/6/2018  Narrative: RIGHT KNEE INDICATION:   infection,suspect spetic joint     Redness and swelling  COMPARISON:  Plain radiographs May 15, 2016 VIEWS:  XR KNEE 1 OR 2 VW RIGHT Images: 2 FINDINGS: There is no acute fracture or dislocation  Small suprapatellar joint effusion  Mild medial joint space narrowing  Mild narrowing of the patellofemoral space  Mild sharpening of the tibial spines  No lytic or blastic lesions are seen  Diffuse soft tissue swelling, extending from above the patella, inferiorly to the anterior tibial tubercle  More focal soft tissue swelling overlying the anterior tibial tubercle  No radiopaque foreign bodies are seen  Impression: 1  Mild degenerative changes with small suprapatellar joint effusion  If there is clinical concern for septic arthritis, consider follow-up arthrocentesis  2   Diffuse soft tissue swelling anterior to the patella, most compatible with prepatellar bursitis  3   More focal soft tissue swelling overlying the anterior tibial tubercle, suspicious for superimposed infrapatellar bursitis  The study was marked in [facility] for immediate notification  Workstation performed: [id number]     Vas Lower Limb Venous Duplex Study, Unilateral/limited    Result Date: 6/7/2018  Narrative:  [facility] REPORT CLINICAL: Indications: Patient presents with right lower extremity edema  Risk Factors: The patient has no history of DVT  The patient current BMI is 28 06, Weight is 190 lb and Height is 69 in  CONCLUSION: Impression: RIGHT LOWER LIMB No evidence of acute or chronic deep vein thrombosis   No evidence of superficial thrombophlebitis noted  Doppler evaluation shows a normal response to augmentation maneuvers  Popliteal, posterior tibial and anterior tibial arterial Doppler waveforms are triphasic  LEFT LOWER LIMB LIMITED Evaluation shows no evidence of thrombus in the common femoral vein  Doppler evaluation shows a normal response to augmentation maneuvers  SIGNATURE: Electronically Signed by: Shey Smith MD, [address] on 2018-06-07 03:44:21 PM    Ir Picc Line    Result Date: 6/15/2018  Narrative: Exchange of midline catheter for double-lumen PICC 6/15/2018 History: Needs access outside of the hospital Contrast: None Fluoroscopy time: 0 6 minutes Procedure: The patient was identified verbally and by wristband  Timeout was performed  All elements of maximal sterile barrier technique, cap and mask and sterile gown and sterile gloves and sterile full-body drape and hand hygiene and 2% chlorhexidine for cutaneous antisepsis  Sterile ultrasound technique with sterile gel and sterile probe covers was also utilized  The patient was prepped and draped in usual sterile fashion  Using fluoroscopic guidance, a mandril wire was placed through the previously placed midline catheter  The midline catheter was removed over the mandril wire and exchanged for a peel-away sheath  The wire was advanced to the level of the RA/SVC junction to measure for a new catheter  The new catheter was cut and placed through the peel-away sheath  The catheter was secured in place  Both lumens were flushed with ease  The patient tolerated the procedure well without apparent immediate complication  The patient left the IR department in unchanged condition  Dr Ela Kay directly supervised the entire procedure  Findings: Fluoroscopic spot image demonstrates a newly placed 50 cm PICC catheter with its most central aspect at the RA/SVC junction  The catheter tubing is smooth in contour       Impression: Impression: Successful exchange of left upper extremity midline catheter for a dual lumen PICC catheter  Workstation performed: [id number]       Allergies:    Allergies   Allergen Reactions    Iodine Anaphylaxis    Shellfish-Derived Products        Discharge Medications:  Current Discharge Medication List      START taking these medications    Details   acetaminophen (TYLENOL) 325 mg tablet Take 2 tablets (650 mg total) by mouth every 6 (six) hours as needed for mild pain for up to 10 days  Qty: 30 tablet, Refills: 0    Associated Diagnoses: Staphylococcal arthritis of right knee (HCC)      calamine-zinc oxide 8-8 % Apply topically 2 (two) times a day  Qty: 118 mL, Refills: 0    Associated Diagnoses: Dermatitis      cefTRIAXone (ROCEPHIN) 2000 mg IVPB Infuse 50 mL (2,000 mg total) into a venous catheter every 24 hours for 30 days  Refills: 0    Associated Diagnoses: Staphylococcal arthritis of right knee (HCC)      clobetasol (TEMOVATE) 0 05 % cream Apply topically 2 (two) times a day  Qty: 30 g, Refills: 0    Associated Diagnoses: Dermatitis      diphenhydrAMINE-zinc acetate (BENADRYL) cream Apply topically 3 (three) times a day as needed for itching  Qty: 28 4 g, Refills: 0    Associated Diagnoses: Dermatitis      docusate sodium (COLACE) 100 mg capsule Take 1 capsule (100 mg total) by mouth 2 (two) times a day for 30 days  Qty: 60 capsule, Refills: 0    Associated Diagnoses: Staphylococcal arthritis of right knee (HCC)      ibuprofen (MOTRIN) 400 mg tablet Take 1 tablet (400 mg total) by mouth every 8 (eight) hours as needed for mild pain or moderate pain for up to 10 days  Qty: 20 tablet, Refills: 0    Associated Diagnoses: Staphylococcal arthritis of right knee (HCC)      nystatin (MYCOSTATIN) powder Apply 1 application topically 2 (two) times a day for 10 days  Qty: 30 g, Refills: 0    Associated Diagnoses: Dermatitis      oxyCODONE-acetaminophen (PERCOCET) 5-325 mg per tablet Take 1 tablet by mouth every 4 (four) hours as needed for moderate pain or severe pain for up to 10 days Max Daily Amount: 6 tablets  Qty: 20 tablet, Refills: 0    Associated Diagnoses: Staphylococcal arthritis of right knee (HCC)      saccharomyces boulardii (FLORASTOR) 250 mg capsule Take 1 capsule (250 mg total) by mouth 2 (two) times a day for 30 days  Qty: 60 capsule, Refills: 0    Associated Diagnoses: Staphylococcal arthritis of right knee ([address] )           Current Discharge Medication List        Current Discharge Medication List      CONTINUE these medications which have NOT CHANGED    Details   ALPRAZolam (XANAX) 0 5 mg tablet Take 0 5 mg by mouth 2 (two) times a day as needed        ENALAPRIL MALEATE PO Take 5 mg by mouth 3 (three) times a day        metFORMIN (GLUCOPHAGE) 1000 MG tablet Take 1,000 mg by mouth daily with breakfast      rosuvastatin (CRESTOR) 10 MG tablet Take 10 mg by mouth daily  venlafaxine (EFFEXOR) 75 mg tablet Take 75 mg by mouth 2 (two) times a day      zolpidem (AMBIEN) 10 mg tablet Take 10 mg by mouth daily at bedtime as needed for sleep           Current Discharge Medication List      STOP taking these medications       traMADol-acetaminophen (ULTRACET) 37 5-325 mg per tablet Comments:   Reason for Stopping:               Medications were reconciliated and instructions were given to Mr Srinivasan at bedside prior to the discharge  Discharge Day Visit/Exam:   Subjective:  /73 (BP Location: Right arm)   Pulse 95   Temp 98 2 °F (36 8 °C) (Oral)   Resp 16   Ht 5' 9" (1 753 m)   Wt 86 2 kg (190 lb 0 6 oz)   SpO2 96%   BMI 28 06 kg/m²   General: NAD  HEENT: EOMI, anicteric, oral moist, neck supple, no mass or JVD  Chest: CTAB, no wheeze/rales  Cardiac: RRR, S1/S2, No murmur  Abd: S/ND/NT/BS+  MSK: Right knee with dressing applied, C/D/I, pedal pulses intact  Neuro: AAOx3, moving all extremities  Psychiatric: Mood with normal affect    Patient Instructions: Activity: activity as tolerated, as instructed    Diet: regular diet  Wound Care: as directed    Discharge instructions/Information to patient and family:(Discharge Medications and Follow up):  See after visit summary for information provided to patient and family  Provisions for Follow-Up Care:  See after visit summary for information related to follow-up care and any pertinent home health orders  Follow-up Informations:  [address] [place] [address]  [phone]  Go on 6/17/2018  APPOINTMENT SCHEDULED ON 6/17/18 AT 9:30 AM    Fern Birch MD  [address] [place] [id number]  [address] [place] [address]  [phone]    Follow up in 1 week(s)  septic right knee infection, s/p I&D, on long term ceftriaxone iv via PICC line    Rona Mera MD  [address] [facility]  [address]  [address]  [phone]    Follow up in 1 week(s)  septic right knee infection, s/p I&D, on long term ceftriaxone iv via PICC line       Disposition: Home    Planned Readmission: No     Discharge Statement:  I spent 30 minutes discharging the patient  This time was spent on the day of discharge  I had direct contact with the patient on the day of discharge  Greater than 50% of the total time was spent examining patient, answering all patient questions, arranging and discussing plan of care with patient as well as directly providing post-discharge instructions  Additional time then spent on discharge activities  Discharge Medications:  See after visit summary for reconciled discharge medications provided to patient and family

## 2018-06-16 NOTE — PLAN OF CARE
DISCHARGE PLANNING     Discharge to home or other facility with appropriate resources Completed        DISCHARGE PLANNING - CARE MANAGEMENT     Discharge to post-acute care or home with appropriate resources Completed        INFECTION - ADULT     Absence or prevention of progression during hospitalization Completed        Knowledge Deficit     Patient/family/caregiver demonstrates understanding of disease process, treatment plan, medications, and discharge instructions Completed        METABOLIC, FLUID AND ELECTROLYTES - ADULT     Glucose maintained within target range Completed        MUSCULOSKELETAL - ADULT     Maintain or return mobility to safest level of function Completed     Maintain proper alignment of affected body part Completed        PAIN - ADULT     Verbalizes/displays adequate comfort level or baseline comfort level Completed        Potential for Falls     Patient will remain free of falls Completed        Prexisting or High Potential for Compromised Skin Integrity     Skin integrity is maintained or improved Completed        SKIN/TISSUE INTEGRITY - ADULT     Skin integrity remains intact Completed     Incision(s), wounds(s) or drain site(s) healing without S/S of infection Completed

## 2018-06-16 NOTE — PROGRESS NOTES
Patient is alert awake cooperative he is in a good mood he reports that he is going home today patient is advised to continue his depression and anxiety medications sleeping medications and follow-up with me in the office he has my office [phone] patient is smiling shaking hands offers no new complaints  Patient will need IV antibiotic treatment and he will do it as an outpatient  Nurse reports that he is doing all right  I reviewed his psychotropic medications  Patient is stable at his baseline with depression anxiety and insomnia  No hallucinations no psychosis no agitation  Patient is talkative and happy to see me  Therapy done with good response  I will continue his medications as ordered and follow up  Patient will be discharged home today

## 2018-06-16 NOTE — PROGRESS NOTES
Patient seen in bed  He had his immobilizer open  He states he was allowed to have it open while on bed as long as he keeps his knee straight  Reminded patient that the immobilizer was to be worn all the time, especially when out of bed, and his knees kept extended to avoid strain on the sutures  Patient verbalized understanding

## 2018-06-16 NOTE — PROGRESS NOTES
Patient seen walking around in his room trying to get his belongings together in preparation for discharge in the morning  He is wearing his brace but is weight bearing on the right leg  He was also not using the walker during this time

## 2018-06-17 ENCOUNTER — HOSPITAL ENCOUNTER (OUTPATIENT)
Dept: INFUSION CENTER | Facility: HOSPITAL | Age: 63
Discharge: HOME | End: 2018-06-17
Payer: COMMERCIAL

## 2018-06-17 VITALS
HEART RATE: 95 BPM | SYSTOLIC BLOOD PRESSURE: 119 MMHG | DIASTOLIC BLOOD PRESSURE: 62 MMHG | TEMPERATURE: 97.1 F | RESPIRATION RATE: 20 BRPM

## 2018-06-17 PROCEDURE — 96365 THER/PROPH/DIAG IV INF INIT: CPT

## 2018-06-17 RX ADMIN — CEFTRIAXONE 2000 MG: 2 INJECTION, SOLUTION INTRAVENOUS at 10:54

## 2018-06-17 NOTE — NURSING NOTE
Patient arrived and PICC dressing found to be mostly removed with insertion site exposed to air and catheter out at the 3 cm marking  I cleaned and changed dressing per protocol with chlorhexidine and applied new chlorhexidine dressing  Education and instructions reviewed with patient in regards to care, keeping dressing dry at all times, cleanliness, and risk of infection to central line  He verbalized understanding

## 2018-06-17 NOTE — NURSING NOTE
Patient did not show for appointment today  I called phone number on file and no answer and no voicemail ability

## 2018-06-18 ENCOUNTER — HOSPITAL ENCOUNTER (OUTPATIENT)
Dept: INFUSION CENTER | Facility: HOSPITAL | Age: 63
Discharge: HOME | End: 2018-06-18
Payer: COMMERCIAL

## 2018-06-18 VITALS
RESPIRATION RATE: 20 BRPM | TEMPERATURE: 98.2 F | SYSTOLIC BLOOD PRESSURE: 122 MMHG | OXYGEN SATURATION: 99 % | HEART RATE: 101 BPM | DIASTOLIC BLOOD PRESSURE: 75 MMHG

## 2018-06-18 LAB — BACTERIA BLD CULT: NORMAL

## 2018-06-18 PROCEDURE — 96365 THER/PROPH/DIAG IV INF INIT: CPT

## 2018-06-18 RX ADMIN — CEFTRIAXONE 2000 MG: 2 INJECTION, SOLUTION INTRAVENOUS at 11:40

## 2018-06-19 ENCOUNTER — HOSPITAL ENCOUNTER (OUTPATIENT)
Dept: INFUSION CENTER | Facility: HOSPITAL | Age: 63
Discharge: HOME | End: 2018-06-19
Payer: COMMERCIAL

## 2018-06-19 VITALS
HEART RATE: 103 BPM | OXYGEN SATURATION: 95 % | DIASTOLIC BLOOD PRESSURE: 69 MMHG | RESPIRATION RATE: 20 BRPM | SYSTOLIC BLOOD PRESSURE: 126 MMHG | TEMPERATURE: 97.1 F

## 2018-06-19 PROCEDURE — 96365 THER/PROPH/DIAG IV INF INIT: CPT

## 2018-06-19 RX ADMIN — CEFTRIAXONE 2000 MG: 2 INJECTION, SOLUTION INTRAVENOUS at 11:31

## 2018-06-20 ENCOUNTER — HOSPITAL ENCOUNTER (OUTPATIENT)
Dept: INFUSION CENTER | Facility: HOSPITAL | Age: 63
Discharge: HOME | End: 2018-06-20
Payer: COMMERCIAL

## 2018-06-20 VITALS
RESPIRATION RATE: 18 BRPM | OXYGEN SATURATION: 98 % | DIASTOLIC BLOOD PRESSURE: 68 MMHG | TEMPERATURE: 98.1 F | HEART RATE: 101 BPM | SYSTOLIC BLOOD PRESSURE: 128 MMHG

## 2018-06-20 PROCEDURE — 96365 THER/PROPH/DIAG IV INF INIT: CPT

## 2018-06-20 RX ADMIN — CEFTRIAXONE 2000 MG: 2 INJECTION, SOLUTION INTRAVENOUS at 11:47

## 2018-06-21 ENCOUNTER — HOSPITAL ENCOUNTER (OUTPATIENT)
Dept: INFUSION CENTER | Facility: HOSPITAL | Age: 63
Discharge: HOME | End: 2018-06-21
Payer: COMMERCIAL

## 2018-06-21 VITALS
TEMPERATURE: 98.4 F | OXYGEN SATURATION: 96 % | RESPIRATION RATE: 18 BRPM | SYSTOLIC BLOOD PRESSURE: 133 MMHG | DIASTOLIC BLOOD PRESSURE: 79 MMHG | HEART RATE: 103 BPM

## 2018-06-21 PROCEDURE — 96365 THER/PROPH/DIAG IV INF INIT: CPT

## 2018-06-21 RX ADMIN — CEFTRIAXONE 2000 MG: 2 INJECTION, SOLUTION INTRAVENOUS at 12:19

## 2018-06-21 NOTE — PROGRESS NOTES
PATIENT STATES HE TOOK A SHOWER WITHOUT WRAPPING HIS PICC LINE SITE  REINFORCED WITH PATIENT THAT HE CANNOT GET THE PICC LINE WET AS HE WOULD BE RISK FOR INFECTION  PATIENT VERBALIZED INSTRUCTIONS BACK TO ME  RIGHT KNEE REMAINS EDEMATOUS AND PINK WITH SEROUS DRAINAGE  PATIENT INSTRUCTED TO CALL HIS ORTHOPEDIST FOR FOLLOW UP  VERBALIZES UNDERSTANDING

## 2018-06-22 ENCOUNTER — HOSPITAL ENCOUNTER (OUTPATIENT)
Dept: INFUSION CENTER | Facility: HOSPITAL | Age: 63
Discharge: HOME | End: 2018-06-22
Payer: COMMERCIAL

## 2018-06-22 VITALS
RESPIRATION RATE: 18 BRPM | HEART RATE: 108 BPM | TEMPERATURE: 97.1 F | DIASTOLIC BLOOD PRESSURE: 76 MMHG | SYSTOLIC BLOOD PRESSURE: 143 MMHG | OXYGEN SATURATION: 97 %

## 2018-06-22 LAB
ALBUMIN SERPL BCP-MCNC: 2.6 G/DL (ref 3.5–5)
ALP SERPL-CCNC: 87 U/L (ref 46–116)
ALT SERPL W P-5'-P-CCNC: 47 U/L (ref 12–78)
ANION GAP SERPL CALCULATED.3IONS-SCNC: 7 MMOL/L (ref 4–13)
AST SERPL W P-5'-P-CCNC: 37 U/L (ref 5–45)
BASOPHILS # BLD AUTO: 0.06 THOUSANDS/ΜL (ref 0–0.1)
BASOPHILS NFR BLD AUTO: 1 % (ref 0–1)
BILIRUB SERPL-MCNC: 0.1 MG/DL (ref 0.2–1)
BUN SERPL-MCNC: 9 MG/DL (ref 5–25)
CALCIUM SERPL-MCNC: 8.6 MG/DL (ref 8.3–10.1)
CHLORIDE SERPL-SCNC: 102 MMOL/L (ref 100–108)
CO2 SERPL-SCNC: 28 MMOL/L (ref 21–32)
CREAT SERPL-MCNC: 0.72 MG/DL (ref 0.6–1.3)
CRP SERPL QL: 45.6 MG/L
EOSINOPHIL # BLD AUTO: 0.28 THOUSAND/ΜL (ref 0–0.61)
EOSINOPHIL NFR BLD AUTO: 4 % (ref 0–6)
ERYTHROCYTE [DISTWIDTH] IN BLOOD BY AUTOMATED COUNT: 13.3 % (ref 11.6–15.1)
ERYTHROCYTE [SEDIMENTATION RATE] IN BLOOD: 59 MM/HOUR (ref 2–10)
GFR SERPL CREATININE-BSD FRML MDRD: 101 ML/MIN/1.73SQ M
GLUCOSE SERPL-MCNC: 155 MG/DL (ref 65–140)
HCT VFR BLD AUTO: 29.5 % (ref 36.5–49.3)
HGB BLD-MCNC: 9.2 G/DL (ref 12–17)
IMM GRANULOCYTES # BLD AUTO: 0.1 THOUSAND/UL (ref 0–0.2)
IMM GRANULOCYTES NFR BLD AUTO: 1 % (ref 0–2)
LYMPHOCYTES # BLD AUTO: 1.9 THOUSANDS/ΜL (ref 0.6–4.47)
LYMPHOCYTES NFR BLD AUTO: 25 % (ref 14–44)
MCH RBC QN AUTO: 30.9 PG (ref 26.8–34.3)
MCHC RBC AUTO-ENTMCNC: 31.2 G/DL (ref 31.4–37.4)
MCV RBC AUTO: 99 FL (ref 82–98)
MONOCYTES # BLD AUTO: 0.48 THOUSAND/ΜL (ref 0.17–1.22)
MONOCYTES NFR BLD AUTO: 6 % (ref 4–12)
NEUTROPHILS # BLD AUTO: 4.72 THOUSANDS/ΜL (ref 1.85–7.62)
NEUTS SEG NFR BLD AUTO: 63 % (ref 43–75)
NRBC BLD AUTO-RTO: 0 /100 WBCS
PLATELET # BLD AUTO: 534 THOUSANDS/UL (ref 149–390)
PMV BLD AUTO: 8.6 FL (ref 8.9–12.7)
POTASSIUM SERPL-SCNC: 4 MMOL/L (ref 3.5–5.3)
PROT SERPL-MCNC: 6.8 G/DL (ref 6.4–8.2)
RBC # BLD AUTO: 2.98 MILLION/UL (ref 3.88–5.62)
SODIUM SERPL-SCNC: 137 MMOL/L (ref 136–145)
WBC # BLD AUTO: 7.54 THOUSAND/UL (ref 4.31–10.16)

## 2018-06-22 PROCEDURE — 80053 COMPREHEN METABOLIC PANEL: CPT | Performed by: INTERNAL MEDICINE

## 2018-06-22 PROCEDURE — 86140 C-REACTIVE PROTEIN: CPT | Performed by: INTERNAL MEDICINE

## 2018-06-22 PROCEDURE — 96365 THER/PROPH/DIAG IV INF INIT: CPT

## 2018-06-22 PROCEDURE — 85652 RBC SED RATE AUTOMATED: CPT | Performed by: INTERNAL MEDICINE

## 2018-06-22 PROCEDURE — 85025 COMPLETE CBC W/AUTO DIFF WBC: CPT | Performed by: INTERNAL MEDICINE

## 2018-06-22 RX ADMIN — CEFTRIAXONE 2000 MG: 2 INJECTION, SOLUTION INTRAVENOUS at 11:31

## 2018-06-23 ENCOUNTER — HOSPITAL ENCOUNTER (OUTPATIENT)
Dept: INFUSION CENTER | Facility: HOSPITAL | Age: 63
Discharge: HOME | End: 2018-06-23
Payer: COMMERCIAL

## 2018-06-23 VITALS
OXYGEN SATURATION: 95 % | SYSTOLIC BLOOD PRESSURE: 126 MMHG | HEART RATE: 101 BPM | TEMPERATURE: 97.8 F | DIASTOLIC BLOOD PRESSURE: 62 MMHG | RESPIRATION RATE: 18 BRPM

## 2018-06-23 PROCEDURE — 96365 THER/PROPH/DIAG IV INF INIT: CPT

## 2018-06-23 RX ADMIN — CEFTRIAXONE 2000 MG: 2 INJECTION, SOLUTION INTRAVENOUS at 10:51

## 2018-06-23 NOTE — PLAN OF CARE
Problem: INFECTION - ADULT  Goal: Absence or prevention of progression during hospitalization  INTERVENTIONS:  - Assess and monitor for signs and symptoms of infection  - Monitor lab/diagnostic results  - Monitor all insertion sites, i e  indwelling lines, tubes, and drains  - [place] appropriate cooling/warming therapies per order  - Administer medications as ordered  - Instruct and encourage patient and family to use good hand hygiene technique  - Identify and instruct in appropriate isolation precautions for identified infection/condition   Outcome: Progressing    Goal: Absence of fever/infection during neutropenic period  INTERVENTIONS:  - Monitor WBC  - Implement neutropenic guidelines   Outcome: Progressing

## 2018-06-24 ENCOUNTER — HOSPITAL ENCOUNTER (OUTPATIENT)
Dept: INFUSION CENTER | Facility: HOSPITAL | Age: 63
Discharge: HOME | End: 2018-06-24
Payer: COMMERCIAL

## 2018-06-24 VITALS
DIASTOLIC BLOOD PRESSURE: 78 MMHG | TEMPERATURE: 98.2 F | RESPIRATION RATE: 18 BRPM | SYSTOLIC BLOOD PRESSURE: 134 MMHG | HEART RATE: 90 BPM

## 2018-06-24 PROCEDURE — 96365 THER/PROPH/DIAG IV INF INIT: CPT

## 2018-06-24 RX ADMIN — CEFTRIAXONE 2000 MG: 2 INJECTION, SOLUTION INTRAVENOUS at 12:37

## 2018-06-25 ENCOUNTER — HOSPITAL ENCOUNTER (OUTPATIENT)
Dept: INFUSION CENTER | Facility: HOSPITAL | Age: 63
Discharge: HOME | End: 2018-06-25
Payer: COMMERCIAL

## 2018-06-25 ENCOUNTER — OFFICE VISIT (OUTPATIENT)
Dept: OBGYN CLINIC | Facility: CLINIC | Age: 63
End: 2018-06-25

## 2018-06-25 VITALS — HEIGHT: 70 IN | WEIGHT: 187.6 LBS | BODY MASS INDEX: 26.86 KG/M2

## 2018-06-25 VITALS
OXYGEN SATURATION: 96 % | HEART RATE: 100 BPM | TEMPERATURE: 97.9 F | DIASTOLIC BLOOD PRESSURE: 80 MMHG | RESPIRATION RATE: 18 BRPM | SYSTOLIC BLOOD PRESSURE: 154 MMHG

## 2018-06-25 DIAGNOSIS — M00.9 CHRONIC INFECTION OF RIGHT KNEE (HCC): Primary | ICD-10-CM

## 2018-06-25 PROCEDURE — 99024 POSTOP FOLLOW-UP VISIT: CPT | Performed by: ORTHOPAEDIC SURGERY

## 2018-06-25 PROCEDURE — 96365 THER/PROPH/DIAG IV INF INIT: CPT

## 2018-06-25 RX ADMIN — CEFTRIAXONE 2000 MG: 2 INJECTION, SOLUTION INTRAVENOUS at 14:00

## 2018-06-25 NOTE — PROGRESS NOTES
Ed returns to see me today as he had 3 washouts by me for the right knee infection  He states that he has had no fevers and has been taking his antibiotics  He has not been wearing his knee brace as much as he should  Physical examination:  Right knee demonstrates some slight serous drainage from the anterior knee wound  The other wounds are well-healed  I did remove the stitches from them today  There is very minimal swelling and really no erythema today along the right knee  It looks much better  Assessment:  Right knee infection status post 3 washouts    Plan:  Head is doing much better  I did refer him to wound care to hopefully help with his anterior knee wound healing  He will follow up with my physician assistant next week for repeat clinical evaluation

## 2018-06-25 NOTE — PLAN OF CARE
INFECTION - ADULT     Absence or prevention of progression during hospitalization Progressing     Absence of fever/infection during neutropenic period Progressing        Potential for Falls     Patient will remain free of falls Progressing

## 2018-06-26 ENCOUNTER — HOSPITAL ENCOUNTER (OUTPATIENT)
Dept: INFUSION CENTER | Facility: HOSPITAL | Age: 63
Discharge: HOME | End: 2018-06-26
Payer: COMMERCIAL

## 2018-06-26 VITALS
SYSTOLIC BLOOD PRESSURE: 152 MMHG | RESPIRATION RATE: 18 BRPM | OXYGEN SATURATION: 98 % | HEART RATE: 99 BPM | DIASTOLIC BLOOD PRESSURE: 81 MMHG | TEMPERATURE: 97.7 F

## 2018-06-26 PROCEDURE — 96365 THER/PROPH/DIAG IV INF INIT: CPT

## 2018-06-26 RX ADMIN — CEFTRIAXONE 2000 MG: 2 INJECTION, SOLUTION INTRAVENOUS at 11:16

## 2018-06-27 ENCOUNTER — HOSPITAL ENCOUNTER (OUTPATIENT)
Dept: INFUSION CENTER | Facility: HOSPITAL | Age: 63
Discharge: HOME | End: 2018-06-27
Payer: COMMERCIAL

## 2018-06-27 VITALS
DIASTOLIC BLOOD PRESSURE: 85 MMHG | HEART RATE: 101 BPM | SYSTOLIC BLOOD PRESSURE: 119 MMHG | OXYGEN SATURATION: 97 % | TEMPERATURE: 97.6 F | RESPIRATION RATE: 18 BRPM

## 2018-06-27 PROCEDURE — 96365 THER/PROPH/DIAG IV INF INIT: CPT

## 2018-06-27 RX ADMIN — CEFTRIAXONE 2000 MG: 2 INJECTION, SOLUTION INTRAVENOUS at 11:02

## 2018-06-27 NOTE — PROGRESS NOTES
Pt states he is out of his pain meds  Dr Terrence Umanzor aware and pt made appt to see him tomorrow

## 2018-06-28 ENCOUNTER — HOSPITAL ENCOUNTER (OUTPATIENT)
Dept: INFUSION CENTER | Facility: HOSPITAL | Age: 63
Discharge: HOME | End: 2018-06-28
Payer: COMMERCIAL

## 2018-06-28 VITALS
HEART RATE: 101 BPM | RESPIRATION RATE: 16 BRPM | OXYGEN SATURATION: 97 % | SYSTOLIC BLOOD PRESSURE: 145 MMHG | TEMPERATURE: 97.8 F | DIASTOLIC BLOOD PRESSURE: 90 MMHG

## 2018-06-28 PROCEDURE — 96365 THER/PROPH/DIAG IV INF INIT: CPT

## 2018-06-28 RX ADMIN — CEFTRIAXONE 2000 MG: 2 INJECTION, SOLUTION INTRAVENOUS at 11:12

## 2018-06-29 ENCOUNTER — HOSPITAL ENCOUNTER (OUTPATIENT)
Dept: INFUSION CENTER | Facility: HOSPITAL | Age: 63
Discharge: HOME | End: 2018-06-29
Payer: COMMERCIAL

## 2018-06-29 VITALS
TEMPERATURE: 98 F | HEART RATE: 100 BPM | OXYGEN SATURATION: 96 % | SYSTOLIC BLOOD PRESSURE: 117 MMHG | DIASTOLIC BLOOD PRESSURE: 81 MMHG | RESPIRATION RATE: 18 BRPM

## 2018-06-29 LAB
ALBUMIN SERPL BCP-MCNC: 2.9 G/DL (ref 3.5–5)
ALP SERPL-CCNC: 72 U/L (ref 46–116)
ALT SERPL W P-5'-P-CCNC: 25 U/L (ref 12–78)
ANION GAP SERPL CALCULATED.3IONS-SCNC: 5 MMOL/L (ref 4–13)
AST SERPL W P-5'-P-CCNC: 14 U/L (ref 5–45)
BILIRUB SERPL-MCNC: 0.1 MG/DL (ref 0.2–1)
BUN SERPL-MCNC: 14 MG/DL (ref 5–25)
CALCIUM SERPL-MCNC: 8.4 MG/DL (ref 8.3–10.1)
CHLORIDE SERPL-SCNC: 102 MMOL/L (ref 100–108)
CO2 SERPL-SCNC: 28 MMOL/L (ref 21–32)
CREAT SERPL-MCNC: 0.69 MG/DL (ref 0.6–1.3)
CRP SERPL QL: 5.3 MG/L
ERYTHROCYTE [DISTWIDTH] IN BLOOD BY AUTOMATED COUNT: 13.7 % (ref 11.6–15.1)
ERYTHROCYTE [SEDIMENTATION RATE] IN BLOOD: 23 MM/HOUR (ref 2–10)
GFR SERPL CREATININE-BSD FRML MDRD: 102 ML/MIN/1.73SQ M
GLUCOSE SERPL-MCNC: 151 MG/DL (ref 65–140)
HCT VFR BLD AUTO: 32.9 % (ref 36.5–49.3)
HGB BLD-MCNC: 10.2 G/DL (ref 12–17)
MCH RBC QN AUTO: 30.7 PG (ref 26.8–34.3)
MCHC RBC AUTO-ENTMCNC: 31 G/DL (ref 31.4–37.4)
MCV RBC AUTO: 99 FL (ref 82–98)
PLATELET # BLD AUTO: 352 THOUSANDS/UL (ref 149–390)
PMV BLD AUTO: 8.6 FL (ref 8.9–12.7)
POTASSIUM SERPL-SCNC: 3.9 MMOL/L (ref 3.5–5.3)
PROT SERPL-MCNC: 6.9 G/DL (ref 6.4–8.2)
RBC # BLD AUTO: 3.32 MILLION/UL (ref 3.88–5.62)
SODIUM SERPL-SCNC: 135 MMOL/L (ref 136–145)
WBC # BLD AUTO: 6.4 THOUSAND/UL (ref 4.31–10.16)

## 2018-06-29 PROCEDURE — 86140 C-REACTIVE PROTEIN: CPT | Performed by: INTERNAL MEDICINE

## 2018-06-29 PROCEDURE — 96365 THER/PROPH/DIAG IV INF INIT: CPT

## 2018-06-29 PROCEDURE — 85652 RBC SED RATE AUTOMATED: CPT | Performed by: INTERNAL MEDICINE

## 2018-06-29 PROCEDURE — 85027 COMPLETE CBC AUTOMATED: CPT | Performed by: INTERNAL MEDICINE

## 2018-06-29 PROCEDURE — 80053 COMPREHEN METABOLIC PANEL: CPT | Performed by: INTERNAL MEDICINE

## 2018-06-29 RX ADMIN — CEFTRIAXONE 2000 MG: 2 INJECTION, SOLUTION INTRAVENOUS at 11:16

## 2018-06-30 ENCOUNTER — HOSPITAL ENCOUNTER (OUTPATIENT)
Dept: INFUSION CENTER | Facility: HOSPITAL | Age: 63
Discharge: HOME | End: 2018-06-30
Payer: COMMERCIAL

## 2018-06-30 VITALS
TEMPERATURE: 98.5 F | SYSTOLIC BLOOD PRESSURE: 122 MMHG | OXYGEN SATURATION: 96 % | HEART RATE: 104 BPM | RESPIRATION RATE: 18 BRPM | DIASTOLIC BLOOD PRESSURE: 58 MMHG

## 2018-06-30 PROCEDURE — 96365 THER/PROPH/DIAG IV INF INIT: CPT

## 2018-06-30 RX ADMIN — CEFTRIAXONE 2000 MG: 2 INJECTION, SOLUTION INTRAVENOUS at 09:54

## 2018-07-01 ENCOUNTER — HOSPITAL ENCOUNTER (OUTPATIENT)
Dept: INFUSION CENTER | Facility: HOSPITAL | Age: 63
Discharge: HOME | End: 2018-07-01
Payer: COMMERCIAL

## 2018-07-01 VITALS
SYSTOLIC BLOOD PRESSURE: 121 MMHG | HEART RATE: 97 BPM | TEMPERATURE: 97.6 F | RESPIRATION RATE: 18 BRPM | DIASTOLIC BLOOD PRESSURE: 72 MMHG

## 2018-07-01 PROCEDURE — 96365 THER/PROPH/DIAG IV INF INIT: CPT

## 2018-07-01 RX ADMIN — CEFTRIAXONE 2000 MG: 2 INJECTION, SOLUTION INTRAVENOUS at 10:15

## 2018-07-02 ENCOUNTER — OFFICE VISIT (OUTPATIENT)
Dept: OBGYN CLINIC | Facility: CLINIC | Age: 63
End: 2018-07-02

## 2018-07-02 ENCOUNTER — HOSPITAL ENCOUNTER (OUTPATIENT)
Dept: INFUSION CENTER | Facility: HOSPITAL | Age: 63
Discharge: HOME | End: 2018-07-02
Payer: COMMERCIAL

## 2018-07-02 ENCOUNTER — TELEPHONE (OUTPATIENT)
Dept: OBGYN CLINIC | Facility: CLINIC | Age: 63
End: 2018-07-02

## 2018-07-02 ENCOUNTER — HOSPITAL ENCOUNTER (OUTPATIENT)
Dept: RADIOLOGY | Facility: HOSPITAL | Age: 63
Discharge: HOME | End: 2018-07-02
Payer: COMMERCIAL

## 2018-07-02 VITALS
HEIGHT: 70 IN | SYSTOLIC BLOOD PRESSURE: 100 MMHG | WEIGHT: 190.6 LBS | HEART RATE: 114 BPM | DIASTOLIC BLOOD PRESSURE: 65 MMHG | BODY MASS INDEX: 27.29 KG/M2

## 2018-07-02 VITALS
DIASTOLIC BLOOD PRESSURE: 75 MMHG | HEART RATE: 104 BPM | SYSTOLIC BLOOD PRESSURE: 136 MMHG | TEMPERATURE: 97.5 F | RESPIRATION RATE: 18 BRPM | OXYGEN SATURATION: 98 %

## 2018-07-02 DIAGNOSIS — M79.661 RIGHT CALF PAIN: ICD-10-CM

## 2018-07-02 DIAGNOSIS — M00.9 CHRONIC INFECTION OF RIGHT KNEE (HCC): Primary | ICD-10-CM

## 2018-07-02 PROCEDURE — 93971 EXTREMITY STUDY: CPT | Performed by: SURGERY

## 2018-07-02 PROCEDURE — 99024 POSTOP FOLLOW-UP VISIT: CPT | Performed by: PHYSICIAN ASSISTANT

## 2018-07-02 PROCEDURE — 96365 THER/PROPH/DIAG IV INF INIT: CPT

## 2018-07-02 PROCEDURE — 93971 EXTREMITY STUDY: CPT

## 2018-07-02 RX ORDER — NYSTATIN 100000 [USP'U]/G
POWDER TOPICAL 4 TIMES DAILY
COMMUNITY
End: 2018-07-25 | Stop reason: HOSPADM

## 2018-07-02 RX ORDER — IBUPROFEN 400 MG/1
TABLET ORAL EVERY 6 HOURS PRN
COMMUNITY

## 2018-07-02 RX ADMIN — CEFTRIAXONE 2000 MG: 2 INJECTION, SOLUTION INTRAVENOUS at 11:29

## 2018-07-02 NOTE — TELEPHONE ENCOUNTER
He will see wound care on Thursday then  Please tell him if he gets any pus or redness or fever/chills to call immediately  He had none of these at the visit today

## 2018-07-03 ENCOUNTER — HOSPITAL ENCOUNTER (OUTPATIENT)
Dept: INFUSION CENTER | Facility: HOSPITAL | Age: 63
Discharge: HOME | End: 2018-07-03
Payer: COMMERCIAL

## 2018-07-03 VITALS
TEMPERATURE: 97.3 F | HEART RATE: 103 BPM | SYSTOLIC BLOOD PRESSURE: 126 MMHG | DIASTOLIC BLOOD PRESSURE: 83 MMHG | RESPIRATION RATE: 18 BRPM | OXYGEN SATURATION: 97 %

## 2018-07-03 PROCEDURE — 96365 THER/PROPH/DIAG IV INF INIT: CPT

## 2018-07-03 RX ADMIN — CEFTRIAXONE 2000 MG: 2 INJECTION, SOLUTION INTRAVENOUS at 11:14

## 2018-07-03 NOTE — TELEPHONE ENCOUNTER
I tried to call patient's emergency contact busy, I attempted to call his phone again no answer, no machine  I tried again, and it beeped and almost sounded like a fax, and disconnected

## 2018-07-04 ENCOUNTER — HOSPITAL ENCOUNTER (OUTPATIENT)
Dept: INFUSION CENTER | Facility: HOSPITAL | Age: 63
Discharge: HOME | End: 2018-07-04
Payer: COMMERCIAL

## 2018-07-04 ENCOUNTER — DOCUMENTATION (OUTPATIENT)
Dept: INPATIENT UNIT | Facility: HOSPITAL | Age: 63
End: 2018-07-04

## 2018-07-04 VITALS
DIASTOLIC BLOOD PRESSURE: 74 MMHG | HEART RATE: 100 BPM | SYSTOLIC BLOOD PRESSURE: 118 MMHG | RESPIRATION RATE: 18 BRPM | TEMPERATURE: 97.9 F

## 2018-07-04 PROCEDURE — 96365 THER/PROPH/DIAG IV INF INIT: CPT

## 2018-07-04 RX ADMIN — CEFTRIAXONE 2000 MG: 2 INJECTION, SOLUTION INTRAVENOUS at 13:15

## 2018-07-04 NOTE — PROGRESS NOTES
Pt  V/s stable, patient here for infusion  Dual  PICC line has good blood return    Rocephine infusing  At this time

## 2018-07-04 NOTE — PROGRESS NOTES
Patient finished his Rocephine infusion  PICC flused and clamped  Wheeled patient to ground floor via wheelchair waiting for his ride home

## 2018-07-05 ENCOUNTER — HOSPITAL ENCOUNTER (OUTPATIENT)
Dept: INFUSION CENTER | Facility: HOSPITAL | Age: 63
Discharge: HOME | End: 2018-07-05
Payer: COMMERCIAL

## 2018-07-05 ENCOUNTER — APPOINTMENT (OUTPATIENT)
Dept: WOUND CARE | Facility: HOSPITAL | Age: 63
End: 2018-07-05
Payer: COMMERCIAL

## 2018-07-05 ENCOUNTER — LAB REQUISITION (OUTPATIENT)
Dept: LAB | Facility: HOSPITAL | Age: 63
End: 2018-07-05
Payer: COMMERCIAL

## 2018-07-05 VITALS
SYSTOLIC BLOOD PRESSURE: 117 MMHG | HEART RATE: 104 BPM | OXYGEN SATURATION: 94 % | TEMPERATURE: 98.1 F | RESPIRATION RATE: 18 BRPM | DIASTOLIC BLOOD PRESSURE: 67 MMHG

## 2018-07-05 DIAGNOSIS — M00.861 ARTHRITIS DUE TO OTHER BACTERIA, RIGHT KNEE (HCC): ICD-10-CM

## 2018-07-05 PROCEDURE — 87070 CULTURE OTHR SPECIMN AEROBIC: CPT | Performed by: FAMILY MEDICINE

## 2018-07-05 PROCEDURE — 87147 CULTURE TYPE IMMUNOLOGIC: CPT | Performed by: FAMILY MEDICINE

## 2018-07-05 PROCEDURE — 87205 SMEAR GRAM STAIN: CPT | Performed by: FAMILY MEDICINE

## 2018-07-05 PROCEDURE — G0463 HOSPITAL OUTPT CLINIC VISIT: HCPCS

## 2018-07-05 PROCEDURE — 99213 OFFICE O/P EST LOW 20 MIN: CPT

## 2018-07-05 PROCEDURE — 96365 THER/PROPH/DIAG IV INF INIT: CPT

## 2018-07-05 RX ADMIN — CEFTRIAXONE 2000 MG: 2 INJECTION, SOLUTION INTRAVENOUS at 11:26

## 2018-07-06 ENCOUNTER — HOSPITAL ENCOUNTER (OUTPATIENT)
Dept: INFUSION CENTER | Facility: HOSPITAL | Age: 63
Discharge: HOME | End: 2018-07-06
Payer: COMMERCIAL

## 2018-07-06 VITALS
HEART RATE: 102 BPM | DIASTOLIC BLOOD PRESSURE: 82 MMHG | RESPIRATION RATE: 18 BRPM | OXYGEN SATURATION: 98 % | TEMPERATURE: 97.8 F | SYSTOLIC BLOOD PRESSURE: 137 MMHG

## 2018-07-06 LAB
ALBUMIN SERPL BCP-MCNC: 3.2 G/DL (ref 3.5–5)
ALP SERPL-CCNC: 92 U/L (ref 46–116)
ALT SERPL W P-5'-P-CCNC: 27 U/L (ref 12–78)
ANION GAP SERPL CALCULATED.3IONS-SCNC: 6 MMOL/L (ref 4–13)
AST SERPL W P-5'-P-CCNC: 22 U/L (ref 5–45)
BILIRUB SERPL-MCNC: 0.1 MG/DL (ref 0.2–1)
BUN SERPL-MCNC: 8 MG/DL (ref 5–25)
CALCIUM SERPL-MCNC: 8.9 MG/DL (ref 8.3–10.1)
CHLORIDE SERPL-SCNC: 101 MMOL/L (ref 100–108)
CO2 SERPL-SCNC: 27 MMOL/L (ref 21–32)
CREAT SERPL-MCNC: 0.71 MG/DL (ref 0.6–1.3)
CRP SERPL QL: 23.8 MG/L
ERYTHROCYTE [DISTWIDTH] IN BLOOD BY AUTOMATED COUNT: 13.5 % (ref 11.6–15.1)
ERYTHROCYTE [SEDIMENTATION RATE] IN BLOOD: 36 MM/HOUR (ref 2–10)
GFR SERPL CREATININE-BSD FRML MDRD: 101 ML/MIN/1.73SQ M
GLUCOSE SERPL-MCNC: 172 MG/DL (ref 65–140)
HCT VFR BLD AUTO: 33.8 % (ref 36.5–49.3)
HGB BLD-MCNC: 10.8 G/DL (ref 12–17)
MCH RBC QN AUTO: 31.1 PG (ref 26.8–34.3)
MCHC RBC AUTO-ENTMCNC: 32 G/DL (ref 31.4–37.4)
MCV RBC AUTO: 97 FL (ref 82–98)
PLATELET # BLD AUTO: 331 THOUSANDS/UL (ref 149–390)
PMV BLD AUTO: 8.9 FL (ref 8.9–12.7)
POTASSIUM SERPL-SCNC: 4.2 MMOL/L (ref 3.5–5.3)
PROT SERPL-MCNC: 7.4 G/DL (ref 6.4–8.2)
RBC # BLD AUTO: 3.47 MILLION/UL (ref 3.88–5.62)
SODIUM SERPL-SCNC: 134 MMOL/L (ref 136–145)
WBC # BLD AUTO: 6.38 THOUSAND/UL (ref 4.31–10.16)

## 2018-07-06 PROCEDURE — 96365 THER/PROPH/DIAG IV INF INIT: CPT

## 2018-07-06 PROCEDURE — 85652 RBC SED RATE AUTOMATED: CPT | Performed by: INTERNAL MEDICINE

## 2018-07-06 PROCEDURE — 80053 COMPREHEN METABOLIC PANEL: CPT | Performed by: INTERNAL MEDICINE

## 2018-07-06 PROCEDURE — 86140 C-REACTIVE PROTEIN: CPT | Performed by: INTERNAL MEDICINE

## 2018-07-06 PROCEDURE — 85027 COMPLETE CBC AUTOMATED: CPT | Performed by: INTERNAL MEDICINE

## 2018-07-06 RX ADMIN — CEFTRIAXONE 2000 MG: 2 INJECTION, SOLUTION INTRAVENOUS at 11:13

## 2018-07-07 ENCOUNTER — HOSPITAL ENCOUNTER (OUTPATIENT)
Dept: INFUSION CENTER | Facility: HOSPITAL | Age: 63
Discharge: HOME | End: 2018-07-07
Payer: COMMERCIAL

## 2018-07-07 VITALS
RESPIRATION RATE: 18 BRPM | OXYGEN SATURATION: 97 % | TEMPERATURE: 97.5 F | HEART RATE: 104 BPM | SYSTOLIC BLOOD PRESSURE: 132 MMHG | DIASTOLIC BLOOD PRESSURE: 78 MMHG

## 2018-07-07 PROCEDURE — 96365 THER/PROPH/DIAG IV INF INIT: CPT

## 2018-07-07 RX ADMIN — CEFTRIAXONE 2000 MG: 2 INJECTION, SOLUTION INTRAVENOUS at 10:09

## 2018-07-08 ENCOUNTER — HOSPITAL ENCOUNTER (OUTPATIENT)
Dept: INFUSION CENTER | Facility: HOSPITAL | Age: 63
Discharge: HOME | End: 2018-07-08
Payer: COMMERCIAL

## 2018-07-08 VITALS
TEMPERATURE: 98.3 F | DIASTOLIC BLOOD PRESSURE: 68 MMHG | HEART RATE: 102 BPM | RESPIRATION RATE: 18 BRPM | SYSTOLIC BLOOD PRESSURE: 123 MMHG

## 2018-07-08 LAB
BACTERIA WND AEROBE CULT: NORMAL
GRAM STN SPEC: NORMAL
GRAM STN SPEC: NORMAL

## 2018-07-08 PROCEDURE — 96365 THER/PROPH/DIAG IV INF INIT: CPT

## 2018-07-08 RX ADMIN — CEFTRIAXONE 2000 MG: 2 INJECTION, SOLUTION INTRAVENOUS at 10:20

## 2018-07-09 ENCOUNTER — HOSPITAL ENCOUNTER (OUTPATIENT)
Dept: INFUSION CENTER | Facility: HOSPITAL | Age: 63
Discharge: HOME | End: 2018-07-09
Payer: COMMERCIAL

## 2018-07-09 VITALS
RESPIRATION RATE: 20 BRPM | SYSTOLIC BLOOD PRESSURE: 143 MMHG | HEART RATE: 87 BPM | DIASTOLIC BLOOD PRESSURE: 73 MMHG | OXYGEN SATURATION: 100 % | TEMPERATURE: 97.9 F

## 2018-07-09 LAB
FUNGUS SPEC CULT: NORMAL
FUNGUS SPEC CULT: NORMAL

## 2018-07-09 PROCEDURE — 96365 THER/PROPH/DIAG IV INF INIT: CPT

## 2018-07-09 RX ADMIN — CEFTRIAXONE 2000 MG: 2 INJECTION, SOLUTION INTRAVENOUS at 11:07

## 2018-07-10 ENCOUNTER — HOSPITAL ENCOUNTER (OUTPATIENT)
Dept: INFUSION CENTER | Facility: HOSPITAL | Age: 63
Discharge: HOME | End: 2018-07-10
Payer: COMMERCIAL

## 2018-07-10 VITALS
OXYGEN SATURATION: 97 % | HEART RATE: 90 BPM | SYSTOLIC BLOOD PRESSURE: 150 MMHG | TEMPERATURE: 98.2 F | DIASTOLIC BLOOD PRESSURE: 80 MMHG | RESPIRATION RATE: 20 BRPM

## 2018-07-10 PROCEDURE — 96365 THER/PROPH/DIAG IV INF INIT: CPT

## 2018-07-10 RX ADMIN — CEFTRIAXONE 2000 MG: 2 INJECTION, SOLUTION INTRAVENOUS at 11:14

## 2018-07-11 ENCOUNTER — HOSPITAL ENCOUNTER (OUTPATIENT)
Dept: INFUSION CENTER | Facility: HOSPITAL | Age: 63
Discharge: HOME | End: 2018-07-11
Payer: COMMERCIAL

## 2018-07-11 VITALS
HEART RATE: 97 BPM | SYSTOLIC BLOOD PRESSURE: 138 MMHG | RESPIRATION RATE: 18 BRPM | OXYGEN SATURATION: 96 % | DIASTOLIC BLOOD PRESSURE: 75 MMHG | TEMPERATURE: 97.9 F

## 2018-07-11 PROCEDURE — 96365 THER/PROPH/DIAG IV INF INIT: CPT

## 2018-07-11 RX ADMIN — CEFTRIAXONE 2000 MG: 2 INJECTION, SOLUTION INTRAVENOUS at 11:07

## 2018-07-12 ENCOUNTER — APPOINTMENT (OUTPATIENT)
Dept: WOUND CARE | Facility: HOSPITAL | Age: 63
End: 2018-07-12
Payer: COMMERCIAL

## 2018-07-12 ENCOUNTER — HOSPITAL ENCOUNTER (OUTPATIENT)
Dept: INFUSION CENTER | Facility: HOSPITAL | Age: 63
Discharge: HOME | End: 2018-07-12
Payer: COMMERCIAL

## 2018-07-12 VITALS
HEART RATE: 89 BPM | DIASTOLIC BLOOD PRESSURE: 65 MMHG | RESPIRATION RATE: 18 BRPM | SYSTOLIC BLOOD PRESSURE: 122 MMHG | TEMPERATURE: 98.4 F | OXYGEN SATURATION: 98 %

## 2018-07-12 PROCEDURE — 99213 OFFICE O/P EST LOW 20 MIN: CPT

## 2018-07-12 PROCEDURE — 96365 THER/PROPH/DIAG IV INF INIT: CPT

## 2018-07-12 PROCEDURE — G0463 HOSPITAL OUTPT CLINIC VISIT: HCPCS

## 2018-07-12 RX ADMIN — CEFTRIAXONE 2000 MG: 2 INJECTION, SOLUTION INTRAVENOUS at 11:47

## 2018-07-13 ENCOUNTER — HOSPITAL ENCOUNTER (OUTPATIENT)
Dept: INFUSION CENTER | Facility: HOSPITAL | Age: 63
Discharge: HOME | End: 2018-07-13
Payer: COMMERCIAL

## 2018-07-13 VITALS
TEMPERATURE: 98.1 F | OXYGEN SATURATION: 98 % | SYSTOLIC BLOOD PRESSURE: 138 MMHG | DIASTOLIC BLOOD PRESSURE: 67 MMHG | HEART RATE: 92 BPM | RESPIRATION RATE: 18 BRPM

## 2018-07-13 LAB
ANION GAP SERPL CALCULATED.3IONS-SCNC: 11 MMOL/L (ref 4–13)
BASOPHILS # BLD AUTO: 0.05 THOUSANDS/ΜL (ref 0–0.1)
BASOPHILS NFR BLD AUTO: 1 % (ref 0–1)
BUN SERPL-MCNC: 11 MG/DL (ref 5–25)
CALCIUM SERPL-MCNC: 9.2 MG/DL (ref 8.3–10.1)
CHLORIDE SERPL-SCNC: 102 MMOL/L (ref 100–108)
CO2 SERPL-SCNC: 24 MMOL/L (ref 21–32)
CREAT SERPL-MCNC: 0.67 MG/DL (ref 0.6–1.3)
CRP SERPL QL: 7 MG/L
EOSINOPHIL # BLD AUTO: 0.15 THOUSAND/ΜL (ref 0–0.61)
EOSINOPHIL NFR BLD AUTO: 3 % (ref 0–6)
ERYTHROCYTE [DISTWIDTH] IN BLOOD BY AUTOMATED COUNT: 13.5 % (ref 11.6–15.1)
ERYTHROCYTE [SEDIMENTATION RATE] IN BLOOD: 20 MM/HOUR (ref 2–10)
GFR SERPL CREATININE-BSD FRML MDRD: 104 ML/MIN/1.73SQ M
GLUCOSE SERPL-MCNC: 126 MG/DL (ref 65–140)
HCT VFR BLD AUTO: 35.9 % (ref 36.5–49.3)
HGB BLD-MCNC: 11.3 G/DL (ref 12–17)
IMM GRANULOCYTES # BLD AUTO: 0.02 THOUSAND/UL (ref 0–0.2)
IMM GRANULOCYTES NFR BLD AUTO: 0 % (ref 0–2)
LYMPHOCYTES # BLD AUTO: 1.49 THOUSANDS/ΜL (ref 0.6–4.47)
LYMPHOCYTES NFR BLD AUTO: 25 % (ref 14–44)
MCH RBC QN AUTO: 30.4 PG (ref 26.8–34.3)
MCHC RBC AUTO-ENTMCNC: 31.5 G/DL (ref 31.4–37.4)
MCV RBC AUTO: 97 FL (ref 82–98)
MONOCYTES # BLD AUTO: 0.34 THOUSAND/ΜL (ref 0.17–1.22)
MONOCYTES NFR BLD AUTO: 6 % (ref 4–12)
NEUTROPHILS # BLD AUTO: 3.94 THOUSANDS/ΜL (ref 1.85–7.62)
NEUTS SEG NFR BLD AUTO: 65 % (ref 43–75)
NRBC BLD AUTO-RTO: 0 /100 WBCS
PLATELET # BLD AUTO: 315 THOUSANDS/UL (ref 149–390)
PMV BLD AUTO: 8.6 FL (ref 8.9–12.7)
POTASSIUM SERPL-SCNC: 3.9 MMOL/L (ref 3.5–5.3)
RBC # BLD AUTO: 3.72 MILLION/UL (ref 3.88–5.62)
SODIUM SERPL-SCNC: 137 MMOL/L (ref 136–145)
WBC # BLD AUTO: 5.99 THOUSAND/UL (ref 4.31–10.16)

## 2018-07-13 PROCEDURE — 85025 COMPLETE CBC W/AUTO DIFF WBC: CPT | Performed by: INTERNAL MEDICINE

## 2018-07-13 PROCEDURE — 80048 BASIC METABOLIC PNL TOTAL CA: CPT | Performed by: INTERNAL MEDICINE

## 2018-07-13 PROCEDURE — 96365 THER/PROPH/DIAG IV INF INIT: CPT

## 2018-07-13 PROCEDURE — 85652 RBC SED RATE AUTOMATED: CPT | Performed by: INTERNAL MEDICINE

## 2018-07-13 PROCEDURE — 86140 C-REACTIVE PROTEIN: CPT | Performed by: INTERNAL MEDICINE

## 2018-07-13 RX ADMIN — CEFTRIAXONE 2000 MG: 2 INJECTION, SOLUTION INTRAVENOUS at 11:45

## 2018-07-14 ENCOUNTER — HOSPITAL ENCOUNTER (OUTPATIENT)
Dept: INFUSION CENTER | Facility: HOSPITAL | Age: 63
Discharge: HOME | End: 2018-07-14
Payer: COMMERCIAL

## 2018-07-14 VITALS
HEART RATE: 86 BPM | OXYGEN SATURATION: 98 % | SYSTOLIC BLOOD PRESSURE: 157 MMHG | DIASTOLIC BLOOD PRESSURE: 71 MMHG | RESPIRATION RATE: 18 BRPM | TEMPERATURE: 97.2 F

## 2018-07-14 PROCEDURE — 96365 THER/PROPH/DIAG IV INF INIT: CPT

## 2018-07-14 RX ADMIN — CEFTRIAXONE 2000 MG: 2 INJECTION, SOLUTION INTRAVENOUS at 11:14

## 2018-07-15 ENCOUNTER — HOSPITAL ENCOUNTER (OUTPATIENT)
Dept: INFUSION CENTER | Facility: HOSPITAL | Age: 63
Discharge: HOME | End: 2018-07-15
Payer: COMMERCIAL

## 2018-07-15 VITALS
SYSTOLIC BLOOD PRESSURE: 158 MMHG | DIASTOLIC BLOOD PRESSURE: 77 MMHG | TEMPERATURE: 97 F | HEART RATE: 87 BPM | RESPIRATION RATE: 18 BRPM

## 2018-07-15 PROCEDURE — 96365 THER/PROPH/DIAG IV INF INIT: CPT

## 2018-07-15 RX ADMIN — CEFTRIAXONE 2000 MG: 2 INJECTION, SOLUTION INTRAVENOUS at 10:33

## 2018-07-16 ENCOUNTER — HOSPITAL ENCOUNTER (OUTPATIENT)
Dept: INFUSION CENTER | Facility: HOSPITAL | Age: 63
Discharge: HOME | End: 2018-07-16
Payer: COMMERCIAL

## 2018-07-16 VITALS
RESPIRATION RATE: 18 BRPM | OXYGEN SATURATION: 97 % | SYSTOLIC BLOOD PRESSURE: 143 MMHG | TEMPERATURE: 98 F | HEART RATE: 89 BPM | DIASTOLIC BLOOD PRESSURE: 77 MMHG

## 2018-07-16 PROCEDURE — 96365 THER/PROPH/DIAG IV INF INIT: CPT

## 2018-07-16 RX ADMIN — CEFTRIAXONE 2000 MG: 2 INJECTION, SOLUTION INTRAVENOUS at 10:52

## 2018-07-16 NOTE — PROGRESS NOTES
PICC LINE REMOVED AS PER PROTOCOL  SITE FREE OF REDNESS OR DRAINAGE  PATIENT INSTRUCTED TO NOTIFY MD FOR ANY REDNESS OR DRAINAGE  PATIENT VERBALIZES UNDERSTANDING

## 2018-07-18 ENCOUNTER — OFFICE VISIT (OUTPATIENT)
Dept: OBGYN CLINIC | Facility: CLINIC | Age: 63
End: 2018-07-18

## 2018-07-18 VITALS — BODY MASS INDEX: 27.2 KG/M2 | HEIGHT: 70 IN | WEIGHT: 190 LBS

## 2018-07-18 DIAGNOSIS — M00.9 CHRONIC INFECTION OF RIGHT KNEE (HCC): Primary | ICD-10-CM

## 2018-07-18 PROCEDURE — 99024 POSTOP FOLLOW-UP VISIT: CPT | Performed by: ORTHOPAEDIC SURGERY

## 2018-07-18 NOTE — PROGRESS NOTES
Ed returns to see me today and unfortunately has not been the wound care  He has not been following our instructions  He has missed multiple appointments  He states that his knee is feeling better and that they did remove the PICC line yesterday  He says he is now off his antibiotics  He still has stitches in place  Physical examination:  The right knee is still draining a serous drainage  Has no erythema and no swelling at this point  It is obvious that the central portion of the knee wound is not completely healed  I did remove the remainder of the stitches today although he does appear to have a deeper stitch and most distal aspect of the wound  I did attempt to remove that however it would of required made open that wound to fully take it out  Thus, I did not remove that deeper stitch  Assessment:  Right knee infection status post multiple washouts    Plan:  Ed has a right knee that is a very complex problem but he unfortunately is not taking care of it  I have reiterated to him today that he needs to see wound care  We are once again making an effort to make certain that he has the correct appointments and that he is to correct travel [place] to get there  We did put a dry dressing on him  This will not heal and less he has appropriate treatment  I am very concerned about him although he certainly appears to retic a the infection he is certainly not healed that wounds to my satisfaction  He will continue with the knee brace until we were satisfied that the wound is cleared

## 2018-07-20 ENCOUNTER — LAB REQUISITION (OUTPATIENT)
Dept: LAB | Facility: HOSPITAL | Age: 63
DRG: 863 | End: 2018-07-20
Payer: COMMERCIAL

## 2018-07-20 ENCOUNTER — HOSPITAL ENCOUNTER (INPATIENT)
Facility: HOSPITAL | Age: 63
LOS: 5 days | Discharge: HOME | DRG: 863 | End: 2018-07-25
Attending: EMERGENCY MEDICINE | Admitting: FAMILY MEDICINE
Payer: COMMERCIAL

## 2018-07-20 ENCOUNTER — APPOINTMENT (INPATIENT)
Dept: RADIOLOGY | Facility: HOSPITAL | Age: 63
DRG: 863 | End: 2018-07-20
Payer: COMMERCIAL

## 2018-07-20 ENCOUNTER — APPOINTMENT (OUTPATIENT)
Dept: WOUND CARE | Facility: HOSPITAL | Age: 63
DRG: 863 | End: 2018-07-20
Payer: COMMERCIAL

## 2018-07-20 DIAGNOSIS — M00.061 STAPHYLOCOCCAL ARTHRITIS OF RIGHT KNEE (HCC): Chronic | ICD-10-CM

## 2018-07-20 DIAGNOSIS — S81.001D OPEN KNEE WOUND, RIGHT, SUBSEQUENT ENCOUNTER: ICD-10-CM

## 2018-07-20 DIAGNOSIS — T81.31XA DISRUPTION OF EXTERNAL OPERATION (SURGICAL) WOUND, NOT ELSEWHERE CLASSIFIED, INITIAL ENCOUNTER: ICD-10-CM

## 2018-07-20 DIAGNOSIS — A41.9 SEPSIS (HCC): ICD-10-CM

## 2018-07-20 DIAGNOSIS — L08.9 RIGHT KNEE SKIN INFECTION: Primary | ICD-10-CM

## 2018-07-20 LAB
ALBUMIN SERPL BCP-MCNC: 3.8 G/DL (ref 3.5–5)
ALP SERPL-CCNC: 73 U/L (ref 46–116)
ALT SERPL W P-5'-P-CCNC: 27 U/L (ref 12–78)
ANION GAP SERPL CALCULATED.3IONS-SCNC: 11 MMOL/L (ref 4–13)
AST SERPL W P-5'-P-CCNC: 33 U/L (ref 5–45)
BASOPHILS # BLD AUTO: 0.08 THOUSANDS/ΜL (ref 0–0.1)
BASOPHILS NFR BLD AUTO: 1 % (ref 0–1)
BILIRUB SERPL-MCNC: 0.3 MG/DL (ref 0.2–1)
BUN SERPL-MCNC: 8 MG/DL (ref 5–25)
CALCIUM SERPL-MCNC: 8.8 MG/DL (ref 8.3–10.1)
CHLORIDE SERPL-SCNC: 100 MMOL/L (ref 100–108)
CO2 SERPL-SCNC: 25 MMOL/L (ref 21–32)
CREAT SERPL-MCNC: 0.7 MG/DL (ref 0.6–1.3)
EOSINOPHIL # BLD AUTO: 0.22 THOUSAND/ΜL (ref 0–0.61)
EOSINOPHIL NFR BLD AUTO: 2 % (ref 0–6)
ERYTHROCYTE [DISTWIDTH] IN BLOOD BY AUTOMATED COUNT: 13.4 % (ref 11.6–15.1)
ERYTHROCYTE [SEDIMENTATION RATE] IN BLOOD: 13 MM/HOUR (ref 2–10)
GFR SERPL CREATININE-BSD FRML MDRD: 102 ML/MIN/1.73SQ M
GLUCOSE SERPL-MCNC: 151 MG/DL (ref 65–140)
GLUCOSE SERPL-MCNC: 74 MG/DL (ref 65–140)
HCT VFR BLD AUTO: 36.1 % (ref 36.5–49.3)
HGB BLD-MCNC: 11.7 G/DL (ref 12–17)
IMM GRANULOCYTES # BLD AUTO: 0.03 THOUSAND/UL (ref 0–0.2)
IMM GRANULOCYTES NFR BLD AUTO: 0 % (ref 0–2)
LACTATE SERPL-SCNC: 2 MMOL/L (ref 0.5–2)
LYMPHOCYTES # BLD AUTO: 2.55 THOUSANDS/ΜL (ref 0.6–4.47)
LYMPHOCYTES NFR BLD AUTO: 24 % (ref 14–44)
MCH RBC QN AUTO: 31 PG (ref 26.8–34.3)
MCHC RBC AUTO-ENTMCNC: 32.4 G/DL (ref 31.4–37.4)
MCV RBC AUTO: 96 FL (ref 82–98)
MONOCYTES # BLD AUTO: 0.49 THOUSAND/ΜL (ref 0.17–1.22)
MONOCYTES NFR BLD AUTO: 5 % (ref 4–12)
NEUTROPHILS # BLD AUTO: 7.45 THOUSANDS/ΜL (ref 1.85–7.62)
NEUTS SEG NFR BLD AUTO: 68 % (ref 43–75)
NRBC BLD AUTO-RTO: 0 /100 WBCS
PLATELET # BLD AUTO: 391 THOUSANDS/UL (ref 149–390)
PMV BLD AUTO: 8.9 FL (ref 8.9–12.7)
POTASSIUM SERPL-SCNC: 4.3 MMOL/L (ref 3.5–5.3)
PROT SERPL-MCNC: 7.7 G/DL (ref 6.4–8.2)
RBC # BLD AUTO: 3.78 MILLION/UL (ref 3.88–5.62)
SODIUM SERPL-SCNC: 136 MMOL/L (ref 136–145)
WBC # BLD AUTO: 10.82 THOUSAND/UL (ref 4.31–10.16)

## 2018-07-20 PROCEDURE — 73560 X-RAY EXAM OF KNEE 1 OR 2: CPT

## 2018-07-20 PROCEDURE — 87081 CULTURE SCREEN ONLY: CPT | Performed by: NURSE PRACTITIONER

## 2018-07-20 PROCEDURE — 36415 COLL VENOUS BLD VENIPUNCTURE: CPT | Performed by: EMERGENCY MEDICINE

## 2018-07-20 PROCEDURE — 87070 CULTURE OTHR SPECIMN AEROBIC: CPT | Performed by: SPECIALIST

## 2018-07-20 PROCEDURE — 80053 COMPREHEN METABOLIC PANEL: CPT | Performed by: EMERGENCY MEDICINE

## 2018-07-20 PROCEDURE — 85652 RBC SED RATE AUTOMATED: CPT | Performed by: EMERGENCY MEDICINE

## 2018-07-20 PROCEDURE — 99284 EMERGENCY DEPT VISIT MOD MDM: CPT

## 2018-07-20 PROCEDURE — 99223 1ST HOSP IP/OBS HIGH 75: CPT | Performed by: FAMILY MEDICINE

## 2018-07-20 PROCEDURE — 87040 BLOOD CULTURE FOR BACTERIA: CPT | Performed by: EMERGENCY MEDICINE

## 2018-07-20 PROCEDURE — 85025 COMPLETE CBC W/AUTO DIFF WBC: CPT | Performed by: EMERGENCY MEDICINE

## 2018-07-20 PROCEDURE — 87205 SMEAR GRAM STAIN: CPT | Performed by: SPECIALIST

## 2018-07-20 PROCEDURE — 82948 REAGENT STRIP/BLOOD GLUCOSE: CPT

## 2018-07-20 PROCEDURE — 83605 ASSAY OF LACTIC ACID: CPT | Performed by: EMERGENCY MEDICINE

## 2018-07-20 PROCEDURE — 97597 DBRDMT OPN WND 1ST 20 CM/<: CPT

## 2018-07-20 RX ORDER — ALPRAZOLAM 0.25 MG/1
0.25 TABLET ORAL 3 TIMES DAILY PRN
Status: DISCONTINUED | OUTPATIENT
Start: 2018-07-20 | End: 2018-07-25 | Stop reason: HOSPADM

## 2018-07-20 RX ORDER — ACETAMINOPHEN 325 MG/1
650 TABLET ORAL EVERY 6 HOURS PRN
Status: DISCONTINUED | OUTPATIENT
Start: 2018-07-20 | End: 2018-07-25 | Stop reason: HOSPADM

## 2018-07-20 RX ORDER — VANCOMYCIN HYDROCHLORIDE 1 G/200ML
1000 INJECTION, SOLUTION INTRAVENOUS EVERY 12 HOURS
Status: DISCONTINUED | OUTPATIENT
Start: 2018-07-21 | End: 2018-07-20

## 2018-07-20 RX ORDER — IBUPROFEN 600 MG/1
600 TABLET ORAL EVERY 6 HOURS PRN
Status: DISCONTINUED | OUTPATIENT
Start: 2018-07-20 | End: 2018-07-25 | Stop reason: HOSPADM

## 2018-07-20 RX ORDER — ONDANSETRON 2 MG/ML
4 INJECTION INTRAMUSCULAR; INTRAVENOUS EVERY 6 HOURS PRN
Status: DISCONTINUED | OUTPATIENT
Start: 2018-07-20 | End: 2018-07-25 | Stop reason: HOSPADM

## 2018-07-20 RX ORDER — ZOLPIDEM TARTRATE 5 MG/1
5 TABLET ORAL
Status: DISCONTINUED | OUTPATIENT
Start: 2018-07-20 | End: 2018-07-21

## 2018-07-20 RX ORDER — OXYCODONE HYDROCHLORIDE 5 MG/1
5 TABLET ORAL EVERY 4 HOURS PRN
Status: DISCONTINUED | OUTPATIENT
Start: 2018-07-20 | End: 2018-07-25 | Stop reason: HOSPADM

## 2018-07-20 RX ORDER — DOCUSATE SODIUM 100 MG/1
100 CAPSULE, LIQUID FILLED ORAL 2 TIMES DAILY
Status: DISCONTINUED | OUTPATIENT
Start: 2018-07-20 | End: 2018-07-25 | Stop reason: HOSPADM

## 2018-07-20 RX ORDER — HEPARIN SODIUM 5000 [USP'U]/ML
5000 INJECTION, SOLUTION INTRAVENOUS; SUBCUTANEOUS EVERY 12 HOURS SCHEDULED
Status: DISCONTINUED | OUTPATIENT
Start: 2018-07-20 | End: 2018-07-25

## 2018-07-20 RX ORDER — SODIUM CHLORIDE 9 MG/ML
75 INJECTION, SOLUTION INTRAVENOUS CONTINUOUS
Status: DISCONTINUED | OUTPATIENT
Start: 2018-07-20 | End: 2018-07-21

## 2018-07-20 RX ORDER — VENLAFAXINE 37.5 MG/1
75 TABLET ORAL 2 TIMES DAILY
Status: DISCONTINUED | OUTPATIENT
Start: 2018-07-20 | End: 2018-07-25 | Stop reason: HOSPADM

## 2018-07-20 RX ORDER — LISINOPRIL 20 MG/1
20 TABLET ORAL DAILY
Status: DISCONTINUED | OUTPATIENT
Start: 2018-07-21 | End: 2018-07-25 | Stop reason: HOSPADM

## 2018-07-20 RX ORDER — PRAVASTATIN SODIUM 80 MG/1
80 TABLET ORAL
Status: DISCONTINUED | OUTPATIENT
Start: 2018-07-20 | End: 2018-07-25 | Stop reason: HOSPADM

## 2018-07-20 RX ADMIN — SODIUM CHLORIDE 75 ML/HR: 0.9 INJECTION, SOLUTION INTRAVENOUS at 20:27

## 2018-07-20 RX ADMIN — OXYCODONE HYDROCHLORIDE 5 MG: 5 TABLET ORAL at 23:24

## 2018-07-20 RX ADMIN — DOCUSATE SODIUM 100 MG: 100 CAPSULE, LIQUID FILLED ORAL at 21:08

## 2018-07-20 RX ADMIN — HEPARIN SODIUM 5000 UNITS: 5000 INJECTION, SOLUTION INTRAVENOUS; SUBCUTANEOUS at 21:08

## 2018-07-20 RX ADMIN — ZOLPIDEM TARTRATE 5 MG: 5 TABLET ORAL at 21:12

## 2018-07-20 RX ADMIN — PIPERACILLIN SODIUM,TAZOBACTAM SODIUM 3.38 G: 3; .375 INJECTION, POWDER, FOR SOLUTION INTRAVENOUS at 18:36

## 2018-07-20 RX ADMIN — PRAVASTATIN SODIUM 80 MG: 80 TABLET ORAL at 21:07

## 2018-07-20 RX ADMIN — VANCOMYCIN HYDROCHLORIDE 1250 MG: 10 INJECTION, POWDER, LYOPHILIZED, FOR SOLUTION INTRAVENOUS at 20:27

## 2018-07-20 RX ADMIN — VENLAFAXINE 75 MG: 37.5 TABLET ORAL at 21:07

## 2018-07-20 NOTE — H&P
H&P- Sparland Quay Woronowicz 1955, 66 y o  male MRN: [id number]    Unit/Bed#: [address] [place] [address] Encounter: [id number]    Primary Care Provider: Noemi Guerra MD   Date and time admitted to hospital: 7/20/2018  3:42 PM        * Sepsis [facility])   Assessment & Plan    POA, as evidenced by tachycardia and leukocytosis with hst of septic joint of right knee   Admitted to [facility] between 6/5 - 6/16/18 for septic joint of right knee, staphylococcal arthritis of right knee, infrapatellar bursitis of right knee and sepsis  At that time, right knee aspirate and tissue cultures grew MSSA  Venous Doppler at that time to rule out DVT, negative   Orthopedics and ID followed  S/p right knee surgical irrigation debridement of bursa and infected joint on 6/8/18 (Dr Loraine Palmer)  S/p repeat right I&D of infrapatellar bursa and irrigation/washout of right knee on 6/11/18  S/p right knee arthrotomy, I&D, irrigation debridement with complex wound closure on 6/14/18  Patient declined STR placement  He insists to go home  Managed with ceftriaxone 2gm IV daily to facilitate outpatient therapy in the infusion center, last dose of ABX 7/16/18  S/p removal of LUE single-lumen PICC on 7/16/18   Per RN, there was no redness or signs of infection during removal     Given Zosyn and Vancomycin x1 in ED   SED rate 13, previous 20  Will consult ID and orthopedics   Will start IV Rocephin and Vancomycin  F/U Wound and Blood cultures   Pain control with Tylenol, Motrin, or Oxycodone prn   Monitor for worsening signs of infection, CBC/D in am        Staphylococcal arthritis of right knee [facility])   Assessment & Plan    Admitted to [facility] between 6/5 - 6/16/18 for septic joint of right knee, staphylococcal arthritis of right knee, infrapatellar bursitis of right knee and sepsis  At that time, right knee aspirate and tissue cultures grew MSSA  Venous Doppler at that time to rule out DVT, negative   Orthopedics and ID followed  S/p right knee surgical irrigation debridement of bursa and infected joint on 6/8/18 (Dr Fern Birch)  S/p repeat right I&D of infrapatellar bursa and irrigation/washout of right knee on 6/11/18  S/p right knee arthrotomy, I&D, irrigation debridement with complex wound closure on 6/14/18  Patient declined STR placement  He insists to go home  Managed with ceftriaxone 2gm IV daily to facilitate outpatient therapy in the infusion center, last dose of ABX 7/16/18  S/p removal of LUE single-lumen PICC on 7/16/18  Per RN, there was no redness or signs of infection during removal     Given Zosyn and Vancomycin x1 in ED   Will consult ID and orthopedics   Will start IV Rocephin and Vancomycin  F/U Wound and Blood cultures   Pain control with Tylenol, Motrin, or Oxycodone prn   PT/OT  Monitor for worsening signs of infection, CBC/D in am         Thrombocytosis (HCC)   Assessment & Plan    Mild,   Likely reactive  Monitor  Depression   Assessment & Plan    Continue Effexor         Hypertension   Assessment & Plan    Continue ACEi  Pain control  Monitor  Borderline diabetes   Assessment & Plan    A1c 7 0  Continue diabetic diet         Hyperlipidemia   Assessment & Plan    Continue statin            VTE Prophylaxis: Heparin  / sequential compression device   Code Status: full code - level 1  POLST: POLST form is not discussed and not completed at this time  Discussion with family: none    Anticipated Length of Stay:  Patient will be admitted on an Inpatient basis with an anticipated length of stay of  Greater than 2 midnights  Justification for Hospital Stay: sepsis 2/2 right knee infection     Total Time for Visit, including Counseling / Coordination of Care: 1 hour  Greater than 50% of this total time spent on direct patient counseling and coordination of care      Chief Complaint:   Chronic right knee wound, sent in from wound care center     History of Present Illness:    Robbie Arevalo is a 66 y o  male who presents with PMH of borderline diabetes, hypertension, hyperlipidemia who presents with recurrent right knee wound with history of septic right knee infection  Patient was admitted to [facility] between 6/5/18 - 6/16/18 due to septic joint of right knee joint, staphylococcal arthritis of right knee, and infrapatellar bursitis right knee  Patient underwent right knee surgical irrigation and debridement of bursa infected joint on 6/8/2018 with Dr Los Hutchison  He underwent subsequent I and D of his right knee on 6/11/2018 and 6/14/2018  Patient was discharged home with the left upper extremity single lumen PICC line for which he received IV Rocephin daily for 5 weeks  Patient completed his antibiotics 4 days ago on 7/16/2018  Patient was seen [facility] [place] [facility] today by Dr Janee Harrison and was referred to the emergency department due to continued right knee drainage  Patient states his knee has continuously drainage since he was discharged from the hospital   He states his drainage worsens with movement  He states that time the drainage is pouring  He describes the drainage as clear/hazy yellowish color  He denies any headache, dizziness, chest pain, shortness of breath, fever, chills, abdominal pain, nausea, vomiting, diarrhea  He is compliant with his knee immobilizer for the most part and ambulates with use of a cane  Review of Systems:    Review of Systems   Constitutional: Negative for appetite change, chills, diaphoresis, fatigue and fever  HENT: Positive for hearing loss (Hard of hearing)  Negative for congestion, postnasal drip, rhinorrhea and sore throat  Eyes: Negative for photophobia and visual disturbance  Respiratory: Negative for cough, chest tightness, shortness of breath and wheezing  Cardiovascular: Negative for chest pain, palpitations and leg swelling  Gastrointestinal: Negative for abdominal distention, abdominal pain, constipation, diarrhea, nausea and vomiting  Genitourinary: Negative for difficulty urinating, dysuria and hematuria  Musculoskeletal: Positive for arthralgias and gait problem  Negative for myalgias  Skin: Negative for rash and wound (Chronic right knee wound, healing well, persistent sanguinous drainage that worsens with movement)  Neurological: Negative for dizziness, weakness, light-headedness, numbness and headaches  Psychiatric/Behavioral: Negative for confusion  The patient is not nervous/anxious  Past Medical and Surgical History:     Past Medical History:   Diagnosis Date    Borderline diabetes     Depression     Hyperlipidemia     Hypertension        Past Surgical History:   Procedure Laterality Date    INCISION AND DRAINAGE OF WOUND Right 6/14/2018    Procedure: INCISION AND DRAINAGE (I&D) EXTREMITY AND COMPLEX WOUND CLOSURE;  Surgeon: Sandra Little MD;  Location: [address] [place] [address];  Service: Orthopedics    LUMBAR EPIDURAL INJECTION      SHOULDER SURGERY Left     SPINE SURGERY      WOUND DEBRIDEMENT Right 6/8/2018    Procedure: RIGHT KNEE ARTHROSCOPY, IRRIGATION AND DEBRIDEMENT; RIGHT KNEE IRRIGATION AND EXTENSIVE DEBRIDEMENT OF INFECTED BURSA; Surgeon: Sandra Little MD;  Location: [address] [place] [address];  Service: Orthopedics    WOUND DEBRIDEMENT Right 6/11/2018    Procedure: open DEBRIDEMENT patellar bursa, arthroscopic right knee joint irrigation and debridement;  Surgeon: Sandra Little MD;  Location: [address] [place] [address];  Service: Orthopedics       Meds/Allergies:    Prior to Admission medications    Medication Sig Start Date End Date Taking?  Authorizing Provider   ALPRAZolam Darnell Broom) 0 5 mg tablet Take 0 5 mg by mouth 2 (two) times a day as needed     Yes Historical Provider, MD   calamine-zinc oxide 8-8 % Apply topically 2 (two) times a day 6/16/18  Yes Carroll Thomson MD   clobetasol (TEMOVATE) 0 05 % cream Apply topically 2 (two) times a day 6/16/18  Yes Carroll Thomson MD   diphenhydrAMINE-zinc acetate (BENADRYL) cream Apply topically 3 (three) times a day as needed for itching 6/16/18  Yes Jelena Lennon MD   ENALAPRIL MALEATE PO Take 5 mg by mouth 3 (three) times a day     Yes Historical Provider, MD   ibuprofen (MOTRIN) 400 mg tablet Take by mouth every 6 (six) hours as needed for mild pain   Yes Historical Provider, MD   metFORMIN (GLUCOPHAGE) 1000 MG tablet Take 1,000 mg by mouth daily with breakfast   Yes Historical Provider, MD   nystatin (MYCOSTATIN) powder Apply topically 4 (four) times a day   Yes Historical Provider, MD   rosuvastatin (CRESTOR) 10 MG tablet Take 10 mg by mouth daily  Yes Historical Provider, MD   venlafaxine (EFFEXOR) 75 mg tablet Take 75 mg by mouth 2 (two) times a day   Yes Historical Provider, MD   zolpidem (AMBIEN) 10 mg tablet Take 10 mg by mouth daily at bedtime as needed for sleep   Yes Historical Provider, MD   docusate sodium (COLACE) 100 mg capsule Take 1 capsule (100 mg total) by mouth 2 (two) times a day for 30 days 6/16/18 7/16/18  Jelena Lennon MD   ibuprofen (MOTRIN) 400 mg tablet Take 1 tablet (400 mg total) by mouth every 8 (eight) hours as needed for mild pain or moderate pain for up to 10 days 6/16/18 6/26/18  Jelena Lennon MD   nystatin (MYCOSTATIN) powder Apply 1 application topically 2 (two) times a day for 10 days 6/16/18 6/26/18  Jelena Lennon MD     I have reviewed home medications with patient personally  Allergies: Allergies   Allergen Reactions    Iodine Anaphylaxis    Shellfish-Derived Products        Social History:     Marital Status: Single   Occupation: Retired  Patient Pre-hospital Living Situation: Lives with family  Patient Pre-hospital Level of Mobility: Independent   Patient Pre-hospital Diet Restrictions: None  Substance Use History:   History   Alcohol Use No     History   Smoking Status    Current Some Day Smoker    Types: Cigars   Smokeless Tobacco    Never Used     History   Drug Use No       Family History:    History reviewed  No pertinent family history      Physical Exam:     Vitals:   Blood Pressure: 146/69 (07/20/18 1547)  Pulse: 92 (07/20/18 1547)  Temperature: 97 9 °F (36 6 °C) (07/20/18 1547)  Temp Source: Tympanic (07/20/18 1547)  Respirations: 18 (07/20/18 1547)  Height: 5' 10" (177 8 cm) (07/20/18 1547)  Weight - Scale: 83 9 kg (185 lb) (07/20/18 1547)  SpO2: 97 % (07/20/18 1547)    Physical Exam   Constitutional: He is oriented to person, place, and time  He appears well-developed  No distress  HENT:   Head: Normocephalic  Neck: Normal range of motion  Cardiovascular: Normal rate, regular rhythm and intact distal pulses  Murmur heard  Pulmonary/Chest: Effort normal and breath sounds normal  No respiratory distress  He has no wheezes  He has no rhonchi  He has no rales  Abdominal: Soft  Bowel sounds are normal  He exhibits no distension  There is no tenderness  Musculoskeletal: He exhibits edema (Trace right knee edema) and tenderness (Right knee)  Right knee: He exhibits decreased range of motion  Neurological: He is alert and oriented to person, place, and time  Skin: Skin is warm and dry  No rash noted  He is not diaphoretic  There is erythema (Right knee with a healing surgical wound with sanguinous drainage noted)  Psychiatric: He has a normal mood and affect  Judgment normal    Nursing note and vitals reviewed  Additional Data:     Lab Results: I have personally reviewed pertinent reports          Results from last 7 days  Lab Units 07/20/18  1558   WBC Thousand/uL 10 82*   HEMOGLOBIN g/dL 11 7*   HEMATOCRIT % 36 1*   PLATELETS Thousands/uL 391*   NEUTROS PCT % 68   LYMPHS PCT % 24   MONOS PCT % 5   EOS PCT % 2       Results from last 7 days  Lab Units 07/20/18  1558   SODIUM mmol/L 136   POTASSIUM mmol/L 4 3   CHLORIDE mmol/L 100   CO2 mmol/L 25   BUN mg/dL 8   CREATININE mg/dL 0 70   CALCIUM mg/dL 8 8   TOTAL PROTEIN g/dL 7 7   BILIRUBIN TOTAL mg/dL 0 30   ALK PHOS U/L 73   ALT U/L 27   AST U/L 33   GLUCOSE RANDOM mg/dL 151* Imaging: I have personally reviewed pertinent reports  XR knee 1 or 2 vw right   Final Result by Yuan Aguilar MD (07/20 1858)      Large suprapatellar joint effusion  Consider aspirating if there is a suspicion for septic arthritis  No osteomyelitis  Workstation performed: [id number]             EKG, Pathology, and Other Studies Reviewed on Admission:   · EKG:  None available    Allscripts / Epic Records Reviewed: Yes     ** Please Note: This note has been constructed using a voice recognition system   **

## 2018-07-20 NOTE — ASSESSMENT & PLAN NOTE
Admitted to [facility] between 6/5 - 6/16/18 for septic joint of right knee, staphylococcal arthritis of right knee, infrapatellar bursitis of right knee and sepsis  At that time, right knee aspirate and tissue cultures grew MSSA  Venous Doppler at that time to rule out DVT, negative   Orthopedics and ID followed  S/p right knee surgical irrigation debridement of bursa and infected joint on 6/8/18 (Dr Elvia Tapia)  S/p repeat right I&D of infrapatellar bursa and irrigation/washout of right knee on 6/11/18  S/p right knee arthrotomy, I&D, irrigation debridement with complex wound closure on 6/14/18  Patient declined STR placement  He insists to go home  Managed with ceftriaxone 2gm IV daily to facilitate outpatient therapy in the infusion center, last dose of ABX 7/16/18  S/p removal of LUE single-lumen PICC on 7/16/18   Per RN, there was no redness or signs of infection during removal     Given Zosyn and Vancomycin x1 in ED   Will consult ID and orthopedics   Will start IV Rocephin and Vancomycin  F/U Wound and Blood cultures   Pain control with Tylenol, Motrin, or Oxycodone prn   PT/OT  Monitor for worsening signs of infection, CBC/D in am

## 2018-07-20 NOTE — ASSESSMENT & PLAN NOTE
POA, as evidenced by tachycardia and leukocytosis with hst of septic joint of right knee   Admitted to [address] [place] [address] between 6/5 - 6/16/18 for septic joint of right knee, staphylococcal arthritis of right knee, infrapatellar bursitis of right knee and sepsis  At that time, right knee aspirate and tissue cultures grew MSSA  Venous Doppler at that time to rule out DVT, negative   Orthopedics and ID followed  S/p right knee surgical irrigation debridement of bursa and infected joint on 6/8/18 (Dr Sobia Gallego)  S/p repeat right I&D of infrapatellar bursa and irrigation/washout of right knee on 6/11/18  S/p right knee arthrotomy, I&D, irrigation debridement with complex wound closure on 6/14/18  Patient declined STR placement  He insists to go home  Managed with ceftriaxone 2gm IV daily to facilitate outpatient therapy in the infusion center, last dose of ABX 7/16/18  S/p removal of LUE single-lumen PICC on 7/16/18   Per RN, there was no redness or signs of infection during removal     Given Zosyn and Vancomycin x1 in ED   SED rate 13, previous 20  Will consult ID and orthopedics   Will start IV Rocephin and Vancomycin  F/U Wound and Blood cultures   Pain control with Tylenol, Motrin, or Oxycodone prn   Monitor for worsening signs of infection, CBC/D in am

## 2018-07-21 LAB
ANION GAP SERPL CALCULATED.3IONS-SCNC: 6 MMOL/L (ref 4–13)
BASOPHILS # BLD AUTO: 0.07 THOUSANDS/ΜL (ref 0–0.1)
BASOPHILS NFR BLD AUTO: 1 % (ref 0–1)
BUN SERPL-MCNC: 7 MG/DL (ref 5–25)
CALCIUM SERPL-MCNC: 8.7 MG/DL (ref 8.3–10.1)
CHLORIDE SERPL-SCNC: 104 MMOL/L (ref 100–108)
CO2 SERPL-SCNC: 29 MMOL/L (ref 21–32)
CREAT SERPL-MCNC: 0.65 MG/DL (ref 0.6–1.3)
EOSINOPHIL # BLD AUTO: 0.24 THOUSAND/ΜL (ref 0–0.61)
EOSINOPHIL NFR BLD AUTO: 3 % (ref 0–6)
ERYTHROCYTE [DISTWIDTH] IN BLOOD BY AUTOMATED COUNT: 13.2 % (ref 11.6–15.1)
GFR SERPL CREATININE-BSD FRML MDRD: 105 ML/MIN/1.73SQ M
GLUCOSE SERPL-MCNC: 102 MG/DL (ref 65–140)
GLUCOSE SERPL-MCNC: 104 MG/DL (ref 65–140)
GLUCOSE SERPL-MCNC: 105 MG/DL (ref 65–140)
GLUCOSE SERPL-MCNC: 124 MG/DL (ref 65–140)
GLUCOSE SERPL-MCNC: 127 MG/DL (ref 65–140)
HCT VFR BLD AUTO: 35.1 % (ref 36.5–49.3)
HGB BLD-MCNC: 11 G/DL (ref 12–17)
IMM GRANULOCYTES # BLD AUTO: 0.02 THOUSAND/UL (ref 0–0.2)
IMM GRANULOCYTES NFR BLD AUTO: 0 % (ref 0–2)
LYMPHOCYTES # BLD AUTO: 2.93 THOUSANDS/ΜL (ref 0.6–4.47)
LYMPHOCYTES NFR BLD AUTO: 33 % (ref 14–44)
MAGNESIUM SERPL-MCNC: 1.7 MG/DL (ref 1.6–2.6)
MCH RBC QN AUTO: 29.9 PG (ref 26.8–34.3)
MCHC RBC AUTO-ENTMCNC: 31.3 G/DL (ref 31.4–37.4)
MCV RBC AUTO: 95 FL (ref 82–98)
MONOCYTES # BLD AUTO: 0.62 THOUSAND/ΜL (ref 0.17–1.22)
MONOCYTES NFR BLD AUTO: 7 % (ref 4–12)
NEUTROPHILS # BLD AUTO: 4.94 THOUSANDS/ΜL (ref 1.85–7.62)
NEUTS SEG NFR BLD AUTO: 56 % (ref 43–75)
NRBC BLD AUTO-RTO: 0 /100 WBCS
PLATELET # BLD AUTO: 344 THOUSANDS/UL (ref 149–390)
PMV BLD AUTO: 8.6 FL (ref 8.9–12.7)
POTASSIUM SERPL-SCNC: 3.9 MMOL/L (ref 3.5–5.3)
PROCALCITONIN SERPL-MCNC: <0.05 NG/ML
RBC # BLD AUTO: 3.68 MILLION/UL (ref 3.88–5.62)
SODIUM SERPL-SCNC: 139 MMOL/L (ref 136–145)
WBC # BLD AUTO: 8.82 THOUSAND/UL (ref 4.31–10.16)

## 2018-07-21 PROCEDURE — 83036 HEMOGLOBIN GLYCOSYLATED A1C: CPT | Performed by: SPECIALIST

## 2018-07-21 PROCEDURE — 99232 SBSQ HOSP IP/OBS MODERATE 35: CPT | Performed by: NURSE PRACTITIONER

## 2018-07-21 PROCEDURE — 80048 BASIC METABOLIC PNL TOTAL CA: CPT | Performed by: NURSE PRACTITIONER

## 2018-07-21 PROCEDURE — 82948 REAGENT STRIP/BLOOD GLUCOSE: CPT

## 2018-07-21 PROCEDURE — 83735 ASSAY OF MAGNESIUM: CPT | Performed by: NURSE PRACTITIONER

## 2018-07-21 PROCEDURE — 84145 PROCALCITONIN (PCT): CPT | Performed by: NURSE PRACTITIONER

## 2018-07-21 PROCEDURE — 85025 COMPLETE CBC W/AUTO DIFF WBC: CPT | Performed by: NURSE PRACTITIONER

## 2018-07-21 RX ORDER — FERROUS SULFATE 325(65) MG
325 TABLET ORAL
Status: DISCONTINUED | OUTPATIENT
Start: 2018-07-21 | End: 2018-07-25 | Stop reason: HOSPADM

## 2018-07-21 RX ORDER — MAGNESIUM SULFATE HEPTAHYDRATE 40 MG/ML
2 INJECTION, SOLUTION INTRAVENOUS ONCE
Status: COMPLETED | OUTPATIENT
Start: 2018-07-21 | End: 2018-07-21

## 2018-07-21 RX ORDER — ZOLPIDEM TARTRATE 5 MG/1
10 TABLET ORAL
Status: DISCONTINUED | OUTPATIENT
Start: 2018-07-21 | End: 2018-07-24

## 2018-07-21 RX ADMIN — ALPRAZOLAM 0.25 MG: 0.25 TABLET ORAL at 01:22

## 2018-07-21 RX ADMIN — PRAVASTATIN SODIUM 80 MG: 80 TABLET ORAL at 17:10

## 2018-07-21 RX ADMIN — CEFTRIAXONE 1000 MG: 1 INJECTION, SOLUTION INTRAVENOUS at 08:13

## 2018-07-21 RX ADMIN — DOCUSATE SODIUM 100 MG: 100 CAPSULE, LIQUID FILLED ORAL at 08:08

## 2018-07-21 RX ADMIN — OXYCODONE HYDROCHLORIDE 5 MG: 5 TABLET ORAL at 17:10

## 2018-07-21 RX ADMIN — HEPARIN SODIUM 5000 UNITS: 5000 INJECTION, SOLUTION INTRAVENOUS; SUBCUTANEOUS at 08:10

## 2018-07-21 RX ADMIN — VENLAFAXINE 75 MG: 37.5 TABLET ORAL at 21:17

## 2018-07-21 RX ADMIN — MAGNESIUM SULFATE HEPTAHYDRATE 2 G: 40 INJECTION, SOLUTION INTRAVENOUS at 12:22

## 2018-07-21 RX ADMIN — VENLAFAXINE 75 MG: 37.5 TABLET ORAL at 08:10

## 2018-07-21 RX ADMIN — DOCUSATE SODIUM 100 MG: 100 CAPSULE, LIQUID FILLED ORAL at 21:17

## 2018-07-21 RX ADMIN — FERROUS SULFATE TAB 325 MG (65 MG ELEMENTAL FE) 325 MG: 325 (65 FE) TAB at 08:09

## 2018-07-21 RX ADMIN — HEPARIN SODIUM 5000 UNITS: 5000 INJECTION, SOLUTION INTRAVENOUS; SUBCUTANEOUS at 21:18

## 2018-07-21 RX ADMIN — OXYCODONE HYDROCHLORIDE 5 MG: 5 TABLET ORAL at 21:17

## 2018-07-21 RX ADMIN — OXYCODONE HYDROCHLORIDE 5 MG: 5 TABLET ORAL at 12:12

## 2018-07-21 RX ADMIN — OXYCODONE HYDROCHLORIDE 5 MG: 5 TABLET ORAL at 08:07

## 2018-07-21 RX ADMIN — LISINOPRIL 20 MG: 20 TABLET ORAL at 08:09

## 2018-07-21 NOTE — CONSULTS
Consultation - Infectious Disease   Leonidas Loyola 66 y o  male MRN: [id number]  Unit/Bed#: [address] [place] [address] Encounter: [id number]        History of Present Illness   Physician Requesting Consult: Penny Martinez MD  Reason for Consult / Principal Problem:  Sepsis, staphylococcal septic arthritis  HPI: Leonidas Loyola is a 66y o  year old male who has multiple comorbidities, hypertension, hyperlipidemia, borderline diabetes mellitus, major depression, substance abuse including marijuana/alcohol, is known to me from recent hospitalization from June 5 to June 16, 2018, for septic arthritis of the right knee, due to methicillin sensitive Staphylococcus aureus  He had undergone surgical, incision and drainage of the right knee, on June 8,11,and 14, 2018  The wound was "closed" on June 14, 2018 , a PICC line was placed on Pamela 15  After discharge he was received intravenous ceftriaxone, at [facility] infusion room, last dose was on July 16, 2018  He was evaluated by the orthopedic surgeon, on July 18, 2018  According to his note, patient had not been compliant with instructions regards wound care  To have draining wound, which had not completely healed  The stitches were removed except for 1 which was noted to be deeper  On July 22,018, he was seen at [facility] by [facility], who felt that he needed to be hospitalized for further intervention  The Infectious Diseases consult is requested for further management  Patient reports ongoing discomfort/drainage from his right knee  He denies any fever, chills or night sweats  His appetite is good  He has been ambulatory a and carrying on the usual activities of daily living without much difficulty  He admits to being compliant with intravenous antimicrobial treatment at the infusion center  Reportedly, has not been compliant with leg brace, usual precautions with wound care    He has not had any diarrhea  The review of microbiology data:  June 6, 2018:  Right knee aspirate:  2+ polys, few colonies/4+ growth of methicillin sensitive Staphylococcus aureus  June 8, 2018:  Right knee synovial fluid, 3 + growth of methicillin sensitive Staphylococcus aureus  Tissue culture 4+ growth of methicillin sensitive Staphylococcus aureus  June 13, 2018:  Blood culture no growth  June 14, 2018 urine culture no growth  July 5, 2018: Wound culture:  1+ growth of mixed skin fahad  July 20, 2018: Wound culture:  2+ growth of mixed skin fahad  July 20 2018:  2 sets of blood cultures are pending  Inpatient consult to Infectious Diseases  Consult performed by: Og Grullon ordered by: Krystle Mueller          Review of Systems   Constitutional: Negative  HENT: Negative  Eyes: Negative  Respiratory: Negative  Cardiovascular: Negative  Gastrointestinal: Negative  Endocrine: Negative  Genitourinary: Negative  Musculoskeletal: Positive for joint swelling  Skin: Positive for wound  Allergic/Immunologic: Negative  Neurological: Negative  Hematological: Negative  Psychiatric/Behavioral: Positive for agitation and dysphoric mood  Historical Information   Past Medical History:   Diagnosis Date    Arthritis     Borderline diabetes     Depression     Hyperlipidemia     Hypertension      Past Surgical History:   Procedure Laterality Date    INCISION AND DRAINAGE OF WOUND Right 6/14/2018    Procedure: INCISION AND DRAINAGE (I&D) EXTREMITY AND COMPLEX WOUND CLOSURE;  Surgeon: Ghazala Zepeda MD;  Location: [address] [place] [address];  Service: Orthopedics    LUMBAR EPIDURAL INJECTION      SHOULDER SURGERY Left     SPINE SURGERY      WOUND DEBRIDEMENT Right 6/8/2018    Procedure: RIGHT KNEE ARTHROSCOPY, IRRIGATION AND DEBRIDEMENT; RIGHT KNEE IRRIGATION AND EXTENSIVE DEBRIDEMENT OF INFECTED BURSA;   Surgeon: Ghazala Zepeda MD;  Location: [address] [place] [address]; Service: Orthopedics    WOUND DEBRIDEMENT Right 6/11/2018    Procedure: open DEBRIDEMENT patellar bursa, arthroscopic right knee joint irrigation and debridement;  Surgeon: Paddy Hernandez MD;  Location: [facility];  Service: Orthopedics     Social History   History   Alcohol Use No     History   Drug Use No     History   Smoking Status    Current Some Day Smoker    Years: 10 00    Types: Cigars   Smokeless Tobacco    Never Used     Family History: History reviewed  No pertinent family history      Meds/Allergies     Current Facility-Administered Medications:     acetaminophen (TYLENOL) tablet 650 mg, 650 mg, Oral, Q6H PRN, Petty Purpura, CRNP    ALPRAZolam Alia Pellston) tablet 0 25 mg, 0 25 mg, Oral, TID PRN, Petty Purpura, CRNP, 0 25 mg at 07/21/18 0122    cefTRIAXone (ROCEPHIN) IVPB (premix) 1,000 mg, 1,000 mg, Intravenous, Q24H, SANCHO Gamez, Last Rate: 100 mL/hr at 07/21/18 0813, 1,000 mg at 07/21/18 0813    docusate sodium (COLACE) capsule 100 mg, 100 mg, Oral, BID, Petty Purpura, CRNP, 100 mg at 07/21/18 0808    ferrous sulfate tablet 325 mg, 325 mg, Oral, Daily With Breakfast, Marcella Revankar, DO, 325 mg at 07/21/18 0809    heparin (porcine) subcutaneous injection 5,000 Units, 5,000 Units, Subcutaneous, Q12H [facility], 5,000 Units at 07/21/18 0810 **AND** Platelet count, , , Once, SANCHO Gamez    ibuprofen (MOTRIN) tablet 600 mg, 600 mg, Oral, Q6H PRN, Petty Purpura, CRNP    insulin lispro (HumaLOG) 100 units/mL subcutaneous injection 1-5 Units, 1-5 Units, Subcutaneous, TID AC **AND** Fingerstick Glucose (POCT), , , TID AC, Petty Purpura, CRNP    insulin lispro (HumaLOG) 100 units/mL subcutaneous injection 1-5 Units, 1-5 Units, Subcutaneous, HS, SANCHO Gamez    lisinopril (ZESTRIL) tablet 20 mg, 20 mg, Oral, Daily, SANCHO Gamez, 20 mg at 07/21/18 0809    magnesium sulfate 2 g/50 mL IVPB (premix) 2 g, 2 g, Intravenous, Once, SANCHO Root, 2 g at 07/21/18 1222    nicotine (NICODERM CQ) 7 mg/24hr TD 24 hr patch 1 patch, 1 patch, Transdermal, Daily, SANCHO Root    ondansetron [facility]) injection 4 mg, 4 mg, Intravenous, Q6H PRN, SANCHO Root    oxyCODONE (ROXICODONE) IR tablet 5 mg, 5 mg, Oral, Q4H PRN, SANCHO Root, 5 mg at 07/21/18 1212    pravastatin (PRAVACHOL) tablet 80 mg, 80 mg, Oral, Daily With Minna Ogren, SANCHO, 80 mg at 07/20/18 2107    sodium chloride 0 9 % infusion, 75 mL/hr, Intravenous, Continuous, SANCHO Root, Last Rate: 75 mL/hr at 07/20/18 2027, 75 mL/hr at 07/20/18 2027    venlafaxine [facility]) tablet 75 mg, 75 mg, Oral, BID, SANCHO Root, 75 mg at 07/21/18 5860    zolpidem (AMBIEN) tablet 5 mg, 5 mg, Oral, HS PRN, SANCHO Root, 5 mg at 07/20/18 2112  Allergies   Allergen Reactions    Iodine Anaphylaxis    Shellfish-Derived Products      all current active meds have been reviewed    PTA meds:    Prescriptions Prior to Admission   Medication Sig Dispense Refill Last Dose    ALPRAZolam (XANAX) 0 5 mg tablet Take 0 5 mg by mouth 2 (two) times a day as needed     7/19/2018 at Unknown time    calamine-zinc oxide 8-8 % Apply topically 2 (two) times a day 118 mL 0 Past Week at Unknown time    clobetasol (TEMOVATE) 0 05 % cream Apply topically 2 (two) times a day 30 g 0 Past Month at Unknown time    diphenhydrAMINE-zinc acetate (BENADRYL) cream Apply topically 3 (three) times a day as needed for itching 28 4 g 0 Past Month at Unknown time    docusate sodium (COLACE) 100 mg capsule Take 1 capsule (100 mg total) by mouth 2 (two) times a day for 30 days 60 capsule 0 Past Month at Unknown time    ENALAPRIL MALEATE PO Take 5 mg by mouth 3 (three) times a day     7/20/2018 at Unknown time    ibuprofen (MOTRIN) 400 mg tablet Take by mouth every 6 (six) hours as needed for mild pain   Taking    metFORMIN (GLUCOPHAGE) 1000 MG tablet Take 1,000 mg by mouth daily with breakfast   7/20/2018 at Unknown time    rosuvastatin (CRESTOR) 10 MG tablet Take 10 mg by mouth daily  7/20/2018 at Unknown time    venlafaxine [facility]) 75 mg tablet Take 75 mg by mouth 2 (two) times a day   7/20/2018 at Unknown time    zolpidem (AMBIEN) 10 mg tablet Take 10 mg by mouth daily at bedtime as needed for sleep   7/20/2018 at Unknown time    ibuprofen (MOTRIN) 400 mg tablet Take 1 tablet (400 mg total) by mouth every 8 (eight) hours as needed for mild pain or moderate pain for up to 10 days 20 tablet 0 More than a month at Unknown time    nystatin (MYCOSTATIN) powder Apply 1 application topically 2 (two) times a day for 10 days 30 g 0 More than a month at Unknown time    nystatin (MYCOSTATIN) powder Apply topically 4 (four) times a day   More than a month at Unknown time    and discontinued meds:   Medications Discontinued During This Encounter   Medication Reason    vancomycin (VANCOCIN) IVPB (premix) 1,000 mg        PHYSICAL EXAM:  Vitals:    07/20/18 1954 07/21/18 0038 07/21/18 0429 07/21/18 0723   BP: 143/89 162/82 130/71 142/86   BP Location:  Left arm Left arm Left leg   Pulse: 81 86 79 78   Resp: 18 18 18 16   Temp:  (!) 97 2 °F (36 2 °C) 97 8 °F (36 6 °C) 98 3 °F (36 8 °C)   TempSrc:  Temporal Temporal Oral   SpO2: 98% 98% 97% 97%   Weight:       Height:         Body mass index is 26 54 kg/m²  Weight (last 2 days)     Date/Time   Weight    07/20/18 1547  83 9 (185)            Physical Exam  This is a middle-age man who is alert and not uncomfortable  He has been afebrile  His vital signs are stable  HEENT exam:  Normocephalic head, no scleral icterus or conjunctivitis  No oropharyngeal mucosal lesions  Neck:  Is supple with no jugular venous distention, lymphadenopathy, thyromegaly or focal tenderness  Chest:  Heart sounds are regular with no murmur  Lungs are clear  Abdomen:  Is obese and soft and nontender    There is no organomegaly  Bowel sounds are active  Lower extremities:  Right lower extremity:  The right knee is diffusely swollen, mildly erythematous  The midline wound is open in 2 places, and there is moderate serous drainage on the dressing  There is moderate tenderness, and mild decrease in range of motion  The calf is supple  There is no evidence of acute inflammation of the thigh or distal leg skin  The left lower extremity reveals no evidence of acute inflammation, the calf is supple and there is no edema  Upper extremities:  No swelling or tenderness at the previous PICC line, on the left arm  No acute phlebitis or arthritis  Intake/Output Summary (Last 24 hours) at 07/21/18 1252  Last data filed at 07/21/18 0747   Gross per 24 hour   Intake                0 ml   Output             1900 ml   Net            -1900 ml       Invasive Devices:   Peripheral IV 07/21/18 Left Hand (Active)   Site Assessment Clean;Dry; Intact 7/21/2018 12:00 PM   Dressing Type Transparent 7/21/2018 12:00 PM   Line Status Blood return noted; Infusing 7/21/2018 12:00 PM   Dressing Status Clean;Dry; Intact 7/21/2018 12:00 PM   Dressing Change Due 07/25/18 7/21/2018 12:00 PM   Reason Not Rotated Not due 7/21/2018 12:00 PM       Closed/Suction Drain Right;Lateral Knee Accordion 10 Fr   (Active)         Lab Results:   Recent Results (from the past 96 hour(s))   Wound culture and Gram stain    Collection Time: 07/20/18 11:35 AM   Result Value Ref Range    Wound Culture 2+ Growth of      Gram Stain Result No polys seen     Gram Stain Result 1+ Gram positive cocci in pairs    CBC and differential    Collection Time: 07/20/18  3:58 PM   Result Value Ref Range    WBC 10 82 (H) 4 31 - 10 16 Thousand/uL    RBC 3 78 (L) 3 88 - 5 62 Million/uL    Hemoglobin 11 7 (L) 12 0 - 17 0 g/dL    Hematocrit 36 1 (L) 36 5 - 49 3 %    MCV 96 82 - 98 fL    MCH 31 0 26 8 - 34 3 pg    MCHC 32 4 31 4 - 37 4 g/dL    RDW 13 4 11 6 - 15 1 %    MPV 8 9 8 9 - 12 7 fL    Platelets 328 (H) 294 - 390 Thousands/uL    nRBC 0 /100 WBCs    Neutrophils Relative 68 43 - 75 %    Immat GRANS % 0 0 - 2 %    Lymphocytes Relative 24 14 - 44 %    Monocytes Relative 5 4 - 12 %    Eosinophils Relative 2 0 - 6 %    Basophils Relative 1 0 - 1 %    Neutrophils Absolute 7 45 1 85 - 7 62 Thousands/µL    Immature Grans Absolute 0 03 0 00 - 0 20 Thousand/uL    Lymphocytes Absolute 2 55 0 60 - 4 47 Thousands/µL    Monocytes Absolute 0 49 0 17 - 1 22 Thousand/µL    Eosinophils Absolute 0 22 0 00 - 0 61 Thousand/µL    Basophils Absolute 0 08 0 00 - 0 10 Thousands/µL   Comprehensive metabolic panel    Collection Time: 07/20/18  3:58 PM   Result Value Ref Range    Sodium 136 136 - 145 mmol/L    Potassium 4 3 3 5 - 5 3 mmol/L    Chloride 100 100 - 108 mmol/L    CO2 25 21 - 32 mmol/L    Anion Gap 11 4 - 13 mmol/L    BUN 8 5 - 25 mg/dL    Creatinine 0 70 0 60 - 1 30 mg/dL    Glucose 151 (H) 65 - 140 mg/dL    Calcium 8 8 8 3 - 10 1 mg/dL    AST 33 5 - 45 U/L    ALT 27 12 - 78 U/L    Alkaline Phosphatase 73 46 - 116 U/L    Total Protein 7 7 6 4 - 8 2 g/dL    Albumin 3 8 3 5 - 5 0 g/dL    Total Bilirubin 0 30 0 20 - 1 00 mg/dL    eGFR 102 ml/min/1 73sq m   Sedimentation rate, automated    Collection Time: 07/20/18  3:58 PM   Result Value Ref Range    Sed Rate 13 (H) 2 - 10 mm/hour   Lactic acid, plasma    Collection Time: 07/20/18  3:58 PM   Result Value Ref Range    LACTIC ACID 2 0 0 5 - 2 0 mmol/L   Fingerstick Glucose (POCT)    Collection Time: 07/20/18  9:32 PM   Result Value Ref Range    POC Glucose 74 65 - 140 mg/dl   Basic metabolic panel    Collection Time: 07/21/18  6:38 AM   Result Value Ref Range    Sodium 139 136 - 145 mmol/L    Potassium 3 9 3 5 - 5 3 mmol/L    Chloride 104 100 - 108 mmol/L    CO2 29 21 - 32 mmol/L    Anion Gap 6 4 - 13 mmol/L    BUN 7 5 - 25 mg/dL    Creatinine 0 65 0 60 - 1 30 mg/dL    Glucose 102 65 - 140 mg/dL    Calcium 8 7 8 3 - 10 1 mg/dL    eGFR 105 ml/min/1 73sq m Magnesium    Collection Time: 07/21/18  6:38 AM   Result Value Ref Range    Magnesium 1 7 1 6 - 2 6 mg/dL   CBC and differential    Collection Time: 07/21/18  6:38 AM   Result Value Ref Range    WBC 8 82 4 31 - 10 16 Thousand/uL    RBC 3 68 (L) 3 88 - 5 62 Million/uL    Hemoglobin 11 0 (L) 12 0 - 17 0 g/dL    Hematocrit 35 1 (L) 36 5 - 49 3 %    MCV 95 82 - 98 fL    MCH 29 9 26 8 - 34 3 pg    MCHC 31 3 (L) 31 4 - 37 4 g/dL    RDW 13 2 11 6 - 15 1 %    MPV 8 6 (L) 8 9 - 12 7 fL    Platelets 104 406 - 321 Thousands/uL    nRBC 0 /100 WBCs    Neutrophils Relative 56 43 - 75 %    Immat GRANS % 0 0 - 2 %    Lymphocytes Relative 33 14 - 44 %    Monocytes Relative 7 4 - 12 %    Eosinophils Relative 3 0 - 6 %    Basophils Relative 1 0 - 1 %    Neutrophils Absolute 4 94 1 85 - 7 62 Thousands/µL    Immature Grans Absolute 0 02 0 00 - 0 20 Thousand/uL    Lymphocytes Absolute 2 93 0 60 - 4 47 Thousands/µL    Monocytes Absolute 0 62 0 17 - 1 22 Thousand/µL    Eosinophils Absolute 0 24 0 00 - 0 61 Thousand/µL    Basophils Absolute 0 07 0 00 - 0 10 Thousands/µL   Fingerstick Glucose (POCT)    Collection Time: 07/21/18  7:07 AM   Result Value Ref Range    POC Glucose 104 65 - 140 mg/dl   Fingerstick Glucose (POCT)    Collection Time: 07/21/18 12:37 PM   Result Value Ref Range    POC Glucose 105 65 - 140 mg/dl     Cultures    Results from last 7 days  Lab Units 07/20/18  1135   GRAM STAIN RESULT  No polys seen  1+ Gram positive cocci in pairs   WOUND CULTURE  2+ Growth of            HEPATITIS: No results found for: HAV, HEPAIGM, HEPBIGM, HEPBCAB, HBEAG, HEPCAB    PPD: No results found for: PPD    Urine Tox Screen: No results found for: PCP, THC, TCA    I have personally reviewed pertinent labs  Imaging Studies:  Xr Knee 1 Or 2 Vw Right    Result Date: 7/20/2018  Narrative: RIGHT KNEE INDICATION:   infection    "POST OP INFECTION?" COMPARISON:  None VIEWS:  XR KNEE 1 OR 2 VW RIGHT FINDINGS: There is no acute fracture or dislocation  No osteomyelitis  Large suprapatellar joint effusion  No significant degenerative changes  No lytic or blastic lesions are seen  Diffuse vascular calcifications  Impression: Large suprapatellar joint effusion  Consider aspirating if there is a suspicion for septic arthritis  No osteomyelitis  Workstation performed: [id number]     Vas Lower Limb Venous Duplex Study, Unilateral/limited    Result Date: 7/2/2018  Narrative:  [facility] REPORT CLINICAL: Indications: Limb Pain [M79 [id number]]  Patient presents with right lower extremity pain x several days  Risk Factors The patient has history of HTN and hyperlipidemia  FINDINGS:  Segment  Right            Left              Impression       Impression       CFV      Normal (Patent)  Normal (Patent)     CONCLUSION: Impression: RIGHT LOWER LIMB No evidence of acute or chronic deep vein thrombosis   No evidence of superficial thrombophlebitis noted  Doppler evaluation shows a normal response to augmentation maneuvers  Popliteal, posterior tibial and anterior tibial arterial Doppler waveforms are triphasic  LEFT LOWER LIMB LIMITED Evaluation shows no evidence of thrombus in the common femoral vein  Doppler evaluation shows a normal response to augmentation maneuvers  SIGNATURE: Electronically Signed by: Danna Miller MD, [address] on 2018-07-02 10:26:44 PM      EKG, Pathology, and Other Studies: I have personally reviewed pertinent reports  Principal Problem:    Sepsis ([facility] [address] )  Active Problems:    Hyperlipidemia    Borderline diabetes    Hypertension    Thrombocytosis (HCC)    Depression    Staphylococcal arthritis of right knee (HCC)      Assessment/Plan   This is a middle-age man with above list of comorbidities including major depression, substance abuse, who was recently hospitalized and treated for right knee bursitis/septic arthritis, with methicillin sensitive Staphylococcus aureus    He has had 3 knee surgeries, with incision and drainage, and on the last operative procedure he had wound closure  He had nearly 6 weeks of intravenous antimicrobial treatment  Despite these interventions, he is noted to have persistent inflammation of the right knee, and nonhealing, draining  wound, latter, probably of the synovial or bursa fluid  Lack of fever, normal procalcitonin level, may be indicative of absence of serious focal/systemic infection  Recommend checking C-reactive protein, hemoglobin A1c  Continue ceftriaxone  Discussed with Dr Francois , regards for the surgical intervention, wound debridement, possible wound VAC  Thank you for the consultation  Discussed with the hospitalist, Dr Roxanne Shaw

## 2018-07-21 NOTE — CASE MANAGEMENT
Initial Clinical Review    Admission: Date/Time/Statement: 7/20/18 @ 1701     Orders Placed This Encounter   Procedures    Inpatient Admission (expected length of stay for this patient is greater than two midnights)     Standing Status:   Standing     Number of Occurrences:   1     Order Specific Question:   Admitting Physician     Answer:   Young Wilson [[id number]]     Order Specific Question:   Level of Care     Answer:   Med Surg [16]     Order Specific Question:   Estimated length of stay     Answer:   More than 2 Midnights     Order Specific Question:   Certification     Answer:   I certify that inpatient services are medically necessary for this patient for a duration of greater than two midnights  See H&P and MD Progress Notes for additional information about the patient's course of treatment  ED: Date/Time/Mode of Arrival:   ED Arrival Information     Expected Arrival Acuity Means of Arrival Escorted By Service Admission Type    - 7/20/2018 15:34 Urgent Walk-In Spouse General Medicine Urgent    Arrival Complaint    wound check, pain          Chief Complaint:   Chief Complaint   Patient presents with    Wound Infection     per wound center, pt here for infected right knee  ABX & PICC line stopped/removed this past week   today pt wound draining & painful  History of Illness: Gila Crawford is a 66 y o  male who presents with PMH of borderline diabetes, hypertension, hyperlipidemia who presents with recurrent right knee wound with history of septic right knee infection  Patient underwent right knee surgical irrigation and debridement of bursa infected joint on 6/8/2018 with Dr Yasir Cabrera  He underwent subsequent I and D of his right knee on 6/11/2018 and 6/14/2018  Patient was discharged home with the left upper extremity single lumen PICC line for which he received IV Rocephin daily for 5 weeks  Patient completed his antibiotics 4 days ago on 7/16/2018    Patient was seen in [place] [address] [facility] [address] today by Dr Zuri Guerra and was referred to the emergency department due to continued right knee drainage  Patient states his knee has continuously drainage since he was discharged from the hospital   He states his drainage worsens with movement  He states that time the drainage is pouring  He describes the drainage as clear/hazy yellowish color  He denies any headache, dizziness, chest pain, shortness of breath, fever, chills, abdominal pain, nausea, vomiting, diarrhea  He is compliant with his knee immobilizer for the most part and ambulates with use of a cane  Musculoskeletal: He exhibits edema (Trace right knee edema) and tenderness (Right knee)  Right knee: He exhibits decreased range of motion    There is erythema (Right knee with a healing surgical wound with sanguinous drainage noted)  ED Vital Signs:   ED Triage Vitals [07/20/18 1547]   Temperature Pulse Respirations Blood Pressure SpO2   97 9 °F (36 6 °C) 92 18 146/69 97 %      Temp Source Heart Rate Source Patient Position - Orthostatic VS BP Location FiO2 (%)   Tympanic Monitor Sitting Left arm --      Pain Score       4        Wt Readings from Last 1 Encounters:   07/20/18 83 9 kg (185 lb)       Vital Signs (abnormal): Abnormal Labs/Diagnostic Test Results:   WBC Thousand/uL 10 82*   HEMOGLOBIN g/dL 11 7*   HEMATOCRIT % 36 1*   PLATELETS Thousands/uL 391*     GLUCOSE RANDOM mg/dL 151*     XR knee 1 or 2 vw right   Final Result by Sonya Oshea MD (07/20 1858)   Large suprapatellar joint effusion  Consider aspirating if there is a suspicion for septic arthritis  No osteomyelitis  ED Treatment:   Medication Administration from 07/20/2018 1533 to 07/20/2018 1758     None          Past Medical/Surgical History:    Active Ambulatory Problems     Diagnosis Date Noted    Hyperlipidemia     Borderline diabetes     Hypertension     Cellulitis of right lower extremity 06/05/2018    [facility] ) 06/05/2018    Thrombocytosis ([facility] ) 06/06/2018    Hyponatremia 06/06/2018    Depression     Infrapatellar bursitis of right knee 06/05/2018    Effusion of right knee 06/05/2018    Staphylococcal arthritis of right knee ([facility] ) 06/10/2018    Dermatitis 06/14/2018     Resolved Ambulatory Problems     Diagnosis Date Noted    No Resolved Ambulatory Problems     Past Medical History:   Diagnosis Date    Arthritis     Borderline diabetes     Depression     Hyperlipidemia     Hypertension        Admitting Diagnosis: Wound infection [T14  8XXA, L08 9]  Right knee skin infection [L08 9]    Age/Sex: 66 y o  male    Assessment/Plan:   * Sepsis ([facility] )   Assessment & Plan     POA, as evidenced by tachycardia and leukocytosis with hst of septic joint of right knee   Given Zosyn and Vancomycin x1 in ED   SED rate 13, previous 20  Will consult ID and orthopedics   Will start IV Rocephin and Vancomycin  F/U Wound and Blood cultures   Pain control with Tylenol, Motrin, or Oxycodone prn   Monitor for worsening signs of infection, CBC/D in am   Staphylococcal arthritis of right knee [facility])   Assessment & Plan       History  right knee aspirate and tissue cultures grew MSSA  S/p right knee surgical irrigation debridement of bursa and infected joint on 6/8/18 (Dr Laura Sheehan)  S/p repeat right I&D of infrapatellar bursa and irrigation/washout of right knee on 6/11/18  S/p right knee arthrotomy, I&D, irrigation debridement with complex wound closure on 6/14/18  Patient declined STR placement  He insists to go home  Managed with ceftriaxone 2gm IV daily to facilitate outpatient therapy in the infusion center, last dose of ABX 7/16/18  S/p removal of LUE single-lumen PICC on 7/16/18   Per RN, there was no redness or signs of infection during removal      Given Zosyn and Vancomycin x1 in ED   Will consult ID and orthopedics   Will start IV Rocephin and Vancomycin  F/U Wound and Blood cultures   Pain control with Tylenol, Motrin, or Oxycodone prn   PT/OT  Monitor for worsening signs of infection, CBC/D in am    Thrombocytosis (HCC)   Assessment & Plan     Mild,   Likely reactive  Monitor  Depression   Assessment & Plan     Continue Effexor    Hypertension   Assessment & Plan     Continue ACEi  Pain control  Monitor  Borderline diabetes   Assessment & Plan     A1c 7 0  Continue diabetic diet    Hyperlipidemia   Assessment & Plan     Continue statin     VTE Prophylaxis: Heparin  / sequential compression device   Code Status: full code - level 1  POLST: POLST form is not discussed and not completed at this time  Discussion with family: non   Anticipated Length of Stay:  Patient will be admitted on an Inpatient basis with an anticipated length of stay of  Greater than 2 midnights     Justification for Hospital Stay: sepsis 2/2 right knee infection        Admission Orders:  [facility]  CONSULT ID   CONSULT ORTHOPEDIC SURGERY  PT OT  Scheduled Meds:   Current Facility-Administered Medications:  acetaminophen 650 mg Oral Q6H PRN SANCHO Mejia    ALPRAZolam 0 25 mg Oral TID PRN SANCHO Mejia    cefTRIAXone 1,000 mg Intravenous Q24H SANCHO Mejia Last Rate: 1,000 mg (07/21/18 0813)   docusate sodium 100 mg Oral BID SANCHO Mejia    ferrous sulfate 325 mg Oral Daily With Breakfast Marcella Revankar, DO    heparin (porcine) 5,000 Units Subcutaneous Q12H [address] SANCHO Mejia    ibuprofen 600 mg Oral Q6H PRN SANCHO Mejia    insulin lispro 1-5 Units Subcutaneous TID AC SANCHO Mejia    insulin lispro 1-5 Units Subcutaneous HS SANCHO Mejia    lisinopril 20 mg Oral Daily SANCHO Mejia    magnesium sulfate 2 g Intravenous Once SANCHO Mejia    nicotine 1 patch Transdermal Daily SANCHO Mejia    ondansetron 4 mg Intravenous Q6H PRN SANCHO Mejia    oxyCODONE 5 mg Oral Q4H PRN Didier Walters KORINNP    pravastatin 80 mg Oral Daily With Dinner SANCHO Olivas    sodium chloride 75 mL/hr Intravenous Continuous SANCHO Olivas Last Rate: 75 mL/hr (07/20/18 2027)   venlafaxine 75 mg Oral BID SANCHO Olivas    zolpidem 5 mg Oral HS PRN SANCHO Olivas      Continuous Infusions:   sodium chloride 75 mL/hr Last Rate: 75 mL/hr (07/20/18 2027)     PRN Meds:   acetaminophen    ALPRAZolam    ibuprofen    ondansetron    oxyCODONE    zolpidem

## 2018-07-21 NOTE — CASE MANAGEMENT
Continued Stay Review    Date: 7/21/18    Vital Signs: /86 (BP Location: Left leg)   Pulse 78   Temp 98 3 °F (36 8 °C) (Oral)   Resp 16   Ht 5' 10" (1 778 m)   Wt 83 9 kg (185 lb)   SpO2 97%   BMI 26 54 kg/m²     IV MAG SULFATE 2GM  C REACTIVE PROTEIN   HGBA1C BMP  CBC   Medications:   Scheduled Meds:   Current Facility-Administered Medications:  acetaminophen 650 mg Oral Q6H PRN Suann Leader, CRNP    ALPRAZolam 0 25 mg Oral TID PRN Suann Leader, CRNP    cefTRIAXone 1,000 mg Intravenous Q24H Suann Leader, CRNP Last Rate: 1,000 mg (07/21/18 0813)   docusate sodium 100 mg Oral BID Suann Leader, CRNP    ferrous sulfate 325 mg Oral Daily With Breakfast Marcella Revankar, DO    heparin (porcine) 5,000 Units Subcutaneous Q12H [facility] Suann Leader, CRNP    ibuprofen 600 mg Oral Q6H PRN ann Leader, CRNP    insulin lispro 1-5 Units Subcutaneous TID AC Suann Leader, CRNP    insulin lispro 1-5 Units Subcutaneous HS Suann Leader, CRNP    lisinopril 20 mg Oral Daily Suann Leader, CRNP    magnesium sulfate 2 g Intravenous Once ann Leader, CRNP    nicotine 1 patch Transdermal Daily ann Leader, CRNP    ondansetron 4 mg Intravenous Q6H PRN ann Leader, CRNP    oxyCODONE 5 mg Oral Q4H PRN ann Leader, CRNP    pravastatin 80 mg Oral Daily With Dinner ann Leader, CRNP    sodium chloride 75 mL/hr Intravenous Continuous ann , KORINNP Last Rate: 75 mL/hr (07/20/18 2027)   venlafaxine 75 mg Oral BID Suann Leader, CRNP    zolpidem 5 mg Oral HS PRN ann Leader, KORINNP      Continuous Infusions:   sodium chloride 75 mL/hr Last Rate: 75 mL/hr (07/20/18 2027)     PRN Meds:   acetaminophen    ALPRAZolam    ibuprofen    ondansetron    oxyCODONE    zolpidem    Abnormal Labs/Diagnostic Results: H/H 11/35 1    Age/Sex: 66 y o  male     PER ID CONSULT  This is a middle-age man with above list of comorbidities including major depression, substance abuse, who was recently hospitalized and treated for right knee bursitis/septic arthritis, with methicillin sensitive Staphylococcus aureus  He has had 3 knee surgeries, with incision and drainage, and on the last operative procedure he had wound closure  He had nearly 6 weeks of intravenous antimicrobial treatment  Despite these interventions, he is noted to have persistent inflammation of the right knee, and nonhealing, draining  wound, latter, probably of the synovial or bursa fluid  Lack of fever, normal procalcitonin level, may be indicative of absence of serious focal/systemic infection  Recommend checking C-reactive protein, hemoglobin A1c  Continue ceftriaxone  Discussed with Dr Francois , regards for the surgical intervention, wound debridement, possible wound VAC      ATTENDING  Consulted ID and orthopedics, appreciate input  Based on previous culture growing MSSA, continue IV Rocephin daily  F/U Wound and Blood cultures   Patient may need a wound VAC to ensure proper closure  Pain control with Tylenol, Motrin, or Oxycodone prn   Monitor for worsening signs of infection, CBC/D in am  Staphylococcal arthritis of right knee   Based on previous wound culture growing MSSA, continue IV Rocephin daily  F/U repeat final Wound and Blood cultures  May need a wound VAC to heal with closure based on ortho recommendation   Pain control with Tylenol, Motrin, or Oxycodone prn   PT/OT  Monitor for worsening signs of infection, CBC/D in am   Certification Statement: The patient will continue to require additional inpatient hospital stay due to Chronic right knee wound, on IV antibiotics, possible need for wound VAC for wound closure

## 2018-07-21 NOTE — PLAN OF CARE
CARDIOVASCULAR - ADULT     Maintains optimal cardiac output and hemodynamic stability Progressing     Absence of cardiac dysrhythmias or at baseline rhythm Progressing        DISCHARGE PLANNING     Discharge to home or other facility with appropriate resources Progressing        INFECTION - ADULT     Absence or prevention of progression during hospitalization Progressing        Knowledge Deficit     Patient/family/caregiver demonstrates understanding of disease process, treatment plan, medications, and discharge instructions Progressing        METABOLIC, FLUID AND ELECTROLYTES - ADULT     Electrolytes maintained within normal limits Progressing     Fluid balance maintained Progressing     Glucose maintained within target range Progressing        PAIN - ADULT     Verbalizes/displays adequate comfort level or baseline comfort level Progressing        Potential for Falls     Patient will remain free of falls Progressing        SAFETY ADULT     Maintain or return to baseline ADL function Progressing     Maintain or return mobility status to optimal level Progressing     Patient will remain free of falls Progressing        SKIN/TISSUE INTEGRITY - ADULT     Skin integrity remains intact Progressing     Incision(s), wounds(s) or drain site(s) healing without S/S of infection Progressing     Oral mucous membranes remain intact Progressing

## 2018-07-21 NOTE — ASSESSMENT & PLAN NOTE
Admitted to [address] [place] [address] between 6/5 - 6/16/18 for septic joint of right knee, staphylococcal arthritis of right knee, infrapatellar bursitis of right knee and sepsis  At that time, right knee aspirate and tissue cultures grew MSSA  Venous Doppler at that time to rule out DVT, negative   Orthopedics and ID followed  S/p right knee surgical irrigation debridement of bursa and infected joint on 6/8/18 (Dr Domenic Cevallos)  S/p repeat right I&D of infrapatellar bursa and irrigation/washout of right knee on 6/11/18  S/p right knee arthrotomy, I&D, irrigation debridement with complex wound closure on 6/14/18  Patient declined STR placement  He insists to go home  Managed with ceftriaxone 2gm IV daily to facilitate outpatient therapy in the infusion center, last dose of ABX 7/16/18  S/p removal of LUE single-lumen PICC on 7/16/18   Per RN, there was no redness or signs of infection during removal     Given Zosyn and Vancomycin x1 in ED   Consult ID and orthopedics, appreciate recommendations  Based on previous wound culture growing MSSA, continue IV Rocephin daily  F/U repeat final Wound and Blood cultures  May need a wound VAC to heal with closure based on ortho recommendation   Pain control with Tylenol, Motrin, or Oxycodone prn   PT/OT  Monitor for worsening signs of infection, CBC/D in am

## 2018-07-21 NOTE — PHYSICAL THERAPY NOTE
PT/OT evaluations ordered but deferred as pt demonstrates independence with ADL and functional mobility  Pt has a cane issued at last admission to use prn  Recommend pt ambulate ad cathleen  No skilled PT/OT needs noted at this time    Jamie Mcdonough PT  [id number]

## 2018-07-21 NOTE — ASSESSMENT & PLAN NOTE
POA, as evidenced by tachycardia and leukocytosis with chronic right knee wound  Admitted to [facility] between 6/5 - 6/16/18 for septic joint of right knee, staphylococcal arthritis of right knee, infrapatellar bursitis of right knee and sepsis  At that time, right knee aspirate and tissue cultures grew MSSA  Venous Doppler at that time to rule out DVT, negative   Orthopedics and ID followed  S/p right knee surgical irrigation debridement of bursa and infected joint on 6/8/18 (Dr Sobia Gallego)  S/p repeat right I&D of infrapatellar bursa and irrigation/washout of right knee on 6/11/18  S/p right knee arthrotomy, I&D, irrigation debridement with complex wound closure on 6/14/18  Patient declined STR placement  He insists to go home  Managed with ceftriaxone 2gm IV daily to facilitate outpatient therapy in the infusion center, last dose of ABX 7/16/18  S/p removal of LUE single-lumen PICC on 7/16/18   Per RN, there was no redness or signs of infection during removal     Given Zosyn and Vancomycin x1 in ED   SED rate 13, previous 20  Consulted ID and orthopedics, appreciate input  Based on previous culture growing MSSA, continue IV Rocephin daily  F/U Wound and Blood cultures   Patient may need a wound VAC to ensure proper closure  Pain control with Tylenol, Motrin, or Oxycodone prn   Monitor for worsening signs of infection, CBC/D in am

## 2018-07-21 NOTE — PROGRESS NOTES
Progress Note - Loe Polo 1955, 66 y o  male MRN: [id number]    Unit/Bed#: [address] [place] [address] Encounter: [id number]    Primary Care Provider: Edinson Aranda MD   Date and time admitted to hospital: 7/20/2018  3:42 PM        * Sepsis [facility])   [address], as evidenced by tachycardia and leukocytosis with chronic right knee wound  Admitted to [facility] between 6/5 - 6/16/18 for septic joint of right knee, staphylococcal arthritis of right knee, infrapatellar bursitis of right knee and sepsis  At that time, right knee aspirate and tissue cultures grew MSSA  Venous Doppler at that time to rule out DVT, negative   Orthopedics and ID followed  S/p right knee surgical irrigation debridement of bursa and infected joint on 6/8/18 (Dr Estefanía Peck)  S/p repeat right I&D of infrapatellar bursa and irrigation/washout of right knee on 6/11/18  S/p right knee arthrotomy, I&D, irrigation debridement with complex wound closure on 6/14/18  Patient declined STR placement  He insists to go home  Managed with ceftriaxone 2gm IV daily to facilitate outpatient therapy in the infusion center, last dose of ABX 7/16/18  S/p removal of LUE single-lumen PICC on 7/16/18   Per RN, there was no redness or signs of infection during removal     Given Zosyn and Vancomycin x1 in ED   SED rate 13, previous 20  Consulted ID and orthopedics, appreciate input  Based on previous culture growing MSSA, continue IV Rocephin daily  F/U Wound and Blood cultures   Patient may need a wound VAC to ensure proper closure  Pain control with Tylenol, Motrin, or Oxycodone prn   Monitor for worsening signs of infection, CBC/D in am        Staphylococcal arthritis of right knee [facility])   Assessment & Plan    Admitted to [facility] between 6/5 - 6/16/18 for septic joint of right knee, staphylococcal arthritis of right knee, infrapatellar bursitis of right knee and sepsis  At that time, right knee aspirate and tissue cultures grew MSSA  Venous Doppler at that time to rule out DVT, negative   Orthopedics and ID followed  S/p right knee surgical irrigation debridement of bursa and infected joint on 6/8/18 (Dr Domenic Cevallos)  S/p repeat right I&D of infrapatellar bursa and irrigation/washout of right knee on 6/11/18  S/p right knee arthrotomy, I&D, irrigation debridement with complex wound closure on 6/14/18  Patient declined STR placement  He insists to go home  Managed with ceftriaxone 2gm IV daily to facilitate outpatient therapy in the infusion center, last dose of ABX 7/16/18  S/p removal of LUE single-lumen PICC on 7/16/18  Per RN, there was no redness or signs of infection during removal     Given Zosyn and Vancomycin x1 in ED   Consult ID and orthopedics, appreciate recommendations  Based on previous wound culture growing MSSA, continue IV Rocephin daily  F/U repeat final Wound and Blood cultures  May need a wound VAC to heal with closure based on ortho recommendation   Pain control with Tylenol, Motrin, or Oxycodone prn   PT/OT  Monitor for worsening signs of infection, CBC/D in am         Thrombocytosis (HCC)   Assessment & Plan    Mild,   Likely reactive  Monitor  Depression   Assessment & Plan    Continue Effexor         Hypertension   Assessment & Plan    Continue ACEi  Pain control  Monitor  Borderline diabetes   Assessment & Plan    A1c 7 0  Continue diabetic diet         Hyperlipidemia   Assessment & Plan    Continue statin            VTE Pharmacologic Prophylaxis:   Pharmacologic: Heparin  Mechanical VTE Prophylaxis in Place: Yes    Patient Centered Rounds: I have performed bedside rounds with nursing staff today  Discussions with Specialists or Other Care Team Provider:    Nursing, case management, ID note appreciated    Education and Discussions with Family / Patient:  I have answered all questions to the best of my ability  Significant other at bedside    Time Spent for Care: 20 minutes    More than 50% of total time spent on counseling and coordination of care as described above  Current Length of Stay: 1 day(s)    Current Patient Status: Inpatient   Certification Statement: The patient will continue to require additional inpatient hospital stay due to Chronic right knee wound, on IV antibiotics, possible need for wound VAC for wound closure    Discharge Plan:   Patient is not medically stable for discharge today due to recurrent right knee wound infection and nonhealing issues, may require a wound VAC after evaluation by Dr Quiñones Mass  Code Status: Level 1 - Full Code      Subjective:   Overall, patient feels near his baseline  He states his right knee is about the same as it has been  He reports less drainage since he has been hospitalized  Pain is controlled  He is requesting 10 mg of Ambien at bedtime  Reports a fair appetite  Denies headache, dizziness, chest pain, shortness of breath, abdominal pain, nausea, vomiting, diarrhea  Objective:     Vitals:   Temp (24hrs), Av 8 °F (36 6 °C), Min:97 2 °F (36 2 °C), Max:98 3 °F (36 8 °C)    HR:  [78-86] 78  Resp:  [16-18] 16  BP: (130-162)/(71-89) 142/86  SpO2:  [97 %-98 %] 97 %  Body mass index is 26 54 kg/m²  Input and Output Summary (last 24 hours): Intake/Output Summary (Last 24 hours) at 18 1757  Last data filed at 18 1310   Gross per 24 hour   Intake              350 ml   Output             2350 ml   Net            -2000 ml       Physical Exam:     Physical Exam   Constitutional: He is oriented to person, place, and time  He appears well-developed  No distress  HENT:   Head: Normocephalic  Neck: Normal range of motion  Cardiovascular: Normal rate, regular rhythm and intact distal pulses  Murmur heard  Pulmonary/Chest: Effort normal and breath sounds normal  No respiratory distress  He has no wheezes  He has no rhonchi  He has no rales  Abdominal: Soft  Bowel sounds are normal  He exhibits no distension   There is no tenderness  Musculoskeletal: He exhibits edema (Trace around right knee) and tenderness (Right knee)  Right knee: He exhibits decreased range of motion  Neurological: He is alert and oriented to person, place, and time  Skin: Skin is warm and dry  No rash noted  He is not diaphoretic  There is erythema (Surrounding the right knee)  Psychiatric: His speech is normal and behavior is normal  Judgment normal  His mood appears anxious  Nursing note and vitals reviewed  Additional Data:     Labs:      Results from last 7 days  Lab Units 07/21/18  0638   WBC Thousand/uL 8 82   HEMOGLOBIN g/dL 11 0*   HEMATOCRIT % 35 1*   PLATELETS Thousands/uL 344   NEUTROS PCT % 56   LYMPHS PCT % 33   MONOS PCT % 7   EOS PCT % 3       Results from last 7 days  Lab Units 07/21/18  0638 07/20/18  1558   SODIUM mmol/L 139 136   POTASSIUM mmol/L 3 9 4 3   CHLORIDE mmol/L 104 100   CO2 mmol/L 29 25   BUN mg/dL 7 8   CREATININE mg/dL 0 65 0 70   CALCIUM mg/dL 8 7 8 8   TOTAL PROTEIN g/dL  --  7 7   BILIRUBIN TOTAL mg/dL  --  0 30   ALK PHOS U/L  --  73   ALT U/L  --  27   AST U/L  --  33   GLUCOSE RANDOM mg/dL 102 151*           * I Have Reviewed All Lab Data Listed Above  * Additional Pertinent Lab Tests Reviewed:  All Labs Within Last 24 Hours Reviewed    Imaging:    Imaging Reports Reviewed Today Include:  Right knee x-ray  Imaging Personally Reviewed by Myself Includes:  None    Recent Cultures (last 7 days):       Results from last 7 days  Lab Units 07/20/18  1135   GRAM STAIN RESULT  No polys seen  1+ Gram positive cocci in pairs   WOUND CULTURE  2+ Growth of        Last 24 Hours Medication List:     Current Facility-Administered Medications:  acetaminophen 650 mg Oral Q6H PRN SANCHO Olivas    ALPRAZolam 0 25 mg Oral TID PRN SANCHO Olivas    cefTRIAXone 1,000 mg Intravenous Q24H SANCHO Olivas Last Rate: 1,000 mg (07/21/18 0813)   docusate sodium 100 mg Oral BID Linda Cheatham SANCHO Valenzuela    ferrous sulfate 325 mg Oral Daily With Breakfast Marcella Richardson,     heparin (porcine) 5,000 Units Subcutaneous Q12H [facility] SANCHO Allen    ibuprofen 600 mg Oral Q6H PRN SANCHO Allen    insulin lispro 1-5 Units Subcutaneous TID AC SANCHO Allen    insulin lispro 1-5 Units Subcutaneous HS SANCHO Allen    lisinopril 20 mg Oral Daily SANCHO Allen    magnesium sulfate 2 g Intravenous Once SANCHO Allen    nicotine 1 patch Transdermal Daily SANCHO Allen    ondansetron 4 mg Intravenous Q6H PRN SANCHO Allen    oxyCODONE 5 mg Oral Q4H PRN SANCHO Allen    pravastatin 80 mg Oral Daily With Dinner SANCHO Allen    sodium chloride 75 mL/hr Intravenous Continuous SANCHO Allen Last Rate: 75 mL/hr (07/20/18 2027)   venlafaxine 75 mg Oral BID SANCHO Allen    zolpidem 10 mg Oral HS PRN SANCHO Allen         Today, Patient Was Seen By: SANCHO Allen    ** Please Note: Dictation voice to text software may have been used in the creation of this document   **

## 2018-07-22 LAB
ANION GAP SERPL CALCULATED.3IONS-SCNC: 6 MMOL/L (ref 4–13)
BACTERIA WND AEROBE CULT: NORMAL
BUN SERPL-MCNC: 7 MG/DL (ref 5–25)
CALCIUM SERPL-MCNC: 9.2 MG/DL (ref 8.3–10.1)
CHLORIDE SERPL-SCNC: 103 MMOL/L (ref 100–108)
CO2 SERPL-SCNC: 27 MMOL/L (ref 21–32)
CREAT SERPL-MCNC: 0.66 MG/DL (ref 0.6–1.3)
CRP SERPL QL: 15.8 MG/L
ERYTHROCYTE [DISTWIDTH] IN BLOOD BY AUTOMATED COUNT: 13.2 % (ref 11.6–15.1)
EST. AVERAGE GLUCOSE BLD GHB EST-MCNC: 131 MG/DL
GFR SERPL CREATININE-BSD FRML MDRD: 104 ML/MIN/1.73SQ M
GLUCOSE SERPL-MCNC: 106 MG/DL (ref 65–140)
GLUCOSE SERPL-MCNC: 120 MG/DL (ref 65–140)
GLUCOSE SERPL-MCNC: 121 MG/DL (ref 65–140)
GLUCOSE SERPL-MCNC: 182 MG/DL (ref 65–140)
GLUCOSE SERPL-MCNC: 213 MG/DL (ref 65–140)
GRAM STN SPEC: NORMAL
GRAM STN SPEC: NORMAL
HBA1C MFR BLD: 6.2 % (ref 4.2–6.3)
HCT VFR BLD AUTO: 36.5 % (ref 36.5–49.3)
HGB BLD-MCNC: 11.7 G/DL (ref 12–17)
MCH RBC QN AUTO: 30.6 PG (ref 26.8–34.3)
MCHC RBC AUTO-ENTMCNC: 32.1 G/DL (ref 31.4–37.4)
MCV RBC AUTO: 96 FL (ref 82–98)
MRSA NOSE QL CULT: NORMAL
PLATELET # BLD AUTO: 319 THOUSANDS/UL (ref 149–390)
PMV BLD AUTO: 8.6 FL (ref 8.9–12.7)
POTASSIUM SERPL-SCNC: 3.9 MMOL/L (ref 3.5–5.3)
RBC # BLD AUTO: 3.82 MILLION/UL (ref 3.88–5.62)
SODIUM SERPL-SCNC: 136 MMOL/L (ref 136–145)
WBC # BLD AUTO: 7.25 THOUSAND/UL (ref 4.31–10.16)

## 2018-07-22 PROCEDURE — 85027 COMPLETE CBC AUTOMATED: CPT | Performed by: NURSE PRACTITIONER

## 2018-07-22 PROCEDURE — 82948 REAGENT STRIP/BLOOD GLUCOSE: CPT

## 2018-07-22 PROCEDURE — 86140 C-REACTIVE PROTEIN: CPT | Performed by: SPECIALIST

## 2018-07-22 PROCEDURE — 99232 SBSQ HOSP IP/OBS MODERATE 35: CPT | Performed by: NURSE PRACTITIONER

## 2018-07-22 PROCEDURE — 80048 BASIC METABOLIC PNL TOTAL CA: CPT | Performed by: NURSE PRACTITIONER

## 2018-07-22 RX ADMIN — DOCUSATE SODIUM 100 MG: 100 CAPSULE, LIQUID FILLED ORAL at 21:50

## 2018-07-22 RX ADMIN — INSULIN LISPRO 1 UNITS: 100 INJECTION, SOLUTION INTRAVENOUS; SUBCUTANEOUS at 11:57

## 2018-07-22 RX ADMIN — HEPARIN SODIUM 5000 UNITS: 5000 INJECTION, SOLUTION INTRAVENOUS; SUBCUTANEOUS at 21:50

## 2018-07-22 RX ADMIN — PRAVASTATIN SODIUM 80 MG: 80 TABLET ORAL at 17:28

## 2018-07-22 RX ADMIN — DOCUSATE SODIUM 100 MG: 100 CAPSULE, LIQUID FILLED ORAL at 08:02

## 2018-07-22 RX ADMIN — INSULIN LISPRO 2 UNITS: 100 INJECTION, SOLUTION INTRAVENOUS; SUBCUTANEOUS at 18:23

## 2018-07-22 RX ADMIN — ZOLPIDEM TARTRATE 10 MG: 5 TABLET ORAL at 22:38

## 2018-07-22 RX ADMIN — HEPARIN SODIUM 5000 UNITS: 5000 INJECTION, SOLUTION INTRAVENOUS; SUBCUTANEOUS at 08:01

## 2018-07-22 RX ADMIN — OXYCODONE HYDROCHLORIDE 5 MG: 5 TABLET ORAL at 07:58

## 2018-07-22 RX ADMIN — IBUPROFEN 600 MG: 600 TABLET ORAL at 11:55

## 2018-07-22 RX ADMIN — LISINOPRIL 20 MG: 20 TABLET ORAL at 10:15

## 2018-07-22 RX ADMIN — CEFTRIAXONE 1000 MG: 1 INJECTION, SOLUTION INTRAVENOUS at 08:01

## 2018-07-22 RX ADMIN — OXYCODONE HYDROCHLORIDE 5 MG: 5 TABLET ORAL at 02:27

## 2018-07-22 RX ADMIN — OXYCODONE HYDROCHLORIDE 5 MG: 5 TABLET ORAL at 21:50

## 2018-07-22 RX ADMIN — VENLAFAXINE 75 MG: 37.5 TABLET ORAL at 21:50

## 2018-07-22 RX ADMIN — VENLAFAXINE 75 MG: 37.5 TABLET ORAL at 08:01

## 2018-07-22 RX ADMIN — FERROUS SULFATE TAB 325 MG (65 MG ELEMENTAL FE) 325 MG: 325 (65 FE) TAB at 08:01

## 2018-07-22 NOTE — PROGRESS NOTES
Progress Note - Barb Issa 1955, 66 y o  male MRN: [id number]    Unit/Bed#: [address] [place] [address] Encounter: [id number]    Primary Care Provider: Navi Pompa MD   Date and time admitted to hospital: 7/20/2018  3:42 PM        * Sepsis [facility])   [address], as evidenced by tachycardia and leukocytosis with chronic right knee wound  Admitted to [facility]"" between 6/5 - 6/16/18 for septic joint of right knee, staphylococcal arthritis of right knee, infrapatellar bursitis of right knee and sepsis  At that time, right knee aspirate and tissue cultures grew MSSA  Venous Doppler at that time to rule out DVT, negative   Orthopedics and ID followed  S/p right knee surgical irrigation debridement of bursa and infected joint on 6/8/18 (Dr Laura Sheehan)  S/p repeat right I&D of infrapatellar bursa and irrigation/washout of right knee on 6/11/18  S/p right knee arthrotomy, I&D, irrigation debridement with complex wound closure on 6/14/18  Patient declined STR placement  He insists to go home  Managed with ceftriaxone 2gm IV daily to facilitate outpatient therapy in the infusion center, last dose of ABX 7/16/18  S/p removal of LUE single-lumen PICC on 7/16/18   Per RN, there was no redness or signs of infection during removal     Given Zosyn and Vancomycin x1 in ED   Consulted ID and orthopedics, appreciate recommendations  Based on previous wound culture growing MSSA, continue IV Rocephin daily  Procalcitonin < 0 05, CRP pending, SED rate 20 ->13   BCx showing no growth at 24hr  May need a wound VAC to heal with closure based on ortho recommendation   Pain control with Tylenol, Motrin, or Oxycodone prn   PT/OT  F/U final blood and wound cultures   Monitor for worsening signs of infection, CBC/D in am        Staphylococcal arthritis of right knee [facility])   Assessment & Plan    Admitted to \[facility]\"" between 6/5 - 6/16/18 for septic joint of right knee, staphylococcal arthritis of right knee, infrapatellar bursitis of right knee and sepsis  At that time, right knee aspirate and tissue cultures grew MSSA  Venous Doppler at that time to rule out DVT, negative   Orthopedics and ID followed  S/p right knee surgical irrigation debridement of bursa and infected joint on 6/8/18 (Dr Dior Mckenzie)  S/p repeat right I&D of infrapatellar bursa and irrigation/washout of right knee on 6/11/18  S/p right knee arthrotomy, I&D, irrigation debridement with complex wound closure on 6/14/18  Patient declined STR placement  He insists to go home  Managed with ceftriaxone 2gm IV daily to facilitate outpatient therapy in the infusion center, last dose of ABX 7/16/18  S/p removal of LUE single-lumen PICC on 7/16/18  Per RN, there was no redness or signs of infection during removal     Given Zosyn and Vancomycin x1 in ED   Consulted ID and orthopedics, appreciate recommendations  Based on previous wound culture growing MSSA, continue IV Rocephin daily  Procalcitonin < 0 05, CRP pending, SED rate 20 ->13   BCx showing no growth at 24hr  May need a wound VAC to heal with closure based on ortho recommendation   Pain control with Tylenol, Motrin, or Oxycodone prn   PT/OT  F/U final blood and wound cultures   Monitor for worsening signs of infection, CBC/D in am         Thrombocytosis (HCC)   Assessment & Plan    Mild,  -> 319  Likely reactive  Monitor  Depression   Assessment & Plan    Continue Effexor         Hypertension   Assessment & Plan    Continue ACEi  Pain control  Monitor  Borderline diabetes   Assessment & Plan    A1c 7 0 -> 6 2  Continue diabetic diet         Hyperlipidemia   Assessment & Plan    Continue statin            VTE Pharmacologic Prophylaxis:   Pharmacologic: Heparin  Mechanical VTE Prophylaxis in Place: Yes    Patient Centered Rounds: I have performed bedside rounds with nursing staff today      Discussions with Specialists or Other Care Team Provider:    Nursing, case management, ID note appreciated    Education and Discussions with Family / Patient:  I have answered all questions to the best of my ability  Significant other at bedside    Time Spent for Care: 20 minutes  More than 50% of total time spent on counseling and coordination of care as described above  Current Length of Stay: 2 day(s)    Current Patient Status: Inpatient   Certification Statement: The patient will continue to require additional inpatient hospital stay due to Chronic right knee wound, on IV antibiotics, possible need for wound VAC for wound closure    Discharge Plan:   Patient is not medically stable for discharge today due to recurrent right knee wound infection and nonhealing issues, may require a wound VAC after evaluation by Dr Wilian Luis  Code Status: Level 1 - Full Code      Subjective:   Right knee pain, 3/10  Ambulating around room frequently  Notes mild right knee drainage  Denies fever or chills, HA, chest pain, or SOB  No diarrhea  Very anxious  Concerned about Enalapril being substituted with Lisinopril  Objective:     Vitals:   Temp (24hrs), Av 5 °F (36 9 °C), Min:98 4 °F (36 9 °C), Max:98 5 °F (36 9 °C)    HR:  [84-85] 84  Resp:  [18] 18  BP: (149-186)/(82-88) 154/86  SpO2:  [96 %-97 %] 96 %  Body mass index is 26 54 kg/m²  Input and Output Summary (last 24 hours): Intake/Output Summary (Last 24 hours) at 18 0938  Last data filed at 18 1310   Gross per 24 hour   Intake              350 ml   Output              450 ml   Net             -100 ml       Physical Exam:     Physical Exam   Constitutional: He is oriented to person, place, and time  He appears well-developed  No distress  HENT:   Head: Normocephalic  Neck: Normal range of motion  Cardiovascular: Normal rate, regular rhythm and intact distal pulses  Murmur heard  Pulmonary/Chest: Effort normal and breath sounds normal  No respiratory distress  He has no wheezes  He has no rhonchi  He has no rales  Abdominal: Soft  Bowel sounds are normal  He exhibits no distension  There is no tenderness  Musculoskeletal: He exhibits edema (trace around right knee with chronic wound) and tenderness (right knee)  Neurological: He is alert and oriented to person, place, and time  Skin: Skin is warm and dry  No rash noted  He is not diaphoretic  Right knee with non healed surgical wound, clear drainage noted   Psychiatric: His speech is normal and behavior is normal  His mood appears anxious  Nursing note and vitals reviewed  Additional Data:     Labs:      Results from last 7 days  Lab Units 07/22/18  0617 07/21/18  0638   WBC Thousand/uL 7 25 8 82   HEMOGLOBIN g/dL 11 7* 11 0*   HEMATOCRIT % 36 5 35 1*   PLATELETS Thousands/uL 319 344   NEUTROS PCT %  --  56   LYMPHS PCT %  --  33   MONOS PCT %  --  7   EOS PCT %  --  3       Results from last 7 days  Lab Units 07/22/18  0617  07/20/18  1558   SODIUM mmol/L 136  < > 136   POTASSIUM mmol/L 3 9  < > 4 3   CHLORIDE mmol/L 103  < > 100   CO2 mmol/L 27  < > 25   BUN mg/dL 7  < > 8   CREATININE mg/dL 0 66  < > 0 70   CALCIUM mg/dL 9 2  < > 8 8   TOTAL PROTEIN g/dL  --   --  7 7   BILIRUBIN TOTAL mg/dL  --   --  0 30   ALK PHOS U/L  --   --  73   ALT U/L  --   --  27   AST U/L  --   --  33   GLUCOSE RANDOM mg/dL 121  < > 151*   < > = values in this interval not displayed  * I Have Reviewed All Lab Data Listed Above  * Additional Pertinent Lab Tests Reviewed: All Labs Within Last 24 Hours Reviewed    Imaging:    Imaging Reports Reviewed Today Include:  Right knee x-ray  Imaging Personally Reviewed by Myself Includes:  None    Recent Cultures (last 7 days):       Results from last 7 days  Lab Units 07/20/18  1602 07/20/18  1558 07/20/18  1135   BLOOD CULTURE  No Growth at 24 hrs   No Growth at 24 hrs   --    GRAM STAIN RESULT   --   --  No polys seen  1+ Gram positive cocci in pairs   WOUND CULTURE   --   --  2+ Growth of        Last 24 Hours Medication List: Current Facility-Administered Medications:  acetaminophen 650 mg Oral Q6H PRN SANCHO Ramirez    ALPRAZolam 0 25 mg Oral TID PRN SANCHO Ramirez    cefTRIAXone 1,000 mg Intravenous Q24H SANCHO Ramirez Last Rate: 1,000 mg (07/22/18 0801)   docusate sodium 100 mg Oral BID SANCHO Ramirez    ferrous sulfate 325 mg Oral Daily With Breakfast Marcella Revankar, DO    heparin (porcine) 5,000 Units Subcutaneous Q12H [address] Afshan Villagomez, SANCHO    ibuprofen 600 mg Oral Q6H PRN SANCHO Ramirez    insulin lispro 1-5 Units Subcutaneous TID AC Afshan Villagomez, KORINNP    insulin lispro 1-5 Units Subcutaneous HS SANCHO Raimrez    lisinopril 20 mg Oral Daily Afshan Villagomez, CRNP    nicotine 1 patch Transdermal Daily SANCHO Ramirez    ondansetron 4 mg Intravenous Q6H PRN SANCHO Ramirez    oxyCODONE 5 mg Oral Q4H PRN SANCHO Ramirez    pravastatin 80 mg Oral Daily With Jacqueline Canal Ofelia Or, CRNP    venlafaxine 75 mg Oral BID Afshna Villagomez, KORINNP    zolpidem 10 mg Oral HS PRN SANCHO Ramirez         Today, Patient Was Seen By: SANCHO Ramirez    ** Please Note: Dictation voice to text software may have been used in the creation of this document   **

## 2018-07-22 NOTE — ASSESSMENT & PLAN NOTE
Admitted to [address] [place] [address] between 6/5 - 6/16/18 for septic joint of right knee, staphylococcal arthritis of right knee, infrapatellar bursitis of right knee and sepsis  At that time, right knee aspirate and tissue cultures grew MSSA  Venous Doppler at that time to rule out DVT, negative   Orthopedics and ID followed  S/p right knee surgical irrigation debridement of bursa and infected joint on 6/8/18 (Dr Domenic Cevallos)  S/p repeat right I&D of infrapatellar bursa and irrigation/washout of right knee on 6/11/18  S/p right knee arthrotomy, I&D, irrigation debridement with complex wound closure on 6/14/18  Patient declined STR placement  He insists to go home  Managed with ceftriaxone 2gm IV daily to facilitate outpatient therapy in the infusion center, last dose of ABX 7/16/18  S/p removal of LUE single-lumen PICC on 7/16/18  Per RN, there was no redness or signs of infection during removal     Given Zosyn and Vancomycin x1 in ED   Procalcitonin < 0 05, CRP 12 9, SED rate 20 ->13   BCx showing no growth at 72hr  Consulted ID, orthopedics and wound care, appreciate recommendations        Plan for local wound care with silver alginate daily  Patient to keep his leg straight in knee immobilizer  He will follow up in the [address] next week for wound evaluation with Dr Krissy Arvizu who is covering for Dr Chloe Davis  The plan is to avoid a wound VAC if possible  Based on previous wound culture growing MSSA, continue Rocephin  Patient is to receive Rocephin 2 g IV daily for 13 more doses starting tomorrow in the infusion center  His midline can be removed at the end of therapy  He is to follow up with Orthopedics in 2 weeks

## 2018-07-22 NOTE — ED PROVIDER NOTES
History  Chief Complaint   Patient presents with    Wound Infection     per wound center, pt here for infected right knee  ABX & PICC line stopped/removed this past week   today pt wound draining & painful  Patient sent in from wound care for admission  Patient has history of RTKR with infection and multiple surgical washouts  Patient was on IV abx with PICC line until last week when it was finished  Presented to wound care with non-healing wound and reported drainange  Referred to ER for admission and restart abx  History provided by:  Patient   used: No        Prior to Admission Medications   Prescriptions Last Dose Informant Patient Reported? Taking?    ALPRAZolam (XANAX) 0 5 mg tablet 7/19/2018 at Unknown time  Yes Yes   Sig: Take 0 5 mg by mouth 2 (two) times a day as needed     ENALAPRIL MALEATE PO 7/20/2018 at Unknown time  Yes Yes   Sig: Take 5 mg by mouth 3 (three) times a day     calamine-zinc oxide 8-8 % Past Week at Unknown time  No Yes   Sig: Apply topically 2 (two) times a day   clobetasol (TEMOVATE) 0 05 % cream Past Month at Unknown time  No Yes   Sig: Apply topically 2 (two) times a day   diphenhydrAMINE-zinc acetate (BENADRYL) cream Past Month at Unknown time  No Yes   Sig: Apply topically 3 (three) times a day as needed for itching   docusate sodium (COLACE) 100 mg capsule Past Month at Unknown time  No Yes   Sig: Take 1 capsule (100 mg total) by mouth 2 (two) times a day for 30 days   ibuprofen (MOTRIN) 400 mg tablet More than a month at Unknown time  No No   Sig: Take 1 tablet (400 mg total) by mouth every 8 (eight) hours as needed for mild pain or moderate pain for up to 10 days   ibuprofen (MOTRIN) 400 mg tablet  Self Yes Yes   Sig: Take by mouth every 6 (six) hours as needed for mild pain   metFORMIN (GLUCOPHAGE) 1000 MG tablet 7/20/2018 at Unknown time  Yes Yes   Sig: Take 1,000 mg by mouth daily with breakfast   nystatin (MYCOSTATIN) powder More than a month at Unknown time  No No   Sig: Apply 1 application topically 2 (two) times a day for 10 days   nystatin (MYCOSTATIN) powder More than a month at Unknown time Self Yes No   Sig: Apply topically 4 (four) times a day   rosuvastatin (CRESTOR) 10 MG tablet 7/20/2018 at Unknown time  Yes Yes   Sig: Take 10 mg by mouth daily  venlafaxine (EFFEXOR) 75 mg tablet 7/20/2018 at Unknown time  Yes Yes   Sig: Take 75 mg by mouth 2 (two) times a day   zolpidem (AMBIEN) 10 mg tablet 7/20/2018 at Unknown time  Yes Yes   Sig: Take 10 mg by mouth daily at bedtime as needed for sleep      Facility-Administered Medications: None       Past Medical History:   Diagnosis Date    Arthritis     Borderline diabetes     Depression     Hyperlipidemia     Hypertension        Past Surgical History:   Procedure Laterality Date    INCISION AND DRAINAGE OF WOUND Right 6/14/2018    Procedure: INCISION AND DRAINAGE (I&D) EXTREMITY AND COMPLEX WOUND CLOSURE;  Surgeon: Fern Birch MD;  Location: [address] [place] [address];  Service: Orthopedics    LUMBAR EPIDURAL INJECTION      SHOULDER SURGERY Left     SPINE SURGERY      WOUND DEBRIDEMENT Right 6/8/2018    Procedure: RIGHT KNEE ARTHROSCOPY, IRRIGATION AND DEBRIDEMENT; RIGHT KNEE IRRIGATION AND EXTENSIVE DEBRIDEMENT OF INFECTED BURSA; Surgeon: Fern Birch MD;  Location: [address] [place] [address];  Service: Orthopedics    WOUND DEBRIDEMENT Right 6/11/2018    Procedure: open DEBRIDEMENT patellar bursa, arthroscopic right knee joint irrigation and debridement;  Surgeon: Fern Birch MD;  Location: [address] [place] [address];  Service: Orthopedics       History reviewed  No pertinent family history  I have reviewed and agree with the history as documented  Social History   Substance Use Topics    Smoking status: Current Some Day Smoker     Years: 10 00     Types: Cigars    Smokeless tobacco: Never Used    Alcohol use No        Review of Systems   Skin: Positive for wound     All other systems reviewed and are negative  Physical Exam  Physical Exam   Constitutional: He is oriented to person, place, and time  No distress  HENT:   Mouth/Throat: Oropharynx is clear and moist    Eyes: Pupils are equal, round, and reactive to light  Neck: Normal range of motion  Cardiovascular: Normal rate, regular rhythm and intact distal pulses  Pulmonary/Chest: Effort normal and breath sounds normal  No respiratory distress  Abdominal: Soft  There is no tenderness  Musculoskeletal: Normal range of motion  Neurological: He is alert and oriented to person, place, and time  Skin: Capillary refill takes less than 2 seconds  He is not diaphoretic  Nursing note and vitals reviewed          Vital Signs  ED Triage Vitals [07/20/18 1547]   Temperature Pulse Respirations Blood Pressure SpO2   97 9 °F (36 6 °C) 92 18 146/69 97 %      Temp Source Heart Rate Source Patient Position - Orthostatic VS BP Location FiO2 (%)   Tympanic Monitor Sitting Left arm --      Pain Score       4           Vitals:    07/21/18 0038 07/21/18 0429 07/21/18 0723 07/21/18 1934   BP: 162/82 130/71 142/86 (!) 186/88   Pulse: 86 79 78 84   Patient Position - Orthostatic VS: Lying Lying Lying Sitting       Visual Acuity      ED Medications  Medications   ALPRAZolam (XANAX) tablet 0 25 mg (0 25 mg Oral Given 7/21/18 0122)   docusate sodium (COLACE) capsule 100 mg (100 mg Oral Given 7/21/18 0808)   ibuprofen (MOTRIN) tablet 600 mg (not administered)   pravastatin (PRAVACHOL) tablet 80 mg (80 mg Oral Given 7/21/18 1710)   venlafaxine (EFFEXOR) tablet 75 mg (75 mg Oral Given 7/21/18 0810)   ondansetron (ZOFRAN) injection 4 mg (not administered)   nicotine (NICODERM CQ) 7 mg/24hr TD 24 hr patch 1 patch (1 patch Transdermal Not Given 7/21/18 0808)   heparin (porcine) subcutaneous injection 5,000 Units (5,000 Units Subcutaneous Given 7/21/18 0810)   acetaminophen (TYLENOL) tablet 650 mg (not administered)   oxyCODONE (ROXICODONE) IR tablet 5 mg (5 mg Oral Given 7/21/18 1710)   cefTRIAXone (ROCEPHIN) IVPB (premix) 1,000 mg (1,000 mg Intravenous New Bag 7/21/18 0813)   lisinopril (ZESTRIL) tablet 20 mg (20 mg Oral Given 7/21/18 0809)   insulin lispro (HumaLOG) 100 units/mL subcutaneous injection 1-5 Units (1 Units Subcutaneous Not Given 7/21/18 1706)   insulin lispro (HumaLOG) 100 units/mL subcutaneous injection 1-5 Units (1 Units Subcutaneous Not Given 7/20/18 2243)   ferrous sulfate tablet 325 mg (325 mg Oral Given 7/21/18 0809)   zolpidem (AMBIEN) tablet 10 mg (not administered)   piperacillin-tazobactam (ZOSYN) IVPB 3 375 g (3 375 g Intravenous New Bag 7/20/18 1836)   vancomycin (VANCOCIN) 1,250 mg in sodium chloride 0 9 % 250 mL IVPB (1,250 mg Intravenous New Bag 7/20/18 2027)   magnesium sulfate 2 g/50 mL IVPB (premix) 2 g (2 g Intravenous New Bag 7/21/18 1222)       Diagnostic Studies  Results Reviewed     Procedure Component Value Units Date/Time    Sedimentation rate, automated [[id number]]  (Abnormal) Collected:  07/20/18 1558    Lab Status:  Final result Specimen:  Blood from Hand, Left Updated:  07/20/18 1720     Sed Rate 13 (H) mm/hour     Comprehensive metabolic panel [[id number]]  (Abnormal) Collected:  07/20/18 1558    Lab Status:  Final result Specimen:  Blood from Hand, Left Updated:  07/20/18 1643     Sodium 136 mmol/L      Potassium 4 3 mmol/L      Chloride 100 mmol/L      CO2 25 mmol/L      Anion Gap 11 mmol/L      BUN 8 mg/dL      Creatinine 0 70 mg/dL      Glucose 151 (H) mg/dL      Calcium 8 8 mg/dL      AST 33 U/L      ALT 27 U/L      Alkaline Phosphatase 73 U/L      Total Protein 7 7 g/dL      Albumin 3 8 g/dL      Total Bilirubin 0 30 mg/dL      eGFR 102 ml/min/1 73sq m     Narrative:         National Kidney Disease Education Program recommendations are as follows:  GFR calculation is accurate only with a steady state creatinine  Chronic Kidney disease less than 60 ml/min/1 73 sq  meters  Kidney failure less than 15 ml/min/1 73 sq  meters      Lactic acid, plasma [[id number]]  (Normal) Collected:  07/20/18 1558    Lab Status:  Final result Specimen:  Blood from Hand, Left Updated:  07/20/18 1632     LACTIC ACID 2 0 mmol/L     Narrative:         Result may be elevated if tourniquet was used during collection  CBC and differential [[id number]]  (Abnormal) Collected:  07/20/18 1558    Lab Status:  Final result Specimen:  Blood from Hand, Left Updated:  07/20/18 1613     WBC 10 82 (H) Thousand/uL      RBC 3 78 (L) Million/uL      Hemoglobin 11 7 (L) g/dL      Hematocrit 36 1 (L) %      MCV 96 fL      MCH 31 0 pg      MCHC 32 4 g/dL      RDW 13 4 %      MPV 8 9 fL      Platelets 178 (H) Thousands/uL      nRBC 0 /100 WBCs      Neutrophils Relative 68 %      Immat GRANS % 0 %      Lymphocytes Relative 24 %      Monocytes Relative 5 %      Eosinophils Relative 2 %      Basophils Relative 1 %      Neutrophils Absolute 7 45 Thousands/µL      Immature Grans Absolute 0 03 Thousand/uL      Lymphocytes Absolute 2 55 Thousands/µL      Monocytes Absolute 0 49 Thousand/µL      Eosinophils Absolute 0 22 Thousand/µL      Basophils Absolute 0 08 Thousands/µL     Blood culture #1 [[id number]] Collected:  07/20/18 1558    Lab Status: In process Specimen:  Blood from Hand, Left Updated:  07/20/18 1611    Blood culture #2 [[id number]] Collected:  07/20/18 1602    Lab Status: In process Specimen:  Blood from Hand, Right Updated:  07/20/18 1611                 XR knee 1 or 2 vw right   Final Result by Saúl Walker MD (07/20 1858)      Large suprapatellar joint effusion  Consider aspirating if there is a suspicion for septic arthritis  No osteomyelitis              Workstation performed: [id number]                    Procedures  Procedures       Phone Contacts  ED Phone Contact    ED Course                               MDM  Number of Diagnoses or Management Options  Right knee skin infection:   Diagnosis management comments: Pulse ox 97% on RA indicating adequate oxygenation    Wound care called about the patient and the hospitalist was also told the patient was coming  Amount and/or Complexity of Data Reviewed  Clinical lab tests: ordered and reviewed  Tests in the radiology section of CPT®: ordered and reviewed  Decide to obtain previous medical records or to obtain history from someone other than the patient: yes  Obtain history from someone other than the patient: yes  Review and summarize past medical records: yes  Discuss the patient with other providers: yes    Patient Progress  Patient progress: stable    CritCare Time    Disposition  Final diagnoses:   Right knee skin infection     Time reflects when diagnosis was documented in both MDM as applicable and the Disposition within this note     Time User Action Codes Description Comment    7/20/2018  5:00 PM Alessandra Holt Add [L08 9] Right knee skin infection     7/20/2018  6:54 PM Aure Angle Add [A41 9] Sepsis ([facility] [address] )     7/20/2018  6:54 PM Kasia RODGERS Add [M00 061] [facility]     7/20/2018  6:54 PM Aure Angle Modify [M00 061] Staphylococcal arthritis of right knee [facility])       ED Disposition     ED Disposition Condition Comment    Admit  Case was discussed with Dr Hoda Haque and the patient's admission status was agreed to be admission to the service of Dr Thien Coles          Follow-up Information    None         Current Discharge Medication List      CONTINUE these medications which have NOT CHANGED    Details   ALPRAZolam (XANAX) 0 5 mg tablet Take 0 5 mg by mouth 2 (two) times a day as needed        calamine-zinc oxide 8-8 % Apply topically 2 (two) times a day  Qty: 118 mL, Refills: 0    Associated Diagnoses: Dermatitis      clobetasol (TEMOVATE) 0 05 % cream Apply topically 2 (two) times a day  Qty: 30 g, Refills: 0    Associated Diagnoses: Dermatitis      diphenhydrAMINE-zinc acetate (BENADRYL) cream Apply topically 3 (three) times a day as needed for itching  Qty: 28 4 g, Refills: 0    Associated Diagnoses: Dermatitis      docusate sodium (COLACE) 100 mg capsule Take 1 capsule (100 mg total) by mouth 2 (two) times a day for 30 days  Qty: 60 capsule, Refills: 0    Associated Diagnoses: Staphylococcal arthritis of right knee (HCC)      ENALAPRIL MALEATE PO Take 5 mg by mouth 3 (three) times a day        ibuprofen (MOTRIN) 400 mg tablet Take by mouth every 6 (six) hours as needed for mild pain      metFORMIN (GLUCOPHAGE) 1000 MG tablet Take 1,000 mg by mouth daily with breakfast      rosuvastatin (CRESTOR) 10 MG tablet Take 10 mg by mouth daily  venlafaxine (EFFEXOR) 75 mg tablet Take 75 mg by mouth 2 (two) times a day      zolpidem (AMBIEN) 10 mg tablet Take 10 mg by mouth daily at bedtime as needed for sleep      nystatin (MYCOSTATIN) powder Apply topically 4 (four) times a day           No discharge procedures on file      ED Provider  Electronically Signed by           Rashmi Rivas DO  07/21/18 5013

## 2018-07-22 NOTE — SOCIAL WORK
DASH discussion completed  Discussed goals of making sure pt's needs are met upon discharge, pt's preferences are taken into account, pt understands her health condition, medications and symptoms to watch for after returning home and pt is aware of any follow up appointments recommended by hospital physician  Spoke with the pt at the bedside  Pt had completed his IV infusion from his prior stay and remains at home alone with a cane and walker for ambulation  He denies any other DME or other needs  Pt has no HHC and did not feel he needed any  Pt does drive and noted that he has his support of his brother and friend if needed  Pt reports having increased stress at home and noted that he just prefers to go home asap to take care of things that are outstanding  Pt reports he knows his knee has to be taken care of first   Pt did not feel he would need VNA services, he goes to the [facility] and noted that they had a big box of supplies sent to his home and he does his own wound care  He is not sure what his dc needs would be at this point, awaiting plan from Ortho to decide on his needs  CM will continue to follow for any further ongoing needs for this pt   Pt uses the [facility] in [address]

## 2018-07-22 NOTE — PROGRESS NOTES
Progress Note - Infectious Disease   Ivet Srinivasan 66 y o  male MRN: [id number]  Unit/Bed#: [address] [place] [address] Encounter: [id number]      Subjective/Objective   Subjective:   No new symptoms  Reports persistent drainage from the wound and discomfort, occasionally" feels as though it is throbbing"        Meds/Allergies     Current Facility-Administered Medications:     acetaminophen (TYLENOL) tablet 650 mg, 650 mg, Oral, Q6H PRN, SANCHO Nicholson    ALPRAZolam [facility]) tablet 0 25 mg, 0 25 mg, Oral, TID PRN, SANCHO Nicholson, 0 25 mg at 07/21/18 0122    cefTRIAXone (ROCEPHIN) IVPB (premix) 1,000 mg, 1,000 mg, Intravenous, Q24H, SANCHO Gamez, Last Rate: 100 mL/hr at 07/22/18 0801, 1,000 mg at 07/22/18 0801    docusate sodium (COLACE) capsule 100 mg, 100 mg, Oral, BID, SANCHO aGmez, 100 mg at 07/22/18 0802    ferrous sulfate tablet 325 mg, 325 mg, Oral, Daily With Breakfast, Marcella Revankar, DO, 325 mg at 07/22/18 0801    heparin (porcine) subcutaneous injection 5,000 Units, 5,000 Units, Subcutaneous, Q12H [address], 5,000 Units at 07/22/18 0801 **AND** Platelet count, , , Once, SANCHO Gamez    ibuprofen (MOTRIN) tablet 600 mg, 600 mg, Oral, Q6H PRN, SANCHO Nicholson, 600 mg at 07/22/18 1155    insulin lispro (HumaLOG) 100 units/mL subcutaneous injection 1-5 Units, 1-5 Units, Subcutaneous, TID AC, 1 Units at 07/22/18 1157 **AND** Fingerstick Glucose (POCT), , , TID AC, SANCHO Nicholson    insulin lispro (HumaLOG) 100 units/mL subcutaneous injection 1-5 Units, 1-5 Units, Subcutaneous, HS, SANCHO Gamez    lisinopril (ZESTRIL) tablet 20 mg, 20 mg, Oral, Daily, SANCHO Gamez, 20 mg at 07/22/18 1015    nicotine (NICODERM CQ) 7 mg/24hr TD 24 hr patch 1 patch, 1 patch, Transdermal, Daily, SANCHO Nicholson    ondansetron [facility]) injection 4 mg, 4 mg, Intravenous, Q6H PRN, SANCHO Nicholson    oxyCODONE (ROXICODONE) IR tablet 5 mg, 5 mg, Oral, Q4H PRN, Petty Purpura, CRNP, 5 mg at 18 0758    pravastatin (PRAVACHOL) tablet 80 mg, 80 mg, Oral, Daily With Dinner, Petty Purpura, CRNP, 80 mg at 18 1710    venlafaxine [facility]) tablet 75 mg, 75 mg, Oral, BID, Petty Purpura, CRNP, 75 mg at 18 0801    zolpidem (AMBIEN) tablet 10 mg, 10 mg, Oral, HS PRN, Petty Purpura, CRNP  Allergies   Allergen Reactions    Iodine Anaphylaxis    Shellfish-Derived Products      all current active meds have been reviewed    discontinued meds:   Medications Discontinued During This Encounter   Medication Reason    vancomycin (VANCOCIN) IVPB (premix) 1,000 mg     zolpidem (AMBIEN) tablet 5 mg     sodium chloride 0 9 % infusion        Physical Exam: Physical Exam    HR:  [84-85] 84  Resp:  [18] 18  BP: (149-186)/(82-88) 154/86  SpO2:  [96 %-97 %] 96 %  Body mass index is 26 54 kg/m²  Weight (last 2 days)     Date/Time   Weight    18 1547  83 9 (185)            Temp (24hrs), Av 5 °F (36 9 °C), Min:98 4 °F (36 9 °C), Max:98 5 °F (36 9 °C)  Current: Temperature: 98 4 °F (36 9 °C)AGE@    Intake/Output Summary (Last 24 hours) at 18 1257  Last data filed at 18 1310   Gross per 24 hour   Intake              350 ml   Output              450 ml   Net             -100 ml    He has been afebrile, systolic hypertension up to 186  The lungs are clear  Heart sounds are regular  Abdomen is soft and nontender  The right knee seems less swollen, wound is unchanged, light yellow/clear drainage from the wound persists  Mild tenderness without significant erythema  The calf is supple  The IV site is clean  Invasive Devices     Peripheral Intravenous Line            Peripheral IV 18 Left Hand 1 day          Drain            Closed/Suction Drain Right;Lateral Knee Accordion 10 Fr   40 days                            Lab, Imaging and other studies:    Recent Results (from the past 96 hour(s))   Wound culture and Gram stain    Collection Time: 07/20/18 11:35 AM   Result Value Ref Range    Wound Culture 2+ Growth of      Gram Stain Result No polys seen     Gram Stain Result 1+ Gram positive cocci in pairs    CBC and differential    Collection Time: 07/20/18  3:58 PM   Result Value Ref Range    WBC 10 82 (H) 4 31 - 10 16 Thousand/uL    RBC 3 78 (L) 3 88 - 5 62 Million/uL    Hemoglobin 11 7 (L) 12 0 - 17 0 g/dL    Hematocrit 36 1 (L) 36 5 - 49 3 %    MCV 96 82 - 98 fL    MCH 31 0 26 8 - 34 3 pg    MCHC 32 4 31 4 - 37 4 g/dL    RDW 13 4 11 6 - 15 1 %    MPV 8 9 8 9 - 12 7 fL    Platelets 624 (H) 302 - 390 Thousands/uL    nRBC 0 /100 WBCs    Neutrophils Relative 68 43 - 75 %    Immat GRANS % 0 0 - 2 %    Lymphocytes Relative 24 14 - 44 %    Monocytes Relative 5 4 - 12 %    Eosinophils Relative 2 0 - 6 %    Basophils Relative 1 0 - 1 %    Neutrophils Absolute 7 45 1 85 - 7 62 Thousands/µL    Immature Grans Absolute 0 03 0 00 - 0 20 Thousand/uL    Lymphocytes Absolute 2 55 0 60 - 4 47 Thousands/µL    Monocytes Absolute 0 49 0 17 - 1 22 Thousand/µL    Eosinophils Absolute 0 22 0 00 - 0 61 Thousand/µL    Basophils Absolute 0 08 0 00 - 0 10 Thousands/µL   Comprehensive metabolic panel    Collection Time: 07/20/18  3:58 PM   Result Value Ref Range    Sodium 136 136 - 145 mmol/L    Potassium 4 3 3 5 - 5 3 mmol/L    Chloride 100 100 - 108 mmol/L    CO2 25 21 - 32 mmol/L    Anion Gap 11 4 - 13 mmol/L    BUN 8 5 - 25 mg/dL    Creatinine 0 70 0 60 - 1 30 mg/dL    Glucose 151 (H) 65 - 140 mg/dL    Calcium 8 8 8 3 - 10 1 mg/dL    AST 33 5 - 45 U/L    ALT 27 12 - 78 U/L    Alkaline Phosphatase 73 46 - 116 U/L    Total Protein 7 7 6 4 - 8 2 g/dL    Albumin 3 8 3 5 - 5 0 g/dL    Total Bilirubin 0 30 0 20 - 1 00 mg/dL    eGFR 102 ml/min/1 73sq m   Blood culture #1    Collection Time: 07/20/18  3:58 PM   Result Value Ref Range    Blood Culture No Growth at 24 hrs      Sedimentation rate, automated    Collection Time: 07/20/18  3:58 PM   Result Value Ref Range    Sed Rate 13 (H) 2 - 10 mm/hour   Lactic acid, plasma    Collection Time: 07/20/18  3:58 PM   Result Value Ref Range    LACTIC ACID 2 0 0 5 - 2 0 mmol/L   Blood culture #2    Collection Time: 07/20/18  4:02 PM   Result Value Ref Range    Blood Culture No Growth at 24 hrs      Fingerstick Glucose (POCT)    Collection Time: 07/20/18  9:32 PM   Result Value Ref Range    POC Glucose 74 65 - 140 mg/dl   Basic metabolic panel    Collection Time: 07/21/18  6:38 AM   Result Value Ref Range    Sodium 139 136 - 145 mmol/L    Potassium 3 9 3 5 - 5 3 mmol/L    Chloride 104 100 - 108 mmol/L    CO2 29 21 - 32 mmol/L    Anion Gap 6 4 - 13 mmol/L    BUN 7 5 - 25 mg/dL    Creatinine 0 65 0 60 - 1 30 mg/dL    Glucose 102 65 - 140 mg/dL    Calcium 8 7 8 3 - 10 1 mg/dL    eGFR 105 ml/min/1 73sq m   Magnesium    Collection Time: 07/21/18  6:38 AM   Result Value Ref Range    Magnesium 1 7 1 6 - 2 6 mg/dL   CBC and differential    Collection Time: 07/21/18  6:38 AM   Result Value Ref Range    WBC 8 82 4 31 - 10 16 Thousand/uL    RBC 3 68 (L) 3 88 - 5 62 Million/uL    Hemoglobin 11 0 (L) 12 0 - 17 0 g/dL    Hematocrit 35 1 (L) 36 5 - 49 3 %    MCV 95 82 - 98 fL    MCH 29 9 26 8 - 34 3 pg    MCHC 31 3 (L) 31 4 - 37 4 g/dL    RDW 13 2 11 6 - 15 1 %    MPV 8 6 (L) 8 9 - 12 7 fL    Platelets 243 601 - 059 Thousands/uL    nRBC 0 /100 WBCs    Neutrophils Relative 56 43 - 75 %    Immat GRANS % 0 0 - 2 %    Lymphocytes Relative 33 14 - 44 %    Monocytes Relative 7 4 - 12 %    Eosinophils Relative 3 0 - 6 %    Basophils Relative 1 0 - 1 %    Neutrophils Absolute 4 94 1 85 - 7 62 Thousands/µL    Immature Grans Absolute 0 02 0 00 - 0 20 Thousand/uL    Lymphocytes Absolute 2 93 0 60 - 4 47 Thousands/µL    Monocytes Absolute 0 62 0 17 - 1 22 Thousand/µL    Eosinophils Absolute 0 24 0 00 - 0 61 Thousand/µL    Basophils Absolute 0 07 0 00 - 0 10 Thousands/µL   Procalcitonin    Collection Time: 07/21/18 6:38 AM   Result Value Ref Range    Procalcitonin <0 05 <=0 25 ng/ml   Fingerstick Glucose (POCT)    Collection Time: 07/21/18  7:07 AM   Result Value Ref Range    POC Glucose 104 65 - 140 mg/dl   Fingerstick Glucose (POCT)    Collection Time: 07/21/18 12:37 PM   Result Value Ref Range    POC Glucose 105 65 - 140 mg/dl   Hemoglobin A1C    Collection Time: 07/21/18  3:57 PM   Result Value Ref Range    Hemoglobin A1C 6 2 4 2 - 6 3 %     mg/dl   Fingerstick Glucose (POCT)    Collection Time: 07/21/18  4:26 PM   Result Value Ref Range    POC Glucose 124 65 - 140 mg/dl   Fingerstick Glucose (POCT)    Collection Time: 07/21/18  9:04 PM   Result Value Ref Range    POC Glucose 127 65 - 140 mg/dl   Basic metabolic panel    Collection Time: 07/22/18  6:17 AM   Result Value Ref Range    Sodium 136 136 - 145 mmol/L    Potassium 3 9 3 5 - 5 3 mmol/L    Chloride 103 100 - 108 mmol/L    CO2 27 21 - 32 mmol/L    Anion Gap 6 4 - 13 mmol/L    BUN 7 5 - 25 mg/dL    Creatinine 0 66 0 60 - 1 30 mg/dL    Glucose 121 65 - 140 mg/dL    Calcium 9 2 8 3 - 10 1 mg/dL    eGFR 104 ml/min/1 73sq m   CBC    Collection Time: 07/22/18  6:17 AM   Result Value Ref Range    WBC 7 25 4 31 - 10 16 Thousand/uL    RBC 3 82 (L) 3 88 - 5 62 Million/uL    Hemoglobin 11 7 (L) 12 0 - 17 0 g/dL    Hematocrit 36 5 36 5 - 49 3 %    MCV 96 82 - 98 fL    MCH 30 6 26 8 - 34 3 pg    MCHC 32 1 31 4 - 37 4 g/dL    RDW 13 2 11 6 - 15 1 %    Platelets 837 428 - 695 Thousands/uL    MPV 8 6 (L) 8 9 - 12 7 fL   Fingerstick Glucose (POCT)    Collection Time: 07/22/18  7:01 AM   Result Value Ref Range    POC Glucose 120 65 - 140 mg/dl   Fingerstick Glucose (POCT)    Collection Time: 07/22/18 11:42 AM   Result Value Ref Range    POC Glucose 182 (H) 65 - 140 mg/dl       Results from last 7 days  Lab Units 07/21/18  0638   MAGNESIUM mg/dL 1 7             No results found for: TROPONINT  ABG:No results found for: PHART, BTB6UUC, PO2ART, PIY1GLX, P5FGNEEK, BEART, SOURCE        Cultures    Results from last 7 days  Lab Units 07/20/18  1602 07/20/18  1558 07/20/18  1135   BLOOD CULTURE  No Growth at 24 hrs  No Growth at 24 hrs   --    GRAM STAIN RESULT   --   --  No polys seen  1+ Gram positive cocci in pairs   WOUND CULTURE   --   --  2+ Growth of       Xr Knee 1 Or 2 Vw Right    Result Date: 7/20/2018  Narrative: RIGHT KNEE INDICATION:   infection  "POST OP INFECTION?" COMPARISON:  None VIEWS:  XR KNEE 1 OR 2 VW RIGHT FINDINGS: There is no acute fracture or dislocation  No osteomyelitis  Large suprapatellar joint effusion  No significant degenerative changes  No lytic or blastic lesions are seen  Diffuse vascular calcifications  Impression: Large suprapatellar joint effusion  Consider aspirating if there is a suspicion for septic arthritis  No osteomyelitis  Workstation performed: [id number]     Vas Lower Limb Venous Duplex Study, Unilateral/limited    Result Date: 7/2/2018  Narrative:  [facility] REPORT [facility]INICAL: Indications: Limb Pain [M79 [id number]]  Patient presents with right lower extremity pain x several days  Risk Factors The patient has history of HTN and hyperlipidemia  FINDINGS:  Segment  Right            Left              Impression       Impression       CFV      Normal (Patent)  Normal (Patent)     CONCLUSION: Impression: RIGHT LOWER LIMB No evidence of acute or chronic deep vein thrombosis   No evidence of superficial thrombophlebitis noted  Doppler evaluation shows a normal response to augmentation maneuvers  Popliteal, posterior tibial and anterior tibial arterial Doppler waveforms are triphasic  LEFT LOWER LIMB LIMITED Evaluation shows no evidence of thrombus in the common femoral vein  Doppler evaluation shows a normal response to augmentation maneuvers  SIGNATURE: Electronically Signed by: Gabriela Garcia MD, [address] on 2018-07-02 10:26:44 PM    I have personally reviewed pertinent reports        Principal Problem:    Sepsis ([address] )  Active Problems:    Hyperlipidemia    Borderline diabetes    Hypertension    Thrombocytosis (HCC)    Depression    Staphylococcal arthritis of right knee (HCC)      Assessment/Plan: This is a middle-age man with the above list of comorbidities, was recently hospitalized for right knee acute septic infrapatellar bursitis and septic arthritis with Staphylococcus aureus, methicillin sensitive  This had been treated with 3 knee surgeries, incision and drainage and subsequent wound closure and nearly 6 weeks of intravenous antimicrobial treatment  He presented to the hospital with nonhealing operative wound and persistent drainage  There were no signs of systemic sepsis  The wound drainage on the Gram stain did not show any polymorphonuclear leukocytes, cultures have not yielded any pathogens  The   procalcitonin level was normal   The sedimentation rate is down from 59 on June 22 to 13 on July 20; the CRP more than 90 on June 13, down to 7 on July 13  All these parameters support resolution of the infection  Reason for poor wound healing is unclear  Await orthopedic surgeon's input regards further surgical intervention to promote healing  In the interim, continue ceftriaxone

## 2018-07-22 NOTE — CASE MANAGEMENT
Continued Stay Review    Date: 7/22/18    Vital Signs: /79 (BP Location: Right arm)   Pulse 84   Temp 98 4 °F (36 9 °C) (Oral)   Resp 18   Ht 5' 10" (1 778 m)   Wt 83 9 kg (185 lb)   SpO2 96%   BMI 26 54 kg/m²     gmf  c reactive protein  Medications:   Scheduled Meds:   Current Facility-Administered Medications:  acetaminophen 650 mg Oral Q6H PRN Kathe First, CRNP    ALPRAZolam 0 25 mg Oral TID PRN Kathe First, CRNP    cefTRIAXone 1,000 mg Intravenous Q24H Kathe First, CRNP Last Rate: 1,000 mg (07/22/18 0801)   docusate sodium 100 mg Oral BID Kathe First, CRNP    ferrous sulfate 325 mg Oral Daily With Breakfast Marcella Revankar, DO    heparin (porcine) 5,000 Units Subcutaneous Q12H [address] Kathe First, CRNP    ibuprofen 600 mg Oral Q6H PRN Kathe First, CRNP    insulin lispro 1-5 Units Subcutaneous TID AC Kathe First, CRNP    insulin lispro 1-5 Units Subcutaneous HS Kathe First, CRNP    lisinopril 20 mg Oral Daily Kathe First, CRNP    nicotine 1 patch Transdermal Daily Kathe First, CRNP    ondansetron 4 mg Intravenous Q6H PRN Kathe First, CRNP    oxyCODONE 5 mg Oral Q4H PRN Kathe First, CRNP    pravastatin 80 mg Oral Daily With Dinner Laney Dura Karolyn Lesch, CRNP    venlafaxine 75 mg Oral BID Kathe First, CRNP    zolpidem 10 mg Oral HS PRN Kathe First, CRNP      Continuous Infusions:    PRN Meds:   acetaminophen    ALPRAZolam    ibuprofen    ondansetron    oxyCODONE    zolpidem    Abnormal Labs/Diagnostic Results: HGB 11 7     Age/Sex: 66 y o  male     ATTENDING  Staphylococcal arthritis of right knee  BCx showing no growth at 24hr  May need a wound VAC to heal with closure based on ortho recommendation   Pain control with Tylenol, Motrin, or Oxycodone prn   PT/OT  F/U final blood and wound cultures   Monitor for worsening signs of infection, CBC/D in am      PER ID  He presented to the hospital with nonhealing operative wound and persistent drainage  There were no signs of systemic sepsis  The wound drainage on the Gram stain did not show any polymorphonuclear leukocytes, cultures have not yielded any pathogens  The   procalcitonin level was normal   The sedimentation rate is down from 59 on June 22 to 13 on July 20; the CRP more than 90 on June 13, down to 7 on July 13  All these parameters support resolution of the infection  Reason for poor wound healing is unclear  Await orthopedic surgeon's input regards further surgical intervention to promote healing  In the interim, continue ceftriaxone      PER SURGERY   Will reevaluate in am concerning further surgical intervention and possible wound vac

## 2018-07-22 NOTE — PLAN OF CARE
Problem: DISCHARGE PLANNING - CARE MANAGEMENT  Goal: Discharge to post-acute care or home with appropriate resources  INTERVENTIONS:  - Conduct assessment to determine patient/family and health care team treatment goals, and need for post-acute services based on payer coverage, community resources, and patient preferences, and barriers to discharge  - Address psychosocial, clinical, and financial barriers to discharge as identified in assessment in conjunction with the patient/family and health care team  - Arrange appropriate level of post-acute services according to patient's   needs and preference and payer coverage in collaboration with the physician and health care team  - Communicate with and update the patient/family, physician, and health care team regarding progress on the discharge plan  - Arrange appropriate transportation to post-acute venues  To home independetly  Outcome: Progressing

## 2018-07-23 LAB
AMPHETAMINES SERPL QL SCN: NEGATIVE
BARBITURATES UR QL: NEGATIVE
BENZODIAZ UR QL: NEGATIVE
COCAINE UR QL: NEGATIVE
CRP SERPL QL: 12.9 MG/L
GLUCOSE SERPL-MCNC: 123 MG/DL (ref 65–140)
GLUCOSE SERPL-MCNC: 151 MG/DL (ref 65–140)
GLUCOSE SERPL-MCNC: 261 MG/DL (ref 65–140)
GLUCOSE SERPL-MCNC: 95 MG/DL (ref 65–140)
METHADONE UR QL: NEGATIVE
OPIATES UR QL SCN: POSITIVE
PCP UR QL: NEGATIVE
THC UR QL: POSITIVE

## 2018-07-23 PROCEDURE — 82948 REAGENT STRIP/BLOOD GLUCOSE: CPT

## 2018-07-23 PROCEDURE — 80307 DRUG TEST PRSMV CHEM ANLYZR: CPT | Performed by: SPECIALIST

## 2018-07-23 PROCEDURE — 86140 C-REACTIVE PROTEIN: CPT | Performed by: SPECIALIST

## 2018-07-23 PROCEDURE — 99024 POSTOP FOLLOW-UP VISIT: CPT | Performed by: ORTHOPAEDIC SURGERY

## 2018-07-23 PROCEDURE — 99232 SBSQ HOSP IP/OBS MODERATE 35: CPT | Performed by: NURSE PRACTITIONER

## 2018-07-23 RX ORDER — DIAPER,BRIEF,INFANT-TODD,DISP
1 EACH MISCELLANEOUS 4 TIMES DAILY PRN
Status: DISCONTINUED | OUTPATIENT
Start: 2018-07-23 | End: 2018-07-25 | Stop reason: HOSPADM

## 2018-07-23 RX ADMIN — OXYCODONE HYDROCHLORIDE 5 MG: 5 TABLET ORAL at 14:09

## 2018-07-23 RX ADMIN — ALPRAZOLAM 0.25 MG: 0.25 TABLET ORAL at 14:09

## 2018-07-23 RX ADMIN — OXYCODONE HYDROCHLORIDE 5 MG: 5 TABLET ORAL at 09:33

## 2018-07-23 RX ADMIN — HEPARIN SODIUM 5000 UNITS: 5000 INJECTION, SOLUTION INTRAVENOUS; SUBCUTANEOUS at 08:42

## 2018-07-23 RX ADMIN — HEPARIN SODIUM 5000 UNITS: 5000 INJECTION, SOLUTION INTRAVENOUS; SUBCUTANEOUS at 21:48

## 2018-07-23 RX ADMIN — DOCUSATE SODIUM 100 MG: 100 CAPSULE, LIQUID FILLED ORAL at 08:41

## 2018-07-23 RX ADMIN — ALPRAZOLAM 0.25 MG: 0.25 TABLET ORAL at 00:05

## 2018-07-23 RX ADMIN — HYDROCORTISONE 1 APPLICATION: 1 CREAM TOPICAL at 12:01

## 2018-07-23 RX ADMIN — OXYCODONE HYDROCHLORIDE 5 MG: 5 TABLET ORAL at 05:37

## 2018-07-23 RX ADMIN — VENLAFAXINE 75 MG: 37.5 TABLET ORAL at 21:47

## 2018-07-23 RX ADMIN — DOCUSATE SODIUM 100 MG: 100 CAPSULE, LIQUID FILLED ORAL at 21:47

## 2018-07-23 RX ADMIN — OXYCODONE HYDROCHLORIDE 5 MG: 5 TABLET ORAL at 21:47

## 2018-07-23 RX ADMIN — LISINOPRIL 20 MG: 20 TABLET ORAL at 08:45

## 2018-07-23 RX ADMIN — FERROUS SULFATE TAB 325 MG (65 MG ELEMENTAL FE) 325 MG: 325 (65 FE) TAB at 08:41

## 2018-07-23 RX ADMIN — ZOLPIDEM TARTRATE 10 MG: 5 TABLET ORAL at 23:01

## 2018-07-23 RX ADMIN — CEFTRIAXONE 1000 MG: 1 INJECTION, SOLUTION INTRAVENOUS at 08:40

## 2018-07-23 RX ADMIN — PRAVASTATIN SODIUM 80 MG: 80 TABLET ORAL at 16:58

## 2018-07-23 RX ADMIN — MAGNESIUM HYDROXIDE 30 ML: 400 SUSPENSION ORAL at 14:10

## 2018-07-23 RX ADMIN — INSULIN LISPRO 2 UNITS: 100 INJECTION, SOLUTION INTRAVENOUS; SUBCUTANEOUS at 16:59

## 2018-07-23 RX ADMIN — VENLAFAXINE 75 MG: 37.5 TABLET ORAL at 08:41

## 2018-07-23 NOTE — PROGRESS NOTES
Progress Note - Gila Crawford 1955, 66 y o  male MRN: [id number]    Unit/Bed#: [address] [place] [address] Encounter: [id number]    Primary Care Provider: Elliott Khan MD   Date and time admitted to hospital: 7/20/2018  3:42 PM        * Sepsis [facility])   [address], as evidenced by tachycardia and leukocytosis with chronic right knee wound  Admitted to [facility] between 6/5 - 6/16/18 for septic joint of right knee, staphylococcal arthritis of right knee, infrapatellar bursitis of right knee and sepsis  At that time, right knee aspirate and tissue cultures grew MSSA  Venous Doppler at that time to rule out DVT, negative   Orthopedics and ID followed  S/p right knee surgical irrigation debridement of bursa and infected joint on 6/8/18 (Dr Rica Virgen)  S/p repeat right I&D of infrapatellar bursa and irrigation/washout of right knee on 6/11/18  S/p right knee arthrotomy, I&D, irrigation debridement with complex wound closure on 6/14/18  Patient declined STR placement  He insists to go home  Managed with ceftriaxone 2gm IV daily to facilitate outpatient therapy in the infusion center, last dose of ABX 7/16/18  S/p removal of LUE single-lumen PICC on 7/16/18  Per RN, there was no redness or signs of infection during removal     Given Zosyn and Vancomycin x1 in ED   Consulted ID and orthopedics, appreciate ID recommendations  Pending orthopedic recommendations    Procalcitonin < 0 05, CRP 12 9, SED rate 20 ->13   BCx showing no growth at 48hr  Based on previous wound culture growing MSSA, continue IV Rocephin daily, day #3  May need a wound VAC to heal with closure based on ortho recommendation   Pain control with Tylenol, Motrin, or Oxycodone prn   PT/OT  F/U final blood and wound cultures   Monitor for worsening signs of infection, CBC/D in am         Staphylococcal arthritis of right knee [facility])   Assessment & Plan    Admitted to [facility] between 6/5 - 6/16/18 for septic joint of right knee, staphylococcal arthritis of right knee, infrapatellar bursitis of right knee and sepsis  At that time, right knee aspirate and tissue cultures grew MSSA  Venous Doppler at that time to rule out DVT, negative   Orthopedics and ID followed  S/p right knee surgical irrigation debridement of bursa and infected joint on 6/8/18 (Dr Jeison Thomas)  S/p repeat right I&D of infrapatellar bursa and irrigation/washout of right knee on 6/11/18  S/p right knee arthrotomy, I&D, irrigation debridement with complex wound closure on 6/14/18  Patient declined STR placement  He insists to go home  Managed with ceftriaxone 2gm IV daily to facilitate outpatient therapy in the infusion center, last dose of ABX 7/16/18  S/p removal of LUE single-lumen PICC on 7/16/18  Per RN, there was no redness or signs of infection during removal     Given Zosyn and Vancomycin x1 in ED   Consulted ID and orthopedics, appreciate ID recommendations  Pending orthopedic recommendations  Procalcitonin < 0 05, CRP 12 9, SED rate 20 ->13   BCx showing no growth at 48hr  Based on previous wound culture growing MSSA, continue IV Rocephin daily, day #3  May need a wound VAC to heal with closure based on ortho recommendation   Pain control with Tylenol, Motrin, or Oxycodone prn   PT/OT  F/U final blood and wound cultures   Monitor for worsening signs of infection, CBC/D in am         Anxiety and depression   Assessment & Plan    Continue Effexor and Xanax prn        Thrombocytosis (HCC)   Assessment & Plan    Mild,  -> 319  Likely reactive  Resolved  Hypertension   Assessment & Plan    Continue ACEi  Pain control  Monitor           Borderline diabetes   Assessment & Plan    A1c 7 0 -> 6 2  Continue diabetic diet         Hyperlipidemia   Assessment & Plan    Continue statin            VTE Pharmacologic Prophylaxis:   Pharmacologic: Heparin  Mechanical VTE Prophylaxis in Place: Yes    Patient Centered Rounds: I have performed bedside rounds with nursing staff today     Discussions with Specialists or Other Care Team Provider:    Nursing, case management, ID note appreciated    Education and Discussions with Family / Patient:  I have answered all questions to the best of my ability  Significant other at bedside    Time Spent for Care: 20 minutes  More than 50% of total time spent on counseling and coordination of care as described above  Current Length of Stay: 3 day(s)    Current Patient Status: Inpatient   Certification Statement: The patient will continue to require additional inpatient hospital stay due to Chronic right knee wound, on IV antibiotics, possible need for wound VAC for wound closure    Discharge Plan:   Patient is not medically stable for discharge today due to recurrent right knee wound infection and nonhealing issues, may require a wound VAC after evaluation by Dr Oliver Feeling  Code Status: Level 1 - Full Code      Subjective:   Observed resting out of bed in chair with right leg elevated  Reports right knee pain is controlled with current regimen  Ambulating room frequently  Reports a better night sleep  Reports itching to his right leg for which he is requesting hydrocortisone cream   Denies headache, dizziness, chest pain, palpitations, shortness of breath  Objective:     Vitals:   Temp (24hrs), Av 2 °F (36 8 °C), Min:98 °F (36 7 °C), Max:98 4 °F (36 9 °C)    HR:  [] 104  Resp:  [18] 18  BP: (128-162)/(70-97) 147/97  SpO2:  [97 %-98 %] 98 %  Body mass index is 26 54 kg/m²  Input and Output Summary (last 24 hours):     No intake or output data in the 24 hours ending 18 1045    Physical Exam:     Physical Exam   Constitutional: He is oriented to person, place, and time  He appears well-developed  No distress  HENT:   Head: Normocephalic  Neck: Normal range of motion  Cardiovascular: Normal rate, regular rhythm and intact distal pulses  Murmur heard    Pulmonary/Chest: Effort normal and breath sounds normal  No respiratory distress  He has no wheezes  He has no rhonchi  He has no rales  Abdominal: Soft  Bowel sounds are normal    Musculoskeletal: He exhibits edema and tenderness  Neurological: He is alert and oriented to person, place, and time  Skin: Skin is warm and dry  No rash noted  He is not diaphoretic  Psychiatric: His speech is normal and behavior is normal  His mood appears anxious  Nursing note and vitals reviewed  Additional Data:     Labs:      Results from last 7 days  Lab Units 07/22/18  0617 07/21/18  0638   WBC Thousand/uL 7 25 8 82   HEMOGLOBIN g/dL 11 7* 11 0*   HEMATOCRIT % 36 5 35 1*   PLATELETS Thousands/uL 319 344   NEUTROS PCT %  --  56   LYMPHS PCT %  --  33   MONOS PCT %  --  7   EOS PCT %  --  3       Results from last 7 days  Lab Units 07/22/18  0617  07/20/18  1558   SODIUM mmol/L 136  < > 136   POTASSIUM mmol/L 3 9  < > 4 3   CHLORIDE mmol/L 103  < > 100   CO2 mmol/L 27  < > 25   BUN mg/dL 7  < > 8   CREATININE mg/dL 0 66  < > 0 70   CALCIUM mg/dL 9 2  < > 8 8   TOTAL PROTEIN g/dL  --   --  7 7   BILIRUBIN TOTAL mg/dL  --   --  0 30   ALK PHOS U/L  --   --  73   ALT U/L  --   --  27   AST U/L  --   --  33   GLUCOSE RANDOM mg/dL 121  < > 151*   < > = values in this interval not displayed  * I Have Reviewed All Lab Data Listed Above  * Additional Pertinent Lab Tests Reviewed: All Labs Within Last 24 Hours Reviewed    Imaging:    Imaging Reports Reviewed Today Include:  Right knee x-ray  Imaging Personally Reviewed by Myself Includes:  None    Recent Cultures (last 7 days):       Results from last 7 days  Lab Units 07/20/18  1602 07/20/18  1558 07/20/18  1135   BLOOD CULTURE  No Growth at 48 hrs   No Growth at 48 hrs   --    GRAM STAIN RESULT   --   --  No polys seen  1+ Gram positive cocci in pairs   WOUND CULTURE   --   --  2+ Growth of        Last 24 Hours Medication List:     Current Facility-Administered Medications:  acetaminophen 650 mg Oral Q6H PRN Thais Briggs Lieutenant Styles, SANCHO    ALPRAZolam 0 25 mg Oral TID PRN SANCHO Hilario    cefTRIAXone 1,000 mg Intravenous Q24H SANCHO Hilario Last Rate: 1,000 mg (07/23/18 0840)   docusate sodium 100 mg Oral BID SANCHO Hilario    ferrous sulfate 325 mg Oral Daily With Breakfast Marcella Revankar, DO    heparin (porcine) 5,000 Units Subcutaneous Q12H [facility] SANCHO Hilario    hydrocortisone 1 application Topical 4x Daily PRN SANCHO Hilario    ibuprofen 600 mg Oral Q6H PRN SANCHO Hilario    insulin lispro 1-5 Units Subcutaneous TID AC SANCHO Hilario    insulin lispro 1-5 Units Subcutaneous HS SANCHO Hilario    lisinopril 20 mg Oral Daily SANCHO Hilario    ondansetron 4 mg Intravenous Q6H PRN SANCHO Hilario    oxyCODONE 5 mg Oral Q4H PRN SANCHO Hilario    pravastatin 80 mg Oral Daily With Joshua SANCHO Walker Mai, Ala    venlafaxine 75 mg Oral BID SANCHO Hilario    zolpidem 10 mg Oral HS PRN SANCHO Hilario         Today, Patient Was Seen By: SANCHO Hilario    ** Please Note: Dictation voice to text software may have been used in the creation of this document   **

## 2018-07-23 NOTE — CASE MANAGEMENT
Continued Stay Review    Date: 7/23/18    Vital Signs: /97 (BP Location: Left arm)   Pulse 104   Temp 98 2 °F (36 8 °C) (Oral)   Resp 18   Ht 5' 10" (1 778 m)   Wt 83 9 kg (185 lb)   SpO2 98%   BMI 26 54 kg/m²     BMP  CBC   RAPID DRUG SCREEN URINE  Medications:   Scheduled Meds:   Current Facility-Administered Medications:  acetaminophen 650 mg Oral Q6H PRN Robbin Sin, CRNP    ALPRAZolam 0 25 mg Oral TID PRN Robbin Sin, CRNP    cefTRIAXone 1,000 mg Intravenous Q24H Robbin Sin, CRNP Last Rate: 1,000 mg (07/23/18 0840)   docusate sodium 100 mg Oral BID Robbin Sin, CRNP    ferrous sulfate 325 mg Oral Daily With Breakfast Marcella Randalar, DO    heparin (porcine) 5,000 Units Subcutaneous Q12H [facility] Robbin Sin, CRNP    hydrocortisone 1 application Topical 4x Daily PRN Robbin Sin, CRNP    ibuprofen 600 mg Oral Q6H PRN Robbin Sin, CRNP    insulin lispro 1-5 Units Subcutaneous TID AC Robbin Sin, CRNP    insulin lispro 1-5 Units Subcutaneous HS Robbin Sin, CRNP    lisinopril 20 mg Oral Daily Robbin Sin, CRNP    ondansetron 4 mg Intravenous Q6H PRN Robbin Sin, CRNP    oxyCODONE 5 mg Oral Q4H PRN Robbin Sin, CRNP    pravastatin 80 mg Oral Daily With Dinner Madonna Andre, CRNP    venlafaxine 75 mg Oral BID Robbin Sin, CRNP    zolpidem 10 mg Oral HS PRN Robbin Sin, CRNP      Continuous Infusions:    PRN Meds:   acetaminophen    ALPRAZolam    hydrocortisone    ibuprofen    ondansetron    oxyCODONE    zolpidem    Abnormal Labs/Diagnostic Results: C REACTIVE 12 9     Age/Sex: 66 y o  male     ATTENDING  Sepsis  Based on previous wound culture growing MSSA, continue IV Rocephin daily, day #3  May need a wound VAC to heal with closure based on ortho recommendation   Pain control with Tylenol, Motrin, or Oxycodone prn   PT/OT  F/U final blood and wound cultures   Monitor for worsening signs of infection, CBC/D in am    Certification Statement: The patient will continue to require additional inpatient hospital stay due to Chronic right knee wound, on IV antibiotics, possible need for wound VAC for wound closure    PER ID  Assessment/Plan: This is a middle-age man with above list of comorbidities including substance abuse and previous alcohol abuse, major depression, who presented with nonhealing draining right knee wound following a prolonged antimicrobial treatment for septic bursitis/and septic arthritis of right knee with methicillin sensitive Staphylococcus aureus  The reason for poor wound healing is unclear, the drainage probably is related to the wound communication with the joint space  Possible wound infection  No pathogens isolated so far  The await input by orthopedic surgeon, for possible wound debridement/further wound care advised  Check urine for any other substance abuse  Continue ceftriaxone in the interim

## 2018-07-23 NOTE — PROGRESS NOTES
Progress Note - Infectious Disease   Keven Srinivasan 66 y o  male MRN: [id number]  Unit/Bed#: [address] [place] [address] Encounter: [id number]      Subjective/Objective   Subjective:   No new symptoms, reports that the right knee pain is a little less  No chills or fever  Today there are no other members of family in the room, I queried about his substance abuse problem  He admits to using marijuana and denies any use of alcohol or cigarette smoking  He denied any other substance abuse    His mother had reported behavioral issues, such as flinging and breaking the plates, and home whenever he gets into bad mood , etc      Meds/Allergies     Current Facility-Administered Medications:     acetaminophen (TYLENOL) tablet 650 mg, 650 mg, Oral, Q6H PRN, Michael Sit, CRNP    ALPRAZolam Stephanie Ling) tablet 0 25 mg, 0 25 mg, Oral, TID PRN, Michael Sit, CRNP, 0 25 mg at 07/23/18 0005    cefTRIAXone (ROCEPHIN) IVPB (premix) 1,000 mg, 1,000 mg, Intravenous, Q24H, SANCHO Gamez, Last Rate: 100 mL/hr at 07/23/18 0840, 1,000 mg at 07/23/18 0840    docusate sodium (COLACE) capsule 100 mg, 100 mg, Oral, BID, SANCHO Gamez, 100 mg at 07/23/18 1409    ferrous sulfate tablet 325 mg, 325 mg, Oral, Daily With Breakfast, Marcella Revankar, DO, 325 mg at 07/23/18 0841    heparin (porcine) subcutaneous injection 5,000 Units, 5,000 Units, Subcutaneous, Q12H [facility] & assisted, 5,000 Units at 07/23/18 0842 **AND** Platelet count, , , Once, SANCHO Gamez    hydrocortisone 1 % cream 1 application, 1 application, Topical, 4x Daily PRN, Michael Sit, CRNP    ibuprofen (MOTRIN) tablet 600 mg, 600 mg, Oral, Q6H PRN, Michael Sit, CRNP, 600 mg at 07/22/18 1155    insulin lispro (HumaLOG) 100 units/mL subcutaneous injection 1-5 Units, 1-5 Units, Subcutaneous, TID AC, 2 Units at 07/22/18 1823 **AND** Fingerstick Glucose (POCT), , , MARIO BOONE, SANCHO Wilson    insulin lispro (HumaLOG) 100 units/mL subcutaneous injection 1-5 Units, 1-5 Units, Subcutaneous, HS, SANCHO Gamez    lisinopril (ZESTRIL) tablet 20 mg, 20 mg, Oral, Daily, SANCHO Gamez, 20 mg at 07/23/18 0845    ondansetron [facility]) injection 4 mg, 4 mg, Intravenous, Q6H PRN, SANCHO Rollins    oxyCODONE (ROXICODONE) IR tablet 5 mg, 5 mg, Oral, Q4H PRN, SANCHO Rollins, 5 mg at 07/23/18 3514    pravastatin (PRAVACHOL) tablet 80 mg, 80 mg, Oral, Daily With SANCHO Tinoco, 80 mg at 07/22/18 1728    venlafaxine [facility]) tablet 75 mg, 75 mg, Oral, BID, SANCHO Rollins, 75 mg at 07/23/18 7575    zolpidem (AMBIEN) tablet 10 mg, 10 mg, Oral, HS PRN, SANCHO oRllins, 10 mg at 07/22/18 2238  Allergies   Allergen Reactions    Iodine Anaphylaxis    Shellfish-Derived Products      all current active meds have been reviewed    PTA meds:    Prescriptions Prior to Admission   Medication Sig Dispense Refill Last Dose    ALPRAZolam (XANAX) 0 5 mg tablet Take 0 5 mg by mouth 2 (two) times a day as needed     7/19/2018 at Unknown time    calamine-zinc oxide 8-8 % Apply topically 2 (two) times a day 118 mL 0 Past Week at Unknown time    clobetasol (TEMOVATE) 0 05 % cream Apply topically 2 (two) times a day 30 g 0 Past Month at Unknown time    diphenhydrAMINE-zinc acetate (BENADRYL) cream Apply topically 3 (three) times a day as needed for itching 28 4 g 0 Past Month at Unknown time    docusate sodium (COLACE) 100 mg capsule Take 1 capsule (100 mg total) by mouth 2 (two) times a day for 30 days 60 capsule 0 Past Month at Unknown time    ENALAPRIL MALEATE PO Take 5 mg by mouth 3 (three) times a day     7/20/2018 at Unknown time    ibuprofen (MOTRIN) 400 mg tablet Take by mouth every 6 (six) hours as needed for mild pain   Taking    metFORMIN (GLUCOPHAGE) 1000 MG tablet Take 1,000 mg by mouth daily with breakfast   7/20/2018 at Unknown time    rosuvastatin (CRESTOR) 10 MG tablet Take 10 mg by mouth daily  2018 at Unknown time    venlafaxine [facility]) 75 mg tablet Take 75 mg by mouth 2 (two) times a day   2018 at Unknown time    zolpidem (AMBIEN) 10 mg tablet Take 10 mg by mouth daily at bedtime as needed for sleep   2018 at Unknown time    ibuprofen (MOTRIN) 400 mg tablet Take 1 tablet (400 mg total) by mouth every 8 (eight) hours as needed for mild pain or moderate pain for up to 10 days 20 tablet 0 More than a month at Unknown time    nystatin (MYCOSTATIN) powder Apply 1 application topically 2 (two) times a day for 10 days 30 g 0 More than a month at Unknown time    nystatin (MYCOSTATIN) powder Apply topically 4 (four) times a day   More than a month at Unknown time    and discontinued meds:   Medications Discontinued During This Encounter   Medication Reason    vancomycin (VANCOCIN) IVPB (premix) 1,000 mg     zolpidem (AMBIEN) tablet 5 mg     sodium chloride 0 9 % infusion     nicotine (NICODERM CQ) 7 mg/24hr TD 24 hr patch 1 patch        Physical Exam: Physical Exam    HR:  [] 104  Resp:  [18] 18  BP: (128-162)/(70-97) 147/97  SpO2:  [97 %-98 %] 98 %  Body mass index is 26 54 kg/m²  Weight (last 2 days)     None        Temp (24hrs), Av 2 °F (36 8 °C), Min:98 °F (36 7 °C), Max:98 4 °F (36 9 °C)  Current: Temperature: 98 2 °F (36 8 °C)AGE@  No intake or output data in the 24 hours ending 18 1100  He has been afebrile, vital signs are stable except for periodic tachycardia  He seems alert and cheerful  Lungs are clear  Heart sounds are regular  There is no murmur  Abdomen is soft and nontender  The right knee swelling is less, there is no erythema or increased heat  Mild tenderness  It continues to drain mostly clear fluid, today there is some blood on the dressing  The calf is supple  The IV site is clean        Invasive Devices     Peripheral Intravenous Line            Peripheral IV 18 Left Hand 1 day          Drain Closed/Suction Drain Right;Lateral Knee Accordion 10 Fr  41 days                            Lab, Imaging and other studies:    Recent Results (from the past 96 hour(s))   Wound culture and Gram stain    Collection Time: 07/20/18 11:35 AM   Result Value Ref Range    Wound Culture 2+ Growth of      Gram Stain Result No polys seen     Gram Stain Result 1+ Gram positive cocci in pairs    CBC and differential    Collection Time: 07/20/18  3:58 PM   Result Value Ref Range    WBC 10 82 (H) 4 31 - 10 16 Thousand/uL    RBC 3 78 (L) 3 88 - 5 62 Million/uL    Hemoglobin 11 7 (L) 12 0 - 17 0 g/dL    Hematocrit 36 1 (L) 36 5 - 49 3 %    MCV 96 82 - 98 fL    MCH 31 0 26 8 - 34 3 pg    MCHC 32 4 31 4 - 37 4 g/dL    RDW 13 4 11 6 - 15 1 %    MPV 8 9 8 9 - 12 7 fL    Platelets 646 (H) 031 - 390 Thousands/uL    nRBC 0 /100 WBCs    Neutrophils Relative 68 43 - 75 %    Immat GRANS % 0 0 - 2 %    Lymphocytes Relative 24 14 - 44 %    Monocytes Relative 5 4 - 12 %    Eosinophils Relative 2 0 - 6 %    Basophils Relative 1 0 - 1 %    Neutrophils Absolute 7 45 1 85 - 7 62 Thousands/µL    Immature Grans Absolute 0 03 0 00 - 0 20 Thousand/uL    Lymphocytes Absolute 2 55 0 60 - 4 47 Thousands/µL    Monocytes Absolute 0 49 0 17 - 1 22 Thousand/µL    Eosinophils Absolute 0 22 0 00 - 0 61 Thousand/µL    Basophils Absolute 0 08 0 00 - 0 10 Thousands/µL   Comprehensive metabolic panel    Collection Time: 07/20/18  3:58 PM   Result Value Ref Range    Sodium 136 136 - 145 mmol/L    Potassium 4 3 3 5 - 5 3 mmol/L    Chloride 100 100 - 108 mmol/L    CO2 25 21 - 32 mmol/L    Anion Gap 11 4 - 13 mmol/L    BUN 8 5 - 25 mg/dL    Creatinine 0 70 0 60 - 1 30 mg/dL    Glucose 151 (H) 65 - 140 mg/dL    Calcium 8 8 8 3 - 10 1 mg/dL    AST 33 5 - 45 U/L    ALT 27 12 - 78 U/L    Alkaline Phosphatase 73 46 - 116 U/L    Total Protein 7 7 6 4 - 8 2 g/dL    Albumin 3 8 3 5 - 5 0 g/dL    Total Bilirubin 0 30 0 20 - 1 00 mg/dL    eGFR 102 ml/min/1 73sq m Blood culture #1    Collection Time: 07/20/18  3:58 PM   Result Value Ref Range    Blood Culture No Growth at 48 hrs  Sedimentation rate, automated    Collection Time: 07/20/18  3:58 PM   Result Value Ref Range    Sed Rate 13 (H) 2 - 10 mm/hour   Lactic acid, plasma    Collection Time: 07/20/18  3:58 PM   Result Value Ref Range    LACTIC ACID 2 0 0 5 - 2 0 mmol/L   Blood culture #2    Collection Time: 07/20/18  4:02 PM   Result Value Ref Range    Blood Culture No Growth at 48 hrs      Fingerstick Glucose (POCT)    Collection Time: 07/20/18  9:32 PM   Result Value Ref Range    POC Glucose 74 65 - 140 mg/dl   MRSA culture    Collection Time: 07/20/18 11:32 PM   Result Value Ref Range    MRSA Culture Only       No Methicillin Resistant Staphlyococcus aureus (MRSA) isolated   Basic metabolic panel    Collection Time: 07/21/18  6:38 AM   Result Value Ref Range    Sodium 139 136 - 145 mmol/L    Potassium 3 9 3 5 - 5 3 mmol/L    Chloride 104 100 - 108 mmol/L    CO2 29 21 - 32 mmol/L    Anion Gap 6 4 - 13 mmol/L    BUN 7 5 - 25 mg/dL    Creatinine 0 65 0 60 - 1 30 mg/dL    Glucose 102 65 - 140 mg/dL    Calcium 8 7 8 3 - 10 1 mg/dL    eGFR 105 ml/min/1 73sq m   Magnesium    Collection Time: 07/21/18  6:38 AM   Result Value Ref Range    Magnesium 1 7 1 6 - 2 6 mg/dL   CBC and differential    Collection Time: 07/21/18  6:38 AM   Result Value Ref Range    WBC 8 82 4 31 - 10 16 Thousand/uL    RBC 3 68 (L) 3 88 - 5 62 Million/uL    Hemoglobin 11 0 (L) 12 0 - 17 0 g/dL    Hematocrit 35 1 (L) 36 5 - 49 3 %    MCV 95 82 - 98 fL    MCH 29 9 26 8 - 34 3 pg    MCHC 31 3 (L) 31 4 - 37 4 g/dL    RDW 13 2 11 6 - 15 1 %    MPV 8 6 (L) 8 9 - 12 7 fL    Platelets 467 377 - 358 Thousands/uL    nRBC 0 /100 WBCs    Neutrophils Relative 56 43 - 75 %    Immat GRANS % 0 0 - 2 %    Lymphocytes Relative 33 14 - 44 %    Monocytes Relative 7 4 - 12 %    Eosinophils Relative 3 0 - 6 %    Basophils Relative 1 0 - 1 %    Neutrophils Absolute 4 94 1 85 - 7 62 Thousands/µL    Immature Grans Absolute 0 02 0 00 - 0 20 Thousand/uL    Lymphocytes Absolute 2 93 0 60 - 4 47 Thousands/µL    Monocytes Absolute 0 62 0 17 - 1 22 Thousand/µL    Eosinophils Absolute 0 24 0 00 - 0 61 Thousand/µL    Basophils Absolute 0 07 0 00 - 0 10 Thousands/µL   Procalcitonin    Collection Time: 07/21/18  6:38 AM   Result Value Ref Range    Procalcitonin <0 05 <=0 25 ng/ml   Fingerstick Glucose (POCT)    Collection Time: 07/21/18  7:07 AM   Result Value Ref Range    POC Glucose 104 65 - 140 mg/dl   Fingerstick Glucose (POCT)    Collection Time: 07/21/18 12:37 PM   Result Value Ref Range    POC Glucose 105 65 - 140 mg/dl   Hemoglobin A1C    Collection Time: 07/21/18  3:57 PM   Result Value Ref Range    Hemoglobin A1C 6 2 4 2 - 6 3 %     mg/dl   Fingerstick Glucose (POCT)    Collection Time: 07/21/18  4:26 PM   Result Value Ref Range    POC Glucose 124 65 - 140 mg/dl   Fingerstick Glucose (POCT)    Collection Time: 07/21/18  9:04 PM   Result Value Ref Range    POC Glucose 127 65 - 140 mg/dl   C-reactive protein    Collection Time: 07/22/18  6:17 AM   Result Value Ref Range    CRP 15 8 (H) <3 0 mg/L   Basic metabolic panel    Collection Time: 07/22/18  6:17 AM   Result Value Ref Range    Sodium 136 136 - 145 mmol/L    Potassium 3 9 3 5 - 5 3 mmol/L    Chloride 103 100 - 108 mmol/L    CO2 27 21 - 32 mmol/L    Anion Gap 6 4 - 13 mmol/L    BUN 7 5 - 25 mg/dL    Creatinine 0 66 0 60 - 1 30 mg/dL    Glucose 121 65 - 140 mg/dL    Calcium 9 2 8 3 - 10 1 mg/dL    eGFR 104 ml/min/1 73sq m   CBC    Collection Time: 07/22/18  6:17 AM   Result Value Ref Range    WBC 7 25 4 31 - 10 16 Thousand/uL    RBC 3 82 (L) 3 88 - 5 62 Million/uL    Hemoglobin 11 7 (L) 12 0 - 17 0 g/dL    Hematocrit 36 5 36 5 - 49 3 %    MCV 96 82 - 98 fL    MCH 30 6 26 8 - 34 3 pg    MCHC 32 1 31 4 - 37 4 g/dL    RDW 13 2 11 6 - 15 1 %    Platelets 098 127 - 417 Thousands/uL    MPV 8 6 (L) 8 9 - 12 7 fL   Fingerstick Glucose (POCT)    Collection Time: 07/22/18  7:01 AM   Result Value Ref Range    POC Glucose 120 65 - 140 mg/dl   Fingerstick Glucose (POCT)    Collection Time: 07/22/18 11:42 AM   Result Value Ref Range    POC Glucose 182 (H) 65 - 140 mg/dl   Fingerstick Glucose (POCT)    Collection Time: 07/22/18  4:05 PM   Result Value Ref Range    POC Glucose 213 (H) 65 - 140 mg/dl   Fingerstick Glucose (POCT)    Collection Time: 07/22/18  8:56 PM   Result Value Ref Range    POC Glucose 106 65 - 140 mg/dl   C-reactive protein    Collection Time: 07/23/18  5:47 AM   Result Value Ref Range    CRP 12 9 (H) <3 0 mg/L   Fingerstick Glucose (POCT)    Collection Time: 07/23/18  7:47 AM   Result Value Ref Range    POC Glucose 123 65 - 140 mg/dl       Results from last 7 days  Lab Units 07/21/18  0638   MAGNESIUM mg/dL 1 7             No results found for: TROPONINT  ABG:No results found for: PHART, DMA5KUK, PO2ART, MQV7SVN, O3FHSNMQ, BEART, SOURCE        Cultures    Results from last 7 days  Lab Units 07/20/18  2332 07/20/18  1602 07/20/18  1558 07/20/18  1135   BLOOD CULTURE   --  No Growth at 48 hrs  No Growth at 48 hrs   --    GRAM STAIN RESULT   --   --   --  No polys seen  1+ Gram positive cocci in pairs   WOUND CULTURE   --   --   --  2+ Growth of    MRSA CULTURE ONLY  No Methicillin Resistant Staphlyococcus aureus (MRSA) isolated  --   --   --       Xr Knee 1 Or 2 Vw Right    Result Date: 7/20/2018  Narrative: RIGHT KNEE INDICATION:   infection  "POST OP INFECTION?" COMPARISON:  None VIEWS:  XR KNEE 1 OR 2 VW RIGHT FINDINGS: There is no acute fracture or dislocation  No osteomyelitis  Large suprapatellar joint effusion  No significant degenerative changes  No lytic or blastic lesions are seen  Diffuse vascular calcifications  Impression: Large suprapatellar joint effusion  Consider aspirating if there is a suspicion for septic arthritis  No osteomyelitis   Workstation performed: [id number]     Vas Lower Limb Venous Duplex Study, Unilateral/limited    Result Date: 7/2/2018  Narrative:  [facility] REPORT [facility]INICAL: Indications: Limb Pain [M79 [id number]]  Patient presents with right lower extremity pain x several days  Risk Factors The patient has history of HTN and hyperlipidemia  FINDINGS:  Segment  Right            Left              Impression       Impression       CFV      Normal (Patent)  Normal (Patent)     CONCLUSION: Impression: RIGHT LOWER LIMB No evidence of acute or chronic deep vein thrombosis   No evidence of superficial thrombophlebitis noted  Doppler evaluation shows a normal response to augmentation maneuvers  Popliteal, posterior tibial and anterior tibial arterial Doppler waveforms are triphasic  LEFT LOWER LIMB LIMITED Evaluation shows no evidence of thrombus in the common femoral vein  Doppler evaluation shows a normal response to augmentation maneuvers  SIGNATURE: Electronically Signed by: Gabriela Garcia MD, [address] on 2018-07-02 10:26:44 PM    I have personally reviewed pertinent reports  Principal Problem:    Sepsis ([address] )  Active Problems:    Hyperlipidemia    Borderline diabetes    Hypertension    Thrombocytosis (HCC)    Anxiety and depression    Staphylococcal arthritis of right knee (HCC)      Assessment/Plan: This is a middle-age man with above list of comorbidities including substance abuse and previous alcohol abuse, major depression, who presented with nonhealing draining right knee wound following a prolonged antimicrobial treatment for septic bursitis/and septic arthritis of right knee with methicillin sensitive Staphylococcus aureus  The reason for poor wound healing is unclear, the drainage probably is related to the wound communication with the joint space  Possible wound infection  No pathogens isolated so far  The await input by orthopedic surgeon, for possible wound debridement/further wound care advised  Check urine for any other substance abuse    Continue ceftriaxone in the interim

## 2018-07-24 LAB
ANION GAP SERPL CALCULATED.3IONS-SCNC: 9 MMOL/L (ref 4–13)
BUN SERPL-MCNC: 10 MG/DL (ref 5–25)
CALCIUM SERPL-MCNC: 9.5 MG/DL (ref 8.3–10.1)
CHLORIDE SERPL-SCNC: 101 MMOL/L (ref 100–108)
CO2 SERPL-SCNC: 27 MMOL/L (ref 21–32)
CREAT SERPL-MCNC: 0.71 MG/DL (ref 0.6–1.3)
ERYTHROCYTE [DISTWIDTH] IN BLOOD BY AUTOMATED COUNT: 12.9 % (ref 11.6–15.1)
GFR SERPL CREATININE-BSD FRML MDRD: 101 ML/MIN/1.73SQ M
GLUCOSE SERPL-MCNC: 102 MG/DL (ref 65–140)
GLUCOSE SERPL-MCNC: 106 MG/DL (ref 65–140)
GLUCOSE SERPL-MCNC: 111 MG/DL (ref 65–140)
GLUCOSE SERPL-MCNC: 120 MG/DL (ref 65–140)
HCT VFR BLD AUTO: 38.5 % (ref 36.5–49.3)
HGB BLD-MCNC: 12.3 G/DL (ref 12–17)
MCH RBC QN AUTO: 30.5 PG (ref 26.8–34.3)
MCHC RBC AUTO-ENTMCNC: 31.9 G/DL (ref 31.4–37.4)
MCV RBC AUTO: 96 FL (ref 82–98)
PLATELET # BLD AUTO: 358 THOUSANDS/UL (ref 149–390)
PMV BLD AUTO: 9.2 FL (ref 8.9–12.7)
POTASSIUM SERPL-SCNC: 4 MMOL/L (ref 3.5–5.3)
RBC # BLD AUTO: 4.03 MILLION/UL (ref 3.88–5.62)
SODIUM SERPL-SCNC: 137 MMOL/L (ref 136–145)
WBC # BLD AUTO: 8.1 THOUSAND/UL (ref 4.31–10.16)

## 2018-07-24 PROCEDURE — 99232 SBSQ HOSP IP/OBS MODERATE 35: CPT | Performed by: NURSE PRACTITIONER

## 2018-07-24 PROCEDURE — 82948 REAGENT STRIP/BLOOD GLUCOSE: CPT

## 2018-07-24 PROCEDURE — 85027 COMPLETE CBC AUTOMATED: CPT | Performed by: NURSE PRACTITIONER

## 2018-07-24 PROCEDURE — 80048 BASIC METABOLIC PNL TOTAL CA: CPT | Performed by: NURSE PRACTITIONER

## 2018-07-24 PROCEDURE — 99024 POSTOP FOLLOW-UP VISIT: CPT | Performed by: ORTHOPAEDIC SURGERY

## 2018-07-24 RX ORDER — LANOLIN ALCOHOL/MO/W.PET/CERES
3 CREAM (GRAM) TOPICAL
Status: DISCONTINUED | OUTPATIENT
Start: 2018-07-24 | End: 2018-07-24

## 2018-07-24 RX ORDER — ZOLPIDEM TARTRATE 5 MG/1
10 TABLET ORAL
Status: DISCONTINUED | OUTPATIENT
Start: 2018-07-24 | End: 2018-07-25 | Stop reason: HOSPADM

## 2018-07-24 RX ADMIN — DOCUSATE SODIUM 100 MG: 100 CAPSULE, LIQUID FILLED ORAL at 08:56

## 2018-07-24 RX ADMIN — PRAVASTATIN SODIUM 80 MG: 80 TABLET ORAL at 16:18

## 2018-07-24 RX ADMIN — CEFTRIAXONE 1000 MG: 1 INJECTION, SOLUTION INTRAVENOUS at 08:55

## 2018-07-24 RX ADMIN — OXYCODONE HYDROCHLORIDE 5 MG: 5 TABLET ORAL at 05:50

## 2018-07-24 RX ADMIN — OXYCODONE HYDROCHLORIDE 5 MG: 5 TABLET ORAL at 16:30

## 2018-07-24 RX ADMIN — LISINOPRIL 20 MG: 20 TABLET ORAL at 08:56

## 2018-07-24 RX ADMIN — OXYCODONE HYDROCHLORIDE 5 MG: 5 TABLET ORAL at 11:06

## 2018-07-24 RX ADMIN — HEPARIN SODIUM 5000 UNITS: 5000 INJECTION, SOLUTION INTRAVENOUS; SUBCUTANEOUS at 21:31

## 2018-07-24 RX ADMIN — DOCUSATE SODIUM 100 MG: 100 CAPSULE, LIQUID FILLED ORAL at 21:31

## 2018-07-24 RX ADMIN — FERROUS SULFATE TAB 325 MG (65 MG ELEMENTAL FE) 325 MG: 325 (65 FE) TAB at 08:56

## 2018-07-24 RX ADMIN — ZOLPIDEM TARTRATE 10 MG: 5 TABLET ORAL at 23:40

## 2018-07-24 RX ADMIN — VENLAFAXINE 75 MG: 37.5 TABLET ORAL at 08:56

## 2018-07-24 RX ADMIN — OXYCODONE HYDROCHLORIDE 5 MG: 5 TABLET ORAL at 21:23

## 2018-07-24 RX ADMIN — ALPRAZOLAM 0.25 MG: 0.25 TABLET ORAL at 16:29

## 2018-07-24 RX ADMIN — VENLAFAXINE 75 MG: 37.5 TABLET ORAL at 21:30

## 2018-07-24 RX ADMIN — IBUPROFEN 600 MG: 600 TABLET ORAL at 22:46

## 2018-07-24 RX ADMIN — ALPRAZOLAM 0.25 MG: 0.25 TABLET ORAL at 06:42

## 2018-07-24 RX ADMIN — MAGNESIUM HYDROXIDE 30 ML: 400 SUSPENSION ORAL at 09:03

## 2018-07-24 RX ADMIN — ALPRAZOLAM 0.25 MG: 0.25 TABLET ORAL at 22:30

## 2018-07-24 RX ADMIN — HEPARIN SODIUM 5000 UNITS: 5000 INJECTION, SOLUTION INTRAVENOUS; SUBCUTANEOUS at 08:56

## 2018-07-24 RX ADMIN — IBUPROFEN 600 MG: 600 TABLET ORAL at 06:41

## 2018-07-24 NOTE — PROGRESS NOTES
Progress Note - Infectious Disease   Radha Srinivasan 66 y o  male MRN: [id number]  Unit/Bed#: [address] [place] [address] Encounter: [id number]      Subjective/Objective   Subjective: The events since last seen were noted  Orthopedic input noted  Patient is anxious, worried why his urine was tested for drugs  Urine drug testing did reveal presence of THC and opioids  Complains of persistent discomfort in his right knee    The  Meds/Allergies     Current Facility-Administered Medications:     acetaminophen (TYLENOL) tablet 650 mg, 650 mg, Oral, Q6H PRN, SANCHO Gaspar    ALPRAZolam Buddie Blackford) tablet 0 25 mg, 0 25 mg, Oral, TID PRN, SANCHO Gaspar, 0 25 mg at 07/24/18 5481    cefTRIAXone (ROCEPHIN) IVPB (premix) 1,000 mg, 1,000 mg, Intravenous, Q24H, SANCHO Gamez, Last Rate: 100 mL/hr at 07/24/18 0855, 1,000 mg at 07/24/18 0855    docusate sodium (COLACE) capsule 100 mg, 100 mg, Oral, BID, SANCHO Gaspar, 100 mg at 07/24/18 0690    ferrous sulfate tablet 325 mg, 325 mg, Oral, Daily With Breakfast, Marcella Teear, DO, 325 mg at 07/24/18 0856    heparin (porcine) subcutaneous injection 5,000 Units, 5,000 Units, Subcutaneous, Q12H [address], 5,000 Units at 07/24/18 0856 **AND** Platelet count, , , Once, SANCHO Gamez    hydrocortisone 1 % cream 1 application, 1 application, Topical, 4x Daily PRN, SANCHO Gaspar, 1 application at 01/44/86 1201    ibuprofen (MOTRIN) tablet 600 mg, 600 mg, Oral, Q6H PRN, SANCHO Gaspar, 600 mg at 07/24/18 0641    insulin lispro (HumaLOG) 100 units/mL subcutaneous injection 1-5 Units, 1-5 Units, Subcutaneous, TID AC, 2 Units at 07/23/18 1659 **AND** Fingerstick Glucose (POCT), , , TID AC, Betzaida Speaker, CRNP    insulin lispro (HumaLOG) 100 units/mL subcutaneous injection 1-5 Units, 1-5 Units, Subcutaneous, HS, Cher Costello, SANCHO    lisinopril (ZESTRIL) tablet 20 mg, 20 mg, Oral, Daily, Anant RODGERS Sonialeo AguiarAll, 20 mg at 18 0856    magnesium hydroxide (MILK OF MAGNESIA) 400 mg/5 mL oral suspension 30 mL, 30 mL, Oral, Daily PRN, Derrick Fuel, CRNP, 30 mL at 18 3712    ondansetron [facility]) injection 4 mg, 4 mg, Intravenous, Q6H PRN, Derrick Fuel, CRNP    oxyCODONE (ROXICODONE) IR tablet 5 mg, 5 mg, Oral, Q4H PRN, Derrick Fuel, CRNP, 5 mg at 18 1106    pravastatin (PRAVACHOL) tablet 80 mg, 80 mg, Oral, Daily With Tayla Forward, CRNP, 80 mg at 18 1658    venlafaxine [facility]) tablet 75 mg, 75 mg, Oral, BID, Derrick Fuel, CRNP, 75 mg at 18 7857    zolpidem (AMBIEN) tablet 10 mg, 10 mg, Oral, HS PRN, Derrick Fuel, CRNP, 10 mg at 18 2301  Allergies   Allergen Reactions    Iodine Anaphylaxis    Shellfish-Derived Products      all current active meds have been reviewed    discontinued meds:   Medications Discontinued During This Encounter   Medication Reason    vancomycin (VANCOCIN) IVPB (premix) 1,000 mg     zolpidem (AMBIEN) tablet 5 mg     sodium chloride 0 9 % infusion     nicotine (NICODERM CQ) 7 mg/24hr TD 24 hr patch 1 patch        Physical Exam: Physical Exam    HR:  [86-94] 86  Resp:  [18] 18  BP: (111-150)/(66-87) 134/87  SpO2:  [93 %-98 %] 93 %  Body mass index is 26 54 kg/m²  Weight (last 2 days)     None        Temp (24hrs), Av 4 °F (36 9 °C), Min:97 3 °F (36 3 °C), Max:98 9 °F (37 2 °C)  Current: Temperature: (!) 97 3 °F (36 3 °C)AGE@    Intake/Output Summary (Last 24 hours) at 18 1417  Last data filed at 18 0501   Gross per 24 hour   Intake                0 ml   Output              300 ml   Net             -300 ml     He has been afebrile vital signs appear stable  He is alert and anxious  The vital signs are stable  The lungs are clear  Heart sounds are regular  Abdomen is soft and nontender  The right knee wound is unchanged, much less drainage on the dressing    The right knee itself remains  swollen, warmer, compared to the left knee  He reports tenderness  The calf is supple          Invasive Devices     Peripheral Intravenous Line            Peripheral IV 07/21/18 Left Hand 3 days          Drain            Closed/Suction Drain Right;Lateral Knee Accordion 10 Fr  42 days                            Lab, Imaging and other studies:    Recent Results (from the past 96 hour(s))   CBC and differential    Collection Time: 07/20/18  3:58 PM   Result Value Ref Range    WBC 10 82 (H) 4 31 - 10 16 Thousand/uL    RBC 3 78 (L) 3 88 - 5 62 Million/uL    Hemoglobin 11 7 (L) 12 0 - 17 0 g/dL    Hematocrit 36 1 (L) 36 5 - 49 3 %    MCV 96 82 - 98 fL    MCH 31 0 26 8 - 34 3 pg    MCHC 32 4 31 4 - 37 4 g/dL    RDW 13 4 11 6 - 15 1 %    MPV 8 9 8 9 - 12 7 fL    Platelets 140 (H) 288 - 390 Thousands/uL    nRBC 0 /100 WBCs    Neutrophils Relative 68 43 - 75 %    Immat GRANS % 0 0 - 2 %    Lymphocytes Relative 24 14 - 44 %    Monocytes Relative 5 4 - 12 %    Eosinophils Relative 2 0 - 6 %    Basophils Relative 1 0 - 1 %    Neutrophils Absolute 7 45 1 85 - 7 62 Thousands/µL    Immature Grans Absolute 0 03 0 00 - 0 20 Thousand/uL    Lymphocytes Absolute 2 55 0 60 - 4 47 Thousands/µL    Monocytes Absolute 0 49 0 17 - 1 22 Thousand/µL    Eosinophils Absolute 0 22 0 00 - 0 61 Thousand/µL    Basophils Absolute 0 08 0 00 - 0 10 Thousands/µL   Comprehensive metabolic panel    Collection Time: 07/20/18  3:58 PM   Result Value Ref Range    Sodium 136 136 - 145 mmol/L    Potassium 4 3 3 5 - 5 3 mmol/L    Chloride 100 100 - 108 mmol/L    CO2 25 21 - 32 mmol/L    Anion Gap 11 4 - 13 mmol/L    BUN 8 5 - 25 mg/dL    Creatinine 0 70 0 60 - 1 30 mg/dL    Glucose 151 (H) 65 - 140 mg/dL    Calcium 8 8 8 3 - 10 1 mg/dL    AST 33 5 - 45 U/L    ALT 27 12 - 78 U/L    Alkaline Phosphatase 73 46 - 116 U/L    Total Protein 7 7 6 4 - 8 2 g/dL    Albumin 3 8 3 5 - 5 0 g/dL    Total Bilirubin 0 30 0 20 - 1 00 mg/dL    eGFR 102 ml/min/1 73sq m   Blood culture #1    Collection Time: 07/20/18  3:58 PM   Result Value Ref Range    Blood Culture No Growth at 72 hrs  Sedimentation rate, automated    Collection Time: 07/20/18  3:58 PM   Result Value Ref Range    Sed Rate 13 (H) 2 - 10 mm/hour   Lactic acid, plasma    Collection Time: 07/20/18  3:58 PM   Result Value Ref Range    LACTIC ACID 2 0 0 5 - 2 0 mmol/L   Blood culture #2    Collection Time: 07/20/18  4:02 PM   Result Value Ref Range    Blood Culture No Growth at 72 hrs      Fingerstick Glucose (POCT)    Collection Time: 07/20/18  9:32 PM   Result Value Ref Range    POC Glucose 74 65 - 140 mg/dl   MRSA culture    Collection Time: 07/20/18 11:32 PM   Result Value Ref Range    MRSA Culture Only       No Methicillin Resistant Staphlyococcus aureus (MRSA) isolated   Basic metabolic panel    Collection Time: 07/21/18  6:38 AM   Result Value Ref Range    Sodium 139 136 - 145 mmol/L    Potassium 3 9 3 5 - 5 3 mmol/L    Chloride 104 100 - 108 mmol/L    CO2 29 21 - 32 mmol/L    Anion Gap 6 4 - 13 mmol/L    BUN 7 5 - 25 mg/dL    Creatinine 0 65 0 60 - 1 30 mg/dL    Glucose 102 65 - 140 mg/dL    Calcium 8 7 8 3 - 10 1 mg/dL    eGFR 105 ml/min/1 73sq m   Magnesium    Collection Time: 07/21/18  6:38 AM   Result Value Ref Range    Magnesium 1 7 1 6 - 2 6 mg/dL   CBC and differential    Collection Time: 07/21/18  6:38 AM   Result Value Ref Range    WBC 8 82 4 31 - 10 16 Thousand/uL    RBC 3 68 (L) 3 88 - 5 62 Million/uL    Hemoglobin 11 0 (L) 12 0 - 17 0 g/dL    Hematocrit 35 1 (L) 36 5 - 49 3 %    MCV 95 82 - 98 fL    MCH 29 9 26 8 - 34 3 pg    MCHC 31 3 (L) 31 4 - 37 4 g/dL    RDW 13 2 11 6 - 15 1 %    MPV 8 6 (L) 8 9 - 12 7 fL    Platelets 715 733 - 149 Thousands/uL    nRBC 0 /100 WBCs    Neutrophils Relative 56 43 - 75 %    Immat GRANS % 0 0 - 2 %    Lymphocytes Relative 33 14 - 44 %    Monocytes Relative 7 4 - 12 %    Eosinophils Relative 3 0 - 6 %    Basophils Relative 1 0 - 1 %    Neutrophils Absolute 4 94 1 85 - 7 62 Thousands/µL    Immature Grans Absolute 0 02 0 00 - 0 20 Thousand/uL    Lymphocytes Absolute 2 93 0 60 - 4 47 Thousands/µL    Monocytes Absolute 0 62 0 17 - 1 22 Thousand/µL    Eosinophils Absolute 0 24 0 00 - 0 61 Thousand/µL    Basophils Absolute 0 07 0 00 - 0 10 Thousands/µL   Procalcitonin    Collection Time: 07/21/18  6:38 AM   Result Value Ref Range    Procalcitonin <0 05 <=0 25 ng/ml   Fingerstick Glucose (POCT)    Collection Time: 07/21/18  7:07 AM   Result Value Ref Range    POC Glucose 104 65 - 140 mg/dl   Fingerstick Glucose (POCT)    Collection Time: 07/21/18 12:37 PM   Result Value Ref Range    POC Glucose 105 65 - 140 mg/dl   Hemoglobin A1C    Collection Time: 07/21/18  3:57 PM   Result Value Ref Range    Hemoglobin A1C 6 2 4 2 - 6 3 %     mg/dl   Fingerstick Glucose (POCT)    Collection Time: 07/21/18  4:26 PM   Result Value Ref Range    POC Glucose 124 65 - 140 mg/dl   Fingerstick Glucose (POCT)    Collection Time: 07/21/18  9:04 PM   Result Value Ref Range    POC Glucose 127 65 - 140 mg/dl   C-reactive protein    Collection Time: 07/22/18  6:17 AM   Result Value Ref Range    CRP 15 8 (H) <3 0 mg/L   Basic metabolic panel    Collection Time: 07/22/18  6:17 AM   Result Value Ref Range    Sodium 136 136 - 145 mmol/L    Potassium 3 9 3 5 - 5 3 mmol/L    Chloride 103 100 - 108 mmol/L    CO2 27 21 - 32 mmol/L    Anion Gap 6 4 - 13 mmol/L    BUN 7 5 - 25 mg/dL    Creatinine 0 66 0 60 - 1 30 mg/dL    Glucose 121 65 - 140 mg/dL    Calcium 9 2 8 3 - 10 1 mg/dL    eGFR 104 ml/min/1 73sq m   CBC    Collection Time: 07/22/18  6:17 AM   Result Value Ref Range    WBC 7 25 4 31 - 10 16 Thousand/uL    RBC 3 82 (L) 3 88 - 5 62 Million/uL    Hemoglobin 11 7 (L) 12 0 - 17 0 g/dL    Hematocrit 36 5 36 5 - 49 3 %    MCV 96 82 - 98 fL    MCH 30 6 26 8 - 34 3 pg    MCHC 32 1 31 4 - 37 4 g/dL    RDW 13 2 11 6 - 15 1 %    Platelets 267 717 - 227 Thousands/uL    MPV 8 6 (L) 8 9 - 12 7 fL Fingerstick Glucose (POCT)    Collection Time: 07/22/18  7:01 AM   Result Value Ref Range    POC Glucose 120 65 - 140 mg/dl   Fingerstick Glucose (POCT)    Collection Time: 07/22/18 11:42 AM   Result Value Ref Range    POC Glucose 182 (H) 65 - 140 mg/dl   Fingerstick Glucose (POCT)    Collection Time: 07/22/18  4:05 PM   Result Value Ref Range    POC Glucose 213 (H) 65 - 140 mg/dl   Fingerstick Glucose (POCT)    Collection Time: 07/22/18  8:56 PM   Result Value Ref Range    POC Glucose 106 65 - 140 mg/dl   C-reactive protein    Collection Time: 07/23/18  5:47 AM   Result Value Ref Range    CRP 12 9 (H) <3 0 mg/L   Fingerstick Glucose (POCT)    Collection Time: 07/23/18  7:47 AM   Result Value Ref Range    POC Glucose 123 65 - 140 mg/dl   Fingerstick Glucose (POCT)    Collection Time: 07/23/18 11:06 AM   Result Value Ref Range    POC Glucose 151 (H) 65 - 140 mg/dl   Fingerstick Glucose (POCT)    Collection Time: 07/23/18  4:13 PM   Result Value Ref Range    POC Glucose 261 (H) 65 - 140 mg/dl   Rapid drug screen, urine    Collection Time: 07/23/18  5:01 PM   Result Value Ref Range    Amph/Meth UR Negative Negative    Barbiturate Ur Negative Negative    Benzodiazepine Urine Negative Negative    Cocaine Urine Negative Negative    Methadone Urine Negative Negative    Opiate Urine Positive (A) Negative    PCP Ur Negative Negative    THC Urine Positive (A) Negative   Fingerstick Glucose (POCT)    Collection Time: 07/23/18  9:03 PM   Result Value Ref Range    POC Glucose 95 65 - 140 mg/dl   Basic metabolic panel    Collection Time: 07/24/18  5:21 AM   Result Value Ref Range    Sodium 137 136 - 145 mmol/L    Potassium 4 0 3 5 - 5 3 mmol/L    Chloride 101 100 - 108 mmol/L    CO2 27 21 - 32 mmol/L    Anion Gap 9 4 - 13 mmol/L    BUN 10 5 - 25 mg/dL    Creatinine 0 71 0 60 - 1 30 mg/dL    Glucose 111 65 - 140 mg/dL    Calcium 9 5 8 3 - 10 1 mg/dL    eGFR 101 ml/min/1 73sq m   CBC    Collection Time: 07/24/18  5:21 AM   Result Value Ref Range    WBC 8 10 4 31 - 10 16 Thousand/uL    RBC 4 03 3 88 - 5 62 Million/uL    Hemoglobin 12 3 12 0 - 17 0 g/dL    Hematocrit 38 5 36 5 - 49 3 %    MCV 96 82 - 98 fL    MCH 30 5 26 8 - 34 3 pg    MCHC 31 9 31 4 - 37 4 g/dL    RDW 12 9 11 6 - 15 1 %    Platelets 745 128 - 627 Thousands/uL    MPV 9 2 8 9 - 12 7 fL   Fingerstick Glucose (POCT)    Collection Time: 07/24/18  7:40 AM   Result Value Ref Range    POC Glucose 120 65 - 140 mg/dl   Fingerstick Glucose (POCT)    Collection Time: 07/24/18 11:28 AM   Result Value Ref Range    POC Glucose 102 65 - 140 mg/dl       Results from last 7 days  Lab Units 07/21/18  0638   MAGNESIUM mg/dL 1 7             No results found for: TROPONINT  ABG:No results found for: PHART, BUP0OFO, PO2ART, KAY3OJV, R3OZMQLP, BEART, SOURCE        Cultures    Results from last 7 days  Lab Units 07/20/18  2332 07/20/18  1602 07/20/18  1558 07/20/18  1135   BLOOD CULTURE   --  No Growth at 72 hrs  No Growth at 72 hrs   --    GRAM STAIN RESULT   --   --   --  No polys seen  1+ Gram positive cocci in pairs   WOUND CULTURE   --   --   --  2+ Growth of    MRSA CULTURE ONLY  No Methicillin Resistant Staphlyococcus aureus (MRSA) isolated  --   --   --       Xr Knee 1 Or 2 Vw Right    Result Date: 7/20/2018  Narrative: RIGHT KNEE INDICATION:   infection  "POST OP INFECTION?" COMPARISON:  None VIEWS:  XR KNEE 1 OR 2 VW RIGHT FINDINGS: There is no acute fracture or dislocation  No osteomyelitis  Large suprapatellar joint effusion  No significant degenerative changes  No lytic or blastic lesions are seen  Diffuse vascular calcifications  Impression: Large suprapatellar joint effusion  Consider aspirating if there is a suspicion for septic arthritis  No osteomyelitis  Workstation performed: [id number]     Vas Lower Limb Venous Duplex Study, Unilateral/limited    Result Date: 7/2/2018  Narrative:  [facility] REPORT CLINICAL: Indications: Limb Pain [M79 [id number]]   Patient presents with right lower extremity pain x several days  Risk Factors The patient has history of HTN and hyperlipidemia  FINDINGS:  Segment  Right            Left              Impression       Impression       CFV      Normal (Patent)  Normal (Patent)     CONCLUSION: Impression: RIGHT LOWER LIMB No evidence of acute or chronic deep vein thrombosis   No evidence of superficial thrombophlebitis noted  Doppler evaluation shows a normal response to augmentation maneuvers  Popliteal, posterior tibial and anterior tibial arterial Doppler waveforms are triphasic  LEFT LOWER LIMB LIMITED Evaluation shows no evidence of thrombus in the common femoral vein  Doppler evaluation shows a normal response to augmentation maneuvers  SIGNATURE: Electronically Signed by: Saima Saab MD, [address] on 2018-07-02 10:26:44 PM    I have personally reviewed pertinent reports  Principal Problem:    Sepsis ([address] )  Active Problems:    Hyperlipidemia    Borderline diabetes    Hypertension    Thrombocytosis (HCC)    Anxiety and depression    Staphylococcal arthritis of right knee (HCC)      Assessment/Plan: This is a middle-age man with above-mentioned comorbidities, major depression, behavioral disorder, substance abuse, who had presented with acute septic arthritis of the right knee with infrapatellar bursitis, June 5, 2018, microbial etiology methicillin sensitive Staphylococcus aureus  Subsequently,  underwent 3 procedures on the knee with incision and drainage, and finally closure of the wound on June 14, 2018  He underwent intravenous antimicrobial treatment through July 16, completing nearly 6 weeks of intravenous antibiotic treatment  His course has been remarkable for nonhealing of the wound, persistent drainage and evidence for mild inflammation of the knee  The repeat cultures have not yielded any pathogens  Further surgical plan noted in the orthopedic notes  Discussed with Gordy Hi, who is involved with wound care management    No immediate plans for wound [facility] C  Recommend continuing intravenous ceftriaxone, will increase dose to 2 g, plan on 2 more weeks of antimicrobial treatment which might give us time to pursue further wound care and see the effect on wound healing  Patient was counseled at length, I explained the need for drug screen and plan for further intravenous antimicrobial treatment, which may be require placement of midline catheter    Discussed with the hospitalist

## 2018-07-24 NOTE — CASE MANAGEMENT
Continued Stay Review  Date: 7/24/18  Vital Signs: /87 (BP Location: Right arm)   Pulse 86   Temp (!) 97 3 °F (36 3 °C) (Oral)   Resp 18   Ht 5' 10" (1 778 m)   Wt 83 9 kg (185 lb)   SpO2 93%   BMI 26 54 kg/m²   Medications:   Scheduled Meds:   Current Facility-Administered Medications:  acetaminophen 650 mg Oral Q6H PRN Wayne Ago, CRNP    ALPRAZolam 0 25 mg Oral TID PRN Wayne Ago, CRNP    cefTRIAXone 1,000 mg Intravenous Q24H Wayne Milo, CRNP Last Rate: 1,000 mg (07/24/18 0855)   docusate sodium 100 mg Oral BID Wayne Ago, CRNP    ferrous sulfate 325 mg Oral Daily With Breakfast Marcella Revankar, DO    heparin (porcine) 5,000 Units Subcutaneous Q12H [facility] Wayne Byron, CRNP    hydrocortisone 1 application Topical 4x Daily PRN Wayne Ago, CRNP    ibuprofen 600 mg Oral Q6H PRN Wayne Milo, CRNP    insulin lispro 1-5 Units Subcutaneous TID AC Wayne Milo, CRNP    insulin lispro 1-5 Units Subcutaneous HS Wayne Ago, CRNP    lisinopril 20 mg Oral Daily Cher Costello, CRNP    magnesium hydroxide 30 mL Oral Daily PRN Wayne Milo, CRNP    ondansetron 4 mg Intravenous Q6H PRN Wayne Ago, CRNP    oxyCODONE 5 mg Oral Q4H PRN Wayne Ago, CRNP    pravastatin 80 mg Oral Daily With Dinner Sheldon Vides, CRNP    venlafaxine 75 mg Oral BID Wayne Milo, CRNP    zolpidem 10 mg Oral HS PRN Wayne Milo, CRNP    Continuous Infusions:    PRN Meds:   acetaminophen    ALPRAZolam    hydrocortisone    ibuprofen    magnesium hydroxide    ondansetron    oxyCODONE    zolpidem  Abnormal Labs/Diagnostic Results:   Age/Sex: 66 y o  male   Assessment/Plan:   IVABT THERAPY ONGOING FOR SEPSIS 2ND POOR HEALING WOUND S/P R KNEE ARTHROTOMY  WOUND CARE CONSULTED FOR POSSIBLE PLACEMENT OF INCISIONAL WOUND VAC  SUCTION DRAIN IN PLACE WITH CLEAR DRAINAGE NOTED;  PERSISTENT SWELLING & TENDERNESS     Discharge Plan: TBD  Thank you,  [id number]  Network Utilization Review Department  Phone: [phone]; Fax [phone]  ATTENTION: The Network Utilization Review Department is now centralized for our 9 Facilities  Make a note that we have a new phone and fax numbers for our Department  Please call with any questions or concerns to [phone] and carefully follow the prompts so that you are directed to the right person  All voicemails are confidential  Fax any determinations, approvals, denials, and requests for initial or continue stay review clinical to [phone]  Due to HIGH CALL volume, it would be easier if you could please send faxed requests to expedite your requests and in part, help us provide discharge notifications faster

## 2018-07-24 NOTE — CONSULTS
Consultation - Orthopedics   Silvia Srinivasan 66 y o  male MRN: [id number]  Unit/Bed#: [address] [place] [address] Encounter: [id number]      Assessment/Plan     Assessment:  Right knee anterior nonhealing wound status post multiple irrigation and debridement surgeries for intra-articular and bursal infection  Plan:  I believe that his knee looks better today than it has throughout his treatment  The laboratory results support that the infection appears to be at the very least well controlled if not eradicated  I would like to consult wound Care for placement of an incisional VAC  If they believe that they cannot accommodate this, he may need to go back to the operating room for another [facility] placement with debridement  I believe however that this should be able to be done at bedside and save him the additional anesthesia  I did speak with him at length tonight about his situation  He does understand and is appreciative of the care that is been given thus far  History of Present Illness   Physician Requesting Consult: Brissa Murphy MD  Reason for Consult / Principal Problem:  Right knee wound  HPI: Gila Crawford is a 66y o  year old male who presents with a nonhealing right knee wound  He has had persistent clear drainage  He was admitted after I sent him to wound care on Friday  I did speak with Dr Eleanor Bryant from wound care and did relate to him that I thought his knee looks better than it has throughout the entire course of his treatment but that I would like to have some local wound care such as an incisional VAC to be placed to help him finish out his course  He was admitted for possible persistent infection as he had completed a 6 week course of IV antibiotics  To date, the ESR and CRP and white blood cell count are all remarkably reduced      Inpatient consult to Orthopedic Surgery  Consult performed by: Aravind Arroyo ordered by: Argelia Eye          Review of Systems   Constitutional: Negative for chills, fever and unexpected weight change  HENT: Negative for hearing loss, nosebleeds and sore throat  Eyes: Negative for pain, redness and visual disturbance  Respiratory: Negative for cough, shortness of breath and wheezing  Cardiovascular: Negative for chest pain, palpitations and leg swelling  Gastrointestinal: Negative for abdominal pain, nausea and vomiting  Endocrine: Negative for polyphagia and polyuria  Genitourinary: Negative for dysuria and hematuria  Musculoskeletal: Positive for gait problem  See HPI   Skin: Positive for wound  Negative for rash  Neurological: Negative for dizziness, numbness and headaches  Psychiatric/Behavioral: Negative for decreased concentration and suicidal ideas  The patient is not nervous/anxious  Historical Information   Past Medical History:   Diagnosis Date    Arthritis     Borderline diabetes     Depression     Hyperlipidemia     Hypertension      Past Surgical History:   Procedure Laterality Date    INCISION AND DRAINAGE OF WOUND Right 6/14/2018    Procedure: INCISION AND DRAINAGE (I&D) EXTREMITY AND COMPLEX WOUND CLOSURE;  Surgeon: Daphne Jamison MD;  Location: [address] [place] [address];  Service: Orthopedics    LUMBAR EPIDURAL INJECTION      SHOULDER SURGERY Left     SPINE SURGERY      WOUND DEBRIDEMENT Right 6/8/2018    Procedure: RIGHT KNEE ARTHROSCOPY, IRRIGATION AND DEBRIDEMENT; RIGHT KNEE IRRIGATION AND EXTENSIVE DEBRIDEMENT OF INFECTED BURSA;   Surgeon: Daphne Jmaison MD;  Location: [facility];  Service: Orthopedics    WOUND DEBRIDEMENT Right 6/11/2018    Procedure: open DEBRIDEMENT patellar bursa, arthroscopic right knee joint irrigation and debridement;  Surgeon: Daphne Jamison MD;  Location: [facility];  Service: Orthopedics     Social History   History   Alcohol Use No     History   Drug Use No     History   Smoking Status    Current Some Day Smoker    Years: 10 00    Types: Cigars   Smokeless Tobacco  Never Used     Family History: History reviewed  No pertinent family history  Meds/Allergies   current meds:   Current Facility-Administered Medications   Medication Dose Route Frequency    acetaminophen (TYLENOL) tablet 650 mg  650 mg Oral Q6H PRN    ALPRAZolam (XANAX) tablet 0 25 mg  0 25 mg Oral TID PRN    cefTRIAXone (ROCEPHIN) IVPB (premix) 1,000 mg  1,000 mg Intravenous Q24H    docusate sodium (COLACE) capsule 100 mg  100 mg Oral BID    ferrous sulfate tablet 325 mg  325 mg Oral Daily With Breakfast    heparin (porcine) subcutaneous injection 5,000 Units  5,000 Units Subcutaneous Q12H [address]    hydrocortisone 1 % cream 1 application  1 application Topical 4x Daily PRN    ibuprofen (MOTRIN) tablet 600 mg  600 mg Oral Q6H PRN    insulin lispro (HumaLOG) 100 units/mL subcutaneous injection 1-5 Units  1-5 Units Subcutaneous TID AC    insulin lispro (HumaLOG) 100 units/mL subcutaneous injection 1-5 Units  1-5 Units Subcutaneous HS    lisinopril (ZESTRIL) tablet 20 mg  20 mg Oral Daily    magnesium hydroxide (MILK OF MAGNESIA) 400 mg/5 mL oral suspension 30 mL  30 mL Oral Daily PRN    ondansetron (ZOFRAN) injection 4 mg  4 mg Intravenous Q6H PRN    oxyCODONE (ROXICODONE) IR tablet 5 mg  5 mg Oral Q4H PRN    pravastatin (PRAVACHOL) tablet 80 mg  80 mg Oral Daily With Dinner    venlafaxine [facility]) tablet 75 mg  75 mg Oral BID    zolpidem (AMBIEN) tablet 10 mg  10 mg Oral HS PRN     Allergies   Allergen Reactions    Iodine Anaphylaxis    Shellfish-Derived Products        Objective   Vitals: Blood pressure 140/78, pulse 91, temperature 98 6 °F (37 °C), temperature source Oral, resp  rate 18, height 5' 10" (1 778 m), weight 83 9 kg (185 lb), SpO2 95 %  ,Body mass index is 26 54 kg/m²  No intake or output data in the 24 hours ending 07/23/18 2398  No intake/output data recorded      Invasive Devices     Peripheral Intravenous Line            Peripheral IV 07/21/18 Left Hand 2 days          Drain Closed/Suction Drain Right;Lateral Knee Accordion 10 Fr  42 days                Physical Exam   Constitutional: He is oriented to person, place, and time  Vital signs are normal  He appears well-developed and well-nourished  HENT:   Head: Normocephalic and atraumatic  Eyes: Conjunctivae and EOM are normal    Neck: Normal range of motion  Cardiovascular: Intact distal pulses  Pulmonary/Chest: Effort normal  No respiratory distress  Musculoskeletal:        Right knee: He exhibits effusion  Neurological: He is alert and oriented to person, place, and time  Skin: Skin is warm and dry  Psychiatric: He has a normal mood and affect  His behavior is normal  Judgment and thought content normal      Right Knee Exam     Tenderness   The patient is experiencing no tenderness  Range of Motion   Extension: normal   Flexion: abnormal     Other   Sensation: normal  Pulse: present  Swelling: mild  Other tests: effusion present    Comments: The wound along the anterior aspect of the knee has an area of approximately 4 cm that has failed to heal   There is no purulence and no erythema surrounding this  There is some serous drainage from the region  He does have some mild discomfort upon flexion although he is quite stiff as 1 would expect after having been so long and a knee immobilizer due to the difficulty with his infection  Lab Results: CBC: No results found for: WBC, HGB, HCT, MCV, PLT, ADJUSTEDWBC, MCH, MCHC, RDW, MPV, NRBC  Imaging Studies: Right knee x-ray demonstrates no obvious infiltration into the bone although there is still an effusion noted  His effusion is remarkably decreased versus previous imaging and this is consistent with his exam today    EKG, Pathology, and Other Studies: I have personally reviewed pertinent films in PACS  VTE Prophylaxis: Sequential compression device [facility])     Code Status: Level 1 - Full Code  Advance Directive and Living Will:      Power of :    POLST:

## 2018-07-24 NOTE — PROGRESS NOTES
Progress Note - Ifeoma Miller 1955, 66 y o  male MRN: [id number]    Unit/Bed#: [address] [place] [address] Encounter: [id number]    Primary Care Provider: Magdy Montelongo MD   Date and time admitted to hospital: 7/20/2018  3:42 PM        Staphylococcal arthritis of right knee [facility])   Assessment & Plan    Admitted to [address] [place] [address] between 6/5 - 6/16/18 for septic joint of right knee, staphylococcal arthritis of right knee, infrapatellar bursitis of right knee and sepsis  At that time, right knee aspirate and tissue cultures grew MSSA  Venous Doppler at that time to rule out DVT, negative   Orthopedics and ID followed  S/p right knee surgical irrigation debridement of bursa and infected joint on 6/8/18 (Dr Alton Renee)  S/p repeat right I&D of infrapatellar bursa and irrigation/washout of right knee on 6/11/18  S/p right knee arthrotomy, I&D, irrigation debridement with complex wound closure on 6/14/18  Patient declined STR placement  He insists to go home  Managed with ceftriaxone 2gm IV daily to facilitate outpatient therapy in the infusion center, last dose of ABX 7/16/18  S/p removal of LUE single-lumen PICC on 7/16/18  Per RN, there was no redness or signs of infection during removal     Given Zosyn and Vancomycin x1 in ED   Procalcitonin < 0 05, CRP 12 9, SED rate 20 ->13   BCx showing no growth at 72hr  Consulted ID, orthopedics and wound care, appreciate recommendations  Based on previous wound culture growing MSSA, continue IV Rocephin daily, increased to 2gm IV daily per ID, day #4  Orthopedics rec wound VAC  Wound care, Dr Adalberto Jo, would like to arrange as outpatient  Awaiting final coordination   Pain control with Tylenol, Motrin, or Oxycodone prn   PT/OT  Monitor for worsening signs of infection, CBC/D in am        * Sepsis [facility])   Assessment & Plan    Ruled out sepsis  Not present on admission   WBC not > 12,000         Anxiety and depression   Assessment & Plan    Continue Effexor and Xanax prn Thrombocytosis (HCC)   Assessment & Plan    Mild,  -> 319  Likely reactive  Resolved  Hypertension   Assessment & Plan    Continue ACEi  Pain control  Monitor  Borderline diabetes   Assessment & Plan    A1c 7 0 -> 6 2  Continue diabetic diet         Hyperlipidemia   Assessment & Plan    Continue statin            VTE Pharmacologic Prophylaxis:   Pharmacologic: Heparin  Mechanical VTE Prophylaxis in Place: Yes    Patient Centered Rounds: I have performed bedside rounds with nursing staff today  Discussions with Specialists or Other Care Team Provider:    Nursing, case management, ID note appreciated    Education and Discussions with Family / Patient:  I have answered all questions to the best of my ability  Significant other at bedside    Time Spent for Care: 20 minutes  More than 50% of total time spent on counseling and coordination of care as described above  Current Length of Stay: 4 day(s)    Current Patient Status: Inpatient   Certification Statement: The patient will continue to require additional inpatient hospital stay due to Chronic right knee wound, on IV antibiotics, possible need for wound VAC for wound closure    Discharge Plan:   Patient is not medically stable for discharge today as wound VAC arrangements need to be finalized, on IV ABX    Code Status: Level 1 - Full Code      Subjective:   Patient observed resting out of bed in chair  He was told he would be going home today  However he is concerned that he does not have a scheduled appointment in the infusion center for outpatient antibiotics and would like to know his scheduled prior to discharge  He says his right knee pain is controlled  He is tolerating the dressing  He has been out of bed in the chair throughout the day and ambulating around his room  He denies any headache, dizziness, lightheadedness, chest pain, shortness of breath, abdominal pain, diarrhea      Objective:     Vitals:   Temp (24hrs), Av 3 °F (36 8 °C), Min:97 3 °F (36 3 °C), Max:98 9 °F (37 2 °C)    HR:  [86-94] 86  Resp:  [18-20] 20  BP: (111-156)/(66-87) 156/76  SpO2:  [93 %-98 %] 95 %  Body mass index is 26 54 kg/m²  Input and Output Summary (last 24 hours): Intake/Output Summary (Last 24 hours) at 18 1658  Last data filed at 18 0501   Gross per 24 hour   Intake                0 ml   Output              300 ml   Net             -300 ml       Physical Exam:     Physical Exam   Constitutional: He is oriented to person, place, and time  He appears well-developed  No distress  HENT:   Head: Normocephalic  Neck: Normal range of motion  Cardiovascular: Normal rate, regular rhythm and intact distal pulses  Murmur heard  Pulmonary/Chest: Effort normal and breath sounds normal  No respiratory distress  He has no wheezes  He has no rhonchi  He has no rales  Abdominal: Soft  Bowel sounds are normal  He exhibits no distension  There is no tenderness  Musculoskeletal: He exhibits tenderness (right knee with mild swelling)  He exhibits no edema  Neurological: He is alert and oriented to person, place, and time  He has normal reflexes  Skin: Skin is warm and dry  He is not diaphoretic  There is erythema (mild erythema surrounding non healing surgical incision)  Psychiatric: His speech is normal and behavior is normal  His mood appears anxious  Cognition and memory are normal    Nursing note and vitals reviewed  Additional Data:     Labs:      Results from last 7 days  Lab Units 18  0521  18  0638   WBC Thousand/uL 8 10  < > 8 82   HEMOGLOBIN g/dL 12 3  < > 11 0*   HEMATOCRIT % 38 5  < > 35 1*   PLATELETS Thousands/uL 358  < > 344   NEUTROS PCT %  --   --  56   LYMPHS PCT %  --   --  33   MONOS PCT %  --   --  7   EOS PCT %  --   --  3   < > = values in this interval not displayed      Results from last 7 days  Lab Units 18  0521  18  1558   SODIUM mmol/L 137  < > 136   POTASSIUM mmol/L 4 0  < > 4 3   CHLORIDE mmol/L 101  < > 100   CO2 mmol/L 27  < > 25   BUN mg/dL 10  < > 8   CREATININE mg/dL 0 71  < > 0 70   CALCIUM mg/dL 9 5  < > 8 8   TOTAL PROTEIN g/dL  --   --  7 7   BILIRUBIN TOTAL mg/dL  --   --  0 30   ALK PHOS U/L  --   --  73   ALT U/L  --   --  27   AST U/L  --   --  33   GLUCOSE RANDOM mg/dL 111  < > 151*   < > = values in this interval not displayed  * I Have Reviewed All Lab Data Listed Above  * Additional Pertinent Lab Tests Reviewed: All Labs Within Last 24 Hours Reviewed    Imaging:    Imaging Reports Reviewed Today Include:  Right knee x-ray  Imaging Personally Reviewed by Myself Includes:  None    Recent Cultures (last 7 days):       Results from last 7 days  Lab Units 07/20/18  1602 07/20/18  1558 07/20/18  1135   BLOOD CULTURE  No Growth at 72 hrs   No Growth at 72 hrs   --    GRAM STAIN RESULT   --   --  No polys seen  1+ Gram positive cocci in pairs   WOUND CULTURE   --   --  2+ Growth of        Last 24 Hours Medication List:     Current Facility-Administered Medications:  acetaminophen 650 mg Oral Q6H PRN Ludlow Sox, CRNP   ALPRAZolam 0 25 mg Oral TID PRN Ludlow Sox, CRNP   [START ON 7/25/2018] cefTRIAXone 2,000 mg Intravenous Q24H Yoanna Brar MD   docusate sodium 100 mg Oral BID Ludlow Sox, CRNP   ferrous sulfate 325 mg Oral Daily With Breakfast Marcella Revbenyar, DO   heparin (porcine) 5,000 Units Subcutaneous Q12H [facility] Ludlow Sox, CRNP   hydrocortisone 1 application Topical 4x Daily PRN Ludlow Sox, CRNP   ibuprofen 600 mg Oral Q6H PRN Ludlow Sox, CRNP   insulin lispro 1-5 Units Subcutaneous TID AC Ludlow Sox, CRNP   insulin lispro 1-5 Units Subcutaneous HS Ludlow Sox, CRNP   lisinopril 20 mg Oral Daily Ludlow Sox, CRNP   magnesium hydroxide 30 mL Oral Daily PRN Ludlow Sox, CRNP   ondansetron 4 mg Intravenous Q6H PRN Ludlow Sox, CRNP   oxyCODONE 5 mg Oral Q4H PRN SANCHO Up   pravastatin 80 mg Oral Daily With Dinner SANCHO Up   venlafaxine 75 mg Oral BID SANCHO Up   zolpidem 10 mg Oral HS PRN SANCHO Up        Today, Patient Was Seen By: SANCHO Up    ** Please Note: Dictation voice to text software may have been used in the creation of this document   **

## 2018-07-24 NOTE — NURSING NOTE
Patient requested for pain medication rating pain level as 4-5  When notified that acetaminophen will be administered based on the severity of pain rated,instead of oxycodone, patient becomes agitated and start threaten the nurse   Chloe Oropeza nurse as well RN Supervisor notified of patient behavioral

## 2018-07-24 NOTE — PROGRESS NOTES
Progress Note - Orthopedics   Silvia Srinivasan 66 y o  male MRN: [id number]  Unit/Bed#: [address] [place] [address] Encounter: [id number]    Assessment:  1) poor healing wound s/p right knee arthrotomy - based on Infectious Disease interpretation an orthopedic interpretation of labs and physical exam findings patient does not appear to have recurrence in septic arthritis, minimal to moderate pain with palpation present, patient is neurovascularly intact, patient does have open wound, patient does openly admit that he did not follow the appropriate instructions with keeping his leg continuously and extension, wound when closed was a high tension closure, swelling markedly reduced, no erythema, no warmth, no leukocytosis, CRP and sed rate have both declined significantly    Plan:  1) poor healing wound s/p right knee arthrotomy  - Antibiotics: can continue antibiotics per Infectious Disease, it does appear however patient is finishing up 6 week course  - Anticoagulation:  Continue heparin DVT prophylaxis, SCDs, likely will benefit from aspirin 325 p o  b i d  when discharged from hospital  - Activity:  Weightbearing as tolerated with right lower extremity always in extension, PT/OT   - AM lab draw:  Repeat a m  labs do not appear to be necessary at this current moment other than glucose monitoring  - Analgesia: Continue p r n  medication  - Dressing: For now patient can just continue to keep wet to dry dressing changes until wound care address is open wound with wound VAC   - Disposition:  As of current the orthopedic team is trying to coordinate with wound care in order to figure out more conservative non operative forms of closure  I spoke with both the orthopedic attending and wound care in order to try to coordinate an opportunity for having some form of incisional wound VAC with possible small debridement in order to try helping wound heal without doing a primary delayed closure   Currently it appears that this will be a possibility to be done as an outpatient  Will reconfirm this with case management and orthopedic attending  Weight bearing: WBAT with RLE in extension    VTE Pharmacologic Prophylaxis: Heparin  VTE Mechanical Prophylaxis: sequential compression device    Subjective:  Patient was seen examined at bedside  Patient denies any acute events overnight  Patient does report that with pressure and palpation around the anterior aspect of his knee but otherwise does not experience any pain when at rest   Patient does have the ability to flex and extend his knee at least 40° but with flexion extension he does have some significant pain around the site of the wound and with tightness in his quadriceps  Patient does report that at the midline wound where it is open there is drainage but he does not noted any erythema, significant swelling, progressively worsening pain  Patient denies any numbness, tingling, lack of motor movement, lack of sensation in the distal lower right extremity  Vitals: Blood pressure 134/87, pulse 86, temperature (!) 97 3 °F (36 3 °C), temperature source Oral, resp  rate 18, height 5' 10" (1 778 m), weight 83 9 kg (185 lb), SpO2 93 %  ,Body mass index is 26 54 kg/m²        Intake/Output Summary (Last 24 hours) at 07/24/18 1129  Last data filed at 07/24/18 0501   Gross per 24 hour   Intake                0 ml   Output              300 ml   Net             -300 ml       Invasive Devices     Peripheral Intravenous Line            Peripheral IV 07/21/18 Left Hand 2 days          Drain            Closed/Suction Drain Right;Lateral Knee Accordion 10 Fr  42 days                Physical Exam: /87 (BP Location: Right arm)   Pulse 86   Temp (!) 97 3 °F (36 3 °C) (Oral)   Resp 18   Ht 5' 10" (1 778 m)   Wt 83 9 kg (185 lb)   SpO2 93%   BMI 26 54 kg/m²   General appearance: alert and oriented, in no acute distress  Head: Normocephalic, without obvious abnormality, atraumatic  Skin: Skin color, texture, turgor normal  No rashes or lesions or Wound on prior arthrotomy incision is still open in the middle of incision, wound edges are much better approximated then prior closure, no erythema or warmth, no active drainage currently  Ortho Exam: Right Lower Extremity: Thigh and calf compartments are soft and nontender to palpation  + L3-S1 SILT  + DF/PF + EHL  No pain with passive stretch/extension of toes  DP 2+, capillary refill less than 2 seconds, and foot is warm B/L  Incision is open in the middle of the arthrotomy incision  No erythema, no swelling, no warmth  AROM and PROM up to 40 degree flexion without tenderness and -10 degree extension     Lab, Imaging and other studies:   I have personally reviewed pertinent lab results    CBC:   Lab Results   Component Value Date    WBC 8 10 07/24/2018    HGB 12 3 07/24/2018    HCT 38 5 07/24/2018    MCV 96 07/24/2018     07/24/2018    MCH 30 5 07/24/2018    MCHC 31 9 07/24/2018    RDW 12 9 07/24/2018    MPV 9 2 07/24/2018     CMP:   Lab Results   Component Value Date     07/24/2018     07/24/2018    CO2 27 07/24/2018    ANIONGAP 9 07/24/2018    BUN 10 07/24/2018    CREATININE 0 71 07/24/2018    GLUCOSE 111 07/24/2018    CALCIUM 9 5 07/24/2018    EGFR 101 07/24/2018

## 2018-07-25 VITALS
HEART RATE: 91 BPM | SYSTOLIC BLOOD PRESSURE: 134 MMHG | RESPIRATION RATE: 18 BRPM | OXYGEN SATURATION: 97 % | WEIGHT: 185 LBS | DIASTOLIC BLOOD PRESSURE: 75 MMHG | TEMPERATURE: 97.9 F | BODY MASS INDEX: 26.48 KG/M2 | HEIGHT: 70 IN

## 2018-07-25 PROBLEM — T81.89XA NONHEALING SURGICAL WOUND: Status: ACTIVE | Noted: 2018-06-10

## 2018-07-25 LAB
ANION GAP SERPL CALCULATED.3IONS-SCNC: 7 MMOL/L (ref 4–13)
BACTERIA BLD CULT: NORMAL
BACTERIA BLD CULT: NORMAL
BUN SERPL-MCNC: 12 MG/DL (ref 5–25)
CALCIUM SERPL-MCNC: 9.1 MG/DL (ref 8.3–10.1)
CHLORIDE SERPL-SCNC: 103 MMOL/L (ref 100–108)
CO2 SERPL-SCNC: 28 MMOL/L (ref 21–32)
CREAT SERPL-MCNC: 0.7 MG/DL (ref 0.6–1.3)
ERYTHROCYTE [DISTWIDTH] IN BLOOD BY AUTOMATED COUNT: 13.1 % (ref 11.6–15.1)
GFR SERPL CREATININE-BSD FRML MDRD: 102 ML/MIN/1.73SQ M
GLUCOSE SERPL-MCNC: 108 MG/DL (ref 65–140)
GLUCOSE SERPL-MCNC: 116 MG/DL (ref 65–140)
GLUCOSE SERPL-MCNC: 125 MG/DL (ref 65–140)
HCT VFR BLD AUTO: 36.4 % (ref 36.5–49.3)
HGB BLD-MCNC: 11.5 G/DL (ref 12–17)
MCH RBC QN AUTO: 30.1 PG (ref 26.8–34.3)
MCHC RBC AUTO-ENTMCNC: 31.6 G/DL (ref 31.4–37.4)
MCV RBC AUTO: 95 FL (ref 82–98)
PLATELET # BLD AUTO: 310 THOUSANDS/UL (ref 149–390)
PMV BLD AUTO: 9 FL (ref 8.9–12.7)
POTASSIUM SERPL-SCNC: 4.1 MMOL/L (ref 3.5–5.3)
RBC # BLD AUTO: 3.82 MILLION/UL (ref 3.88–5.62)
SODIUM SERPL-SCNC: 138 MMOL/L (ref 136–145)
WBC # BLD AUTO: 5.61 THOUSAND/UL (ref 4.31–10.16)

## 2018-07-25 PROCEDURE — 99024 POSTOP FOLLOW-UP VISIT: CPT | Performed by: PHYSICIAN ASSISTANT

## 2018-07-25 PROCEDURE — 82948 REAGENT STRIP/BLOOD GLUCOSE: CPT

## 2018-07-25 PROCEDURE — 80048 BASIC METABOLIC PNL TOTAL CA: CPT | Performed by: NURSE PRACTITIONER

## 2018-07-25 PROCEDURE — 99239 HOSP IP/OBS DSCHRG MGMT >30: CPT | Performed by: NURSE PRACTITIONER

## 2018-07-25 PROCEDURE — 85027 COMPLETE CBC AUTOMATED: CPT | Performed by: NURSE PRACTITIONER

## 2018-07-25 RX ORDER — FERROUS SULFATE 325(65) MG
325 TABLET ORAL
Qty: 30 TABLET | Refills: 0 | Status: SHIPPED | OUTPATIENT
Start: 2018-07-26

## 2018-07-25 RX ORDER — ACETAMINOPHEN 325 MG/1
650 TABLET ORAL EVERY 6 HOURS PRN
Qty: 30 TABLET | Refills: 0
Start: 2018-07-25

## 2018-07-25 RX ORDER — ASPIRIN 325 MG
325 TABLET ORAL DAILY
Status: DISCONTINUED | OUTPATIENT
Start: 2018-07-25 | End: 2018-07-25 | Stop reason: HOSPADM

## 2018-07-25 RX ORDER — ASPIRIN 325 MG
325 TABLET ORAL DAILY
Qty: 30 TABLET | Refills: 0 | Status: SHIPPED | OUTPATIENT
Start: 2018-07-25

## 2018-07-25 RX ADMIN — ALPRAZOLAM 0.25 MG: 0.25 TABLET ORAL at 08:28

## 2018-07-25 RX ADMIN — FERROUS SULFATE TAB 325 MG (65 MG ELEMENTAL FE) 325 MG: 325 (65 FE) TAB at 08:21

## 2018-07-25 RX ADMIN — HEPARIN SODIUM 5000 UNITS: 5000 INJECTION, SOLUTION INTRAVENOUS; SUBCUTANEOUS at 08:22

## 2018-07-25 RX ADMIN — DOCUSATE SODIUM 100 MG: 100 CAPSULE, LIQUID FILLED ORAL at 08:22

## 2018-07-25 RX ADMIN — CEFTRIAXONE 2000 MG: 2 INJECTION, SOLUTION INTRAVENOUS at 08:21

## 2018-07-25 RX ADMIN — OXYCODONE HYDROCHLORIDE 5 MG: 5 TABLET ORAL at 10:29

## 2018-07-25 RX ADMIN — VENLAFAXINE 75 MG: 37.5 TABLET ORAL at 08:22

## 2018-07-25 RX ADMIN — ASPIRIN 325 MG: 325 TABLET ORAL at 10:29

## 2018-07-25 RX ADMIN — LISINOPRIL 20 MG: 20 TABLET ORAL at 08:22

## 2018-07-25 RX ADMIN — MAGNESIUM HYDROXIDE 30 ML: 400 SUSPENSION ORAL at 10:29

## 2018-07-25 NOTE — PROGRESS NOTES
Called by RN that patient had a "reaction" to ambien last night and to substiute melatonin  Called to room as patient is demanding his [facility]  Patient states that he has been taking ambien for years for sleep  Upon further investigation, as per RN, the reaction was that patient became "angry" and "mean"  As per patient, he was angry because he was woken up at 5am and stuck several times for his blood work  At this point, will resume patient's home ambien as it does not seem he had a true reaction to the medication, has been on it for several years and is unable to sleep without it

## 2018-07-25 NOTE — NURSING NOTE
Reviewed discharge instructions with patient  Discharged to [facility] where patient will get a ride

## 2018-07-25 NOTE — DISCHARGE SUMMARY
Discharge- Reg Montoya 1955, 66 y o  male MRN: [id number]    Unit/Bed#: [address] [place] [address] Encounter: [id number]    Primary Care Provider: Millie Knight MD   Date and time admitted to hospital: 7/20/2018  3:42 PM        Nonhealing surgical wound   Assessment & Plan    Admitted to [facility] between 6/5 - 6/16/18 for septic joint of right knee, staphylococcal arthritis of right knee, infrapatellar bursitis of right knee and sepsis  At that time, right knee aspirate and tissue cultures grew MSSA  Venous Doppler at that time to rule out DVT, negative   Orthopedics and ID followed  S/p right knee surgical irrigation debridement of bursa and infected joint on 6/8/18 (Dr Sobia Gallego)  S/p repeat right I&D of infrapatellar bursa and irrigation/washout of right knee on 6/11/18  S/p right knee arthrotomy, I&D, irrigation debridement with complex wound closure on 6/14/18  Patient declined STR placement  He insists to go home  Managed with ceftriaxone 2gm IV daily to facilitate outpatient therapy in the infusion center, last dose of ABX 7/16/18  S/p removal of LUE single-lumen PICC on 7/16/18  Per RN, there was no redness or signs of infection during removal     Given Zosyn and Vancomycin x1 in ED   Procalcitonin < 0 05, CRP 12 9, SED rate 20 ->13   BCx showing no growth at 72hr  Consulted ID, orthopedics and wound care, appreciate recommendations        Plan for local wound care with silver alginate daily  Patient to keep his leg straight in knee immobilizer  He will follow up in the [address] [place] [address] next week for wound evaluation with Dr Taylor Wagner who is covering for Dr Jakob Crum  The plan is to avoid a wound VAC if possible  Based on previous wound culture growing MSSA, continue Rocephin  Patient is to receive Rocephin 2 g IV daily for 13 more doses starting tomorrow in the infusion center  His midline can be removed at the end of therapy  He is to follow up with Orthopedics in 2 weeks        Anxiety and depression Assessment & Plan    Continue Effexor and Xanax prn  Ambien at night        * Sepsis [facility])   Assessment & Plan    Ruled out sepsis  Not present on admission  WBC not > 12,000         Thrombocytosis (HCC)   Assessment & Plan    Mild,  -> 319  Likely reactive  Resolved  Hypertension   Assessment & Plan    Continue ACEi  Pain control  Monitor  Borderline diabetes   Assessment & Plan    A1c 7 0 -> 6 2  Continue diabetic diet         Hyperlipidemia   Assessment & Plan    Continue statin            Discharging Physician / Practitioner: SACNHO Rodríguez  PCP: Denver Burrows, MD  Admission Date:   Admission Orders     Ordered        07/20/18 1701  Inpatient Admission (expected length of stay for this patient is greater than two midnights)  Once             Discharge Date: 07/25/18    Resolved Problems  Date Reviewed: 7/25/2018    None          Consultations During Hospital Stay:  · Orthopedics- Dr Russ Bergeron  · Wound care- Dr Canelo Burgos  · Infectious Disease- Dr Jose Ewing    Procedures Performed:     · Right knee x-ray:  Large suprapatellar joint effusion  No osteomyelitis  · Repeat wound culture shows no growth  · Blood cultures negative after 4 days  · MRSA negative    Significant Findings / Test Results:     · Nonhealing right knee wound    Incidental Findings:   · None     Test Results Pending at Discharge (will require follow up): · None     Outpatient Tests Requested:  · None    Complications:  None    Reason for Admission:  Referral from [address] [place] [address] for a nonhealing right knee wound with infection    Hospital Course:     Stef Rizo is a 66 y o  male patient who originally presented to the hospital on 7/20/2018 due to referral from the wound [facility] for a nonhealing right knee wound with infection  Of note, patient was [facility] to [facility] 6/5/2018 for right knee septic arthritis  At that time, he was seen by orthopedist, Dr Russ Bergeron    He underwent 3 I&Ds and debridement of infected bursa by Orthopedics  He was discharged with a knee immobilizer, daily IV antibiotic infusions, and follow up with Orthopedics  A completion of his antibiotics, he had no drainage from his right knee wound  Two days after his antibiotics were completed, he developed drainage from his right knee and was referred to see Dr Abran Milner in the [address] [place] [address]  Per Dr Abran Milner, there was signs of necrosis around part of the nonhealing surgical incision  The necrotic areas were removed and the patient was referred to the emergency department for further evaluation and treatment  Patient was admitted for a nonhealing surgical wound  He was managed with IV Rocephin daily based on previous wound cultures growing MSSA  A formal Infectious Disease, Orthopedic, and wound care consult were obtained  Presently, patient has a scant amount of serosanguineous drainage on discharge  The patient will be managed as an outpatient with a 2 week course of IV Rocephin via a midline  He is to follow up in the [address] [place] [address] next week and with Orthopedics in 2 weeks  After extensive discussion with wound care, the goal is to avoid negative pressure via wound VAC as this will delay the healing of the patient's bursa  Please see above list of diagnoses and related plan for additional information  Condition at Discharge: stable     Discharge Day Visit / Exam:     Subjective:  Patient resting out of bed in chair  He had some complaints about his care last night, but at this time, he has put those issues aside and is focusing on his discharge plan  He appears pleased with his care today  He states his providers are very good to him  I was at the bedside with Dr Abran Milner to discuss the plan of care moving forward  Patient is agreeing to wear his knee immobilizer and keep his knee extended, even with walking  He has refused VNA services    He is aware that he needs to do daily dressing changes and follow-up in the [address] [place] [address] Road next week  He is also aware that he needs to go to the infusion center daily for 13 days  Reports pain is well controlled, denies headache dizziness chest pain or shortness of breath  Vitals: Blood Pressure: 134/75 (07/25/18 0815)  Pulse: 91 (07/25/18 0815)  Temperature: 97 9 °F (36 6 °C) (07/25/18 0815)  Temp Source: Oral (07/25/18 0815)  Respirations: 18 (07/25/18 0815)  Height: 5' 10" (177 8 cm) (07/20/18 1547)  Weight - Scale: 83 9 kg (185 lb) (07/20/18 1547)  SpO2: 97 % (07/25/18 0815)  Exam:   Physical Exam   Constitutional: He is oriented to person, place, and time  He appears well-developed  No distress  Pleasant, hard of hearing gentleman resting out of bed in chair on room air, no signs of distress   HENT:   Head: Normocephalic  Neck: Normal range of motion  Cardiovascular: Normal rate, regular rhythm and intact distal pulses  Pulmonary/Chest: Effort normal and breath sounds normal  No respiratory distress  He has no wheezes  He has no rhonchi  He has no rales  Abdominal: Soft  Bowel sounds are normal  He exhibits no distension  There is no tenderness  Musculoskeletal: He exhibits no edema (Trace around right knee) or tenderness (Right knee)  Right knee: He exhibits decreased range of motion, effusion and erythema  Neurological: He is alert and oriented to person, place, and time  Skin: Skin is warm and dry  He is not diaphoretic  There is erythema  Nonhealing surgical wound to the right knee with mild redness surrounding the incision, no signs of necrosis, scant serous drainage   Psychiatric: His speech is normal and behavior is normal  His mood appears anxious  Cognition and memory are normal    Nursing note and vitals reviewed  Discussion with Family:  None    Discharge instructions/Information to patient and family:   See after visit summary for information provided to patient and family        Provisions for Follow-Up Care:  See after visit summary for information related to follow-up care and any pertinent home health orders  Disposition:     Home    For Discharges to [facility] SNF:   · Not Applicable to this Patient - Not Applicable to this Patient    Planned Readmission:  None     Discharge Statement:  I spent >45 minutes discharging the patient  This time was spent on the day of discharge  I had direct contact with the patient on the day of discharge  Greater than 50% of the total time was spent examining patient, answering all patient questions, arranging and discussing plan of care with patient as well as directly providing post-discharge instructions  Additional time then spent on discharge activities  Coordination with orthopedics and wound care  Extensive discussion with the patient requiring 3 visits to his bedside to answer questions  Discharge Medications:  See after visit summary for reconciled discharge medications provided to patient and family        ** Please Note: This note has been constructed using a voice recognition system **

## 2018-07-25 NOTE — CONSULTS
Consultation - General Surgery   Galen Srinivasan 66 y o  male MRN: [id number]  Unit/Bed#: [address] [place] [address] Encounter: [id number]: Elidia Schwab MD      Physician Requesting Consult: Lashanda Diane MD  Reason for Consult / Principal Problem:   Nonhealing surgical wound right knee    Chief Complaint:   Nonhealing wound  HPI:  Jeaneth Plunkett is a 66 y o  male who was admitted on 06/05 2018 for septic arthritis  Has multiple joint wash by Orthopedics and debridement of a infected bursa  Now has nonhealing surgical wound  The original wound is approximately 8 inches long out of this 2 inches in the middle is open and leaking  Minimal amount of serosanguineous discharge  Patient has a course of a antibiotics with a PICC line  And was admitted 0 few days ago for cellulitis of the wound and drainage    Historical Information   Past Medical History:   Diagnosis Date    Arthritis     Borderline diabetes     Depression     Hyperlipidemia     Hypertension      Past Surgical History:   Procedure Laterality Date    INCISION AND DRAINAGE OF WOUND Right 6/14/2018    Procedure: INCISION AND DRAINAGE (I&D) EXTREMITY AND COMPLEX WOUND CLOSURE;  Surgeon: Aparna Weaver MD;  Location: [address] [place] [address];  Service: Orthopedics    LUMBAR EPIDURAL INJECTION      SHOULDER SURGERY Left     SPINE SURGERY      WOUND DEBRIDEMENT Right 6/8/2018    Procedure: RIGHT KNEE ARTHROSCOPY, IRRIGATION AND DEBRIDEMENT; RIGHT KNEE IRRIGATION AND EXTENSIVE DEBRIDEMENT OF INFECTED BURSA;   Surgeon: Aparna Weaver MD;  Location: [facility]IN OR;  Service: Orthopedics    WOUND DEBRIDEMENT Right 6/11/2018    Procedure: open DEBRIDEMENT patellar bursa, arthroscopic right knee joint irrigation and debridement;  Surgeon: Aparna Weaver MD;  Location: [facility] OR;  Service: Orthopedics     Social History   History   Alcohol Use No     History   Drug Use No     History   Smoking Status    Current Some Day Smoker    Years: 10 00    Types: Cigars Smokeless Tobacco    Never Used     Family History: History reviewed  No pertinent family history      Meds/Allergies     Current Facility-Administered Medications:     acetaminophen (TYLENOL) tablet 650 mg, 650 mg, Oral, Q6H PRN, SANCHO Hilario    ALPRAZolam Lex Dome) tablet 0 25 mg, 0 25 mg, Oral, TID PRN, SANCHO Hilario, 0 25 mg at 07/25/18 9068    aspirin tablet 325 mg, 325 mg, Oral, Daily, SANCHO Gamez, 325 mg at 07/25/18 1029    cefTRIAXone (ROCEPHIN) IVPB (premix) 2,000 mg, 2,000 mg, Intravenous, Q24H, Yoanna Brar MD, Last Rate: 100 mL/hr at 07/25/18 0821, 2,000 mg at 07/25/18 9835    docusate sodium (COLACE) capsule 100 mg, 100 mg, Oral, BID, SANCHO Hilario, 100 mg at 07/25/18 1340    ferrous sulfate tablet 325 mg, 325 mg, Oral, Daily With Breakfast, Marcella Revankar, DO, 325 mg at 07/25/18 0821    hydrocortisone 1 % cream 1 application, 1 application, Topical, 4x Daily PRN, SANCHO Hilario, 1 application at 59/53/79 1201    ibuprofen (MOTRIN) tablet 600 mg, 600 mg, Oral, Q6H PRN, SANCHO Hilario, 600 mg at 07/24/18 2246    insulin lispro (HumaLOG) 100 units/mL subcutaneous injection 1-5 Units, 1-5 Units, Subcutaneous, TID AC, 2 Units at 07/23/18 1659 **AND** Fingerstick Glucose (POCT), , , TID AC, SANCHO Hilario    insulin lispro (HumaLOG) 100 units/mL subcutaneous injection 1-5 Units, 1-5 Units, Subcutaneous, HS, SANCHO Gamez    lisinopril (ZESTRIL) tablet 20 mg, 20 mg, Oral, Daily, SANCHO Gamez, 20 mg at 07/25/18 6821    magnesium hydroxide (MILK OF MAGNESIA) 400 mg/5 mL oral suspension 30 mL, 30 mL, Oral, Daily PRN, SANCHO Hilario, 30 mL at 07/25/18 1029    ondansetron (ZOFRAN) injection 4 mg, 4 mg, Intravenous, Q6H PRN, SANCHO Hilario    oxyCODONE (ROXICODONE) IR tablet 5 mg, 5 mg, Oral, Q4H PRN, SANCHO Hilario, 5 mg at 07/25/18 1029    pravastatin (PRAVACHOL) tablet 80 mg, 80 mg, Oral, Daily With SANCHO Otoole, 80 mg at 07/24/18 1618    venlafaxine [facility]) tablet 75 mg, 75 mg, Oral, BID, PatriciaSANCHO Camarillo, 75 mg at 07/25/18 8678    zolpidem (AMBIEN) tablet 10 mg, 10 mg, Oral, HS PRN, Aga Vuin, CRNP, 10 mg at 07/24/18 2340   Allergies   Allergen Reactions    Iodine Anaphylaxis    Shellfish-Derived Products        REVIEW OF SYSTEMS  Constitutional:  Denies fever or chills   Eyes:  Denies change in visual acuity   HENT:  Denies nasal congestion or sore throat   Respiratory:  Denies cough or shortness of breath   Cardiovascular:  Denies chest pain or edema   GI:  Denies abdominal pain, nausea, vomiting, bloody stools or diarrhea   :  Denies dysuria, frequency, difficulty in micturition and nocturia  Musculoskeletal:  As a gait problem is assessment advised not to bend his right knee  And open wound   Neurologic:  Denies headache, focal weakness or sensory changes   Endocrine:  Denies polyuria or polydipsia   Lymphatic:  Denies swollen glands   Psychiatric:  Denies depression or anxiety     Objective   PHYSICAL EXAMS  Current Vitals:   Blood Pressure: 134/75 (07/25/18 0815)  Pulse: 91 (07/25/18 0815)  Temperature: 97 9 °F (36 6 °C) (07/25/18 0815)  Temp Source: Oral (07/25/18 0815)  Respirations: 18 (07/25/18 0815)  Height: 5' 10" (177 8 cm) (07/20/18 1547)  Weight - Scale: 83 9 kg (185 lb) (07/20/18 1547)  SpO2: 97 % (07/25/18 0815) Body mass index is 26 54 kg/m²  I/Os:I/O last 3 completed shifts:  In: -   Out: 300 [Urine:300]      No intake/output data recorded  General:  Patient is not in acute distress, laying in the bed comfortably, awake, alert responding to commands, well oriented to time place and person  HEENT:  Both pupils normal-size atraumatic, normocephalic, nonicteric  Neck:  JVP not raised   Trachea central  Respiratory: normal Breath sounds clear to auscultation,  Cardiovascular:  S1-S2 normal without any murmur   GI:  Abdomen soft nontender  Tolerating p o  Diet  Rectal: deferred  Genitalia: deferred  Musculoskeletal:  Open wound right knee  Integument:  No skin rashes or ulceration  Lymphatic:  No cervical or inguinal lymphadenopathy  Neurologic:  Patient is awake alert, responding to command, well-oriented to time and place and person moving all extremities ambulating well  Psychiatric:  Patient awake alert doesn't appear depressed affect normal  Extremities:  Left extremity normal right extremity some swelling and edema around the knee joint with open wound as described below  Wound/Incision:  Right knee anterior nonhealing surgical wound  5 cm by 2 cm by 1 cm deep nonhealing surgical wound secondary to disruption of anterior knee surgical wound  With the minimal serosanguineous discharge no foul smell beth wound is dry clean intact the cellulitis has improved the some edema swelling of the suprapatellar bursa site    Lab Results and Cultures:   CBC with diff:   Lab Results   Component Value Date    WBC 5 61 07/25/2018    HGB 11 5 (L) 07/25/2018    HCT 36 4 (L) 07/25/2018    MCV 95 07/25/2018     07/25/2018    MCH 30 1 07/25/2018    MCHC 31 6 07/25/2018    RDW 13 1 07/25/2018    MPV 9 0 07/25/2018    NRBC 0 07/21/2018   ,   BMP/CMP:  Lab Results   Component Value Date     07/25/2018    K 4 1 07/25/2018     07/25/2018    CO2 28 07/25/2018    ANIONGAP 7 07/25/2018    BUN 12 07/25/2018    CREATININE 0 70 07/25/2018    GLUCOSE 116 07/25/2018    CALCIUM 9 1 07/25/2018    AST 33 07/20/2018    ALT 27 07/20/2018    ALKPHOS 73 07/20/2018    PROT 7 7 07/20/2018    BILITOT 0 30 07/20/2018    EGFR 102 07/25/2018     Coags:   Lab Results   Component Value Date    PTT 29 06/05/2018    INR 0 92 06/05/2018   ,     Blood Culture:   Lab Results   Component Value Date    BLOODCX No Growth After 4 Days   07/20/2018   ,   Urinalysis:   Lab Results   Component Value Date    COLORU Yellow 06/13/2018    CLARITYU Clear 06/13/2018    SPECGRAV <=1 005 06/13/2018    PHUR 6 0 06/13/2018    LEUKOCYTESUR Negative 06/13/2018    NITRITE Negative 06/13/2018    PROTEINUA Negative 06/13/2018    GLUCOSEU Negative 06/13/2018    KETONESU Negative 06/13/2018    BILIRUBINUR Negative 06/13/2018    BLOODU Trace-Intact (A) 06/13/2018   ,   Urine Culture:   Lab Results   Component Value Date    URINECX No Growth <1000 cfu/mL 06/14/2018   ,   Wound Culure:   Lab Results   Component Value Date    WOUNDCULT 2+ Growth of  07/20/2018       Imaging: Xr Knee 1 Or 2 Vw Right    Result Date: 7/20/2018  Impression: Large suprapatellar joint effusion  Consider aspirating if there is a suspicion for septic arthritis  No osteomyelitis  Workstation performed: [id number]     Admitting Diagnosis: Wound infection [T14  8XXA, L08 9]  Right knee skin infection [L08 9]  Code Status: Level 1 - Full Code  Length Of Stay: 5 day(s)    Assessment:  Right knee nonhealing surgical wound  Wound was inspected with Dr Willy Salmon MD  and nursing staff members  Plan:  Local wound care with the silver alginate  Counseling / Coordination of Care  Total floor / unit time spent today 30minutes  Greater than 50% of total time was spent with the patient and / or family counseling and / or coordination of care  Thank you for allowing me to participate in this patients care  Please do not hesitate to call with any additional questions  Aldo Perry MD MS FRCS FACS  07/25/18  11:02 AM    Portions of the record may have been created with voice recognition software  Occasional wrong word or "sound a like" substitutions may have occurred due to the inherent limitations of voice recognition software  Read the chart carefully and recognize, using context, where substitutions have occurred

## 2018-07-25 NOTE — DISCHARGE INSTRUCTIONS
Right Knee non-healing surgical wound:   You will need 13 more days of IV antibiotics  Please go to the infusion center tomorrow, 7/26/18 at 11:30 am for your infusion  Please follow-up in wound care center next Tuesday, 7/31/18 at 1:15 pm with Dr Segundo Kirby local dressing to right knee until seen in the wound care center   Follow-up with Dr Shamika Smith in 2 weeks   Continue Aspirin 325 mg daily until mobility improves

## 2018-07-25 NOTE — PLAN OF CARE
Problem: Potential for Falls  Goal: Patient will remain free of falls  INTERVENTIONS:  - Assess patient frequently for physical needs  -  Identify cognitive and physical deficits and behaviors that affect risk of falls    -  [place] fall precautions as indicated by assessment   - Educate patient/family on patient safety including physical limitations  - Instruct patient to call for assistance with activity based on assessment  - Modify environment to reduce risk of injury  - Consider OT/PT consult to assist with strengthening/mobility    Outcome: Completed Date Met: 07/25/18      Problem: PAIN - ADULT  Goal: Verbalizes/displays adequate comfort level or baseline comfort level  Interventions:  - Encourage patient to monitor pain and request assistance  - Assess pain using appropriate pain scale  - Administer analgesics based on type and severity of pain and evaluate response  - Implement non-pharmacological measures as appropriate and evaluate response  - Consider cultural and social influences on pain and pain management  - Notify physician/advanced practitioner if interventions unsuccessful or patient reports new pain   Outcome: Completed Date Met: 07/25/18      Problem: INFECTION - ADULT  Goal: Absence or prevention of progression during hospitalization  INTERVENTIONS:  - Assess and monitor for signs and symptoms of infection  - Monitor lab/diagnostic results  - Monitor all insertion sites, i e  indwelling lines, tubes, and drains  - Monitor nasal secretions for changes in amount and color  - [place] appropriate cooling/warming therapies per order  - Administer medications as ordered  - Instruct and encourage patient and family to use good hand hygiene technique  - Identify and instruct in appropriate isolation precautions for identified infection/condition    Outcome: Completed Date Met: 07/25/18      Problem: SAFETY ADULT  Goal: Maintain or return to baseline ADL function  INTERVENTIONS:  -  Assess patient's ability to carry out ADLs; assess patient's baseline for ADL function and identify physical deficits which impact ability to perform ADLs (bathing, care of mouth/teeth, toileting, grooming, dressing, etc )  - Assess/evaluate cause of self-care deficits   - Assess range of motion  - Assess patient's mobility; develop plan if impaired  - Assess patient's need for assistive devices and provide as appropriate  - Encourage maximum independence but intervene and supervise when necessary  ¯ Involve family in performance of ADLs  ¯ Assess for home care needs following discharge   ¯ Request OT consult to assist with ADL evaluation and planning for discharge  ¯ Provide patient education as appropriate   Outcome: Completed Date Met: 07/25/18    Goal: Maintain or return mobility status to optimal level  INTERVENTIONS:  - Assess patient's baseline mobility status (ambulation, transfers, stairs, etc )    - Identify cognitive and physical deficits and behaviors that affect mobility  - Identify mobility aids required to assist with transfers and/or ambulation (gait belt, sit-to-stand, lift, walker, cane, etc )  - [place]emp fall precautions as indicated by assessment  - Record patient progress and toleration of activity level on Mobility SBAR; progress patient to next Phase/Stage  - Instruct patient to call for assistance with activity based on assessment  - Request Rehabilitation consult to assist with strengthening/weightbearing, etc    Outcome: Completed Date Met: 07/25/18    Goal: Patient will remain free of falls  INTERVENTIONS:  - Assess patient frequently for physical needs  -  Identify cognitive and physical deficits and behaviors that affect risk of falls    -  [place]emp fall precautions as indicated by assessment   - Educate patient/family on patient safety including physical limitations  - Instruct patient to call for assistance with activity based on assessment  - Modify environment to reduce risk of injury  - Consider OT/PT consult to assist with strengthening/mobility    Outcome: Completed Date Met: 07/25/18      Problem: DISCHARGE PLANNING  Goal: Discharge to home or other facility with appropriate resources  INTERVENTIONS:  - Identify barriers to discharge w/patient and caregiver  - Arrange for needed discharge resources and transportation as appropriate  - Identify discharge learning needs (meds, wound care, etc )  - Arrange for interpretive services to assist at discharge as needed  - Refer to Case Management Department for coordinating discharge planning if the patient needs post-hospital services based on physician/advanced practitioner order or complex needs related to functional status, cognitive ability, or social support system   Outcome: Completed Date Met: 07/25/18      Problem: Knowledge Deficit  Goal: Patient/family/caregiver demonstrates understanding of disease process, treatment plan, medications, and discharge instructions  Complete learning assessment and assess knowledge base  Interventions:  - Provide teaching at level of understanding  - Provide teaching via preferred learning methods   Outcome: Completed Date Met: 07/25/18      Problem: CARDIOVASCULAR - ADULT  Goal: Maintains optimal cardiac output and hemodynamic stability  INTERVENTIONS:  - Monitor I/O, vital signs and rhythm  - Monitor for S/S and trends of decreased cardiac output i e  bleeding, hypotension  - Administer and titrate ordered vasoactive medications to optimize hemodynamic stability  - Assess quality of pulses, skin color and temperature  - Assess for signs of decreased coronary artery perfusion - ex   Angina  - Instruct patient to report change in severity of symptoms   Outcome: Completed Date Met: 07/25/18    Goal: Absence of cardiac dysrhythmias or at baseline rhythm  INTERVENTIONS:  - Continuous cardiac monitoring, monitor vital signs, obtain 12 lead EKG if indicated  - Administer antiarrhythmic and heart rate control medications as ordered  - Monitor electrolytes and administer replacement therapy as ordered   Outcome: Completed Date Met: 07/25/18      Problem: METABOLIC, FLUID AND ELECTROLYTES - ADULT  Goal: Electrolytes maintained within normal limits  INTERVENTIONS:  - Monitor labs and assess patient for signs and symptoms of electrolyte imbalances  - Administer electrolyte replacement as ordered  - Monitor response to electrolyte replacements, including repeat lab results as appropriate  - Instruct patient on fluid and nutrition as appropriate   Outcome: Completed Date Met: 07/25/18    Goal: Fluid balance maintained  INTERVENTIONS:  - Monitor labs and assess for signs and symptoms of volume excess or deficit  - Monitor I/O and WT  - Instruct patient on fluid and nutrition as appropriate   Outcome: Completed Date Met: 07/25/18    Goal: Glucose maintained within target range  INTERVENTIONS:  - Monitor Blood Glucose as ordered  - Assess for signs and symptoms of hyperglycemia and hypoglycemia  - Administer ordered medications to maintain glucose within target range  - Assess nutritional intake and initiate nutrition service referral as needed   Outcome: Completed Date Met: 07/25/18      Problem: SKIN/TISSUE INTEGRITY - ADULT  Goal: Skin integrity remains intact  INTERVENTIONS  - Identify patients at risk for skin breakdown  - Assess and monitor skin integrity  - Assess and monitor nutrition and hydration status  - Monitor labs (i e  albumin)  - Assess for incontinence   - Turn and reposition patient  - Assist with mobility/ambulation  - Relieve pressure over bony prominences  - Avoid friction and shearing  - Provide appropriate hygiene as needed including keeping skin clean and dry  - Evaluate need for skin moisturizer/barrier cream  - Collaborate with interdisciplinary team (i e  Nutrition, Rehabilitation, etc )   - Patient/family teaching   Outcome: Completed Date Met: 07/25/18    Goal: Incision(s), wounds(s) or drain site(s) healing without S/S of infection  INTERVENTIONS  - Assess and document risk factors for skin impairment   - Assess and document dressing, incision, wound bed, drain sites and surrounding tissue  - Initiate Nutrition services consult and/or wound management as needed   Outcome: Completed Date Met: 07/25/18    Goal: Oral mucous membranes remain intact  INTERVENTIONS  - Assess oral mucosa and hygiene practices  - Implement preventative oral hygiene regimen  - Implement oral medicated treatments as ordered  - Initiate Nutrition services referral as needed   Outcome: Completed Date Met: 07/25/18      Problem: DISCHARGE PLANNING - CARE MANAGEMENT  Goal: Discharge to post-acute care or home with appropriate resources  INTERVENTIONS:  - Conduct assessment to determine patient/family and health care team treatment goals, and need for post-acute services based on payer coverage, community resources, and patient preferences, and barriers to discharge  - Address psychosocial, clinical, and financial barriers to discharge as identified in assessment in conjunction with the patient/family and health care team  - Arrange appropriate level of post-acute services according to patient's   needs and preference and payer coverage in collaboration with the physician and health care team  - Communicate with and update the patient/family, physician, and health care team regarding progress on the discharge plan  - Arrange appropriate transportation to post-acute venues  To home independetly   Outcome: Completed Date Met: 07/25/18      Problem: Nutrition/Hydration-ADULT  Goal: Nutrient/Hydration intake appropriate for improving, restoring or maintaining nutritional needs  Monitor and assess patient's nutrition/hydration status for malnutrition (ex- brittle hair, bruises, dry skin, pale skin and conjunctiva, muscle wasting, smooth red tongue, and disorientation)  Collaborate with interdisciplinary team and initiate plan and interventions as ordered  Monitor patient's weight and dietary intake as ordered or per policy  Utilize nutrition screening tool and intervene per policy  Determine patient's food preferences and provide high-protein, high-caloric foods as appropriate       INTERVENTIONS:  - Monitor oral intake, urinary output, labs, and treatment plans  - Assess nutrition and hydration status and recommend course of action  - Evaluate amount of meals eaten  - Assist patient with eating if necessary   - Allow adequate time for meals  - Recommend/ encourage appropriate diets, oral nutritional supplements, and vitamin/mineral supplements  - Order, calculate, and assess calorie counts as needed  - Assess need for intravenous fluids  - Provide specific nutrition/hydration education as appropriate  - Include patient/family/caregiver in decisions related to nutrition   Outcome: Completed Date Met: 07/25/18

## 2018-07-25 NOTE — PLAN OF CARE
CARDIOVASCULAR - ADULT     Maintains optimal cardiac output and hemodynamic stability Progressing     Absence of cardiac dysrhythmias or at baseline rhythm Progressing        DISCHARGE PLANNING     Discharge to home or other facility with appropriate resources Progressing        DISCHARGE PLANNING - CARE MANAGEMENT     Discharge to post-acute care or home with appropriate resources Progressing        INFECTION - ADULT     Absence or prevention of progression during hospitalization Progressing        Knowledge Deficit     Patient/family/caregiver demonstrates understanding of disease process, treatment plan, medications, and discharge instructions Progressing        METABOLIC, FLUID AND ELECTROLYTES - ADULT     Electrolytes maintained within normal limits Progressing     Fluid balance maintained Progressing     Glucose maintained within target range Progressing        PAIN - ADULT     Verbalizes/displays adequate comfort level or baseline comfort level Progressing        Potential for Falls     Patient will remain free of falls Progressing        SAFETY ADULT     Maintain or return to baseline ADL function Progressing     Maintain or return mobility status to optimal level Progressing     Patient will remain free of falls Progressing        SKIN/TISSUE INTEGRITY - ADULT     Skin integrity remains intact Progressing     Incision(s), wounds(s) or drain site(s) healing without S/S of infection Progressing     Oral mucous membranes remain intact Progressing

## 2018-07-25 NOTE — PROGRESS NOTES
Progress Note - Orthopedics   [place] Srinivasan 66 y o  male MRN: [id number]  Unit/Bed#: [address] [place] [address] Encounter: [id number]    Assessment:  1) poor healing wound s/p right knee arthrotomy - based on Infectious Disease interpretation an orthopedic interpretation of labs and physical exam findings patient does not appear to have recurrence in septic arthritis, minimal to moderate pain with palpation present, patient is neurovascularly intact, patient does have open wound, patient does openly admit that he did not follow the appropriate instructions with keeping his leg continuously and extension, wound when closed was a high tension closure, swelling markedly reduced, no erythema, no warmth, no leukocytosis, CRP and sed rate have both declined significantly    Plan:  1) poor healing wound s/p right knee arthrotomy  - Antibiotics:  PICC line has been placed for outpatient IV antibiotics, antibiotics per Infectious Disease and Internal Medicine  - Anticoagulation:  Continue heparin DVT prophylaxis, SCDs, likely will benefit from aspirin 325 p o  b i d  when discharged from hospital  - Activity:  Weightbearing as tolerated with right lower extremity always in extension, PT/OT   - AM lab draw:   no other labs other than glucose monitoring needed from an orthopedic perspective  - Analgesia: Continue p r n  medication  - Dressing:   continue wet to dry dressing changes and follow up with wound care with Dr Candida Kevin in 1 week upon discharge  - Disposition:  Spoken coordinated between Dr Candida Kevin from wound clinic and General surgery as well as attending from orthopedic team we are all in agreement that patient would benefit most from most conservative measures trying to get chronic wound closed through nonsurgical means such as potentially incisional wound VAC and very minor debridements  We will for go the route of delayed primary closure and instead refer back to the wound clinic under Dr Candida Kevin service    Patient has been made aware of this recommendation  Patient is likely ready for discharge and orthopedic team will sign off    Weight bearing:  Weightbearing as tolerated with right lower extremity in extension    VTE Pharmacologic Prophylaxis: Heparin  VTE Mechanical Prophylaxis: sequential compression device    Subjective:  Patient was seen examined at bedside  Patient denies any acute events overnight  Patient reports that he still having the same kind of pain in his right knee with palpation and with exerting himself while walking  Patient describes the pain as a mild to moderate aching pain  Patient is not experiencing any significant pain at rest currently  Patient does still have the ability to flex his knee and extend his knee but is recommended not to in order to allow wound edges to be more approximated  Patient's wound is still draining but minimally  Patient denies any progressive worsening of pain, erythema, significant swelling  Patient denies any numbness, tingling, lack of motor movement, lack of sensation in the distal lower right extremity    Vitals: Blood pressure 134/75, pulse 91, temperature 97 9 °F (36 6 °C), temperature source Oral, resp  rate 18, height 5' 10" (1 778 m), weight 83 9 kg (185 lb), SpO2 97 %  ,Body mass index is 26 54 kg/m²      No intake or output data in the 24 hours ending 07/25/18 1150    Invasive Devices     Peripheral Intravenous Line            Peripheral IV 07/21/18 Left Hand 3 days    Long-Dwell Peripheral IV (Midline) 07/24/18 Right Brachial less than 1 day          Drain            Closed/Suction Drain Right;Lateral Knee Accordion 10 Fr  43 days                Physical Exam: /75 (BP Location: Left arm)   Pulse 91   Temp 97 9 °F (36 6 °C) (Oral)   Resp 18   Ht 5' 10" (1 778 m)   Wt 83 9 kg (185 lb)   SpO2 97%   BMI 26 54 kg/m²   General appearance: alert and oriented, in no acute distress  Head: Normocephalic, without obvious abnormality, atraumatic  Skin: Skin color, texture, turgor normal  No rashes or lesions or Wound edges are somewhat approximated in extension, no erythema or warmth, minimal active draining serous fluid and water from wet to dry  Ortho Exam:   Right Lower Extremity: Thigh and calf compartments are soft and nontender to palpation  + L3-S1 SILT  + DF/PF + EHL  No pain with passive stretch/extension of toes  DP 2+, capillary refill less than 2 seconds, and foot is warm B/L  Incision is clean, open, with minimal drainage  Deferred hip and knee ROM because of open wound  Lab, Imaging and other studies:   I have personally reviewed pertinent lab results    CBC:   Lab Results   Component Value Date    WBC 5 61 07/25/2018    HGB 11 5 (L) 07/25/2018    HCT 36 4 (L) 07/25/2018    MCV 95 07/25/2018     07/25/2018    MCH 30 1 07/25/2018    MCHC 31 6 07/25/2018    RDW 13 1 07/25/2018    MPV 9 0 07/25/2018     CMP:   Lab Results   Component Value Date     07/25/2018     07/25/2018    CO2 28 07/25/2018    ANIONGAP 7 07/25/2018    BUN 12 07/25/2018    CREATININE 0 70 07/25/2018    GLUCOSE 116 07/25/2018    CALCIUM 9 1 07/25/2018    EGFR 102 07/25/2018

## 2018-07-26 ENCOUNTER — HOSPITAL ENCOUNTER (OUTPATIENT)
Dept: INFUSION CENTER | Facility: HOSPITAL | Age: 63
Discharge: HOME | End: 2018-07-26
Payer: COMMERCIAL

## 2018-07-26 VITALS
DIASTOLIC BLOOD PRESSURE: 69 MMHG | TEMPERATURE: 97.9 F | SYSTOLIC BLOOD PRESSURE: 119 MMHG | HEART RATE: 95 BPM | RESPIRATION RATE: 18 BRPM | OXYGEN SATURATION: 96 %

## 2018-07-26 PROCEDURE — 96365 THER/PROPH/DIAG IV INF INIT: CPT

## 2018-07-26 RX ADMIN — CEFTRIAXONE 2000 MG: 2 INJECTION, SOLUTION INTRAVENOUS at 12:06

## 2018-07-26 NOTE — CASE MANAGEMENT
Notification of Discharge  This is a Notification of Discharge from our facility [address]  Please be advised that this patient has been discharge from our facility  Below you will find the admission and discharge date and time including the patients disposition  PRESENTATION DATE: 7/20/2018  3:42 PM  IP ADMISSION DATE: 7/20/18 1701  DISCHARGE DATE: 7/25/2018  1:06 PM  DISPOSITION: [facility] in the [facility] Utilization Review Department  Phone: [phone]; Fax [phone]  ATTENTION: The Network Utilization Review Department is now centralized for our 9 Facilities  Make a note that we have a new phone and fax numbers for our Department  Please call with any questions or concerns to [phone] and carefully follow the prompts so that you are directed to the right person  All voicemails are confidential  Fax any determinations, approvals, denials, and requests for initial or continue stay review clinical to [phone]  Due to HIGH CALL volume, it would be easier if you could please send faxed requests to expedite your requests and in part, help us provide discharge notifications faster

## 2018-07-27 ENCOUNTER — HOSPITAL ENCOUNTER (OUTPATIENT)
Dept: INFUSION CENTER | Facility: HOSPITAL | Age: 63
Discharge: HOME | End: 2018-07-27
Payer: COMMERCIAL

## 2018-07-27 VITALS
SYSTOLIC BLOOD PRESSURE: 130 MMHG | RESPIRATION RATE: 16 BRPM | OXYGEN SATURATION: 98 % | TEMPERATURE: 97.2 F | HEART RATE: 103 BPM | DIASTOLIC BLOOD PRESSURE: 64 MMHG

## 2018-07-27 PROCEDURE — 96365 THER/PROPH/DIAG IV INF INIT: CPT

## 2018-07-27 RX ADMIN — CEFTRIAXONE 2000 MG: 2 INJECTION, SOLUTION INTRAVENOUS at 12:03

## 2018-07-28 ENCOUNTER — HOSPITAL ENCOUNTER (OUTPATIENT)
Dept: INFUSION CENTER | Facility: HOSPITAL | Age: 63
Discharge: HOME | End: 2018-07-28
Payer: COMMERCIAL

## 2018-07-28 VITALS
OXYGEN SATURATION: 96 % | DIASTOLIC BLOOD PRESSURE: 74 MMHG | HEART RATE: 103 BPM | RESPIRATION RATE: 16 BRPM | SYSTOLIC BLOOD PRESSURE: 133 MMHG | TEMPERATURE: 97.6 F

## 2018-07-28 PROCEDURE — 96365 THER/PROPH/DIAG IV INF INIT: CPT

## 2018-07-28 RX ADMIN — CEFTRIAXONE 2000 MG: 2 INJECTION, SOLUTION INTRAVENOUS at 09:50

## 2018-07-30 ENCOUNTER — HOSPITAL ENCOUNTER (OUTPATIENT)
Dept: INFUSION CENTER | Facility: HOSPITAL | Age: 63
Discharge: HOME | End: 2018-07-30
Payer: COMMERCIAL

## 2018-07-30 VITALS
OXYGEN SATURATION: 98 % | RESPIRATION RATE: 18 BRPM | SYSTOLIC BLOOD PRESSURE: 119 MMHG | HEART RATE: 94 BPM | DIASTOLIC BLOOD PRESSURE: 65 MMHG | TEMPERATURE: 97.3 F

## 2018-07-30 PROCEDURE — 96365 THER/PROPH/DIAG IV INF INIT: CPT

## 2018-07-30 RX ADMIN — CEFTRIAXONE 2000 MG: 2 INJECTION, SOLUTION INTRAVENOUS at 12:06

## 2018-07-31 ENCOUNTER — APPOINTMENT (OUTPATIENT)
Dept: WOUND CARE | Facility: HOSPITAL | Age: 63
End: 2018-07-31
Payer: COMMERCIAL

## 2018-07-31 ENCOUNTER — HOSPITAL ENCOUNTER (OUTPATIENT)
Dept: INFUSION CENTER | Facility: HOSPITAL | Age: 63
Discharge: HOME | End: 2018-07-31
Payer: COMMERCIAL

## 2018-07-31 VITALS
DIASTOLIC BLOOD PRESSURE: 67 MMHG | RESPIRATION RATE: 16 BRPM | HEART RATE: 93 BPM | OXYGEN SATURATION: 99 % | SYSTOLIC BLOOD PRESSURE: 151 MMHG | TEMPERATURE: 98.1 F

## 2018-07-31 PROCEDURE — 96365 THER/PROPH/DIAG IV INF INIT: CPT

## 2018-07-31 PROCEDURE — 11042 DBRDMT SUBQ TIS 1ST 20SQCM/<: CPT

## 2018-07-31 RX ADMIN — CEFTRIAXONE 2000 MG: 2 INJECTION, SOLUTION INTRAVENOUS at 13:19

## 2018-08-01 ENCOUNTER — HOSPITAL ENCOUNTER (OUTPATIENT)
Dept: INFUSION CENTER | Facility: HOSPITAL | Age: 63
Discharge: HOME | End: 2018-08-01
Payer: COMMERCIAL

## 2018-08-01 VITALS
OXYGEN SATURATION: 96 % | SYSTOLIC BLOOD PRESSURE: 129 MMHG | HEART RATE: 100 BPM | TEMPERATURE: 98.9 F | DIASTOLIC BLOOD PRESSURE: 79 MMHG | RESPIRATION RATE: 16 BRPM

## 2018-08-01 PROCEDURE — 96365 THER/PROPH/DIAG IV INF INIT: CPT

## 2018-08-01 RX ADMIN — CEFTRIAXONE 2000 MG: 2 INJECTION, SOLUTION INTRAVENOUS at 13:48

## 2018-08-02 ENCOUNTER — HOSPITAL ENCOUNTER (OUTPATIENT)
Dept: INFUSION CENTER | Facility: HOSPITAL | Age: 63
Discharge: HOME | End: 2018-08-02
Payer: COMMERCIAL

## 2018-08-02 VITALS
RESPIRATION RATE: 16 BRPM | TEMPERATURE: 98.1 F | SYSTOLIC BLOOD PRESSURE: 143 MMHG | OXYGEN SATURATION: 97 % | HEART RATE: 95 BPM | DIASTOLIC BLOOD PRESSURE: 60 MMHG

## 2018-08-02 PROCEDURE — 96365 THER/PROPH/DIAG IV INF INIT: CPT

## 2018-08-02 RX ADMIN — CEFTRIAXONE 2000 MG: 2 INJECTION, SOLUTION INTRAVENOUS at 13:14

## 2018-08-03 ENCOUNTER — HOSPITAL ENCOUNTER (OUTPATIENT)
Dept: INFUSION CENTER | Facility: HOSPITAL | Age: 63
Discharge: HOME | End: 2018-08-03
Payer: COMMERCIAL

## 2018-08-03 VITALS
OXYGEN SATURATION: 96 % | HEART RATE: 92 BPM | DIASTOLIC BLOOD PRESSURE: 65 MMHG | SYSTOLIC BLOOD PRESSURE: 121 MMHG | RESPIRATION RATE: 16 BRPM | TEMPERATURE: 99.2 F

## 2018-08-03 PROCEDURE — 96365 THER/PROPH/DIAG IV INF INIT: CPT

## 2018-08-03 RX ADMIN — CEFTRIAXONE 2000 MG: 2 INJECTION, SOLUTION INTRAVENOUS at 14:15

## 2018-08-04 ENCOUNTER — HOSPITAL ENCOUNTER (OUTPATIENT)
Dept: INFUSION CENTER | Facility: HOSPITAL | Age: 63
Discharge: HOME | End: 2018-08-04
Payer: COMMERCIAL

## 2018-08-04 PROCEDURE — 96365 THER/PROPH/DIAG IV INF INIT: CPT

## 2018-08-04 RX ADMIN — CEFTRIAXONE 2000 MG: 2 INJECTION, SOLUTION INTRAVENOUS at 09:44

## 2018-08-05 ENCOUNTER — HOSPITAL ENCOUNTER (OUTPATIENT)
Dept: INFUSION CENTER | Facility: HOSPITAL | Age: 63
Discharge: HOME | End: 2018-08-05
Payer: COMMERCIAL

## 2018-08-05 VITALS
HEART RATE: 88 BPM | DIASTOLIC BLOOD PRESSURE: 78 MMHG | RESPIRATION RATE: 18 BRPM | TEMPERATURE: 97.2 F | SYSTOLIC BLOOD PRESSURE: 132 MMHG

## 2018-08-05 PROCEDURE — 96365 THER/PROPH/DIAG IV INF INIT: CPT

## 2018-08-05 RX ADMIN — CEFTRIAXONE 2000 MG: 2 INJECTION, SOLUTION INTRAVENOUS at 11:39

## 2018-08-06 ENCOUNTER — OFFICE VISIT (OUTPATIENT)
Dept: OBGYN CLINIC | Facility: CLINIC | Age: 63
End: 2018-08-06

## 2018-08-06 ENCOUNTER — HOSPITAL ENCOUNTER (OUTPATIENT)
Dept: INFUSION CENTER | Facility: HOSPITAL | Age: 63
Discharge: HOME | End: 2018-08-06
Payer: COMMERCIAL

## 2018-08-06 VITALS
DIASTOLIC BLOOD PRESSURE: 70 MMHG | BODY MASS INDEX: 27.4 KG/M2 | HEART RATE: 101 BPM | WEIGHT: 191.4 LBS | HEIGHT: 70 IN | SYSTOLIC BLOOD PRESSURE: 110 MMHG

## 2018-08-06 VITALS
OXYGEN SATURATION: 95 % | DIASTOLIC BLOOD PRESSURE: 72 MMHG | HEART RATE: 85 BPM | RESPIRATION RATE: 18 BRPM | SYSTOLIC BLOOD PRESSURE: 138 MMHG | TEMPERATURE: 98.5 F

## 2018-08-06 DIAGNOSIS — M00.9 CHRONIC INFECTION OF RIGHT KNEE (HCC): Primary | ICD-10-CM

## 2018-08-06 PROCEDURE — 96365 THER/PROPH/DIAG IV INF INIT: CPT

## 2018-08-06 PROCEDURE — 99024 POSTOP FOLLOW-UP VISIT: CPT | Performed by: ORTHOPAEDIC SURGERY

## 2018-08-06 RX ORDER — OXYCODONE HYDROCHLORIDE AND ACETAMINOPHEN 5; 325 MG/1; MG/1
TABLET ORAL
COMMUNITY
Start: 2018-06-28

## 2018-08-06 RX ORDER — TRAMADOL HYDROCHLORIDE AND ACETAMINOPHEN 37.5; 325 MG/1; MG/1
TABLET ORAL
COMMUNITY
Start: 2018-07-20

## 2018-08-06 RX ORDER — FLUTICASONE PROPIONATE 50 MCG
SPRAY, SUSPENSION (ML) NASAL
COMMUNITY
Start: 2018-07-13

## 2018-08-06 RX ADMIN — CEFTRIAXONE 2000 MG: 2 INJECTION, SOLUTION INTRAVENOUS at 11:01

## 2018-08-06 NOTE — PROGRESS NOTES
Assessment/Plan:  1  Chronic infection of right knee ([address] )         Scribe Attestation    I,:   Cristiano Salinas am acting as a scribe while in the presence of the attending physician :        I,:   Job Woodward MD personally performed the services described in this documentation    as scribed in my presence :          Preet's right knee has improved since his last evaluation  I am pleased that he has been more compliant with his wound care  I expressed the importance of continued compliance with his wound care appointments to avoid further complications  He also was having IV antibiotic treatment  We did apply a dry dressing in the office today  I would like to see him back in 3 weeks for follow up evaluation  Subjective:   Mel Bishop is a 66 y o  male who presents today for follow up evaluation of chronic infection of the right knee  He states he has been keeping his wound care appointments and getting infusions  He is being treated by Dr Rocky Penaloza states he feels like he is improving and has less pain than before  He is also able to lie prone without issue  At today's visit, he complains of intermittent pins and needles under his skin around the open portion of the healing wound  He reports that his knee drains yellow fluid when his packing is changed  He denies any new injury or trauma  Review of Systems   Constitutional: Positive for unexpected weight change  Negative for chills and fever  Weight loss   HENT: Positive for hearing loss and sore throat  Negative for nosebleeds  Eyes: Positive for pain, redness and visual disturbance  Respiratory: Negative for cough, shortness of breath and wheezing  Cardiovascular: Negative for chest pain, palpitations and leg swelling  Gastrointestinal: Negative for abdominal pain, nausea and vomiting  Endocrine: Positive for polydipsia  Negative for polyphagia and polyuria  Genitourinary: Negative for dysuria and hematuria  Musculoskeletal: Positive for arthralgias and myalgias  Negative for joint swelling  See HPI   Skin: Negative for rash and wound  Neurological: Negative for dizziness, numbness and headaches  Psychiatric/Behavioral: Negative for decreased concentration and suicidal ideas  The patient is nervous/anxious  Past Medical History:   Diagnosis Date    Arthritis     Borderline diabetes     Depression     Hyperlipidemia     Hypertension        Past Surgical History:   Procedure Laterality Date    INCISION AND DRAINAGE OF WOUND Right 6/14/2018    Procedure: INCISION AND DRAINAGE (I&D) EXTREMITY AND COMPLEX WOUND CLOSURE;  Surgeon: Samm Bryant MD;  Location: [address] [place] [address];  Service: Orthopedics    LUMBAR EPIDURAL INJECTION      SHOULDER SURGERY Left     SPINE SURGERY      WOUND DEBRIDEMENT Right 6/8/2018    Procedure: RIGHT KNEE ARTHROSCOPY, IRRIGATION AND DEBRIDEMENT; RIGHT KNEE IRRIGATION AND EXTENSIVE DEBRIDEMENT OF INFECTED BURSA; Surgeon: Samm Bryant MD;  Location: [address] [place] [address];  Service: Orthopedics    WOUND DEBRIDEMENT Right 6/11/2018    Procedure: open DEBRIDEMENT patellar bursa, arthroscopic right knee joint irrigation and debridement;  Surgeon: Samm Bryant MD;  Location: [address] [place] [address];  Service: Orthopedics       History reviewed  No pertinent family history  Social History     Occupational History    Not on file       Social History Main Topics    Smoking status: Former Smoker     Years: 10 00     Types: Cigars    Smokeless tobacco: Never Used    Alcohol use No    Drug use: No    Sexual activity: No         Current Outpatient Prescriptions:     acetaminophen (TYLENOL) 325 mg tablet, Take 2 tablets (650 mg total) by mouth every 6 (six) hours as needed for mild pain, headaches or fever, Disp: 30 tablet, Rfl: 0    ALPRAZolam (XANAX) 0 5 mg tablet, Take 0 5 mg by mouth 2 (two) times a day as needed  , Disp: , Rfl:     aspirin 325 mg tablet, Take 1 tablet (325 mg total) by mouth daily, Disp: 30 tablet, Rfl: 0    calamine-zinc oxide 8-8 %, Apply topically 2 (two) times a day, Disp: 118 mL, Rfl: 0    cefTRIAXone (ROCEPHIN) 2000 mg IVPB, Infuse 50 mL (2,000 mg total) into a venous catheter every 24 hours for 13 doses, Disp: 650 mL, Rfl: 0    clobetasol (TEMOVATE) 0 05 % cream, Apply topically 2 (two) times a day, Disp: 30 g, Rfl: 0    diphenhydrAMINE-zinc acetate (BENADRYL) cream, Apply topically 3 (three) times a day as needed for itching, Disp: 28 4 g, Rfl: 0    ENALAPRIL MALEATE PO, Take 5 mg by mouth 3 (three) times a day  , Disp: , Rfl:     ferrous sulfate 325 (65 Fe) mg tablet, Take 1 tablet (325 mg total) by mouth daily with breakfast, Disp: 30 tablet, Rfl: 0    fluticasone (FLONASE) 50 mcg/act nasal spray, , Disp: , Rfl:     ibuprofen (MOTRIN) 400 mg tablet, Take by mouth every 6 (six) hours as needed for mild pain, Disp: , Rfl:     metFORMIN (GLUCOPHAGE) 1000 MG tablet, Take 1,000 mg by mouth daily with breakfast, Disp: , Rfl:     oxyCODONE-acetaminophen (PERCOCET) 5-325 mg per tablet, , Disp: , Rfl:     rosuvastatin (CRESTOR) 10 MG tablet, Take 10 mg by mouth daily  , Disp: , Rfl:     ULTRACET 37 5-325 MG per tablet, , Disp: , Rfl:     venlafaxine (EFFEXOR) 75 mg tablet, Take 75 mg by mouth 2 (two) times a day, Disp: , Rfl:     zolpidem (AMBIEN) 10 mg tablet, Take 10 mg by mouth daily at bedtime as needed for sleep, Disp: , Rfl:     docusate sodium (COLACE) 100 mg capsule, Take 1 capsule (100 mg total) by mouth 2 (two) times a day for 30 days, Disp: 60 capsule, Rfl: 0  No current facility-administered medications for this visit       Facility-Administered Medications Ordered in Other Visits:     cefTRIAXone (ROCEPHIN) IVPB (premix) 2,000 mg, 2,000 mg, Intravenous, Once, SANCHO Rollins    [START ON 8/7/2018] cefTRIAXone (ROCEPHIN) IVPB (premix) 2,000 mg, 2,000 mg, Intravenous, Once, Melani Galicia MD    Allergies   Allergen Reactions    Iodine Anaphylaxis    Shellfish-Derived Products        Objective:  Vitals:    08/06/18 0952   BP: 110/70   Pulse: 101       Right Knee Exam     Other   Erythema: absent  Scars: present  Sensation: normal  Swelling: none            Physical Exam   Musculoskeletal:        Legs:  Abrasion from scratching

## 2018-08-07 ENCOUNTER — HOSPITAL ENCOUNTER (OUTPATIENT)
Dept: INFUSION CENTER | Facility: HOSPITAL | Age: 63
Discharge: HOME | End: 2018-08-07
Payer: COMMERCIAL

## 2018-08-07 VITALS
SYSTOLIC BLOOD PRESSURE: 138 MMHG | RESPIRATION RATE: 18 BRPM | HEART RATE: 90 BPM | OXYGEN SATURATION: 95 % | DIASTOLIC BLOOD PRESSURE: 82 MMHG | TEMPERATURE: 97.9 F

## 2018-08-07 PROCEDURE — 96365 THER/PROPH/DIAG IV INF INIT: CPT

## 2018-08-07 RX ADMIN — CEFTRIAXONE 2000 MG: 2 INJECTION, SOLUTION INTRAVENOUS at 15:15

## 2018-08-14 ENCOUNTER — APPOINTMENT (OUTPATIENT)
Dept: WOUND CARE | Facility: HOSPITAL | Age: 63
End: 2018-08-14
Payer: COMMERCIAL

## 2018-08-14 PROCEDURE — 99213 OFFICE O/P EST LOW 20 MIN: CPT

## 2018-08-14 PROCEDURE — G0463 HOSPITAL OUTPT CLINIC VISIT: HCPCS

## 2018-08-27 VITALS — BODY MASS INDEX: 27.2 KG/M2 | WEIGHT: 190 LBS | HEIGHT: 70 IN

## 2018-08-27 DIAGNOSIS — M00.9 CHRONIC INFECTION OF RIGHT KNEE (HCC): Primary | ICD-10-CM

## 2018-08-27 PROCEDURE — 99024 POSTOP FOLLOW-UP VISIT: CPT | Performed by: ORTHOPAEDIC SURGERY

## 2018-08-27 NOTE — PROGRESS NOTES
Assessment/Plan:  1  Chronic infection of right knee ([address] )         Scribe Attestation    I,:   Mariel Motley MA am acting as a scribe while in the presence of the attending physician :        I,:   Sandra Little MD personally performed the services described in this documentation    as scribed in my presence :            He is to continue with his regular scheduled  wound care appointments and his home dressing changed  His wound looks healthy no signs of infection  His wound today is  approximately 4 cm by 3 cm and 5 mm in depth pink granulation tissue is present and active bleeding in the office today  A clean dressing was placed in the office today  He can follow up with me as needed  Subjective:   Debra Herring is a 66 y o  male who presents to the office for a 3 week follow up regarding his right knee infection  He is approximately 2 months s/p open debridement and irrigation of right patella bursa performed on 6/8/18  He states he has been doing home dressing changes as instructed by wound care  He is continuing to follow up with wound care for this  He states he was doing a dressing change last night and noticed a little more blood colored drainage than normal        Review of Systems   Constitutional: Positive for unexpected weight change  Negative for chills and fever  HENT: Positive for hearing loss  Negative for drooling and sneezing  Eyes: Negative for redness  Respiratory: Negative for cough and wheezing  Gastrointestinal: Negative for nausea and vomiting  Musculoskeletal: Positive for joint swelling  Negative for arthralgias and gait problem  Skin: Positive for wound  Neurological: Negative for weakness and numbness  Psychiatric/Behavioral: Negative for behavioral problems  The patient is not nervous/anxious            Past Medical History:   Diagnosis Date    Arthritis     Borderline diabetes     Depression     Hyperlipidemia     Hypertension        Past Surgical History:   Procedure Laterality Date    INCISION AND DRAINAGE OF WOUND Right 6/14/2018    Procedure: INCISION AND DRAINAGE (I&D) EXTREMITY AND COMPLEX WOUND CLOSURE;  Surgeon: Elvia Tapia MD;  Location: [address] [place] [address];  Service: Orthopedics    LUMBAR EPIDURAL INJECTION      SHOULDER SURGERY Left     SPINE SURGERY      WOUND DEBRIDEMENT Right 6/8/2018    Procedure: RIGHT KNEE ARTHROSCOPY, IRRIGATION AND DEBRIDEMENT; RIGHT KNEE IRRIGATION AND EXTENSIVE DEBRIDEMENT OF INFECTED BURSA; Surgeon: Elvia Tapia MD;  Location: [address] [place] [address];  Service: Orthopedics    WOUND DEBRIDEMENT Right 6/11/2018    Procedure: open DEBRIDEMENT patellar bursa, arthroscopic right knee joint irrigation and debridement;  Surgeon: Elvia Tapia MD;  Location: [address] [place] [address];  Service: Orthopedics       History reviewed  No pertinent family history  Social History     Occupational History    Not on file       Social History Main Topics    Smoking status: Former Smoker     Years: 10 00     Types: Cigars    Smokeless tobacco: Never Used    Alcohol use No    Drug use: No    Sexual activity: No         Current Outpatient Prescriptions:     acetaminophen (TYLENOL) 325 mg tablet, Take 2 tablets (650 mg total) by mouth every 6 (six) hours as needed for mild pain, headaches or fever, Disp: 30 tablet, Rfl: 0    ALPRAZolam (XANAX) 0 5 mg tablet, Take 0 5 mg by mouth 2 (two) times a day as needed  , Disp: , Rfl:     aspirin 325 mg tablet, Take 1 tablet (325 mg total) by mouth daily, Disp: 30 tablet, Rfl: 0    calamine-zinc oxide 8-8 %, Apply topically 2 (two) times a day, Disp: 118 mL, Rfl: 0    clobetasol (TEMOVATE) 0 05 % cream, Apply topically 2 (two) times a day, Disp: 30 g, Rfl: 0    diphenhydrAMINE-zinc acetate (BENADRYL) cream, Apply topically 3 (three) times a day as needed for itching, Disp: 28 4 g, Rfl: 0    ENALAPRIL MALEATE PO, Take 5 mg by mouth 3 (three) times a day  , Disp: , Rfl:     ferrous sulfate 325 (65 Fe) mg tablet, Take 1 tablet (325 mg total) by mouth daily with breakfast, Disp: 30 tablet, Rfl: 0    fluticasone (FLONASE) 50 mcg/act nasal spray, , Disp: , Rfl:     ibuprofen (MOTRIN) 400 mg tablet, Take by mouth every 6 (six) hours as needed for mild pain, Disp: , Rfl:     metFORMIN (GLUCOPHAGE) 1000 MG tablet, Take 1,000 mg by mouth daily with breakfast, Disp: , Rfl:     oxyCODONE-acetaminophen (PERCOCET) 5-325 mg per tablet, , Disp: , Rfl:     rosuvastatin (CRESTOR) 10 MG tablet, Take 10 mg by mouth daily  , Disp: , Rfl:     ULTRACET 37 5-325 MG per tablet, , Disp: , Rfl:     venlafaxine (EFFEXOR) 75 mg tablet, Take 75 mg by mouth 2 (two) times a day, Disp: , Rfl:     zolpidem (AMBIEN) 10 mg tablet, Take 10 mg by mouth daily at bedtime as needed for sleep, Disp: , Rfl:     docusate sodium (COLACE) 100 mg capsule, Take 1 capsule (100 mg total) by mouth 2 (two) times a day for 30 days, Disp: 60 capsule, Rfl: 0    Allergies   Allergen Reactions    Iodine Anaphylaxis    Shellfish-Derived Products        Objective: There were no vitals filed for this visit  Right Knee Exam     Range of Motion   Extension: 0   Flexion: 110     Other   Sensation: normal  Pulse: present  Swelling: mild    Comments:  Wound- Approximately 4 cm by 3 cm 5 mm in depth pink granulation tissue present and actively bleeding  Physical Exam   Constitutional: He is oriented to person, place, and time  He appears well-developed and well-nourished  HENT:   Head: Atraumatic  Eyes: Conjunctivae are normal    Neck: Normal range of motion  Cardiovascular: Normal rate  Pulmonary/Chest: Effort normal    Musculoskeletal:   As noted in HPI   Neurological: He is alert and oriented to person, place, and time  Skin: Skin is warm and dry  Psychiatric: He has a normal mood and affect   His behavior is normal  Judgment normal

## 2018-08-28 ENCOUNTER — APPOINTMENT (OUTPATIENT)
Dept: WOUND CARE | Facility: HOSPITAL | Age: 63
End: 2018-08-28
Payer: COMMERCIAL

## 2018-08-28 PROCEDURE — G0463 HOSPITAL OUTPT CLINIC VISIT: HCPCS

## 2018-08-28 PROCEDURE — 99213 OFFICE O/P EST LOW 20 MIN: CPT

## 2018-09-18 ENCOUNTER — APPOINTMENT (OUTPATIENT)
Dept: WOUND CARE | Facility: HOSPITAL | Age: 63
End: 2018-09-18
Payer: COMMERCIAL

## 2018-09-18 PROCEDURE — 97597 DBRDMT OPN WND 1ST 20 CM/<: CPT

## 2018-10-02 ENCOUNTER — APPOINTMENT (OUTPATIENT)
Dept: WOUND CARE | Facility: HOSPITAL | Age: 63
End: 2018-10-02
Payer: COMMERCIAL

## 2018-10-02 PROCEDURE — 97597 DBRDMT OPN WND 1ST 20 CM/<: CPT

## 2018-10-16 ENCOUNTER — APPOINTMENT (OUTPATIENT)
Dept: WOUND CARE | Facility: HOSPITAL | Age: 63
End: 2018-10-16
Payer: COMMERCIAL

## 2018-10-16 PROCEDURE — 97597 DBRDMT OPN WND 1ST 20 CM/<: CPT

## 2018-10-30 ENCOUNTER — APPOINTMENT (OUTPATIENT)
Dept: WOUND CARE | Facility: HOSPITAL | Age: 63
End: 2018-10-30
Payer: COMMERCIAL

## 2018-10-30 PROCEDURE — 97597 DBRDMT OPN WND 1ST 20 CM/<: CPT

## 2018-11-13 ENCOUNTER — APPOINTMENT (OUTPATIENT)
Dept: WOUND CARE | Facility: HOSPITAL | Age: 63
End: 2018-11-13
Payer: COMMERCIAL

## 2018-11-13 PROCEDURE — 97597 DBRDMT OPN WND 1ST 20 CM/<: CPT

## 2018-12-04 ENCOUNTER — APPOINTMENT (OUTPATIENT)
Dept: WOUND CARE | Facility: HOSPITAL | Age: 63
End: 2018-12-04
Payer: COMMERCIAL

## 2018-12-04 PROCEDURE — 97597 DBRDMT OPN WND 1ST 20 CM/<: CPT

## 2018-12-18 ENCOUNTER — APPOINTMENT (OUTPATIENT)
Dept: WOUND CARE | Facility: HOSPITAL | Age: 63
End: 2018-12-18
Payer: COMMERCIAL

## 2018-12-18 PROCEDURE — 99212 OFFICE O/P EST SF 10 MIN: CPT

## 2018-12-18 PROCEDURE — G0463 HOSPITAL OUTPT CLINIC VISIT: HCPCS

## 2019-01-08 ENCOUNTER — APPOINTMENT (OUTPATIENT)
Dept: WOUND CARE | Facility: HOSPITAL | Age: 64
End: 2019-01-08
Payer: COMMERCIAL

## 2019-01-08 PROCEDURE — 99212 OFFICE O/P EST SF 10 MIN: CPT

## 2019-01-08 PROCEDURE — G0463 HOSPITAL OUTPT CLINIC VISIT: HCPCS

## 2023-11-01 NOTE — PROGRESS NOTES
Assessment/Plan:  1  Chronic infection of right knee [facility])  Ambulatory referral to Wound Care   2  Right calf pain  VAS lower limb venous duplex study, unilateral/limited       Jeffry Catalan is was sent for a stat ultrasound to rule out DVT  We also called the wound care center and they will see him today  There are no signs of active infection today though the wound is draining serous fluid  Sutures about the proximal and distal aspects of the wound were removed today as this is fully healed  The sutures about the central aspect of the wound remain in place  We will see him back in 1 week for repeat evaluation  Subjective:   Robert Hughes is a 66 y o  male who presents today for follow-up of his right knee  He is status post four washout for infected joint and infected bursa  He was last seen week ago and was supposed to be seeing the wound care center, however this was never arranged  He notes that his wound has still been draining clear fluid, similar to what it had been draining about a week ago  He denies any pus  He also denies any erythema  He notes a new onset of some calf pain since last visit  He denies any fever chills  He is still getting antibiotics via his PICC line  Review of Systems      Past Medical History:   Diagnosis Date    Borderline diabetes     Depression     Hyperlipidemia     Hypertension        Past Surgical History:   Procedure Laterality Date    INCISION AND DRAINAGE OF WOUND Right 6/14/2018    Procedure: INCISION AND DRAINAGE (I&D) EXTREMITY AND COMPLEX WOUND CLOSURE;  Surgeon: Christa Blackburn MD;  Location: [address] [place] [address];  Service: Orthopedics    LUMBAR EPIDURAL INJECTION      SHOULDER SURGERY Left     SPINE SURGERY      WOUND DEBRIDEMENT Right 6/8/2018    Procedure: RIGHT KNEE ARTHROSCOPY, IRRIGATION AND DEBRIDEMENT; RIGHT KNEE IRRIGATION AND EXTENSIVE DEBRIDEMENT OF INFECTED BURSA;   Surgeon: Christa Blackburn MD;  Location: [address] [place] [address];  Service: Orthopedics  WOUND DEBRIDEMENT Right 6/11/2018    Procedure: open DEBRIDEMENT patellar bursa, arthroscopic right knee joint irrigation and debridement;  Surgeon: Ashleigh Stafford MD;  Location: [address] [place] [address];  Service: Orthopedics       History reviewed  No pertinent family history  Social History     Occupational History    Not on file  Social History Main Topics    Smoking status: Current Some Day Smoker     Types: Cigars    Smokeless tobacco: Never Used    Alcohol use No    Drug use: No    Sexual activity: Not on file         Current Outpatient Prescriptions:     ALPRAZolam (XANAX) 0 5 mg tablet, Take 0 5 mg by mouth 2 (two) times a day as needed  , Disp: , Rfl:     calamine-zinc oxide 8-8 %, Apply topically 2 (two) times a day, Disp: 118 mL, Rfl: 0    cefTRIAXone (ROCEPHIN) 2000 mg IVPB, Infuse 50 mL (2,000 mg total) into a venous catheter every 24 hours for 30 days, Disp: , Rfl: 0    clobetasol (TEMOVATE) 0 05 % cream, Apply topically 2 (two) times a day, Disp: 30 g, Rfl: 0    diphenhydrAMINE-zinc acetate (BENADRYL) cream, Apply topically 3 (three) times a day as needed for itching, Disp: 28 4 g, Rfl: 0    docusate sodium (COLACE) 100 mg capsule, Take 1 capsule (100 mg total) by mouth 2 (two) times a day for 30 days, Disp: 60 capsule, Rfl: 0    ENALAPRIL MALEATE PO, Take 5 mg by mouth 3 (three) times a day  , Disp: , Rfl:     ibuprofen (MOTRIN) 400 mg tablet, Take by mouth every 6 (six) hours as needed for mild pain, Disp: , Rfl:     metFORMIN (GLUCOPHAGE) 1000 MG tablet, Take 1,000 mg by mouth daily with breakfast, Disp: , Rfl:     nystatin (MYCOSTATIN) powder, Apply topically 4 (four) times a day, Disp: , Rfl:     rosuvastatin (CRESTOR) 10 MG tablet, Take 10 mg by mouth daily  , Disp: , Rfl:     saccharomyces boulardii (FLORASTOR) 250 mg capsule, Take 1 capsule (250 mg total) by mouth 2 (two) times a day for 30 days, Disp: 60 capsule, Rfl: 0    venlafaxine (EFFEXOR) 75 mg tablet, Take 75 mg by mouth 2 (two) times a day, Disp: , Rfl:     zolpidem (AMBIEN) 10 mg tablet, Take 10 mg by mouth daily at bedtime as needed for sleep, Disp: , Rfl:     ibuprofen (MOTRIN) 400 mg tablet, Take 1 tablet (400 mg total) by mouth every 8 (eight) hours as needed for mild pain or moderate pain for up to 10 days, Disp: 20 tablet, Rfl: 0    nystatin (MYCOSTATIN) powder, Apply 1 application topically 2 (two) times a day for 10 days, Disp: 30 g, Rfl: 0  No current facility-administered medications for this visit  Facility-Administered Medications Ordered in Other Visits:     cefTRIAXone (ROCEPHIN) IVPB (premix) 2,000 mg, 2,000 mg, Intravenous, Once, MD Isaac Velazquez  [START ON 7/3/2018] cefTRIAXone (ROCEPHIN) IVPB (premix) 2,000 mg, 2,000 mg, Intravenous, Once, MD Isaac Velazquez  [START ON 7/4/2018] cefTRIAXone (ROCEPHIN) IVPB (premix) 2,000 mg, 2,000 mg, Intravenous, Once, MD Isaac Velazquez  [START ON 7/5/2018] cefTRIAXone (ROCEPHIN) IVPB (premix) 2,000 mg, 2,000 mg, Intravenous, Once, Rudolph Moscoso MD    Allergies   Allergen Reactions    Iodine Anaphylaxis    Shellfish-Derived Products        Objective:  Vitals:    07/02/18 1016   BP: 100/65   Pulse: (!) 114       Ortho Exam    Physical Exam    Right knee:  Large anterior knee incision has good healing about the proximal and distal aspects of it  The center is not fully healed  Sutures are still intact  There is serous drainage from the wound but no pus  No surrounding erythema  Calf tenderness noted  - - -

## 2024-08-26 NOTE — POST OP PROGRESS NOTES
And is now postoperative day 1  Status post right knee irrigation debridement of grossly infected bursa as well as irrigation and debridement of infected joint  He states that his pain is much better today  He has a VAC sponge that is on and working well  Physical examination:  The right knee has far less swelling and less erythema today on examination  The VAC sponge is working nicely and is intact  He has good neurovascular function into the right lower extremity  He does have a white blood cell count is trending down quite nicely  Assessment:  Right knee prepatellar septic bursitis as well as septic arthritis    Plan:  Ed is having a good recovery although I would like to irrigate and debride the right knee further on Monday  We will also change the VAC sponge then  We could consider performing a closure at that point if the wound truly looks good or we may consider closing at a later date  Ambulatory

## (undated) DEVICE — SPONGE GAUZE 4 X 9

## (undated) DEVICE — ACE WRAP 6 IN STERILE

## (undated) DEVICE — HANDPIECE SET WITH HIGH FLOW TIP AND SUCTION TUBE: Brand: INTERPULSE

## (undated) DEVICE — FABRIC REINFORCED, SURGICAL GOWN, XL: Brand: CONVERTORS

## (undated) DEVICE — TUBING ARTHROSCOPIC WAVE  MAIN PUMP

## (undated) DEVICE — DRESSING MEPILEX AG BORDER 4 X 10 IN

## (undated) DEVICE — 3M™ TEGADERM™ TRANSPARENT FILM DRESSING FRAME STYLE, 1624W, 2-3/8 IN X 2-3/4 IN (6 CM X 7 CM), 100/CT 4CT/CASE: Brand: 3M™ TEGADERM™

## (undated) DEVICE — INTENDED FOR TISSUE SEPARATION, AND OTHER PROCEDURES THAT REQUIRE A SHARP SURGICAL BLADE TO PUNCTURE OR CUT.: Brand: BARD-PARKER SAFETY BLADES SIZE 11, STERILE

## (undated) DEVICE — 3M™ TEGADERM™ TRANSPARENT FILM DRESSING FRAME STYLE, 1626W, 4 IN X 4-3/4 IN (10 CM X 12 CM), 50/CT 4CT/CASE: Brand: 3M™ TEGADERM™

## (undated) DEVICE — VAC DRESSING SENSATRAC RND

## (undated) DEVICE — PADDING CAST 6IN COTTON STRL

## (undated) DEVICE — TUBING ARTHROSCOPY REDUCE PUMP

## (undated) DEVICE — DRAPE SHEET THREE QUARTER

## (undated) DEVICE — GLOVE INDICATOR PI UNDERGLOVE SZ 7.5 BLUE

## (undated) DEVICE — DRAIN HEMOVAC 1/4 IN KIT

## (undated) DEVICE — BLADE SHAVER EXCALIBUR 4MM 13CM COOLCUT

## (undated) DEVICE — REPEL CUT REST SURGICAL GLV LNRS LG: Brand: REPEL

## (undated) DEVICE — STOCKINETTE REGULAR

## (undated) DEVICE — PACK GENERAL LF

## (undated) DEVICE — GLOVE SRG BIOGEL 8

## (undated) DEVICE — SUT ETHILON 2-0 FS 18 IN 664H

## (undated) DEVICE — GLOVE INDICATOR PI UNDERGLOVE SZ 6.5 BLUE

## (undated) DEVICE — IMMOBILIZER KNEE UNIVERSAL 22 IN

## (undated) DEVICE — CULTURE TUBE ANAEROBIC

## (undated) DEVICE — EXTREMITY DRAPE W/ARMBOARD COVERS: Brand: CONVERTORS

## (undated) DEVICE — CULTURE TUBE AEROBIC

## (undated) DEVICE — SUT ETHILON 3-0 FSL 30 IN 1671H

## (undated) DEVICE — BASIC DOUBLE BASIN 2-LF: Brand: MEDLINE INDUSTRIES, INC.

## (undated) DEVICE — GLOVE INDICATOR PI UNDERGLOVE SZ 7 BLUE

## (undated) DEVICE — 3M™ V.A.C.® GRANUFOAM™ DRESSING KIT, M8275052, MEDIUM: Brand: 3M™ V.A.C.® GRANUFOAM™

## (undated) DEVICE — GLOVE INDICATOR PI UNDERGLOVE SZ 8 BLUE

## (undated) DEVICE — VAC CANISTER 500ML

## (undated) DEVICE — OCCLUSIVE GAUZE STRIP,3% BISMUTH TRIBROMOPHENATE IN PETROLATUM BLEND: Brand: XEROFORM

## (undated) DEVICE — COBAN 4 IN STERILE

## (undated) DEVICE — SUT PDS II 1 CT-1 27 IN Z341H

## (undated) DEVICE — SUT PDS II 2-0 CT-1 27 IN Z339H

## (undated) DEVICE — GLOVE SRG BIOGEL 7.5

## (undated) DEVICE — PACK ARTHROSCOPY

## (undated) DEVICE — GLOVE SRG BIOGEL 7

## (undated) DEVICE — SPONGE LAP 18 X 18 IN STRL RFD

## (undated) DEVICE — GLOVE SRG BIOGEL M SRGS 7.5

## (undated) DEVICE — GAUZE SPONGES,16 PLY: Brand: CURITY

## (undated) DEVICE — GLOVE SRG BIOGEL 6.5